# Patient Record
Sex: FEMALE | Race: WHITE | NOT HISPANIC OR LATINO
[De-identification: names, ages, dates, MRNs, and addresses within clinical notes are randomized per-mention and may not be internally consistent; named-entity substitution may affect disease eponyms.]

---

## 2017-01-20 ENCOUNTER — MOBILE ON CALL (OUTPATIENT)
Age: 57
End: 2017-01-20

## 2017-02-27 ENCOUNTER — MEDICATION RENEWAL (OUTPATIENT)
Age: 57
End: 2017-02-27

## 2017-03-01 ENCOUNTER — MEDICATION RENEWAL (OUTPATIENT)
Age: 57
End: 2017-03-01

## 2017-03-01 ENCOUNTER — TRANSCRIPTION ENCOUNTER (OUTPATIENT)
Age: 57
End: 2017-03-01

## 2017-03-20 ENCOUNTER — MEDICATION RENEWAL (OUTPATIENT)
Age: 57
End: 2017-03-20

## 2017-04-18 ENCOUNTER — MEDICATION RENEWAL (OUTPATIENT)
Age: 57
End: 2017-04-18

## 2017-05-03 ENCOUNTER — FORM ENCOUNTER (OUTPATIENT)
Age: 57
End: 2017-05-03

## 2017-05-04 ENCOUNTER — APPOINTMENT (OUTPATIENT)
Dept: FAMILY MEDICINE | Facility: CLINIC | Age: 57
End: 2017-05-04

## 2017-05-04 ENCOUNTER — OUTPATIENT (OUTPATIENT)
Dept: OUTPATIENT SERVICES | Facility: HOSPITAL | Age: 57
LOS: 1 days | End: 2017-05-04
Payer: COMMERCIAL

## 2017-05-04 ENCOUNTER — APPOINTMENT (OUTPATIENT)
Dept: RADIOLOGY | Facility: HOSPITAL | Age: 57
End: 2017-05-04

## 2017-05-04 VITALS
HEART RATE: 67 BPM | DIASTOLIC BLOOD PRESSURE: 90 MMHG | OXYGEN SATURATION: 97 % | RESPIRATION RATE: 14 BRPM | WEIGHT: 275 LBS | HEIGHT: 67 IN | BODY MASS INDEX: 43.16 KG/M2 | TEMPERATURE: 98.4 F | SYSTOLIC BLOOD PRESSURE: 140 MMHG

## 2017-05-04 DIAGNOSIS — M54.31 SCIATICA, RIGHT SIDE: ICD-10-CM

## 2017-05-04 PROCEDURE — 72100 X-RAY EXAM L-S SPINE 2/3 VWS: CPT

## 2017-05-05 ENCOUNTER — CHART COPY (OUTPATIENT)
Age: 57
End: 2017-05-05

## 2017-05-10 ENCOUNTER — MESSAGE (OUTPATIENT)
Age: 57
End: 2017-05-10

## 2017-05-11 ENCOUNTER — MEDICATION RENEWAL (OUTPATIENT)
Age: 57
End: 2017-05-11

## 2017-05-18 ENCOUNTER — MESSAGE (OUTPATIENT)
Age: 57
End: 2017-05-18

## 2017-05-26 ENCOUNTER — MEDICATION RENEWAL (OUTPATIENT)
Age: 57
End: 2017-05-26

## 2017-06-01 ENCOUNTER — MESSAGE (OUTPATIENT)
Age: 57
End: 2017-06-01

## 2017-06-05 ENCOUNTER — APPOINTMENT (OUTPATIENT)
Dept: ORTHOPEDIC SURGERY | Facility: CLINIC | Age: 57
End: 2017-06-05

## 2017-06-05 VITALS
WEIGHT: 270 LBS | HEIGHT: 67 IN | SYSTOLIC BLOOD PRESSURE: 152 MMHG | HEART RATE: 70 BPM | DIASTOLIC BLOOD PRESSURE: 75 MMHG | BODY MASS INDEX: 42.38 KG/M2

## 2017-06-05 VITALS — BODY MASS INDEX: 42.38 KG/M2 | WEIGHT: 270 LBS | HEIGHT: 67 IN

## 2017-06-05 DIAGNOSIS — M43.17 SPONDYLOLISTHESIS, LUMBOSACRAL REGION: ICD-10-CM

## 2017-06-06 ENCOUNTER — MESSAGE (OUTPATIENT)
Age: 57
End: 2017-06-06

## 2017-06-06 ENCOUNTER — MEDICATION RENEWAL (OUTPATIENT)
Age: 57
End: 2017-06-06

## 2017-06-07 ENCOUNTER — MESSAGE (OUTPATIENT)
Age: 57
End: 2017-06-07

## 2017-06-07 RX ORDER — MELOXICAM 15 MG/1
15 TABLET ORAL
Qty: 10 | Refills: 1 | Status: DISCONTINUED | COMMUNITY
Start: 2017-06-01 | End: 2017-06-07

## 2017-06-08 ENCOUNTER — MESSAGE (OUTPATIENT)
Age: 57
End: 2017-06-08

## 2017-06-13 ENCOUNTER — MEDICATION RENEWAL (OUTPATIENT)
Age: 57
End: 2017-06-13

## 2017-06-23 ENCOUNTER — MESSAGE (OUTPATIENT)
Age: 57
End: 2017-06-23

## 2017-07-06 ENCOUNTER — MEDICATION RENEWAL (OUTPATIENT)
Age: 57
End: 2017-07-06

## 2017-07-17 ENCOUNTER — MEDICATION RENEWAL (OUTPATIENT)
Age: 57
End: 2017-07-17

## 2017-07-21 ENCOUNTER — MEDICATION RENEWAL (OUTPATIENT)
Age: 57
End: 2017-07-21

## 2017-08-16 ENCOUNTER — RX RENEWAL (OUTPATIENT)
Age: 57
End: 2017-08-16

## 2017-08-16 ENCOUNTER — MEDICATION RENEWAL (OUTPATIENT)
Age: 57
End: 2017-08-16

## 2017-08-18 ENCOUNTER — MOBILE ON CALL (OUTPATIENT)
Age: 57
End: 2017-08-18

## 2017-09-25 ENCOUNTER — MEDICATION RENEWAL (OUTPATIENT)
Age: 57
End: 2017-09-25

## 2017-11-28 ENCOUNTER — RX RENEWAL (OUTPATIENT)
Age: 57
End: 2017-11-28

## 2017-12-05 ENCOUNTER — APPOINTMENT (OUTPATIENT)
Dept: FAMILY MEDICINE | Facility: CLINIC | Age: 57
End: 2017-12-05

## 2017-12-05 DIAGNOSIS — Z87.09 PERSONAL HISTORY OF OTHER DISEASES OF THE RESPIRATORY SYSTEM: ICD-10-CM

## 2017-12-12 ENCOUNTER — APPOINTMENT (OUTPATIENT)
Dept: FAMILY MEDICINE | Facility: CLINIC | Age: 57
End: 2017-12-12

## 2017-12-12 DIAGNOSIS — M54.6 PAIN IN THORACIC SPINE: ICD-10-CM

## 2017-12-19 ENCOUNTER — APPOINTMENT (OUTPATIENT)
Dept: FAMILY MEDICINE | Facility: CLINIC | Age: 57
End: 2017-12-19
Payer: COMMERCIAL

## 2017-12-19 VITALS
DIASTOLIC BLOOD PRESSURE: 84 MMHG | OXYGEN SATURATION: 98 % | WEIGHT: 288 LBS | HEIGHT: 67 IN | RESPIRATION RATE: 14 BRPM | SYSTOLIC BLOOD PRESSURE: 146 MMHG | BODY MASS INDEX: 45.2 KG/M2 | HEART RATE: 87 BPM | TEMPERATURE: 98.2 F

## 2017-12-19 PROCEDURE — 90686 IIV4 VACC NO PRSV 0.5 ML IM: CPT

## 2017-12-19 PROCEDURE — 99396 PREV VISIT EST AGE 40-64: CPT | Mod: 25

## 2017-12-19 PROCEDURE — G0008: CPT

## 2017-12-19 RX ORDER — PREDNISONE 50 MG/1
50 TABLET ORAL DAILY
Qty: 5 | Refills: 1 | Status: COMPLETED | COMMUNITY
Start: 2017-05-04 | End: 2017-12-19

## 2017-12-20 ENCOUNTER — RESULT REVIEW (OUTPATIENT)
Age: 57
End: 2017-12-20

## 2017-12-20 LAB — HPV HIGH+LOW RISK DNA PNL CVX: NOT DETECTED

## 2017-12-22 ENCOUNTER — RESULT REVIEW (OUTPATIENT)
Age: 57
End: 2017-12-22

## 2017-12-22 LAB — CYTOLOGY CVX/VAG DOC THIN PREP: NORMAL

## 2018-01-04 ENCOUNTER — RESULT REVIEW (OUTPATIENT)
Age: 58
End: 2018-01-04

## 2018-01-04 ENCOUNTER — MOBILE ON CALL (OUTPATIENT)
Age: 58
End: 2018-01-04

## 2018-01-04 LAB — HEMOCCULT STL QL IA: NEGATIVE

## 2018-03-05 ENCOUNTER — MEDICATION RENEWAL (OUTPATIENT)
Age: 58
End: 2018-03-05

## 2018-03-13 ENCOUNTER — RX RENEWAL (OUTPATIENT)
Age: 58
End: 2018-03-13

## 2018-04-11 ENCOUNTER — MOBILE ON CALL (OUTPATIENT)
Age: 58
End: 2018-04-11

## 2018-04-21 ENCOUNTER — RX RENEWAL (OUTPATIENT)
Age: 58
End: 2018-04-21

## 2018-05-21 ENCOUNTER — RX RENEWAL (OUTPATIENT)
Age: 58
End: 2018-05-21

## 2018-06-07 ENCOUNTER — OTHER (OUTPATIENT)
Age: 58
End: 2018-06-07

## 2018-07-15 ENCOUNTER — RX RENEWAL (OUTPATIENT)
Age: 58
End: 2018-07-15

## 2018-07-17 ENCOUNTER — RX RENEWAL (OUTPATIENT)
Age: 58
End: 2018-07-17

## 2018-08-08 ENCOUNTER — APPOINTMENT (OUTPATIENT)
Dept: FAMILY MEDICINE | Facility: CLINIC | Age: 58
End: 2018-08-08
Payer: COMMERCIAL

## 2018-08-08 VITALS
DIASTOLIC BLOOD PRESSURE: 88 MMHG | RESPIRATION RATE: 14 BRPM | SYSTOLIC BLOOD PRESSURE: 150 MMHG | HEART RATE: 116 BPM | BODY MASS INDEX: 45.11 KG/M2 | OXYGEN SATURATION: 98 % | WEIGHT: 288 LBS

## 2018-08-08 VITALS — SYSTOLIC BLOOD PRESSURE: 140 MMHG | DIASTOLIC BLOOD PRESSURE: 80 MMHG

## 2018-08-08 PROCEDURE — 99214 OFFICE O/P EST MOD 30 MIN: CPT

## 2018-08-08 NOTE — REVIEW OF SYSTEMS
[Earache] : earache [Sore Throat] : sore throat [Depression] : depression [Negative] : Heme/Lymph [FreeTextEntry4] : on the right side of the neck, cold sore on the lip -for about 5 days on and off for a little longer than that

## 2018-08-08 NOTE — PHYSICAL EXAM
[No Acute Distress] : no acute distress [Well Nourished] : well nourished [Normal Oropharynx] : the oropharynx was normal [Normal TMs] : both tympanic membranes were normal [No JVD] : no jugular venous distention [No Respiratory Distress] : no respiratory distress  [Clear to Auscultation] : lungs were clear to auscultation bilaterally [No Accessory Muscle Use] : no accessory muscle use [Normal Rate] : normal rate  [Regular Rhythm] : with a regular rhythm [Normal S1, S2] : normal S1 and S2 [No Murmur] : no murmur heard [Pedal Pulses Present] : the pedal pulses are present [No Edema] : there was no peripheral edema [No Extremity Clubbing/Cyanosis] : no extremity clubbing/cyanosis [No Discharge] : no discharge [Peau D'Orange In The Right Breast] : Peau [place] changes [___] : a [unfilled] ~Ucm area of erythema [Enlargement Of The Right Breast] : swelling [Tenderness Of The Right Breast] : tenderness [Breast Nipple Retraction Right] : retracted [Breast Nipple Flattening Right] : flattened [___cm] : a ~M [unfilled] ~Ucm subareolar mass was palpated [5:00] : in the 5:00 position [___cm Radial Distance from the Nipple] : [unfilled] ~Ucm radially from the nipple [The Left Breast Was Examined] : a normal appearance [Urinary Bladder Findings] : the bladder was normal on palpation [Vagina] : normal vaginal exam [Cervix] : normal cervix [Uterus] : uterus was normal size, without masses or tenderness [Uterine Adnexae] : normal adnexa [No CVA Tenderness] : no CVA  tenderness [No Joint Swelling] : no joint swelling [Normal Gait] : normal gait [Normal Affect] : the affect was normal [Alert and Oriented x3] : oriented to person, place, and time [Normal Insight/Judgement] : insight and judgment were intact [Normal Outer Ear/Nose] : the outer ears and nose were normal in appearance [No Axillary Lymphadenopathy] : no axillary lymphadenopathy [Breast Nipple Inversion Right] : not inverted [Breast Nipple Fissures Right] : not fissured [Breast Nipple Inversion Left] : not inverted [Breast Nipple Retraction Left] : not retracted [Breast Nipple Flattening Left] : not flattened [Breast Nipple Fissures Left] : not fissured [Axillary Lymph Nodes Enlarged Bilaterally] : no enlarged nodes [de-identified] : obese [de-identified] : small lesion right side of the lip  [de-identified] : right posterior cervical and anterior cervical nodes slightly enlarged and tender to touch, no other source [de-identified] : low back less tender than last exam and no neurological changes noted [de-identified] : eczema of the hands with significant dryness of skin dffusely [de-identified] :  no foot drop noted, fairly good strength in foot flexion and extension bilaterally

## 2018-08-08 NOTE — COUNSELING
[Weight management counseling provided] : Weight management [Healthy eating counseling provided] : healthy eating [Activity counseling provided] : activity [Behavioral health counseling provided] : behavioral health  [Sleep ___ hours/day] : Sleep [unfilled] hours/day [Engage in a relaxing activity] : Engage in a relaxing activity [None] : None [Good understanding] : Patient has a good understanding of lifestyle changes and the steps needed to achieve self management goals

## 2018-08-08 NOTE — HISTORY OF PRESENT ILLNESS
[FreeTextEntry8] : Patient here with pain in the right breast for the last 2 weeks- began when she would normally have gotten her menses, had initial symptoms in both breasts then just in the right while lying on the acupuncture table. Swelling on the left resolved but not the right. She also feels a lump underneath the nipple area of the right breast. She denies any discharge, fever or chills. The area has been a little bit red as well.

## 2018-08-08 NOTE — PAST MEDICAL HISTORY
[Perimenopausal] : hx of being perimenopausal [Definite ___ (Date)] : the last menstrual period was [unfilled] [Total Preg ___] : G: [unfilled] [Live Births___] : P: [unfilled] [Living ___] : Living: [unfilled]

## 2018-08-13 ENCOUNTER — MESSAGE (OUTPATIENT)
Age: 58
End: 2018-08-13

## 2018-08-14 ENCOUNTER — RESULT REVIEW (OUTPATIENT)
Age: 58
End: 2018-08-14

## 2018-08-15 ENCOUNTER — RESULT REVIEW (OUTPATIENT)
Age: 58
End: 2018-08-15

## 2018-08-18 ENCOUNTER — MOBILE ON CALL (OUTPATIENT)
Age: 58
End: 2018-08-18

## 2018-08-19 ENCOUNTER — RESULT REVIEW (OUTPATIENT)
Age: 58
End: 2018-08-19

## 2018-08-19 LAB — CREAT SERPL-MCNC: 0.63 MG/DL

## 2018-08-24 ENCOUNTER — MESSAGE (OUTPATIENT)
Age: 58
End: 2018-08-24

## 2018-08-24 RX ORDER — MELOXICAM 15 MG/1
15 TABLET ORAL DAILY
Qty: 30 | Refills: 1 | Status: COMPLETED | COMMUNITY
Start: 2017-12-19 | End: 2018-08-24

## 2018-08-24 RX ORDER — TRIAMCINOLONE ACETONIDE 5 MG/G
0.5 CREAM TOPICAL TWICE DAILY
Qty: 1 | Refills: 1 | Status: COMPLETED | COMMUNITY
Start: 2017-12-19 | End: 2018-08-24

## 2018-08-29 ENCOUNTER — TRANSCRIPTION ENCOUNTER (OUTPATIENT)
Age: 58
End: 2018-08-29

## 2018-08-30 ENCOUNTER — OUTPATIENT (OUTPATIENT)
Dept: OUTPATIENT SERVICES | Facility: HOSPITAL | Age: 58
LOS: 1 days | Discharge: HOME | End: 2018-08-30
Payer: COMMERCIAL

## 2018-08-30 ENCOUNTER — RESULT REVIEW (OUTPATIENT)
Age: 58
End: 2018-08-30

## 2018-08-30 DIAGNOSIS — C50.919 MALIGNANT NEOPLASM OF UNSPECIFIED SITE OF UNSPECIFIED FEMALE BREAST: ICD-10-CM

## 2018-09-05 ENCOUNTER — LABORATORY RESULT (OUTPATIENT)
Age: 58
End: 2018-09-05

## 2018-09-05 ENCOUNTER — RESULT REVIEW (OUTPATIENT)
Age: 58
End: 2018-09-05

## 2018-09-05 ENCOUNTER — APPOINTMENT (OUTPATIENT)
Dept: HEMATOLOGY ONCOLOGY | Facility: CLINIC | Age: 58
End: 2018-09-05
Payer: COMMERCIAL

## 2018-09-05 VITALS
TEMPERATURE: 98.4 F | RESPIRATION RATE: 16 BRPM | WEIGHT: 279.98 LBS | OXYGEN SATURATION: 97 % | SYSTOLIC BLOOD PRESSURE: 141 MMHG | HEART RATE: 77 BPM | DIASTOLIC BLOOD PRESSURE: 85 MMHG | BODY MASS INDEX: 44.47 KG/M2 | HEIGHT: 66.54 IN

## 2018-09-05 DIAGNOSIS — Z80.52 FAMILY HISTORY OF MALIGNANT NEOPLASM OF BLADDER: ICD-10-CM

## 2018-09-05 DIAGNOSIS — Z80.3 FAMILY HISTORY OF MALIGNANT NEOPLASM OF BREAST: ICD-10-CM

## 2018-09-05 LAB
HCT VFR BLD CALC: 39.5 % — SIGNIFICANT CHANGE UP (ref 34.5–45)
HGB BLD-MCNC: 12.9 G/DL — SIGNIFICANT CHANGE UP (ref 11.5–15.5)
MCHC RBC-ENTMCNC: 29.5 PG — SIGNIFICANT CHANGE UP (ref 27–34)
MCHC RBC-ENTMCNC: 32.8 G/DL — SIGNIFICANT CHANGE UP (ref 32–36)
MCV RBC AUTO: 90.1 FL — SIGNIFICANT CHANGE UP (ref 80–100)
PLATELET # BLD AUTO: 332 K/UL — SIGNIFICANT CHANGE UP (ref 150–400)
RBC # BLD: 4.38 M/UL — SIGNIFICANT CHANGE UP (ref 3.8–5.2)
RBC # FLD: 12 % — SIGNIFICANT CHANGE UP (ref 10.3–14.5)
WBC # BLD: 10.9 K/UL — HIGH (ref 3.8–10.5)
WBC # FLD AUTO: 10.9 K/UL — HIGH (ref 3.8–10.5)

## 2018-09-05 PROCEDURE — 99205 OFFICE O/P NEW HI 60 MIN: CPT

## 2018-09-05 PROCEDURE — 93010 ELECTROCARDIOGRAM REPORT: CPT

## 2018-09-06 LAB
BASOPHILS # BLD AUTO: 0.1 K/UL — SIGNIFICANT CHANGE UP (ref 0–0.2)
BASOPHILS NFR BLD AUTO: 0.5 % — SIGNIFICANT CHANGE UP (ref 0–2)
EOSINOPHIL # BLD AUTO: 0.2 K/UL — SIGNIFICANT CHANGE UP (ref 0–0.5)
EOSINOPHIL NFR BLD AUTO: 1.8 % — SIGNIFICANT CHANGE UP (ref 0–6)
LYMPHOCYTES # BLD AUTO: 19.6 % — SIGNIFICANT CHANGE UP (ref 13–44)
LYMPHOCYTES # BLD AUTO: 2.1 K/UL — SIGNIFICANT CHANGE UP (ref 1–3.3)
MONOCYTES # BLD AUTO: 0.5 K/UL — SIGNIFICANT CHANGE UP (ref 0–0.9)
MONOCYTES NFR BLD AUTO: 4.8 % — SIGNIFICANT CHANGE UP (ref 2–14)
NEUTROPHILS # BLD AUTO: 8 K/UL — HIGH (ref 1.8–7.4)
NEUTROPHILS NFR BLD AUTO: 73.3 % — SIGNIFICANT CHANGE UP (ref 43–77)

## 2018-09-08 ENCOUNTER — APPOINTMENT (OUTPATIENT)
Dept: NUCLEAR MEDICINE | Facility: IMAGING CENTER | Age: 58
End: 2018-09-08
Payer: COMMERCIAL

## 2018-09-08 ENCOUNTER — OUTPATIENT (OUTPATIENT)
Dept: OUTPATIENT SERVICES | Facility: HOSPITAL | Age: 58
LOS: 1 days | End: 2018-09-08
Payer: COMMERCIAL

## 2018-09-08 DIAGNOSIS — Z00.8 ENCOUNTER FOR OTHER GENERAL EXAMINATION: ICD-10-CM

## 2018-09-08 PROCEDURE — 78815 PET IMAGE W/CT SKULL-THIGH: CPT

## 2018-09-08 PROCEDURE — A9552: CPT

## 2018-09-08 PROCEDURE — 78815 PET IMAGE W/CT SKULL-THIGH: CPT | Mod: 26,PI

## 2018-09-11 ENCOUNTER — APPOINTMENT (OUTPATIENT)
Dept: CV DIAGNOSITCS | Facility: HOSPITAL | Age: 58
End: 2018-09-11
Payer: COMMERCIAL

## 2018-09-11 ENCOUNTER — OUTPATIENT (OUTPATIENT)
Dept: OUTPATIENT SERVICES | Facility: HOSPITAL | Age: 58
LOS: 1 days | End: 2018-09-11

## 2018-09-11 DIAGNOSIS — C50.911 MALIGNANT NEOPLASM OF UNSPECIFIED SITE OF RIGHT FEMALE BREAST: ICD-10-CM

## 2018-09-11 PROCEDURE — 93306 TTE W/DOPPLER COMPLETE: CPT | Mod: 26

## 2018-09-13 ENCOUNTER — NON-APPOINTMENT (OUTPATIENT)
Age: 58
End: 2018-09-13

## 2018-09-13 ENCOUNTER — APPOINTMENT (OUTPATIENT)
Dept: CARDIOLOGY | Facility: CLINIC | Age: 58
End: 2018-09-13
Payer: COMMERCIAL

## 2018-09-13 ENCOUNTER — RESULT REVIEW (OUTPATIENT)
Age: 58
End: 2018-09-13

## 2018-09-13 ENCOUNTER — APPOINTMENT (OUTPATIENT)
Dept: GASTROENTEROLOGY | Facility: HOSPITAL | Age: 58
End: 2018-09-13

## 2018-09-13 ENCOUNTER — OUTPATIENT (OUTPATIENT)
Dept: OUTPATIENT SERVICES | Facility: HOSPITAL | Age: 58
LOS: 1 days | Discharge: HOME | End: 2018-09-13
Payer: COMMERCIAL

## 2018-09-13 VITALS — SYSTOLIC BLOOD PRESSURE: 131 MMHG | DIASTOLIC BLOOD PRESSURE: 84 MMHG | HEART RATE: 90 BPM

## 2018-09-13 DIAGNOSIS — C50.911 MALIGNANT NEOPLASM OF UNSPECIFIED SITE OF RIGHT FEMALE BREAST: ICD-10-CM

## 2018-09-13 PROCEDURE — 88312 SPECIAL STAINS GROUP 1: CPT | Mod: 26

## 2018-09-13 PROCEDURE — 99244 OFF/OP CNSLTJ NEW/EST MOD 40: CPT

## 2018-09-13 PROCEDURE — 88305 TISSUE EXAM BY PATHOLOGIST: CPT | Mod: 26

## 2018-09-13 PROCEDURE — 88341 IMHCHEM/IMCYTCHM EA ADD ANTB: CPT | Mod: 26

## 2018-09-13 PROCEDURE — 43239 EGD BIOPSY SINGLE/MULTIPLE: CPT | Mod: GC,59

## 2018-09-13 PROCEDURE — 88189 FLOWCYTOMETRY/READ 16 & >: CPT

## 2018-09-13 PROCEDURE — 88173 CYTOPATH EVAL FNA REPORT: CPT | Mod: 26

## 2018-09-13 PROCEDURE — 43242 EGD US FINE NEEDLE BX/ASPIR: CPT | Mod: GC

## 2018-09-13 PROCEDURE — 93000 ELECTROCARDIOGRAM COMPLETE: CPT

## 2018-09-13 PROCEDURE — 88342 IMHCHEM/IMCYTCHM 1ST ANTB: CPT | Mod: 26

## 2018-09-13 PROCEDURE — 88305 TISSUE EXAM BY PATHOLOGIST: CPT | Mod: 26,59

## 2018-09-14 ENCOUNTER — APPOINTMENT (OUTPATIENT)
Dept: INFUSION THERAPY | Facility: HOSPITAL | Age: 58
End: 2018-09-14

## 2018-09-14 ENCOUNTER — APPOINTMENT (OUTPATIENT)
Dept: HEMATOLOGY ONCOLOGY | Facility: CLINIC | Age: 58
End: 2018-09-14

## 2018-09-14 LAB — TM INTERPRETATION: SIGNIFICANT CHANGE UP

## 2018-09-19 ENCOUNTER — CHART COPY (OUTPATIENT)
Age: 58
End: 2018-09-19

## 2018-09-24 ENCOUNTER — FORM ENCOUNTER (OUTPATIENT)
Age: 58
End: 2018-09-24

## 2018-09-25 ENCOUNTER — OUTPATIENT (OUTPATIENT)
Dept: OUTPATIENT SERVICES | Facility: HOSPITAL | Age: 58
LOS: 1 days | End: 2018-09-25
Payer: COMMERCIAL

## 2018-09-25 DIAGNOSIS — Z98.890 OTHER SPECIFIED POSTPROCEDURAL STATES: Chronic | ICD-10-CM

## 2018-09-25 DIAGNOSIS — C50.911 MALIGNANT NEOPLASM OF UNSPECIFIED SITE OF RIGHT FEMALE BREAST: ICD-10-CM

## 2018-09-25 PROCEDURE — 77001 FLUOROGUIDE FOR VEIN DEVICE: CPT | Mod: 26

## 2018-09-25 PROCEDURE — 36561 INSERT TUNNELED CV CATH: CPT

## 2018-09-25 PROCEDURE — C1894: CPT

## 2018-09-25 PROCEDURE — 76937 US GUIDE VASCULAR ACCESS: CPT

## 2018-09-25 PROCEDURE — 76937 US GUIDE VASCULAR ACCESS: CPT | Mod: 26

## 2018-09-25 PROCEDURE — 77001 FLUOROGUIDE FOR VEIN DEVICE: CPT

## 2018-09-25 PROCEDURE — C1769: CPT

## 2018-09-25 PROCEDURE — C1788: CPT

## 2018-09-26 ENCOUNTER — NON-APPOINTMENT (OUTPATIENT)
Age: 58
End: 2018-09-26

## 2018-09-26 ENCOUNTER — APPOINTMENT (OUTPATIENT)
Dept: HEMATOLOGY ONCOLOGY | Facility: CLINIC | Age: 58
End: 2018-09-26
Payer: COMMERCIAL

## 2018-09-26 ENCOUNTER — APPOINTMENT (OUTPATIENT)
Dept: INFUSION THERAPY | Facility: HOSPITAL | Age: 58
End: 2018-09-26

## 2018-09-26 ENCOUNTER — RESULT REVIEW (OUTPATIENT)
Age: 58
End: 2018-09-26

## 2018-09-26 ENCOUNTER — LABORATORY RESULT (OUTPATIENT)
Age: 58
End: 2018-09-26

## 2018-09-26 ENCOUNTER — APPOINTMENT (OUTPATIENT)
Dept: DERMATOLOGY | Facility: CLINIC | Age: 58
End: 2018-09-26
Payer: COMMERCIAL

## 2018-09-26 VITALS
BODY MASS INDEX: 43.95 KG/M2 | WEIGHT: 280 LBS | DIASTOLIC BLOOD PRESSURE: 76 MMHG | HEIGHT: 67 IN | SYSTOLIC BLOOD PRESSURE: 128 MMHG

## 2018-09-26 LAB
HCT VFR BLD CALC: 40.9 % — SIGNIFICANT CHANGE UP (ref 34.5–45)
HGB BLD-MCNC: 13 G/DL — SIGNIFICANT CHANGE UP (ref 11.5–15.5)
MCHC RBC-ENTMCNC: 28.3 PG — SIGNIFICANT CHANGE UP (ref 27–34)
MCHC RBC-ENTMCNC: 31.8 G/DL — LOW (ref 32–36)
MCV RBC AUTO: 89.1 FL — SIGNIFICANT CHANGE UP (ref 80–100)
PLATELET # BLD AUTO: 324 K/UL — SIGNIFICANT CHANGE UP (ref 150–400)
RBC # BLD: 4.6 M/UL — SIGNIFICANT CHANGE UP (ref 3.8–5.2)
RBC # FLD: 12.4 % — SIGNIFICANT CHANGE UP (ref 10.3–14.5)
WBC # BLD: 10.7 K/UL — HIGH (ref 3.8–10.5)
WBC # FLD AUTO: 10.7 K/UL — HIGH (ref 3.8–10.5)

## 2018-09-26 PROCEDURE — 99215 OFFICE O/P EST HI 40 MIN: CPT

## 2018-09-26 PROCEDURE — 11100 BX SKIN SUBCUTANEOUS&/MUCOUS MEMBRANE 1 LESION: CPT

## 2018-09-26 RX ORDER — TRIAMCINOLONE ACETONIDE 5 MG/G
0.5 CREAM TOPICAL TWICE DAILY
Qty: 15 | Refills: 1 | Status: DISCONTINUED | COMMUNITY
Start: 2018-04-11 | End: 2018-09-26

## 2018-09-27 ENCOUNTER — APPOINTMENT (OUTPATIENT)
Dept: ENDOVASCULAR SURGERY | Facility: CLINIC | Age: 58
End: 2018-09-27

## 2018-09-27 DIAGNOSIS — Z51.11 ENCOUNTER FOR ANTINEOPLASTIC CHEMOTHERAPY: ICD-10-CM

## 2018-09-27 DIAGNOSIS — E86.0 DEHYDRATION: ICD-10-CM

## 2018-09-27 DIAGNOSIS — R11.2 NAUSEA WITH VOMITING, UNSPECIFIED: ICD-10-CM

## 2018-09-27 DIAGNOSIS — Z45.2 ENCOUNTER FOR ADJUSTMENT AND MANAGEMENT OF VASCULAR ACCESS DEVICE: ICD-10-CM

## 2018-09-27 DIAGNOSIS — Z51.89 ENCOUNTER FOR OTHER SPECIFIED AFTERCARE: ICD-10-CM

## 2018-09-27 DIAGNOSIS — C50.911 MALIGNANT NEOPLASM OF UNSPECIFIED SITE OF RIGHT FEMALE BREAST: ICD-10-CM

## 2018-09-27 PROBLEM — J34.2 DEVIATED NASAL SEPTUM: Chronic | Status: ACTIVE | Noted: 2018-09-25

## 2018-09-28 ENCOUNTER — APPOINTMENT (OUTPATIENT)
Dept: INFUSION THERAPY | Facility: HOSPITAL | Age: 58
End: 2018-09-28

## 2018-10-02 DIAGNOSIS — Z87.891 PERSONAL HISTORY OF NICOTINE DEPENDENCE: ICD-10-CM

## 2018-10-02 DIAGNOSIS — Z87.19 PERSONAL HISTORY OF OTHER DISEASES OF THE DIGESTIVE SYSTEM: ICD-10-CM

## 2018-10-02 RX ORDER — SUMATRIPTAN 50 MG/1
50 TABLET, FILM COATED ORAL
Qty: 9 | Refills: 0 | Status: COMPLETED | COMMUNITY
Start: 2018-07-08 | End: 2018-10-02

## 2018-10-04 ENCOUNTER — MEDICATION RENEWAL (OUTPATIENT)
Age: 58
End: 2018-10-04

## 2018-10-04 ENCOUNTER — APPOINTMENT (OUTPATIENT)
Dept: DERMATOLOGY | Facility: CLINIC | Age: 58
End: 2018-10-04
Payer: COMMERCIAL

## 2018-10-04 VITALS — SYSTOLIC BLOOD PRESSURE: 118 MMHG | DIASTOLIC BLOOD PRESSURE: 72 MMHG

## 2018-10-04 PROCEDURE — 99212 OFFICE O/P EST SF 10 MIN: CPT

## 2018-10-09 ENCOUNTER — OUTPATIENT (OUTPATIENT)
Dept: OUTPATIENT SERVICES | Facility: HOSPITAL | Age: 58
LOS: 1 days | Discharge: HOME | End: 2018-10-09

## 2018-10-09 DIAGNOSIS — C50.919 MALIGNANT NEOPLASM OF UNSPECIFIED SITE OF UNSPECIFIED FEMALE BREAST: ICD-10-CM

## 2018-10-09 DIAGNOSIS — Z98.890 OTHER SPECIFIED POSTPROCEDURAL STATES: Chronic | ICD-10-CM

## 2018-10-12 ENCOUNTER — RESULT REVIEW (OUTPATIENT)
Age: 58
End: 2018-10-12

## 2018-10-12 ENCOUNTER — LABORATORY RESULT (OUTPATIENT)
Age: 58
End: 2018-10-12

## 2018-10-12 ENCOUNTER — APPOINTMENT (OUTPATIENT)
Dept: INFUSION THERAPY | Facility: HOSPITAL | Age: 58
End: 2018-10-12

## 2018-10-12 ENCOUNTER — APPOINTMENT (OUTPATIENT)
Dept: HEMATOLOGY ONCOLOGY | Facility: CLINIC | Age: 58
End: 2018-10-12
Payer: COMMERCIAL

## 2018-10-12 LAB
HCT VFR BLD CALC: 36.9 % — SIGNIFICANT CHANGE UP (ref 34.5–45)
HGB BLD-MCNC: 12.3 G/DL — SIGNIFICANT CHANGE UP (ref 11.5–15.5)
MCHC RBC-ENTMCNC: 29.3 PG — SIGNIFICANT CHANGE UP (ref 27–34)
MCHC RBC-ENTMCNC: 33.3 G/DL — SIGNIFICANT CHANGE UP (ref 32–36)
MCV RBC AUTO: 88 FL — SIGNIFICANT CHANGE UP (ref 80–100)
PLATELET # BLD AUTO: 337 K/UL — SIGNIFICANT CHANGE UP (ref 150–400)
RBC # BLD: 4.19 M/UL — SIGNIFICANT CHANGE UP (ref 3.8–5.2)
RBC # FLD: 12.7 % — SIGNIFICANT CHANGE UP (ref 10.3–14.5)
WBC # BLD: 10.8 K/UL — HIGH (ref 3.8–10.5)
WBC # FLD AUTO: 10.8 K/UL — HIGH (ref 3.8–10.5)

## 2018-10-12 PROCEDURE — 99215 OFFICE O/P EST HI 40 MIN: CPT

## 2018-10-15 DIAGNOSIS — Z51.89 ENCOUNTER FOR OTHER SPECIFIED AFTERCARE: ICD-10-CM

## 2018-10-15 DIAGNOSIS — R11.2 NAUSEA WITH VOMITING, UNSPECIFIED: ICD-10-CM

## 2018-10-15 DIAGNOSIS — E86.0 DEHYDRATION: ICD-10-CM

## 2018-10-15 DIAGNOSIS — Z51.11 ENCOUNTER FOR ANTINEOPLASTIC CHEMOTHERAPY: ICD-10-CM

## 2018-10-25 ENCOUNTER — RX RENEWAL (OUTPATIENT)
Age: 58
End: 2018-10-25

## 2018-10-26 ENCOUNTER — APPOINTMENT (OUTPATIENT)
Dept: HEMATOLOGY ONCOLOGY | Facility: CLINIC | Age: 58
End: 2018-10-26
Payer: COMMERCIAL

## 2018-10-26 ENCOUNTER — APPOINTMENT (OUTPATIENT)
Dept: INFUSION THERAPY | Facility: HOSPITAL | Age: 58
End: 2018-10-26
Payer: COMMERCIAL

## 2018-10-26 ENCOUNTER — RESULT REVIEW (OUTPATIENT)
Age: 58
End: 2018-10-26

## 2018-10-26 ENCOUNTER — LABORATORY RESULT (OUTPATIENT)
Age: 58
End: 2018-10-26

## 2018-10-26 VITALS
WEIGHT: 278.88 LBS | OXYGEN SATURATION: 97 % | BODY MASS INDEX: 43.68 KG/M2 | SYSTOLIC BLOOD PRESSURE: 154 MMHG | TEMPERATURE: 98.3 F | RESPIRATION RATE: 16 BRPM | HEART RATE: 78 BPM | DIASTOLIC BLOOD PRESSURE: 83 MMHG

## 2018-10-26 LAB
HCT VFR BLD CALC: 33.1 % — LOW (ref 34.5–45)
HGB BLD-MCNC: 11 G/DL — LOW (ref 11.5–15.5)
MCHC RBC-ENTMCNC: 29.3 PG — SIGNIFICANT CHANGE UP (ref 27–34)
MCHC RBC-ENTMCNC: 33.2 G/DL — SIGNIFICANT CHANGE UP (ref 32–36)
MCV RBC AUTO: 88.2 FL — SIGNIFICANT CHANGE UP (ref 80–100)
PLATELET # BLD AUTO: 237 K/UL — SIGNIFICANT CHANGE UP (ref 150–400)
RBC # BLD: 3.75 M/UL — LOW (ref 3.8–5.2)
RBC # FLD: 13.4 % — SIGNIFICANT CHANGE UP (ref 10.3–14.5)
WBC # BLD: 9.3 K/UL — SIGNIFICANT CHANGE UP (ref 3.8–10.5)
WBC # FLD AUTO: 9.3 K/UL — SIGNIFICANT CHANGE UP (ref 3.8–10.5)

## 2018-10-26 PROCEDURE — 99215 OFFICE O/P EST HI 40 MIN: CPT | Mod: 25

## 2018-10-26 PROCEDURE — G0008: CPT

## 2018-10-31 ENCOUNTER — OUTPATIENT (OUTPATIENT)
Dept: OUTPATIENT SERVICES | Facility: HOSPITAL | Age: 58
LOS: 1 days | Discharge: HOME | End: 2018-10-31

## 2018-10-31 DIAGNOSIS — C50.919 MALIGNANT NEOPLASM OF UNSPECIFIED SITE OF UNSPECIFIED FEMALE BREAST: ICD-10-CM

## 2018-10-31 DIAGNOSIS — Z98.890 OTHER SPECIFIED POSTPROCEDURAL STATES: Chronic | ICD-10-CM

## 2018-11-05 ENCOUNTER — MOBILE ON CALL (OUTPATIENT)
Age: 58
End: 2018-11-05

## 2018-11-05 ENCOUNTER — APPOINTMENT (OUTPATIENT)
Dept: FAMILY MEDICINE | Facility: CLINIC | Age: 58
End: 2018-11-05
Payer: COMMERCIAL

## 2018-11-05 VITALS
DIASTOLIC BLOOD PRESSURE: 84 MMHG | BODY MASS INDEX: 43.63 KG/M2 | OXYGEN SATURATION: 98 % | TEMPERATURE: 98 F | HEART RATE: 104 BPM | HEIGHT: 67 IN | SYSTOLIC BLOOD PRESSURE: 130 MMHG | WEIGHT: 278 LBS | RESPIRATION RATE: 14 BRPM

## 2018-11-05 PROCEDURE — 99213 OFFICE O/P EST LOW 20 MIN: CPT

## 2018-11-05 RX ORDER — DICLOFENAC SODIUM 10 MG/G
1 GEL TOPICAL
Qty: 500 | Refills: 0 | Status: COMPLETED | COMMUNITY
Start: 2018-05-09

## 2018-11-05 NOTE — COUNSELING
[Healthy eating counseling provided] : healthy eating [Activity counseling provided] : activity [Behavioral health counseling provided] : behavioral health  [Sleep ___ hours/day] : Sleep [unfilled] hours/day [Engage in a relaxing activity] : Engage in a relaxing activity [None] : None [Needs reinforcement, provided] : Patient needs reinforcement on understanding lifestyle changes and  the steps needed to achieve self management goals and reinforcement was provided

## 2018-11-05 NOTE — REVIEW OF SYSTEMS
[Abdominal Pain] : abdominal pain [Diarrhea] : diarrhea [Dysuria] : dysuria [Frequency] : frequency [Negative] : Respiratory [Hematuria] : no hematuria [FreeTextEntry4] : mouth sores after last chemo now resolving, mouth is dry but using biotene with improvement.

## 2018-11-05 NOTE — HISTORY OF PRESENT ILLNESS
[FreeTextEntry8] : Vaginal discharge and itching following last chemotherapy treatment about 2 weeks ago. Also having diarrhea since that treatment, since also having some dysuria and urgency with urination. No hematuria but urine is dark likely from lack of hydration.

## 2018-11-05 NOTE — PHYSICAL EXAM
[No Acute Distress] : no acute distress [Soft] : abdomen soft [Non Tender] : non-tender [Vulvitis] : vulvitis [No Rash] : no rash [Alert and Oriented x3] : oriented to person, place, and time [FreeTextEntry1] : mucosal sloughing of the vulvar region with few ulcerated

## 2018-11-07 ENCOUNTER — RESULT REVIEW (OUTPATIENT)
Age: 58
End: 2018-11-07

## 2018-11-07 LAB
CANDIDA VAG CYTO: NOT DETECTED
G VAGINALIS+PREV SP MTYP VAG QL MICRO: NOT DETECTED
T VAGINALIS VAG QL WET PREP: NOT DETECTED

## 2018-11-09 ENCOUNTER — APPOINTMENT (OUTPATIENT)
Dept: HEMATOLOGY ONCOLOGY | Facility: CLINIC | Age: 58
End: 2018-11-09
Payer: COMMERCIAL

## 2018-11-09 ENCOUNTER — RESULT REVIEW (OUTPATIENT)
Age: 58
End: 2018-11-09

## 2018-11-09 ENCOUNTER — LABORATORY RESULT (OUTPATIENT)
Age: 58
End: 2018-11-09

## 2018-11-09 ENCOUNTER — APPOINTMENT (OUTPATIENT)
Dept: INFUSION THERAPY | Facility: HOSPITAL | Age: 58
End: 2018-11-09

## 2018-11-09 VITALS
RESPIRATION RATE: 14 BRPM | HEART RATE: 71 BPM | BODY MASS INDEX: 43.06 KG/M2 | DIASTOLIC BLOOD PRESSURE: 66 MMHG | WEIGHT: 274.92 LBS | TEMPERATURE: 98 F | SYSTOLIC BLOOD PRESSURE: 142 MMHG | OXYGEN SATURATION: 98 %

## 2018-11-09 LAB
HCT VFR BLD CALC: 31.7 % — LOW (ref 34.5–45)
HGB BLD-MCNC: 10.5 G/DL — LOW (ref 11.5–15.5)
MCHC RBC-ENTMCNC: 29.4 PG — SIGNIFICANT CHANGE UP (ref 27–34)
MCHC RBC-ENTMCNC: 33 G/DL — SIGNIFICANT CHANGE UP (ref 32–36)
MCV RBC AUTO: 88.8 FL — SIGNIFICANT CHANGE UP (ref 80–100)
PLATELET # BLD AUTO: 347 K/UL — SIGNIFICANT CHANGE UP (ref 150–400)
RBC # BLD: 3.57 M/UL — LOW (ref 3.8–5.2)
RBC # FLD: 14.2 % — SIGNIFICANT CHANGE UP (ref 10.3–14.5)
WBC # BLD: 10.3 K/UL — SIGNIFICANT CHANGE UP (ref 3.8–10.5)
WBC # FLD AUTO: 10.3 K/UL — SIGNIFICANT CHANGE UP (ref 3.8–10.5)

## 2018-11-09 PROCEDURE — 99215 OFFICE O/P EST HI 40 MIN: CPT

## 2018-11-09 NOTE — REASON FOR VISIT
[Follow-Up Visit] : a follow-up [Spouse] : spouse [FreeTextEntry2] : locally advanced, inflammatory breast cancer

## 2018-11-09 NOTE — ASSESSMENT
[FreeTextEntry1] : 59yo woman with ER+MN-HER2+ locally advanced, poorly differentiated, inflammatory ductal breast cancer, multifocal with multiple breast masses and multiple enlarged axillary LNs on exam/MRI imaging (biopsy confirmed with clips placed). Staging imaging with SUV-avid peripancreatic LN - biopsy with reactive lymph node (benign). She presents for C4D1 of dose-dense AC (to be followed by THP weekly x 12 weeks). She is doing well overall with some chemotherapy-related side effects in the week after treatment, and exam is consistent with response to therapy.\par \par R Breast Cancer: Inflammatory with axillary involvement\par -labs today, proceed with AC #4 via mediport with neulasta OnPro support\par -re-reviewed side effects, monitoring, to call if any issues or new symptoms develop, fever to go to call and come to [facility] or ER immediately\par -EMLA cream 1hour prior to mediport accessed\par -continue antihypertensive medication (HTN treatment and cardioprotective)\par -follow up with Dr. Luna after AC portion of treatment is complete, she has an appointment 11/15 (plan for mastectomy with ALND)\par -radiation and plastic surgery consultations in near future\par -plan for interval repeat imaging prior to surgery\par -adjuvant plan for AI (?with ovarian suppression), herceptin/perjeta to complete 1 year, consider extended adjuvant therapy with neratinib thereafter\par \par Chemotherapy-induced nausea: \par -eprescribed PRN compazine trial for nausea as trial given incomplete relief with Reglan and Zofran recently\par \par Eye tearing/crusting:\par -possible side effect of treatment v. allergy-related\par -trial of OTC antihistamine eye drops BID and saline/rewetting drops between\par \par Anxiety: patient takes citalopram, situation anxiety surrounding treatment appointments especially\par -recommend trial of Ativan 0.5mg PRN; night before treatment and AM of treatment (trial of half tab - 0.25mg for tolerance)\par -advised on avoiding driving, falls precautions when taking

## 2018-11-09 NOTE — PHYSICAL EXAM
[Fully active, able to carry on all pre-disease performance without restriction] : Status 0 - Fully active, able to carry on all pre-disease performance without restriction [Normal] : affect appropriate [de-identified] : +alopecia, wearing cap [de-identified] : occasional tearing b/l without erythema or irritation evident [de-identified] : no sialedenitis evident, nontender, no submandibular masses or adenopathy, no mucositis or irritation [de-identified] : L chest wall mediport. no scab or erythema, well healed [de-identified] : pendulous breasts. R breast with some thickness behind the nipple without mauricio masses palpable (signficant improvement) - nipple flattened with dimpling superiorly with very slight peu d'orange appearance and 2 small papules at nipple stable/overall improved. L axillary fullness without mauricio masses, L no masses or adenopathy

## 2018-11-09 NOTE — HISTORY OF PRESENT ILLNESS
[Disease: _____________________] : Disease: [unfilled] [T: ___] : T[unfilled] [N: ___] : N[unfilled] [AJCC Stage: ____] : AJCC Stage: [unfilled] [Treatment Protocol] : Treatment Protocol [de-identified] : \par 57yo woman with ER+AR-HER2+ locally advanced, inflammatory poorly differentiated ductal breast cancer.\par \par She noted summer 2018 she felt a mass near her nipple in her R breast. She went to her PMD who ordered a mammogram and sonogram which revealed a R abnormal hypoechoic mass and abnormal appearing axillary LN.\par \par Image guided biopsy of mass and axilla revealed invasive poorly differentiated ductal carcinoma, +LVI, 1.2cm in specimen, [place] 8/9, DCIS, microcalcifications present, ER >95%, AR negative, HER2+ 3+ IHC, Lymph node biopsy with metastatic carcinoma.\par \par She then saw Dr. Camacho breast surgeon who ordered and MRI of the breasts. MRI showed: Biopsy proven mammary carcinoma in the R retroareolar region 5:00 manifested as multiple irregular masses. The tumor is extensive in nature and spanning an area of over 3 cm with non-mass enhancement extending posteriorly suggesting extensive intraductal component as well. Two other highly suspicious masses noted in the RUOQ at 11:00 measuring >3cm and at 10:00 measuring 2.6cm consistent with multicentric disease. Biopsy proven metastatic disease to an axillary LN with additional suspicious LNs (additional with replaced fatty hilum and abnormally enlarged cortices, some have irregular borders, some posterior to pectoralis muscle, also abnormal appearing node in soft tissue lateral to the R chest wall).\par \par Dr. Camacho recommended neoadjuvant chemotherapy and the option for mastectomy with possible axillary lymph node dissection pending response to therapy.\par \par She then saw me in consultation. She underwent PETCT that found a peripancreatic lymph node that was enlarged and SUV avid, b/l laryngeal FDG avidity (likely due to speaking during exam), and SUV avid breast mass/axilla on R with breast skin noted to be thickened. She underwent EGD/EUS and biopsy of this peripancreatic LN (Dr. Giovanni Felton) which was consistent with benign/reactive lymphatic tissue. She had a mediport placed by IR on 9/26. She saw oncocardiology Dr. Khan for risk assessment prior to anthracycline/anti-HER2 monoclonal therapy and was started on ACEi for HTN and for cardioprotection. She saw dermatology for skin biopsy of R breast prior to treatment to evaluate/confirm inflammatory disease; biopsy results with skin involvement by poorly differentiated carcinoma. She started neoadjuvant chemotherapy with dose-dense AC on 9/26.\par \par Patient lives in [place] with her  Gonzales ([employer] company owner) and 26 year old daughter (an artist and ). She works full time as a . She notes a history of smoking in the distant past. Mother passed away in 70s from metastatic melanoma, and a maternal aunt had breast cancer; no other significant close family history of cancers. She notes overall migraines (which she has had for many years, seen neurologist, stable lytic medications help, associated with one sided facial numbness when they occur) have gotten better with menopause – her LMP was 2/2018 and it was infrequent prior to that. She has had some hot flashes. She used OCPs in the past for years particularly for endometriosis treatment. She has had issues with DJD of back and spine with pain, MRI last year noted, epidural injections have helped with this significantly as well as occasional NSAID use.\par  [de-identified] : ER+ND-HER2+ [FreeTextEntry1] : dose-dense AC x 4 cycles, to be followed by weekly taxol/herceptin/perjeta x 12 [de-identified] : She presents today for C4D1 of AC (to be followed by[facility]). She noted fatigue that lingered for ~1 week after treatment, slightly longer duration this cycle and for the full week. She feels "queasy" without emesis for 2-3 days post treatment and poor/metallic taste sensation that lingers, only somewhat improved with PO Reglan PRN or Zofran ODT - tolerating PO but she notes tough to drink enough fluids during that time. Occasional b/l eye tearing with crusting in the morning. She had some mouth sores that improved/resolved with magic mouthwash. She has loose BMs multiple times per day for 5 days last week. She was treated for vaginal infection/itching by her PMD and has noted significant relief over the last few days. She feels a headache/migraine coming on this morning. Denies f/c/s, current n/v, CP, SOB, RESTREPO, cough, abd pain, diarrhea, bleeding, edema, rashes, new neuro symptoms, weight loss or gain.

## 2018-11-09 NOTE — OB HISTORY
[Definite:  ___ (Date)] : the last menstrual period was [unfilled] [Experiencing Menopausal Sxs] : experiencing menopausal symptoms [___] : Full Term: [unfilled] [Normal Amount/Duration] : was abnormal [Spotting Between  Menses] : no spotting between menses [Regular Cycle Intervals] : periods have been irregular

## 2018-11-12 DIAGNOSIS — Z51.89 ENCOUNTER FOR OTHER SPECIFIED AFTERCARE: ICD-10-CM

## 2018-11-12 DIAGNOSIS — R11.2 NAUSEA WITH VOMITING, UNSPECIFIED: ICD-10-CM

## 2018-11-12 DIAGNOSIS — Z51.11 ENCOUNTER FOR ANTINEOPLASTIC CHEMOTHERAPY: ICD-10-CM

## 2018-11-15 ENCOUNTER — MESSAGE (OUTPATIENT)
Age: 58
End: 2018-11-15

## 2018-11-23 ENCOUNTER — RESULT REVIEW (OUTPATIENT)
Age: 58
End: 2018-11-23

## 2018-11-23 ENCOUNTER — LABORATORY RESULT (OUTPATIENT)
Age: 58
End: 2018-11-23

## 2018-11-23 ENCOUNTER — APPOINTMENT (OUTPATIENT)
Dept: HEMATOLOGY ONCOLOGY | Facility: CLINIC | Age: 58
End: 2018-11-23
Payer: COMMERCIAL

## 2018-11-23 ENCOUNTER — APPOINTMENT (OUTPATIENT)
Dept: INFUSION THERAPY | Facility: HOSPITAL | Age: 58
End: 2018-11-23

## 2018-11-23 VITALS
TEMPERATURE: 98.3 F | SYSTOLIC BLOOD PRESSURE: 136 MMHG | RESPIRATION RATE: 16 BRPM | WEIGHT: 272.27 LBS | BODY MASS INDEX: 42.64 KG/M2 | OXYGEN SATURATION: 97 % | HEART RATE: 84 BPM | DIASTOLIC BLOOD PRESSURE: 78 MMHG

## 2018-11-23 LAB
BASOPHILS # BLD AUTO: 0 K/UL — SIGNIFICANT CHANGE UP (ref 0–0.2)
BASOPHILS NFR BLD AUTO: 0.2 % — SIGNIFICANT CHANGE UP (ref 0–2)
EOSINOPHIL # BLD AUTO: 0.1 K/UL — SIGNIFICANT CHANGE UP (ref 0–0.5)
EOSINOPHIL NFR BLD AUTO: 0.6 % — SIGNIFICANT CHANGE UP (ref 0–6)
HCT VFR BLD CALC: 28.4 % — LOW (ref 34.5–45)
HGB BLD-MCNC: 9.6 G/DL — LOW (ref 11.5–15.5)
LYMPHOCYTES # BLD AUTO: 0.8 K/UL — LOW (ref 1–3.3)
LYMPHOCYTES # BLD AUTO: 9.2 % — LOW (ref 13–44)
MCHC RBC-ENTMCNC: 30.3 PG — SIGNIFICANT CHANGE UP (ref 27–34)
MCHC RBC-ENTMCNC: 33.9 G/DL — SIGNIFICANT CHANGE UP (ref 32–36)
MCV RBC AUTO: 89.4 FL — SIGNIFICANT CHANGE UP (ref 80–100)
MONOCYTES # BLD AUTO: 0.8 K/UL — SIGNIFICANT CHANGE UP (ref 0–0.9)
MONOCYTES NFR BLD AUTO: 8.4 % — SIGNIFICANT CHANGE UP (ref 2–14)
NEUTROPHILS # BLD AUTO: 7.4 K/UL — SIGNIFICANT CHANGE UP (ref 1.8–7.4)
NEUTROPHILS NFR BLD AUTO: 81.6 % — HIGH (ref 43–77)
PLATELET # BLD AUTO: 296 K/UL — SIGNIFICANT CHANGE UP (ref 150–400)
RBC # BLD: 3.18 M/UL — LOW (ref 3.8–5.2)
RBC # FLD: 15.6 % — HIGH (ref 10.3–14.5)
WBC # BLD: 9 K/UL — SIGNIFICANT CHANGE UP (ref 3.8–10.5)
WBC # FLD AUTO: 9 K/UL — SIGNIFICANT CHANGE UP (ref 3.8–10.5)

## 2018-11-23 PROCEDURE — 99214 OFFICE O/P EST MOD 30 MIN: CPT

## 2018-11-23 NOTE — HISTORY OF PRESENT ILLNESS
[Disease: _____________________] : Disease: [unfilled] [T: ___] : T[unfilled] [N: ___] : N[unfilled] [AJCC Stage: ____] : AJCC Stage: [unfilled] [Treatment Protocol] : Treatment Protocol [de-identified] : \par 57yo woman with ER+GA-HER2+ locally advanced, inflammatory poorly differentiated ductal breast cancer.\par \par She noted summer 2018 she felt a mass near her nipple in her R breast. She went to her PMD who ordered a mammogram and sonogram which revealed a R abnormal hypoechoic mass and abnormal appearing axillary LN.\par \par Image guided biopsy of mass and axilla revealed invasive poorly differentiated ductal carcinoma, +LVI, 1.2cm in specimen, [place] 8/9, DCIS, microcalcifications present, ER >95%, GA negative, HER2+ 3+ IHC, Lymph node biopsy with metastatic carcinoma.\par \par She then saw Dr. Camacho breast surgeon who ordered and MRI of the breasts. MRI showed: Biopsy proven mammary carcinoma in the R retroareolar region 5:00 manifested as multiple irregular masses. The tumor is extensive in nature and spanning an area of over 3 cm with non-mass enhancement extending posteriorly suggesting extensive intraductal component as well. Two other highly suspicious masses noted in the RUOQ at 11:00 measuring >3cm and at 10:00 measuring 2.6cm consistent with multicentric disease. Biopsy proven metastatic disease to an axillary LN with additional suspicious LNs (additional with replaced fatty hilum and abnormally enlarged cortices, some have irregular borders, some posterior to pectoralis muscle, also abnormal appearing node in soft tissue lateral to the R chest wall).\par \par Dr. Camacho recommended neoadjuvant chemotherapy and the option for mastectomy with possible axillary lymph node dissection pending response to therapy.\par \par She then saw me in consultation. She underwent PETCT that found a peripancreatic lymph node that was enlarged and SUV avid, b/l laryngeal FDG avidity (likely due to speaking during exam), and SUV avid breast mass/axilla on R with breast skin noted to be thickened. She underwent EGD/EUS and biopsy of this peripancreatic LN (Dr. Giovanni Felton) which was consistent with benign/reactive lymphatic tissue. She had a mediport placed by IR on 9/26. She saw oncocardiology Dr. Khan for risk assessment prior to anthracycline/anti-HER2 monoclonal therapy and was started on ACEi for HTN and for cardioprotection. She saw dermatology for skin biopsy of R breast prior to treatment to evaluate/confirm inflammatory disease; biopsy results with skin involvement by poorly differentiated carcinoma. She started neoadjuvant chemotherapy with dose-dense AC on 9/26.\par \par Patient lives in [place] with her  Gonzales ([employer] owner) and 26 year old daughter (an artist and ). She works full time as a . She notes a history of smoking in the distant past. Mother passed away in 70s from metastatic melanoma, and a maternal aunt had breast cancer; no other significant close family history of cancers. She notes overall migraines (which she has had for many years, seen neurologist, stable lytic medications help, associated with one sided facial numbness when they occur) have gotten better with menopause – her LMP was 2/2018 and it was infrequent prior to that. She has had some hot flashes. She used OCPs in the past for years particularly for endometriosis treatment. She has had issues with DJD of back and spine with pain, MRI last year noted, epidural injections have helped with this significantly as well as occasional NSAID use.\par  [de-identified] : ER+NM-HER2+ [FreeTextEntry1] : dose-dense AC (adriamycin 60mg/m2 and cytoxan 600mg/m2) x 4 cycles, followed by weekly taxol 80mg/m2 x 12 with herceptin/perjeta q3 weeks (to complete 1 year) [de-identified] : She presents today for C1 of THP (she completed 4 cycles of AC). She noted fatigue that lingered for >1 week after the last treatment, again slightly longer duration this cycle and for the full week. She feels "queasy" without emesis for 2-3 days post treatment and poor/metallic taste sensation that lingers until the last few days. Occasional b/l eye tearing with crusting in the mornin- she noted antihistamine eye drops helped but subsequently dried her eyes out and she was unable to wear contacts. Some oral discomfort without mucositis noted. She is working full time. She saw Dr. Camacho last week who noted significant improvement in breast exam, recommended MRI 3 weeks prior to treatment completion for surgical planning. Denies f/c/s, current n/v, CP, SOB, RESTREPO, cough, abd pain, diarrhea, bleeding, edema, rashes, new neuro symptoms, weight loss or gain.

## 2018-11-23 NOTE — PHYSICAL EXAM
[Fully active, able to carry on all pre-disease performance without restriction] : Status 0 - Fully active, able to carry on all pre-disease performance without restriction [Normal] : affect appropriate [de-identified] : +alopecia, wearing cap [de-identified] : occasional tearing b/l without erythema or irritation evident [de-identified] : no sialedenitis evident, nontender, no submandibular masses or adenopathy, no mucositis or irritation [de-identified] : L chest wall mediport. no scab or erythema, well healed (tegaderm with EMLA cream in place) [de-identified] : pendulous breasts. R breast with some thickness behind the nipple/of nipple tissue without mauricio masses palpable (signficant improvement) - nipple flattened with dimpling superiorly with very slight peu d'orange appearance and 2 small papules at nipple stable/overall improved. L axillary fullness/fatty tissue without mauricio masses or adenopathy palpated, L no masses or adenopathy [de-identified] : dryness of hands/thumbs, slight/faint rash b/l arms dorsal surface

## 2018-11-23 NOTE — ASSESSMENT
[FreeTextEntry1] : 57yo woman with ER+OK-HER2+ locally advanced, poorly differentiated, inflammatory ductal breast cancer, multifocal with multiple breast masses and multiple enlarged axillary LNs on exam/MRI imaging (biopsy confirmed with clips placed). Staging imaging with SUV-avid peripancreatic LN - biopsy with reactive lymph node (benign). She presents for C1 of weekly taxol with q3week herceptin/perjeta, after completing 4 cycles of AC. She is doing well overall with some chemotherapy-related side effects in the week after treatment, and exam is consistent with response to therapy.\par \par R Breast Cancer: Inflammatory with axillary involvement, exam significantly improved with treatment\par -CMP today, proceed with taxol, herceptin, perjeta today via mediport\par -CBC reviewed, mild anemia, continue to monitor\par -re-reviewed side effects of THP therapy, monitoring, to call if any issues or new symptoms develop, fever to go to call and come to [facility] or ER immediately\par -EMLA cream 1 hour prior to mediport accessed\par -continue antihypertensive medication (HTN treatment and cardioprotective); follow up with Dr. Khna\par -follow up with Dr. Luna for MRI in 8 weeks (plan for mastectomy with ALND)\par -radiation and plastic surgery consultations in near future\par -plan for interval repeat imaging prior to surgery\par -adjuvant plan for AI (?with ovarian suppression), herceptin/perjeta to complete 1 year, to consider extended adjuvant therapy with neratinib thereafter\par \par Chemotherapy-induced nausea: improved/did not require PRN this cycle\par -eprescribed PRN compazine trial for nausea as trial given incomplete relief with Reglan and Zofran recently\par \par Eye tearing/crusting: ?relation to AC, now completed\par -possible side effect of treatment v. allergy-related\par -trial of OTC antihistamine eye drops every other day and saline/rewetting drops between\par \par Anxiety: patient takes citalopram, situation anxiety surrounding treatment appointments especially\par -recommend trial of Ativan 0.5mg PRN; night before treatment and AM of treatment (trial of half tab - 0.25mg for tolerance) - she has not yet tried but will consider\par -advised on avoiding driving, falls precautions when taking\par \par RTC 1 week with cycle 2 of taxol (herceptin/perjeta [id number]) for toxicity check, exam; thereafter to consider q3 week visits if stable/tolerating

## 2018-11-28 ENCOUNTER — CLINICAL ADVICE (OUTPATIENT)
Age: 58
End: 2018-11-28

## 2018-11-30 ENCOUNTER — APPOINTMENT (OUTPATIENT)
Dept: INFUSION THERAPY | Facility: HOSPITAL | Age: 58
End: 2018-11-30

## 2018-11-30 ENCOUNTER — RESULT REVIEW (OUTPATIENT)
Age: 58
End: 2018-11-30

## 2018-11-30 ENCOUNTER — APPOINTMENT (OUTPATIENT)
Dept: HEMATOLOGY ONCOLOGY | Facility: CLINIC | Age: 58
End: 2018-11-30
Payer: COMMERCIAL

## 2018-11-30 ENCOUNTER — LABORATORY RESULT (OUTPATIENT)
Age: 58
End: 2018-11-30

## 2018-11-30 LAB
BASOPHILS # BLD AUTO: 0.1 K/UL — SIGNIFICANT CHANGE UP (ref 0–0.2)
BASOPHILS NFR BLD AUTO: 0.8 % — SIGNIFICANT CHANGE UP (ref 0–2)
BUN SERPL-MCNC: 7 MG/DL — SIGNIFICANT CHANGE UP (ref 7–23)
CA-I BLDA-SCNC: 1.23 MMOL/L — SIGNIFICANT CHANGE UP (ref 1.12–1.3)
CHLORIDE SERPL-SCNC: 103 MMOL/L — SIGNIFICANT CHANGE UP (ref 96–108)
CO2 SERPL-SCNC: 24 MMOL/L — SIGNIFICANT CHANGE UP (ref 22–31)
CREAT SERPL-MCNC: 0.6 MG/DL — SIGNIFICANT CHANGE UP (ref 0.5–1.3)
EOSINOPHIL # BLD AUTO: 0.1 K/UL — SIGNIFICANT CHANGE UP (ref 0–0.5)
EOSINOPHIL NFR BLD AUTO: 2.1 % — SIGNIFICANT CHANGE UP (ref 0–6)
GLUCOSE SERPL-MCNC: 95 MG/DL — SIGNIFICANT CHANGE UP (ref 70–99)
HCT VFR BLD CALC: 26.6 % — LOW (ref 34.5–45)
HGB BLD-MCNC: 9.2 G/DL — LOW (ref 11.5–15.5)
LYMPHOCYTES # BLD AUTO: 0.7 K/UL — LOW (ref 1–3.3)
LYMPHOCYTES # BLD AUTO: 10.8 % — LOW (ref 13–44)
MCHC RBC-ENTMCNC: 31.1 PG — SIGNIFICANT CHANGE UP (ref 27–34)
MCHC RBC-ENTMCNC: 34.7 G/DL — SIGNIFICANT CHANGE UP (ref 32–36)
MCV RBC AUTO: 89.8 FL — SIGNIFICANT CHANGE UP (ref 80–100)
MONOCYTES # BLD AUTO: 0.5 K/UL — SIGNIFICANT CHANGE UP (ref 0–0.9)
MONOCYTES NFR BLD AUTO: 7.7 % — SIGNIFICANT CHANGE UP (ref 2–14)
NEUTROPHILS # BLD AUTO: 5 K/UL — SIGNIFICANT CHANGE UP (ref 1.8–7.4)
NEUTROPHILS NFR BLD AUTO: 78.5 % — HIGH (ref 43–77)
PLATELET # BLD AUTO: 417 K/UL — HIGH (ref 150–400)
POTASSIUM SERPL-MCNC: 4 MMOL/L — SIGNIFICANT CHANGE UP (ref 3.5–5.3)
POTASSIUM SERPL-SCNC: 4 MMOL/L — SIGNIFICANT CHANGE UP (ref 3.5–5.3)
RBC # BLD: 2.96 M/UL — LOW (ref 3.8–5.2)
RBC # FLD: 16.2 % — HIGH (ref 10.3–14.5)
SODIUM SERPL-SCNC: 139 MMOL/L — SIGNIFICANT CHANGE UP (ref 135–145)
WBC # BLD: 6.4 K/UL — SIGNIFICANT CHANGE UP (ref 3.8–10.5)
WBC # FLD AUTO: 6.4 K/UL — SIGNIFICANT CHANGE UP (ref 3.8–10.5)

## 2018-11-30 PROCEDURE — 99214 OFFICE O/P EST MOD 30 MIN: CPT

## 2018-11-30 NOTE — ASSESSMENT
[FreeTextEntry1] : 59yo woman with ER+NV-HER2+ locally advanced, poorly differentiated, inflammatory ductal breast cancer, multifocal with multiple breast masses and multiple enlarged axillary LNs on exam/MRI imaging (biopsy confirmed with clips placed). Staging imaging with SUV-avid peripancreatic LN - biopsy with reactive lymph node (benign). She presents for weekly taxol #2 with q3week herceptin/perjeta, after completing 4 cycles of AC. She is doing well overall with some chemotherapy-related side effects in the week after treatment, and exam is consistent with response to therapy.\par \par R Breast Cancer: Inflammatory with axillary involvement, exam significantly improved with treatment. Mild diarrhea G2 for 2 days, resolved, suspect related to Perjeta, neuropathy grade 2 sudden onset with taxol 80mg/m2\par -CMP today, BMP OK, hold taxol today given neuropathy\par -CBC reviewed, mild anemia Hgb 9.2, continue to monitor\par -re-reviewed side effects of THP therapy, monitoring, to call if any issues or new symptoms develop, fever to go to call and come to [facility] or ER immediately\par -EMLA cream 1 hour prior to mediport accessed\par -continue antihypertensive medication (HTN treatment and cardioprotective); follow up with Dr. Khan\par -follow up with Dr. Luna for MRI in ~3 weeks prior to treatment completion (plan for mastectomy with ALND)\par -radiation and plastic surgery consultations in near future\par -plan for interval repeat imaging prior to surgery with PET/CT\par -adjuvant plan for AI (?with ovarian suppression), herceptin/perjeta to complete 1 year, to consider extended adjuvant therapy with neratinib thereafter\par \par Chemotherapy-induced nausea: improved/did not require PRN this cycle\par -PRN compazine trial for nausea as trial given incomplete relief with Reglan and Zofran recently\par \par Chemotherapy-related diarrhea: limited, resolved, thought related to perjeta\par -PRN imodium, lomotil, hydration, call if recurs\par Eye tearing/crusting: ?relation to AC, now completed\par -possible side effect of treatment v. allergy-related\par -trial of OTC antihistamine eye drops every other day and saline/rewetting drops between\par \par Anxiety: patient takes citalopram, situation anxiety surrounding treatment appointments especially\par -recommend trial of Ativan 0.5mg PRN; night before treatment and AM of treatment (trial of half tab - 0.25mg for tolerance) - she has not yet tried but will consider\par -advised on avoiding driving, falls precautions when taking\par \par She will call Monday to let me know if symptoms persist and to decide whether to continue taxol v. consider docetaxel, H+P alone

## 2018-11-30 NOTE — HISTORY OF PRESENT ILLNESS
[Disease: _____________________] : Disease: [unfilled] [T: ___] : T[unfilled] [N: ___] : N[unfilled] [AJCC Stage: ____] : AJCC Stage: [unfilled] [Treatment Protocol] : Treatment Protocol [de-identified] : \par 57yo woman with ER+LA-HER2+ locally advanced, inflammatory poorly differentiated ductal breast cancer.\par \par She noted summer 2018 she felt a mass near her nipple in her R breast. She went to her PMD who ordered a mammogram and sonogram which revealed a R abnormal hypoechoic mass and abnormal appearing axillary LN.\par \par Image guided biopsy of mass and axilla revealed invasive poorly differentiated ductal carcinoma, +LVI, 1.2cm in specimen, [place] 8/9, DCIS, microcalcifications present, ER >95%, LA negative, HER2+ 3+ IHC, Lymph node biopsy with metastatic carcinoma.\par \par She then saw Dr. Camcaho breast surgeon who ordered and MRI of the breasts. MRI showed: Biopsy proven mammary carcinoma in the R retroareolar region 5:00 manifested as multiple irregular masses. The tumor is extensive in nature and spanning an area of over 3 cm with non-mass enhancement extending posteriorly suggesting extensive intraductal component as well. Two other highly suspicious masses noted in the RUOQ at 11:00 measuring >3cm and at 10:00 measuring 2.6cm consistent with multicentric disease. Biopsy proven metastatic disease to an axillary LN with additional suspicious LNs (additional with replaced fatty hilum and abnormally enlarged cortices, some have irregular borders, some posterior to pectoralis muscle, also abnormal appearing node in soft tissue lateral to the R chest wall).\par \par Dr. Camacho recommended neoadjuvant chemotherapy and the option for mastectomy with possible axillary lymph node dissection pending response to therapy.\par \par She then saw me in consultation. She underwent PETCT that found a peripancreatic lymph node that was enlarged and SUV avid, b/l laryngeal FDG avidity (likely due to speaking during exam), and SUV avid breast mass/axilla on R with breast skin noted to be thickened. She underwent EGD/EUS and biopsy of this peripancreatic LN (Dr. Giovanni Felton) which was consistent with benign/reactive lymphatic tissue. She had a mediport placed by IR on 9/26. She saw oncocardiology Dr. Khan for risk assessment prior to anthracycline/anti-HER2 monoclonal therapy and was started on ACEi for HTN and for cardioprotection. She saw dermatology for skin biopsy of R breast prior to treatment to evaluate/confirm inflammatory disease; biopsy results with skin involvement by poorly differentiated carcinoma. She started neoadjuvant chemotherapy with dose-dense AC on 9/26. dose-dense AC-->THP (weekly taxol 80mg/m2)\par \par Patient lives in [place] with her  Gonzales ([employer] company owner) and 26 year old daughter (an artist and ). She works full time as a . She notes a history of smoking in the distant past. Mother passed away in 70s from metastatic melanoma, and a maternal aunt had breast cancer; no other significant close family history of cancers. She notes overall migraines (which she has had for many years, seen neurologist, stable lytic medications help, associated with one sided facial numbness when they occur) have gotten better with menopause – her LMP was 2/2018 and it was infrequent prior to that. She has had some hot flashes. She used OCPs in the past for years particularly for endometriosis treatment. She has had issues with DJD of back and spine with pain, MRI last year noted, epidural injections have helped with this significantly as well as occasional NSAID use.\par  [de-identified] : ER+FL-HER2+ [FreeTextEntry1] : dose-dense AC (adriamycin 60mg/m2 and cytoxan 600mg/m2) x 4 cycles, followed by weekly taxol 80mg/m2 x 12 with herceptin/perjeta q3 weeks (to complete 1 year) [de-identified] : She presents today for taxol #2, THP last week (she completed 4 cycles of AC). She notes some fatigue. She developed diarrhea Sunday and Monday, ~6 episodes each day, watery, took imodium 4 tabs both days, associated with abdominal cramping - this has resolved. She feels her skin is very dry - scrapes on forearms from loofah use in shower yesterday against skin. For the last few days she has felt "neuropathy" - b/l palms (top and bottom) and entire soles (but most focused at ball of the soles) feeling of burning like "scraped across pavement." The sensation is new and persistent, particularly when using her hands. She is able to button her clothes. Denies f/c/s, current n/v/d, CP, SOB, RESTREPO, cough, abd pain, bleeding, edema, rashes, focal neuro symptoms, weight loss or gain.

## 2018-11-30 NOTE — PHYSICAL EXAM
[Fully active, able to carry on all pre-disease performance without restriction] : Status 0 - Fully active, able to carry on all pre-disease performance without restriction [Normal] : affect appropriate [de-identified] : +alopecia, wearing cap [de-identified] : no submandibular masses or adenopathy, no mucositis or irritation [de-identified] : L chest wall mediport. no scab or erythema, well healed (tegaderm with EMLA cream in place) [de-identified] : deferred [de-identified] : dryness of hands/thumbs/arms and chest, slight/faint rash b/l arms dorsal surface has resolved but some excoriations remain

## 2018-12-03 ENCOUNTER — OUTPATIENT (OUTPATIENT)
Dept: OUTPATIENT SERVICES | Facility: HOSPITAL | Age: 58
LOS: 1 days | Discharge: HOME | End: 2018-12-03

## 2018-12-03 DIAGNOSIS — C50.919 MALIGNANT NEOPLASM OF UNSPECIFIED SITE OF UNSPECIFIED FEMALE BREAST: ICD-10-CM

## 2018-12-03 DIAGNOSIS — Z98.890 OTHER SPECIFIED POSTPROCEDURAL STATES: Chronic | ICD-10-CM

## 2018-12-06 ENCOUNTER — CLINICAL ADVICE (OUTPATIENT)
Age: 58
End: 2018-12-06

## 2018-12-06 ENCOUNTER — RX RENEWAL (OUTPATIENT)
Age: 58
End: 2018-12-06

## 2018-12-07 ENCOUNTER — RESULT REVIEW (OUTPATIENT)
Age: 58
End: 2018-12-07

## 2018-12-07 ENCOUNTER — APPOINTMENT (OUTPATIENT)
Dept: INFUSION THERAPY | Facility: HOSPITAL | Age: 58
End: 2018-12-07

## 2018-12-07 ENCOUNTER — LABORATORY RESULT (OUTPATIENT)
Age: 58
End: 2018-12-07

## 2018-12-07 LAB
BASOPHILS # BLD AUTO: 0 K/UL — SIGNIFICANT CHANGE UP (ref 0–0.2)
BASOPHILS NFR BLD AUTO: 0.5 % — SIGNIFICANT CHANGE UP (ref 0–2)
EOSINOPHIL # BLD AUTO: 0.1 K/UL — SIGNIFICANT CHANGE UP (ref 0–0.5)
EOSINOPHIL NFR BLD AUTO: 1.6 % — SIGNIFICANT CHANGE UP (ref 0–6)
HCT VFR BLD CALC: 28 % — LOW (ref 34.5–45)
HGB BLD-MCNC: 9.5 G/DL — LOW (ref 11.5–15.5)
LYMPHOCYTES # BLD AUTO: 0.8 K/UL — LOW (ref 1–3.3)
LYMPHOCYTES # BLD AUTO: 12.1 % — LOW (ref 13–44)
MCHC RBC-ENTMCNC: 30.3 PG — SIGNIFICANT CHANGE UP (ref 27–34)
MCHC RBC-ENTMCNC: 34 G/DL — SIGNIFICANT CHANGE UP (ref 32–36)
MCV RBC AUTO: 89 FL — SIGNIFICANT CHANGE UP (ref 80–100)
MONOCYTES # BLD AUTO: 0.7 K/UL — SIGNIFICANT CHANGE UP (ref 0–0.9)
MONOCYTES NFR BLD AUTO: 10 % — SIGNIFICANT CHANGE UP (ref 2–14)
NEUTROPHILS # BLD AUTO: 5.2 K/UL — SIGNIFICANT CHANGE UP (ref 1.8–7.4)
NEUTROPHILS NFR BLD AUTO: 75.7 % — SIGNIFICANT CHANGE UP (ref 43–77)
PLATELET # BLD AUTO: 417 K/UL — HIGH (ref 150–400)
RBC # BLD: 3.14 M/UL — LOW (ref 3.8–5.2)
RBC # FLD: 15.8 % — HIGH (ref 10.3–14.5)
WBC # BLD: 6.9 K/UL — SIGNIFICANT CHANGE UP (ref 3.8–10.5)
WBC # FLD AUTO: 6.9 K/UL — SIGNIFICANT CHANGE UP (ref 3.8–10.5)

## 2018-12-11 ENCOUNTER — MEDICATION RENEWAL (OUTPATIENT)
Age: 58
End: 2018-12-11

## 2018-12-11 DIAGNOSIS — Z51.11 ENCOUNTER FOR ANTINEOPLASTIC CHEMOTHERAPY: ICD-10-CM

## 2018-12-11 DIAGNOSIS — R11.2 NAUSEA WITH VOMITING, UNSPECIFIED: ICD-10-CM

## 2018-12-14 ENCOUNTER — LABORATORY RESULT (OUTPATIENT)
Age: 58
End: 2018-12-14

## 2018-12-14 ENCOUNTER — APPOINTMENT (OUTPATIENT)
Dept: HEMATOLOGY ONCOLOGY | Facility: CLINIC | Age: 58
End: 2018-12-14
Payer: COMMERCIAL

## 2018-12-14 ENCOUNTER — APPOINTMENT (OUTPATIENT)
Dept: INFUSION THERAPY | Facility: HOSPITAL | Age: 58
End: 2018-12-14

## 2018-12-14 ENCOUNTER — RESULT REVIEW (OUTPATIENT)
Age: 58
End: 2018-12-14

## 2018-12-14 VITALS
SYSTOLIC BLOOD PRESSURE: 110 MMHG | HEART RATE: 124 BPM | OXYGEN SATURATION: 96 % | DIASTOLIC BLOOD PRESSURE: 74 MMHG | RESPIRATION RATE: 16 BRPM | BODY MASS INDEX: 40.81 KG/M2 | TEMPERATURE: 98.9 F | WEIGHT: 260.58 LBS

## 2018-12-14 LAB
BASOPHILS # BLD AUTO: 0 K/UL — SIGNIFICANT CHANGE UP (ref 0–0.2)
BASOPHILS NFR BLD AUTO: 0.2 % — SIGNIFICANT CHANGE UP (ref 0–2)
EOSINOPHIL # BLD AUTO: 0.4 K/UL — SIGNIFICANT CHANGE UP (ref 0–0.5)
EOSINOPHIL NFR BLD AUTO: 3 % — SIGNIFICANT CHANGE UP (ref 0–6)
HCT VFR BLD CALC: 31.4 % — LOW (ref 34.5–45)
HGB BLD-MCNC: 10.7 G/DL — LOW (ref 11.5–15.5)
LYMPHOCYTES # BLD AUTO: 1 K/UL — SIGNIFICANT CHANGE UP (ref 1–3.3)
LYMPHOCYTES # BLD AUTO: 6.7 % — LOW (ref 13–44)
MCHC RBC-ENTMCNC: 30.8 PG — SIGNIFICANT CHANGE UP (ref 27–34)
MCHC RBC-ENTMCNC: 34.2 G/DL — SIGNIFICANT CHANGE UP (ref 32–36)
MCV RBC AUTO: 90 FL — SIGNIFICANT CHANGE UP (ref 80–100)
MONOCYTES # BLD AUTO: 0.7 K/UL — SIGNIFICANT CHANGE UP (ref 0–0.9)
MONOCYTES NFR BLD AUTO: 5.1 % — SIGNIFICANT CHANGE UP (ref 2–14)
NEUTROPHILS # BLD AUTO: 12.1 K/UL — HIGH (ref 1.8–7.4)
NEUTROPHILS NFR BLD AUTO: 85 % — HIGH (ref 43–77)
PLATELET # BLD AUTO: 473 K/UL — HIGH (ref 150–400)
RBC # BLD: 3.48 M/UL — LOW (ref 3.8–5.2)
RBC # FLD: 16.1 % — HIGH (ref 10.3–14.5)
WBC # BLD: 14.3 K/UL — HIGH (ref 3.8–10.5)
WBC # FLD AUTO: 14.3 K/UL — HIGH (ref 3.8–10.5)

## 2018-12-14 PROCEDURE — 99215 OFFICE O/P EST HI 40 MIN: CPT

## 2018-12-14 RX ORDER — TERCONAZOLE 8 MG/G
0.8 CREAM VAGINAL
Qty: 20 | Refills: 0 | Status: DISCONTINUED | COMMUNITY
Start: 2018-11-05 | End: 2018-12-14

## 2018-12-14 RX ORDER — METOCLOPRAMIDE 10 MG/1
10 TABLET ORAL EVERY 8 HOURS
Qty: 30 | Refills: 0 | Status: DISCONTINUED | COMMUNITY
Start: 2018-09-10 | End: 2018-12-14

## 2018-12-14 RX ORDER — DIPHENHYDRAMINE HYDROCHLORIDE AND LIDOCAINE HYDROCHLORIDE AND ALUMINUM HYDROXIDE AND MAGNESIUM HYDRO
KIT
Qty: 1 | Refills: 0 | Status: DISCONTINUED | COMMUNITY
Start: 2018-11-02 | End: 2018-12-14

## 2018-12-14 RX ORDER — TERCONAZOLE 4 MG/G
0.4 CREAM VAGINAL
Qty: 1 | Refills: 1 | Status: DISCONTINUED | COMMUNITY
Start: 2018-11-05 | End: 2018-12-14

## 2018-12-14 RX ORDER — NYSTATIN 100000 [USP'U]/G
100000 CREAM TOPICAL
Qty: 30 | Refills: 0 | Status: DISCONTINUED | COMMUNITY
Start: 2018-07-10 | End: 2018-12-14

## 2018-12-14 NOTE — HISTORY OF PRESENT ILLNESS
[Disease: _____________________] : Disease: [unfilled] [T: ___] : T[unfilled] [N: ___] : N[unfilled] [AJCC Stage: ____] : AJCC Stage: [unfilled] [de-identified] : \par 57yo woman with ER+LA-HER2+ locally advanced, inflammatory poorly differentiated ductal breast cancer.\par \par She noted summer 2018 she felt a mass near her nipple in her R breast. She went to her PMD who ordered a mammogram and sonogram which revealed a R abnormal hypoechoic mass and abnormal appearing axillary LN.\par \par Image guided biopsy of mass and axilla revealed invasive poorly differentiated ductal carcinoma, +LVI, 1.2cm in specimen, [place] 8/9, DCIS, microcalcifications present, ER >95%, LA negative, HER2+ 3+ IHC, Lymph node biopsy with metastatic carcinoma.\par \par She then saw Dr. Camacho breast surgeon who ordered and MRI of the breasts. MRI showed: Biopsy proven mammary carcinoma in the R retroareolar region 5:00 manifested as multiple irregular masses. The tumor is extensive in nature and spanning an area of over 3 cm with non-mass enhancement extending posteriorly suggesting extensive intraductal component as well. Two other highly suspicious masses noted in the RUOQ at 11:00 measuring >3cm and at 10:00 measuring 2.6cm consistent with multicentric disease. Biopsy proven metastatic disease to an axillary LN with additional suspicious LNs (additional with replaced fatty hilum and abnormally enlarged cortices, some have irregular borders, some posterior to pectoralis muscle, also abnormal appearing node in soft tissue lateral to the R chest wall).\par \par Dr. Camacho recommended neoadjuvant chemotherapy and the option for mastectomy with possible axillary lymph node dissection pending response to therapy.\par \par She then saw me in consultation. She underwent PETCT that found a peripancreatic lymph node that was enlarged and SUV avid, b/l laryngeal FDG avidity (likely due to speaking during exam), and SUV avid breast mass/axilla on R with breast skin noted to be thickened. She underwent EGD/EUS and biopsy of this peripancreatic LN (Dr. Giovanni Felton) which was consistent with benign/reactive lymphatic tissue. She had a mediport placed by IR on 9/26. She saw oncocardiology Dr. Khan for risk assessment prior to anthracycline/anti-HER2 monoclonal therapy and was started on ACEi for HTN and for cardioprotection. She saw dermatology for skin biopsy of R breast prior to treatment to evaluate/confirm inflammatory disease; biopsy results with skin involvement by poorly differentiated carcinoma. She started neoadjuvant chemotherapy with dose-dense AC on 9/26. dose-dense AC-->THP (weekly taxol 80mg/m2)\par \par 11/23 THP #1\par 11/30 Taxol 80mg/m2 held due to neuropathy\par 12/7 resumed Taxol\par \par Patient lives in [place] with her  Gonzales ([employer] company owner) and 26 year old daughter (an artist and ). She works full time as a . She notes a history of smoking in the distant past. Mother passed away in 70s from metastatic melanoma, and a maternal aunt had breast cancer; no other significant close family history of cancers. She notes overall migraines (which she has had for many years, seen neurologist, stable lytic medications help, associated with one sided facial numbness when they occur) have gotten better with menopause – her LMP was 2/2018 and it was infrequent prior to that. She has had some hot flashes. She used OCPs in the past for years particularly for endometriosis treatment. She has had issues with DJD of back and spine with pain, MRI last year noted, epidural injections have helped with this significantly as well as occasional NSAID use.\par  [de-identified] : ER+GA-HER2+ [Treatment Protocol] : Treatment Protocol [FreeTextEntry1] : dose-dense AC (adriamycin 60mg/m2 and cytoxan 600mg/m2) x 4 cycles, followed by weekly taxol 80mg/m2 x 12 with herceptin/perjeta q3 weeks (to complete 1 year) [de-identified] : She presents today for THP #2 (she completed 4 cycles of AC). She notes some fatigue and occasional back pain/aches that vary. She has taken some anti-inflammatory medications. She has not had any further diarrhea, is keeping a simple diet but eating adequately. She notes a rare "sensitivity" of skin on dorsal fingers but no further neuropathy. She had a toothache this week and saw dentist for root canal yesterday that was uneventful - she is to complete 7 days of Clindamycin. Denies f/c/s, current n/v/d, CP, SOB, RESTREPO, cough, abd pain, bleeding, edema, rashes, focal neuro symptoms, weight loss or gain.

## 2018-12-14 NOTE — PHYSICAL EXAM
[Fully active, able to carry on all pre-disease performance without restriction] : Status 0 - Fully active, able to carry on all pre-disease performance without restriction [Normal] : affect appropriate [de-identified] : +alopecia, wearing cap [de-identified] : root canal area evaluated, unremarkable [de-identified] : HR normal on exam [de-identified] : L chest wall mediport. no scab or erythema, well healed (tegaderm with EMLA cream in place) [de-identified] : R nipple with diffuse thickening compared to L nipple, erythema and dimpling resolved, no palpable masses or adenopathy b/l

## 2018-12-14 NOTE — OB HISTORY
[Definite:  ___ (Date)] : the last menstrual period was [unfilled] [Normal Amount/Duration] : was abnormal [Spotting Between  Menses] : no spotting between menses [Regular Cycle Intervals] : periods have been irregular [Experiencing Menopausal Sxs] : experiencing menopausal symptoms [___] : Full Term: [unfilled]

## 2018-12-14 NOTE — ASSESSMENT
[FreeTextEntry1] : 57yo woman with ER+NC-HER2+ locally advanced, poorly differentiated, inflammatory ductal breast cancer, multifocal with multiple breast masses and multiple enlarged axillary LNs on exam/MRI imaging (biopsy confirmed with clips placed). Staging imaging with SUV-avid peripancreatic LN - biopsy with reactive lymph node (benign). She presents for THP #2 (weekly taxol) after completing 4 cycles of AC. She is doing well overall with some chemotherapy-related side effects in the week after treatment, and exam is consistent with response to therapy.\par \par R Breast Cancer: Inflammatory with axillary involvement, exam significantly improved with treatment. Mild diarrhea G2 for 2 days, resolved, suspect related to Perjeta, neuropathy grade 2 sudden onset with taxol 80mg/m2 now resolved\par -CMP, CBC today reviewed, OK\par -re-reviewed side effects of THP therapy, monitoring, to call if any issues or new symptoms develop, fever to go to call and come to [facility] or ER immediately\par -EMLA cream 1 hour prior to mediport accessed\par -continue antihypertensive medication (HTN treatment and cardioprotective); follow up with Dr. Khan\par -follow up with Dr. Luna for MRI in ~3 weeks prior to treatment completion (plan for mastectomy with ALND)\par -radiation and plastic surgery consultations in near future\par -plan for interval repeat imaging prior to surgery with PET/CT\par -adjuvant plan for AI (?with ovarian suppression), herceptin/perjeta to complete 1 year, to consider extended adjuvant therapy with neratinib thereafter\par -TTE January 2019 due\par \par Chemotherapy-induced nausea: improved/did not require PRN this cycle\par -PRN compazine trial for nausea\par \par Chemotherapy-related diarrhea: limited, resolved, thought related to perjeta\par -PRN imodium, lomotil, hydration, call if recurs\par \par Anxiety: patient takes citalopram, situation anxiety surrounding treatment appointments especially\par -recommend trial of Ativan 0.5mg PRN; night before treatment and AM of treatment (trial of half tab - 0.25mg for tolerance) - she has not yet tried but will consider\par -advised on avoiding driving, falls precautions when taking\par \par Dental Pain: s/p root canal, CBC reviewed adequate counts, exam OK\par -monitor for s/s of infection, fevers, call if issues\par -dental follow up in the coming week\par -continue clindamycin as prescribed\par \par Taxol #4 in 1 week

## 2018-12-20 DIAGNOSIS — Z17.0 ESTROGEN RECEPTOR POSITIVE STATUS [ER+]: ICD-10-CM

## 2018-12-21 ENCOUNTER — APPOINTMENT (OUTPATIENT)
Dept: HEMATOLOGY ONCOLOGY | Facility: CLINIC | Age: 58
End: 2018-12-21
Payer: COMMERCIAL

## 2018-12-21 ENCOUNTER — LABORATORY RESULT (OUTPATIENT)
Age: 58
End: 2018-12-21

## 2018-12-21 ENCOUNTER — APPOINTMENT (OUTPATIENT)
Dept: INFUSION THERAPY | Facility: HOSPITAL | Age: 58
End: 2018-12-21

## 2018-12-21 ENCOUNTER — RESULT REVIEW (OUTPATIENT)
Age: 58
End: 2018-12-21

## 2018-12-21 LAB
BASOPHILS # BLD AUTO: 0 K/UL — SIGNIFICANT CHANGE UP (ref 0–0.2)
BASOPHILS NFR BLD AUTO: 0.4 % — SIGNIFICANT CHANGE UP (ref 0–2)
EOSINOPHIL # BLD AUTO: 0.3 K/UL — SIGNIFICANT CHANGE UP (ref 0–0.5)
EOSINOPHIL NFR BLD AUTO: 3.9 % — SIGNIFICANT CHANGE UP (ref 0–6)
HCT VFR BLD CALC: 27.4 % — LOW (ref 34.5–45)
HGB BLD-MCNC: 9.7 G/DL — LOW (ref 11.5–15.5)
LYMPHOCYTES # BLD AUTO: 0.7 K/UL — LOW (ref 1–3.3)
LYMPHOCYTES # BLD AUTO: 11.5 % — LOW (ref 13–44)
MCHC RBC-ENTMCNC: 31.7 PG — SIGNIFICANT CHANGE UP (ref 27–34)
MCHC RBC-ENTMCNC: 35.2 G/DL — SIGNIFICANT CHANGE UP (ref 32–36)
MCV RBC AUTO: 89.9 FL — SIGNIFICANT CHANGE UP (ref 80–100)
MONOCYTES # BLD AUTO: 0.2 K/UL — SIGNIFICANT CHANGE UP (ref 0–0.9)
MONOCYTES NFR BLD AUTO: 3.7 % — SIGNIFICANT CHANGE UP (ref 2–14)
NEUTROPHILS # BLD AUTO: 5.2 K/UL — SIGNIFICANT CHANGE UP (ref 1.8–7.4)
NEUTROPHILS NFR BLD AUTO: 80.4 % — HIGH (ref 43–77)
PLATELET # BLD AUTO: 344 K/UL — SIGNIFICANT CHANGE UP (ref 150–400)
RBC # BLD: 3.05 M/UL — LOW (ref 3.8–5.2)
RBC # FLD: 16.1 % — HIGH (ref 10.3–14.5)
WBC # BLD: 6.4 K/UL — SIGNIFICANT CHANGE UP (ref 3.8–10.5)
WBC # FLD AUTO: 6.4 K/UL — SIGNIFICANT CHANGE UP (ref 3.8–10.5)

## 2018-12-21 PROCEDURE — 99214 OFFICE O/P EST MOD 30 MIN: CPT

## 2018-12-21 NOTE — HISTORY OF PRESENT ILLNESS
[Disease: _____________________] : Disease: [unfilled] [T: ___] : T[unfilled] [N: ___] : N[unfilled] [AJCC Stage: ____] : AJCC Stage: [unfilled] [de-identified] : \par 57yo woman with ER+NM-HER2+ locally advanced, inflammatory poorly differentiated ductal breast cancer.\par \par She noted summer 2018 she felt a mass near her nipple in her R breast. She went to her PMD who ordered a mammogram and sonogram which revealed a R abnormal hypoechoic mass and abnormal appearing axillary LN.\par \par Image guided biopsy of mass and axilla revealed invasive poorly differentiated ductal carcinoma, +LVI, 1.2cm in specimen, [place] 8/9, DCIS, microcalcifications present, ER >95%, NM negative, HER2+ 3+ IHC, Lymph node biopsy with metastatic carcinoma.\par \par She then saw Dr. Camacho breast surgeon who ordered and MRI of the breasts. MRI showed: Biopsy proven mammary carcinoma in the R retroareolar region 5:00 manifested as multiple irregular masses. The tumor is extensive in nature and spanning an area of over 3 cm with non-mass enhancement extending posteriorly suggesting extensive intraductal component as well. Two other highly suspicious masses noted in the RUOQ at 11:00 measuring >3cm and at 10:00 measuring 2.6cm consistent with multicentric disease. Biopsy proven metastatic disease to an axillary LN with additional suspicious LNs (additional with replaced fatty hilum and abnormally enlarged cortices, some have irregular borders, some posterior to pectoralis muscle, also abnormal appearing node in soft tissue lateral to the R chest wall).\par \par Dr. Camacho recommended neoadjuvant chemotherapy and the option for mastectomy with possible axillary lymph node dissection pending response to therapy.\par \par She then saw me in consultation. She underwent PETCT that found a peripancreatic lymph node that was enlarged and SUV avid, b/l laryngeal FDG avidity (likely due to speaking during exam), and SUV avid breast mass/axilla on R with breast skin noted to be thickened. She underwent EGD/EUS and biopsy of this peripancreatic LN (Dr. Giovanni Felton) which was consistent with benign/reactive lymphatic tissue. She had a mediport placed by IR on 9/26. She saw oncocardiology Dr. Khan for risk assessment prior to anthracycline/anti-HER2 monoclonal therapy and was started on ACEi for HTN and for cardioprotection. She saw dermatology for skin biopsy of R breast prior to treatment to evaluate/confirm inflammatory disease; biopsy results with skin involvement by poorly differentiated carcinoma. She started neoadjuvant chemotherapy with dose-dense AC on 9/26. dose-dense AC-->THP (weekly taxol 80mg/m2)\par \par 11/23 THP #1\par 11/30 Taxol 80mg/m2 held due to neuropathy\par 12/7 resumed Taxol\par \par Patient lives in [place] with her  Gonzales ([employer] company owner) and 26 year old daughter (an artist and ). She works full time as a . She notes a history of smoking in the distant past. Mother passed away in 70s from metastatic melanoma, and a maternal aunt had breast cancer; no other significant close family history of cancers. She notes overall migraines (which she has had for many years, seen neurologist, stable lytic medications help, associated with one sided facial numbness when they occur) have gotten better with menopause – her LMP was 2/2018 and it was infrequent prior to that. She has had some hot flashes. She used OCPs in the past for years particularly for endometriosis treatment. She has had issues with DJD of back and spine with pain, MRI last year noted, epidural injections have helped with this significantly as well as occasional NSAID use.\par  [de-identified] : ER+SD-HER2+ [Treatment Protocol] : Treatment Protocol [FreeTextEntry1] : dose-dense AC (adriamycin 60mg/m2 and cytoxan 600mg/m2) x 4 cycles, followed by weekly taxol 80mg/m2 x 12 with herceptin/perjeta q3 weeks (to complete 1 year) [de-identified] : She presents today for weekly Taxol #4, 2nd cycle of THP was last week (she completed 4 cycles of AC). She notes some fatigue, unchanged. She has completed dental antibiotics and her tooth feels okay without pain - plan for extraction in the future. She had some loose stools interittently but manageable, and she has been working. She notes a rare "sensitivity" of skin on dorsal fingers and occasionally at tips of fingers/toes. Denies f/c/s, current n/v/d, CP, SOB, RESTREPO, cough, abd pain, bleeding, edema, rashes, focal neuro symptoms, weight loss or gain.

## 2018-12-21 NOTE — ASSESSMENT
[FreeTextEntry1] : 59yo woman with ER+TN-HER2+ locally advanced, poorly differentiated, inflammatory ductal breast cancer, multifocal with multiple breast masses and multiple enlarged axillary LNs on exam/MRI imaging (biopsy confirmed with clips placed). Staging imaging with SUV-avid peripancreatic LN - biopsy with reactive lymph node (benign). She presents for weekly taxol#4 after completing 4 cycles of AC, THP #2 last week. She is doing well overall with some chemotherapy-related side effects in the week after treatment, and exam is consistent with response to therapy.\par \par R Breast Cancer: Inflammatory with axillary involvement, exam significantly improved with treatment - no palpable masses or erythema. Mild diarrhea G2 for 2 days, resolved, suspect related to Perjeta, neuropathy grade 2 sudden onset with taxol 80mg/m2 #1 now resolved (week 2 held, week 3 resumed)\par -CMP, CBC today \par -re-reviewed side effects of THP therapy, monitoring, to call if any issues or new symptoms develop, fever to go to call and come to [facility] or ER immediately\par -EMLA cream 1 hour prior to mediport accessed\par -continue antihypertensive medication (HTN treatment and cardioprotective); follow up with Dr. Khan - CHINEDUE due now, discussed and ordered\par -follow up with Dr. Luna for MRI in ~3 weeks prior to treatment completion (plan for mastectomy with ALND)\par -radiation and plastic surgery consultations in near future\par -plan for interval repeat imaging prior to surgery with PET/CT\par -adjuvant plan for AI (?with ovarian suppression), herceptin/perjeta v. TDM1 pending surgical findings\par \par Chemotherapy-induced nausea: improved/did not require PRN this cycle\par -PRN compazine trial for nausea\par \par Chemotherapy-related diarrhea: limited, resolved, thought related to perjeta\par -PRN imodium, lomotil, hydration, call if recurs\par \par Anxiety: patient takes citalopram, situation anxiety surrounding treatment appointments especially\par -recommend trial of Ativan 0.5mg PRN; night before treatment and AM of treatment (trial of half tab - 0.25mg for tolerance) - she has not yet tried but will consider if necessary in the future\par -advised on avoiding driving, falls precautions when taking\par \par Dental Pain: s/p root canal, CBC reviewed adequate counts, exam OK, no additional pain and antibiotics completed\par -monitor for s/s of infection, fevers, call if issues\par -dental follow up in the coming week, CBC check before any further dental work\par \par Taxol #5 in 1 week

## 2018-12-21 NOTE — PHYSICAL EXAM
[Fully active, able to carry on all pre-disease performance without restriction] : Status 0 - Fully active, able to carry on all pre-disease performance without restriction [Normal] : affect appropriate [de-identified] : +alopecia, wearing cap [de-identified] : root canal area evaluated, unremarkable [de-identified] : HR normal on exam [de-identified] : L chest wall mediport. no scab or erythema, well healed (tegaderm with EMLA cream in place) [de-identified] : deferred

## 2018-12-28 ENCOUNTER — RESULT REVIEW (OUTPATIENT)
Age: 58
End: 2018-12-28

## 2018-12-28 ENCOUNTER — APPOINTMENT (OUTPATIENT)
Dept: INFUSION THERAPY | Facility: HOSPITAL | Age: 58
End: 2018-12-28

## 2018-12-28 ENCOUNTER — LABORATORY RESULT (OUTPATIENT)
Age: 58
End: 2018-12-28

## 2018-12-28 LAB
BASOPHILS # BLD AUTO: 0 K/UL — SIGNIFICANT CHANGE UP (ref 0–0.2)
BASOPHILS NFR BLD AUTO: 0.3 % — SIGNIFICANT CHANGE UP (ref 0–2)
EOSINOPHIL # BLD AUTO: 0.2 K/UL — SIGNIFICANT CHANGE UP (ref 0–0.5)
EOSINOPHIL NFR BLD AUTO: 3.2 % — SIGNIFICANT CHANGE UP (ref 0–6)
HCT VFR BLD CALC: 28.4 % — LOW (ref 34.5–45)
HGB BLD-MCNC: 10 G/DL — LOW (ref 11.5–15.5)
LYMPHOCYTES # BLD AUTO: 0.8 K/UL — LOW (ref 1–3.3)
LYMPHOCYTES # BLD AUTO: 13.2 % — SIGNIFICANT CHANGE UP (ref 13–44)
MCHC RBC-ENTMCNC: 31.9 PG — SIGNIFICANT CHANGE UP (ref 27–34)
MCHC RBC-ENTMCNC: 35.1 G/DL — SIGNIFICANT CHANGE UP (ref 32–36)
MCV RBC AUTO: 91 FL — SIGNIFICANT CHANGE UP (ref 80–100)
MONOCYTES # BLD AUTO: 0.3 K/UL — SIGNIFICANT CHANGE UP (ref 0–0.9)
MONOCYTES NFR BLD AUTO: 5.3 % — SIGNIFICANT CHANGE UP (ref 2–14)
NEUTROPHILS # BLD AUTO: 4.6 K/UL — SIGNIFICANT CHANGE UP (ref 1.8–7.4)
NEUTROPHILS NFR BLD AUTO: 78 % — HIGH (ref 43–77)
PLATELET # BLD AUTO: 392 K/UL — SIGNIFICANT CHANGE UP (ref 150–400)
RBC # BLD: 3.12 M/UL — LOW (ref 3.8–5.2)
RBC # FLD: 16.5 % — HIGH (ref 10.3–14.5)
WBC # BLD: 5.9 K/UL — SIGNIFICANT CHANGE UP (ref 3.8–10.5)
WBC # FLD AUTO: 5.9 K/UL — SIGNIFICANT CHANGE UP (ref 3.8–10.5)

## 2019-01-02 ENCOUNTER — RESULT REVIEW (OUTPATIENT)
Age: 59
End: 2019-01-02

## 2019-01-02 ENCOUNTER — APPOINTMENT (OUTPATIENT)
Dept: INFUSION THERAPY | Facility: HOSPITAL | Age: 59
End: 2019-01-02

## 2019-01-02 ENCOUNTER — APPOINTMENT (OUTPATIENT)
Dept: HEMATOLOGY ONCOLOGY | Facility: CLINIC | Age: 59
End: 2019-01-02
Payer: COMMERCIAL

## 2019-01-02 ENCOUNTER — LABORATORY RESULT (OUTPATIENT)
Age: 59
End: 2019-01-02

## 2019-01-02 VITALS
OXYGEN SATURATION: 97 % | RESPIRATION RATE: 16 BRPM | SYSTOLIC BLOOD PRESSURE: 129 MMHG | TEMPERATURE: 98.4 F | WEIGHT: 261.67 LBS | BODY MASS INDEX: 40.98 KG/M2 | HEART RATE: 85 BPM | DIASTOLIC BLOOD PRESSURE: 79 MMHG

## 2019-01-02 LAB
BASOPHILS # BLD AUTO: 0 K/UL — SIGNIFICANT CHANGE UP (ref 0–0.2)
BASOPHILS NFR BLD AUTO: 0.6 % — SIGNIFICANT CHANGE UP (ref 0–2)
EOSINOPHIL # BLD AUTO: 0.1 K/UL — SIGNIFICANT CHANGE UP (ref 0–0.5)
EOSINOPHIL NFR BLD AUTO: 1.4 % — SIGNIFICANT CHANGE UP (ref 0–6)
HCT VFR BLD CALC: 27.6 % — LOW (ref 34.5–45)
HGB BLD-MCNC: 9.4 G/DL — LOW (ref 11.5–15.5)
LYMPHOCYTES # BLD AUTO: 0.9 K/UL — LOW (ref 1–3.3)
LYMPHOCYTES # BLD AUTO: 20.6 % — SIGNIFICANT CHANGE UP (ref 13–44)
MCHC RBC-ENTMCNC: 31.2 PG — SIGNIFICANT CHANGE UP (ref 27–34)
MCHC RBC-ENTMCNC: 33.9 G/DL — SIGNIFICANT CHANGE UP (ref 32–36)
MCV RBC AUTO: 91.9 FL — SIGNIFICANT CHANGE UP (ref 80–100)
MONOCYTES # BLD AUTO: 0.2 K/UL — SIGNIFICANT CHANGE UP (ref 0–0.9)
MONOCYTES NFR BLD AUTO: 4.2 % — SIGNIFICANT CHANGE UP (ref 2–14)
NEUTROPHILS # BLD AUTO: 3.1 K/UL — SIGNIFICANT CHANGE UP (ref 1.8–7.4)
NEUTROPHILS NFR BLD AUTO: 73.2 % — SIGNIFICANT CHANGE UP (ref 43–77)
PLATELET # BLD AUTO: 304 K/UL — SIGNIFICANT CHANGE UP (ref 150–400)
RBC # BLD: 3.01 M/UL — LOW (ref 3.8–5.2)
RBC # FLD: 16.4 % — HIGH (ref 10.3–14.5)
WBC # BLD: 4.3 K/UL — SIGNIFICANT CHANGE UP (ref 3.8–10.5)
WBC # FLD AUTO: 4.3 K/UL — SIGNIFICANT CHANGE UP (ref 3.8–10.5)

## 2019-01-02 PROCEDURE — 99214 OFFICE O/P EST MOD 30 MIN: CPT

## 2019-01-03 ENCOUNTER — OUTPATIENT (OUTPATIENT)
Dept: OUTPATIENT SERVICES | Facility: HOSPITAL | Age: 59
LOS: 1 days | Discharge: HOME | End: 2019-01-03

## 2019-01-03 ENCOUNTER — MEDICATION RENEWAL (OUTPATIENT)
Age: 59
End: 2019-01-03

## 2019-01-03 DIAGNOSIS — Z17.0 ESTROGEN RECEPTOR POSITIVE STATUS [ER+]: ICD-10-CM

## 2019-01-03 DIAGNOSIS — C50.919 MALIGNANT NEOPLASM OF UNSPECIFIED SITE OF UNSPECIFIED FEMALE BREAST: ICD-10-CM

## 2019-01-03 DIAGNOSIS — Z98.890 OTHER SPECIFIED POSTPROCEDURAL STATES: Chronic | ICD-10-CM

## 2019-01-03 RX ORDER — PROCHLORPERAZINE MALEATE 10 MG/1
10 TABLET ORAL EVERY 6 HOURS
Qty: 30 | Refills: 0 | Status: DISCONTINUED | COMMUNITY
Start: 2018-11-09 | End: 2019-01-03

## 2019-01-03 RX ORDER — ONDANSETRON 8 MG/1
8 TABLET, ORALLY DISINTEGRATING ORAL EVERY 8 HOURS
Qty: 30 | Refills: 0 | Status: DISCONTINUED | COMMUNITY
Start: 2018-10-26 | End: 2019-01-03

## 2019-01-03 NOTE — ASSESSMENT
[FreeTextEntry1] : 59yo woman with ER+GA-HER2+ locally advanced, poorly differentiated, inflammatory ductal breast cancer, multifocal with multiple breast masses and multiple enlarged axillary LNs on exam/MRI imaging (biopsy confirmed with clips placed). Staging imaging with SUV-avid peripancreatic LN - biopsy with reactive lymph node (benign). She presents prior to THP #3 after completing 4 cycles of AC. Exam is consistent with significant response to therapy and she is tolerating therapy.\par \par R Breast Cancer: Inflammatory with axillary involvement, exam significantly improved with treatment - no palpable masses or erythema. Mild diarrhea G2 for 2 days with first cycle, resolved, suspect related to Perjeta. Neuropathy grade 2 sudden onset with taxol 80mg/m2 #1 now resolved and has not recurred (week 2 held, week 3 resumed)\par -CMP, CBC today \par -EMLA cream 1 hour prior to mediport accessed\par -continue antihypertensive medication (HTN treatment and cardioprotective); follow up with Dr. Khan - TTE due now, discussed and ordered 1/4 AM prior to treatment\par -follow up with Dr. Luna for MRI in ~3 weeks prior to treatment completion (plan for mastectomy with ALND)\par -radiation and plastic surgery consultations in near future\par -plan for interval repeat imaging prior to surgery with PET/CT\par -adjuvant plan for AI (?with ovarian suppression), herceptin/perjeta v. TDM1 pending surgical findings\par \par Chemotherapy-induced nausea: improved/did not require PRN this cycle\par -PRN compazine trial for nausea\par \par Chemotherapy-related diarrhea: limited, resolved, thought related to perjeta with initial loading dose, occasional diet-related loose stools\par -PRN imodium, lomotil, hydration, call if recurs\par \par Anxiety: patient takes citalopram, situation anxiety surrounding treatment appointments especially\par -recommend trial of Ativan 0.5mg PRN; night before treatment and AM of treatment (trial of half tab - 0.25mg for tolerance) - she has not yet tried but will consider if necessary in the future\par -advised on avoiding driving, falls precautions when taking\par \par Dental Pain: s/p root canal, CBC reviewed adequate counts, exam OK, no additional pain and antibiotics completed\par -monitor for s/s of infection, fevers, call if issues\par -dental follow up planned, CBC check before any further dental work\par \par Skin rash on arms: c/w folliculitis, possbly related to taxol, not bothersome\par -continue to monitor, plan for derm consult \par \par Nasal congestion/slight bleeding on tissue: some component chronic, platelets/CBC adequate\par -ENT consultation for nasal endoscopy and evaluation\par -advised nasal saline, flonase, decongestants PRN, hydration, humidifier use

## 2019-01-03 NOTE — HISTORY OF PRESENT ILLNESS
[Disease: _____________________] : Disease: [unfilled] [T: ___] : T[unfilled] [N: ___] : N[unfilled] [AJCC Stage: ____] : AJCC Stage: [unfilled] [Treatment Protocol] : Treatment Protocol [de-identified] : \par 57yo woman with ER+IL-HER2+ locally advanced, inflammatory poorly differentiated ductal breast cancer.\par \par She noted summer 2018 she felt a mass near her nipple in her R breast. She went to her PMD who ordered a mammogram and sonogram which revealed a R abnormal hypoechoic mass and abnormal appearing axillary LN.\par \par Image guided biopsy of mass and axilla revealed invasive poorly differentiated ductal carcinoma, +LVI, 1.2cm in specimen, [place] 8/9, DCIS, microcalcifications present, ER >95%, IL negative, HER2+ 3+ IHC, Lymph node biopsy with metastatic carcinoma.\par \par She then saw Dr. Camacho breast surgeon who ordered and MRI of the breasts. MRI showed: Biopsy proven mammary carcinoma in the R retroareolar region 5:00 manifested as multiple irregular masses. The tumor is extensive in nature and spanning an area of over 3 cm with non-mass enhancement extending posteriorly suggesting extensive intraductal component as well. Two other highly suspicious masses noted in the RUOQ at 11:00 measuring >3cm and at 10:00 measuring 2.6cm consistent with multicentric disease. Biopsy proven metastatic disease to an axillary LN with additional suspicious LNs (additional with replaced fatty hilum and abnormally enlarged cortices, some have irregular borders, some posterior to pectoralis muscle, also abnormal appearing node in soft tissue lateral to the R chest wall).\par \par Dr. Camacho recommended neoadjuvant chemotherapy and the option for mastectomy with possible axillary lymph node dissection pending response to therapy.\par \par She then saw me in consultation. She underwent PETCT that found a peripancreatic lymph node that was enlarged and SUV avid, b/l laryngeal FDG avidity (likely due to speaking during exam), and SUV avid breast mass/axilla on R with breast skin noted to be thickened. She underwent EGD/EUS and biopsy of this peripancreatic LN (Dr. Giovanni Felton) which was consistent with benign/reactive lymphatic tissue. She had a mediport placed by IR on 9/26. She saw oncocardiology Dr. Khan for risk assessment prior to anthracycline/anti-HER2 monoclonal therapy and was started on ACEi for HTN and for cardioprotection. She saw dermatology for skin biopsy of R breast prior to treatment to evaluate/confirm inflammatory disease; biopsy results with skin involvement by poorly differentiated carcinoma. She started neoadjuvant chemotherapy with dose-dense AC on 9/26. dose-dense AC-->THP (weekly taxol 80mg/m2)\par \par 11/23 THP #1\par 11/30 Taxol 80mg/m2 held due to neuropathy\par 12/7 resumed Taxol weekly without issue\par \par Patient lives in [place] with her  Gonzales (pretzel company owner) and 26 year old daughter (an artist and ). She works full time as a . She notes a history of smoking in the distant past. Mother passed away in 70s from metastatic melanoma, and a maternal aunt had breast cancer; no other significant close family history of cancers. She notes overall migraines (which she has had for many years, seen neurologist, stable lytic medications help, associated with one sided facial numbness when they occur) have gotten better with menopause – her LMP was 2/2018 and it was infrequent prior to that. She has had some hot flashes. She used OCPs in the past for years particularly for endometriosis treatment. She has had issues with DJD of back and spine with pain, MRI last year noted, epidural injections have helped with this significantly as well as occasional NSAID use.\par  [de-identified] : ER+NE-HER2+ [FreeTextEntry1] : dose-dense AC (adriamycin 60mg/m2 and cytoxan 600mg/m2) x 4 cycles, followed by weekly taxol 80mg/m2 x 12 with herceptin/perjeta q3 weeks (to complete 1 year) [de-identified] : She presents today prior to next cycle of therapy ([facility] Friday). She notes fatigue, unchanged. She had some loose stools intermittently but manageable and diet/food related. She has some congestion and notes when she blows her nose especially in the morning that there is some blood (limited). She has a slight rash on the dorsal surface of her arms for the last few days that is not bothersome. Denies f/c/s, current n/v/d, CP, SOB, RESTREPO, cough, abd pain, other bleeding, edema, rashes, focal neuro symptoms, weight loss or gain.

## 2019-01-03 NOTE — PHYSICAL EXAM
[Fully active, able to carry on all pre-disease performance without restriction] : Status 0 - Fully active, able to carry on all pre-disease performance without restriction [Normal] : affect appropriate [de-identified] : +alopecia, wearing cap [de-identified] : L chest wall mediport. no scab or erythema, well healed (tegaderm with EMLA cream in place) [de-identified] : R breast with slightly edematous appearing nipple, no palpable masses or adenopathy b/l, pendulous breasts [de-identified] : trace edema at sock line symmetric b/l. faint maculopapular rash of dorsal arms b/l

## 2019-01-04 ENCOUNTER — APPOINTMENT (OUTPATIENT)
Dept: INFUSION THERAPY | Facility: HOSPITAL | Age: 59
End: 2019-01-04

## 2019-01-04 ENCOUNTER — RESULT REVIEW (OUTPATIENT)
Age: 59
End: 2019-01-04

## 2019-01-04 ENCOUNTER — LABORATORY RESULT (OUTPATIENT)
Age: 59
End: 2019-01-04

## 2019-01-04 ENCOUNTER — APPOINTMENT (OUTPATIENT)
Dept: CV DIAGNOSITCS | Facility: HOSPITAL | Age: 59
End: 2019-01-04

## 2019-01-04 ENCOUNTER — OUTPATIENT (OUTPATIENT)
Dept: OUTPATIENT SERVICES | Facility: HOSPITAL | Age: 59
LOS: 1 days | End: 2019-01-04
Payer: COMMERCIAL

## 2019-01-04 DIAGNOSIS — C50.911 MALIGNANT NEOPLASM OF UNSPECIFIED SITE OF RIGHT FEMALE BREAST: ICD-10-CM

## 2019-01-04 DIAGNOSIS — Z98.890 OTHER SPECIFIED POSTPROCEDURAL STATES: Chronic | ICD-10-CM

## 2019-01-04 LAB
BASOPHILS # BLD AUTO: 0 K/UL — SIGNIFICANT CHANGE UP (ref 0–0.2)
BASOPHILS NFR BLD AUTO: 0.5 % — SIGNIFICANT CHANGE UP (ref 0–2)
EOSINOPHIL # BLD AUTO: 0.1 K/UL — SIGNIFICANT CHANGE UP (ref 0–0.5)
EOSINOPHIL NFR BLD AUTO: 1.8 % — SIGNIFICANT CHANGE UP (ref 0–6)
HCT VFR BLD CALC: 28.3 % — LOW (ref 34.5–45)
HGB BLD-MCNC: 9.5 G/DL — LOW (ref 11.5–15.5)
LYMPHOCYTES # BLD AUTO: 0.9 K/UL — LOW (ref 1–3.3)
LYMPHOCYTES # BLD AUTO: 13.8 % — SIGNIFICANT CHANGE UP (ref 13–44)
MCHC RBC-ENTMCNC: 31 PG — SIGNIFICANT CHANGE UP (ref 27–34)
MCHC RBC-ENTMCNC: 33.7 G/DL — SIGNIFICANT CHANGE UP (ref 32–36)
MCV RBC AUTO: 91.9 FL — SIGNIFICANT CHANGE UP (ref 80–100)
MONOCYTES # BLD AUTO: 0.3 K/UL — SIGNIFICANT CHANGE UP (ref 0–0.9)
MONOCYTES NFR BLD AUTO: 4.9 % — SIGNIFICANT CHANGE UP (ref 2–14)
NEUTROPHILS # BLD AUTO: 5.1 K/UL — SIGNIFICANT CHANGE UP (ref 1.8–7.4)
NEUTROPHILS NFR BLD AUTO: 79 % — HIGH (ref 43–77)
PLATELET # BLD AUTO: 344 K/UL — SIGNIFICANT CHANGE UP (ref 150–400)
RBC # BLD: 3.08 M/UL — LOW (ref 3.8–5.2)
RBC # FLD: 16.8 % — HIGH (ref 10.3–14.5)
WBC # BLD: 6.4 K/UL — SIGNIFICANT CHANGE UP (ref 3.8–10.5)
WBC # FLD AUTO: 6.4 K/UL — SIGNIFICANT CHANGE UP (ref 3.8–10.5)

## 2019-01-04 PROCEDURE — 93306 TTE W/DOPPLER COMPLETE: CPT | Mod: 26

## 2019-01-04 PROCEDURE — 93306 TTE W/DOPPLER COMPLETE: CPT

## 2019-01-04 PROCEDURE — 0399T: CPT

## 2019-01-05 ENCOUNTER — MOBILE ON CALL (OUTPATIENT)
Age: 59
End: 2019-01-05

## 2019-01-07 DIAGNOSIS — Z51.11 ENCOUNTER FOR ANTINEOPLASTIC CHEMOTHERAPY: ICD-10-CM

## 2019-01-07 DIAGNOSIS — R11.2 NAUSEA WITH VOMITING, UNSPECIFIED: ICD-10-CM

## 2019-01-11 ENCOUNTER — LABORATORY RESULT (OUTPATIENT)
Age: 59
End: 2019-01-11

## 2019-01-11 ENCOUNTER — RESULT REVIEW (OUTPATIENT)
Age: 59
End: 2019-01-11

## 2019-01-11 ENCOUNTER — APPOINTMENT (OUTPATIENT)
Dept: INFUSION THERAPY | Facility: HOSPITAL | Age: 59
End: 2019-01-11

## 2019-01-11 LAB
BASOPHILS # BLD AUTO: 0 K/UL — SIGNIFICANT CHANGE UP (ref 0–0.2)
BASOPHILS NFR BLD AUTO: 0.2 % — SIGNIFICANT CHANGE UP (ref 0–2)
EOSINOPHIL # BLD AUTO: 0.1 K/UL — SIGNIFICANT CHANGE UP (ref 0–0.5)
EOSINOPHIL NFR BLD AUTO: 2.4 % — SIGNIFICANT CHANGE UP (ref 0–6)
HCT VFR BLD CALC: 31.5 % — LOW (ref 34.5–45)
HGB BLD-MCNC: 10.2 G/DL — LOW (ref 11.5–15.5)
LYMPHOCYTES # BLD AUTO: 0.7 K/UL — LOW (ref 1–3.3)
LYMPHOCYTES # BLD AUTO: 15.5 % — SIGNIFICANT CHANGE UP (ref 13–44)
MCHC RBC-ENTMCNC: 30.1 PG — SIGNIFICANT CHANGE UP (ref 27–34)
MCHC RBC-ENTMCNC: 32.6 G/DL — SIGNIFICANT CHANGE UP (ref 32–36)
MCV RBC AUTO: 92.5 FL — SIGNIFICANT CHANGE UP (ref 80–100)
MONOCYTES # BLD AUTO: 0.4 K/UL — SIGNIFICANT CHANGE UP (ref 0–0.9)
MONOCYTES NFR BLD AUTO: 7.7 % — SIGNIFICANT CHANGE UP (ref 2–14)
NEUTROPHILS # BLD AUTO: 3.6 K/UL — SIGNIFICANT CHANGE UP (ref 1.8–7.4)
NEUTROPHILS NFR BLD AUTO: 74.2 % — SIGNIFICANT CHANGE UP (ref 43–77)
PLATELET # BLD AUTO: 375 K/UL — SIGNIFICANT CHANGE UP (ref 150–400)
RBC # BLD: 3.4 M/UL — LOW (ref 3.8–5.2)
RBC # FLD: 16.6 % — HIGH (ref 10.3–14.5)
WBC # BLD: 4.8 K/UL — SIGNIFICANT CHANGE UP (ref 3.8–10.5)
WBC # FLD AUTO: 4.8 K/UL — SIGNIFICANT CHANGE UP (ref 3.8–10.5)

## 2019-01-17 DIAGNOSIS — C50.911 MALIGNANT NEOPLASM OF UNSPECIFIED SITE OF RIGHT FEMALE BREAST: ICD-10-CM

## 2019-01-17 DIAGNOSIS — C77.3 SECONDARY AND UNSPECIFIED MALIGNANT NEOPLASM OF AXILLA AND UPPER LIMB LYMPH NODES: ICD-10-CM

## 2019-01-18 ENCOUNTER — LABORATORY RESULT (OUTPATIENT)
Age: 59
End: 2019-01-18

## 2019-01-18 ENCOUNTER — APPOINTMENT (OUTPATIENT)
Dept: INFUSION THERAPY | Facility: HOSPITAL | Age: 59
End: 2019-01-18

## 2019-01-18 ENCOUNTER — RESULT REVIEW (OUTPATIENT)
Age: 59
End: 2019-01-18

## 2019-01-18 LAB
BASOPHILS # BLD AUTO: 0 K/UL — SIGNIFICANT CHANGE UP (ref 0–0.2)
BASOPHILS NFR BLD AUTO: 0.6 % — SIGNIFICANT CHANGE UP (ref 0–2)
EOSINOPHIL # BLD AUTO: 0.1 K/UL — SIGNIFICANT CHANGE UP (ref 0–0.5)
EOSINOPHIL NFR BLD AUTO: 2.2 % — SIGNIFICANT CHANGE UP (ref 0–6)
HCT VFR BLD CALC: 31.1 % — LOW (ref 34.5–45)
HGB BLD-MCNC: 10.5 G/DL — LOW (ref 11.5–15.5)
LYMPHOCYTES # BLD AUTO: 0.9 K/UL — LOW (ref 1–3.3)
LYMPHOCYTES # BLD AUTO: 14.3 % — SIGNIFICANT CHANGE UP (ref 13–44)
MCHC RBC-ENTMCNC: 31.3 PG — SIGNIFICANT CHANGE UP (ref 27–34)
MCHC RBC-ENTMCNC: 33.8 G/DL — SIGNIFICANT CHANGE UP (ref 32–36)
MCV RBC AUTO: 92.5 FL — SIGNIFICANT CHANGE UP (ref 80–100)
MONOCYTES # BLD AUTO: 0.4 K/UL — SIGNIFICANT CHANGE UP (ref 0–0.9)
MONOCYTES NFR BLD AUTO: 6.1 % — SIGNIFICANT CHANGE UP (ref 2–14)
NEUTROPHILS # BLD AUTO: 4.8 K/UL — SIGNIFICANT CHANGE UP (ref 1.8–7.4)
NEUTROPHILS NFR BLD AUTO: 76.8 % — SIGNIFICANT CHANGE UP (ref 43–77)
PLATELET # BLD AUTO: 378 K/UL — SIGNIFICANT CHANGE UP (ref 150–400)
RBC # BLD: 3.36 M/UL — LOW (ref 3.8–5.2)
RBC # FLD: 16.2 % — HIGH (ref 10.3–14.5)
WBC # BLD: 6.2 K/UL — SIGNIFICANT CHANGE UP (ref 3.8–10.5)
WBC # FLD AUTO: 6.2 K/UL — SIGNIFICANT CHANGE UP (ref 3.8–10.5)

## 2019-01-23 ENCOUNTER — APPOINTMENT (OUTPATIENT)
Dept: DERMATOLOGY | Facility: CLINIC | Age: 59
End: 2019-01-23
Payer: COMMERCIAL

## 2019-01-23 VITALS
SYSTOLIC BLOOD PRESSURE: 126 MMHG | BODY MASS INDEX: 40.81 KG/M2 | WEIGHT: 260 LBS | HEIGHT: 67 IN | DIASTOLIC BLOOD PRESSURE: 76 MMHG

## 2019-01-23 PROCEDURE — 99243 OFF/OP CNSLTJ NEW/EST LOW 30: CPT

## 2019-01-25 ENCOUNTER — LABORATORY RESULT (OUTPATIENT)
Age: 59
End: 2019-01-25

## 2019-01-25 ENCOUNTER — RESULT REVIEW (OUTPATIENT)
Age: 59
End: 2019-01-25

## 2019-01-25 ENCOUNTER — APPOINTMENT (OUTPATIENT)
Dept: HEMATOLOGY ONCOLOGY | Facility: CLINIC | Age: 59
End: 2019-01-25
Payer: COMMERCIAL

## 2019-01-25 ENCOUNTER — APPOINTMENT (OUTPATIENT)
Dept: INFUSION THERAPY | Facility: HOSPITAL | Age: 59
End: 2019-01-25

## 2019-01-25 VITALS
OXYGEN SATURATION: 97 % | RESPIRATION RATE: 16 BRPM | DIASTOLIC BLOOD PRESSURE: 79 MMHG | HEART RATE: 94 BPM | TEMPERATURE: 98.7 F | SYSTOLIC BLOOD PRESSURE: 119 MMHG | BODY MASS INDEX: 40.4 KG/M2 | WEIGHT: 257.94 LBS

## 2019-01-25 LAB
BASOPHILS # BLD AUTO: 0 K/UL — SIGNIFICANT CHANGE UP (ref 0–0.2)
BASOPHILS NFR BLD AUTO: 0.6 % — SIGNIFICANT CHANGE UP (ref 0–2)
EOSINOPHIL # BLD AUTO: 0.1 K/UL — SIGNIFICANT CHANGE UP (ref 0–0.5)
EOSINOPHIL NFR BLD AUTO: 2.1 % — SIGNIFICANT CHANGE UP (ref 0–6)
HCT VFR BLD CALC: 29.8 % — LOW (ref 34.5–45)
HGB BLD-MCNC: 10 G/DL — LOW (ref 11.5–15.5)
LYMPHOCYTES # BLD AUTO: 0.8 K/UL — LOW (ref 1–3.3)
LYMPHOCYTES # BLD AUTO: 16.6 % — SIGNIFICANT CHANGE UP (ref 13–44)
MCHC RBC-ENTMCNC: 31 PG — SIGNIFICANT CHANGE UP (ref 27–34)
MCHC RBC-ENTMCNC: 33.5 G/DL — SIGNIFICANT CHANGE UP (ref 32–36)
MCV RBC AUTO: 92.5 FL — SIGNIFICANT CHANGE UP (ref 80–100)
MONOCYTES # BLD AUTO: 0.3 K/UL — SIGNIFICANT CHANGE UP (ref 0–0.9)
MONOCYTES NFR BLD AUTO: 6.6 % — SIGNIFICANT CHANGE UP (ref 2–14)
NEUTROPHILS # BLD AUTO: 3.6 K/UL — SIGNIFICANT CHANGE UP (ref 1.8–7.4)
NEUTROPHILS NFR BLD AUTO: 74 % — SIGNIFICANT CHANGE UP (ref 43–77)
PLATELET # BLD AUTO: 388 K/UL — SIGNIFICANT CHANGE UP (ref 150–400)
RBC # BLD: 3.22 M/UL — LOW (ref 3.8–5.2)
RBC # FLD: 16.1 % — HIGH (ref 10.3–14.5)
WBC # BLD: 4.9 K/UL — SIGNIFICANT CHANGE UP (ref 3.8–10.5)
WBC # FLD AUTO: 4.9 K/UL — SIGNIFICANT CHANGE UP (ref 3.8–10.5)

## 2019-01-25 PROCEDURE — 99215 OFFICE O/P EST HI 40 MIN: CPT

## 2019-01-25 NOTE — ASSESSMENT
[FreeTextEntry1] : 57yo woman with ER+NE-HER2+ locally advanced, poorly differentiated, inflammatory ductal breast cancer, multifocal with multiple breast masses and multiple enlarged axillary LNs on exam/MRI imaging (biopsy confirmed with clips placed). Staging imaging with SUV-avid peripancreatic LN - biopsy with reactive lymph node (benign). She presents prior to THP #4 after completing 4 cycles of AC. Exam is consistent with significant response to therapy and she is tolerating therapy with few AEs.\par \par R Breast Cancer: Inflammatory with axillary involvement, exam significantly improved with treatment - no palpable masses or erythema. Mild diarrhea G2 for 2-3 days with each THP cycle, suspect related to Perjeta. Neuropathy grade 2 sudden onset with taxol 80mg/m2 #1 now resolved and has not recurred (week 2 held, week 3 resumed). Skin rash grade 1, per below\par -CMP, CBC today \par -EMLA cream 1 hour prior to mediport accessed\par -continue antihypertensive medication (HTN treatment and cardioprotective); follow up with Dr. Khan (TTE 1/4 ok, EF stable)\par -follow up with Dr. Luna for MRI in ~2 weeks prior to treatment completion (plan for mastectomy with ALND) - scheduled. discussed today deferring reconstruction to completion of full treatment\par -radiation consultation in near future\par -adjuvant plan for AI (?with ovarian suppression), herceptin/perjeta v. TDM1 pending surgical findings\par \par Chemotherapy-related diarrhea: limited, resolved, thought related to perjeta with initial loading dose, occasional diet-related loose stools\par -PRN imodium, lomotil, hydration, call if recurs\par \par Anxiety: patient takes citalopram, situation anxiety surrounding treatment appointments especially\par -recommend trial of Ativan 0.5mg PRN, has not needed\par -advised on avoiding driving, falls precautions when taking\par \par Dental Pain: s/p root canal, CBC reviewed adequate counts, exam OK, no additional pain and antibiotics completed. small residual area of ulceration near tooth on exam today\par -monitor for s/s of infection, fevers, call if issues\par -dental follow up planned, CBC check before any further dental work - next week, reviewed\par \par Skin rash on arms: c/w taxol reaction, also with new acneiform rash on face c/w reaction to her2 antibody therapy, both improved\par -continue topicals, derm follow up planned\par \par Nasal congestion/slight bleeding on tissue: some component chronic, platelets/CBC adequate\par -ENT consultation for nasal endoscopy and evaluation - scheduled per patient\par -advised nasal saline, flonase, decongestants PRN, hydration, humidifier use

## 2019-01-25 NOTE — HISTORY OF PRESENT ILLNESS
[Disease: _____________________] : Disease: [unfilled] [T: ___] : T[unfilled] [N: ___] : N[unfilled] [AJCC Stage: ____] : AJCC Stage: [unfilled] [de-identified] : \par 57yo woman with ER+NE-HER2+ locally advanced, inflammatory poorly differentiated ductal breast cancer.\par \par She noted summer 2018 she felt a mass near her nipple in her R breast. She went to her PMD who ordered a mammogram and sonogram which revealed a R abnormal hypoechoic mass and abnormal appearing axillary LN.\par \par Image guided biopsy of mass and axilla revealed invasive poorly differentiated ductal carcinoma, +LVI, 1.2cm in specimen, [place] 8/9, DCIS, microcalcifications present, ER >95%, NE negative, HER2+ 3+ IHC, Lymph node biopsy with metastatic carcinoma.\par \par She then saw Dr. Camacho breast surgeon who ordered and MRI of the breasts. MRI showed: Biopsy proven mammary carcinoma in the R retroareolar region 5:00 manifested as multiple irregular masses. The tumor is extensive in nature and spanning an area of over 3 cm with non-mass enhancement extending posteriorly suggesting extensive intraductal component as well. Two other highly suspicious masses noted in the RUOQ at 11:00 measuring >3cm and at 10:00 measuring 2.6cm consistent with multicentric disease. Biopsy proven metastatic disease to an axillary LN with additional suspicious LNs (additional with replaced fatty hilum and abnormally enlarged cortices, some have irregular borders, some posterior to pectoralis muscle, also abnormal appearing node in soft tissue lateral to the R chest wall).\par \par Dr. Camacho recommended neoadjuvant chemotherapy and the option for mastectomy with possible axillary lymph node dissection pending response to therapy.\par \par She then saw me in consultation. She underwent PETCT that found a peripancreatic lymph node that was enlarged and SUV avid, b/l laryngeal FDG avidity (likely due to speaking during exam), and SUV avid breast mass/axilla on R with breast skin noted to be thickened. She underwent EGD/EUS and biopsy of this peripancreatic LN (Dr. Giovanni Felton) which was consistent with benign/reactive lymphatic tissue. She had a mediport placed by IR on 9/26. She saw oncocardiology Dr. Khan for risk assessment prior to anthracycline/anti-HER2 monoclonal therapy and was started on ACEi for HTN and for cardioprotection. She saw dermatology for skin biopsy of R breast prior to treatment to evaluate/confirm inflammatory disease; biopsy results with skin involvement by poorly differentiated carcinoma. She started neoadjuvant chemotherapy with dose-dense AC on 9/26. dose-dense AC-->THP (weekly taxol 80mg/m2)\par \par 11/23 THP #1\par 11/30 Taxol 80mg/m2 held due to neuropathy\par 12/7 resumed Taxol weekly without issue\par \par Patient lives in [place] with her  Gonzales (pretzel company owner) and 26 year old daughter (an artist and ). She works full time as a . She notes a history of smoking in the distant past. Mother passed away in 70s from metastatic melanoma, and a maternal aunt had breast cancer; no other significant close family history of cancers. She notes overall migraines (which she has had for many years, seen neurologist, stable lytic medications help, associated with one sided facial numbness when they occur) have gotten better with menopause – her LMP was 2/2018 and it was infrequent prior to that. She has had some hot flashes. She used OCPs in the past for years particularly for endometriosis treatment. She has had issues with DJD of back and spine with pain, MRI last year noted, epidural injections have helped with this significantly as well as occasional NSAID use.\par  [de-identified] : ER+IL-HER2+ [Treatment Protocol] : Treatment Protocol [FreeTextEntry1] : dose-dense AC (adriamycin 60mg/m2 and cytoxan 600mg/m2) x 4 cycles, followed by weekly taxol 80mg/m2 x 12 with herceptin/perjeta q3 weeks (to complete 1 year) [de-identified] : She presents today prior to next cycle of therapy (THP today). She notes fatigue, unchanged. She had some loose stools intermittently but manageable and diet/food related - no more than 3-4 times per day for a few days each cycle, relieved with imodium. She still has some congestion and notes when she blows her nose especially in the morning that there is some blood (limited to when blowing nose) - she will see ENT within the week for evaluation. Since last visit she developed acne like rash on face; she saw Dr. Jim who prescribed topicals for this and for hand erythema c/w taxol reaction, she notes improvement. Denies f/c/s, current n/v/d, CP, SOB, RESTREPO, cough, abd pain, other bleeding, edema, rashes, focal neuro symptoms, weight loss or gain.

## 2019-01-25 NOTE — PHYSICAL EXAM
[Fully active, able to carry on all pre-disease performance without restriction] : Status 0 - Fully active, able to carry on all pre-disease performance without restriction [Normal] : affect appropriate [de-identified] : +alopecia, wearing cap [de-identified] : very small ulceration L lateral mouth near prior tooth infection [de-identified] : L chest wall mediport. no scab or erythema (tegaderm with EMLA cream in place) [de-identified] : R breast with slightly edematous appearing nipple, no palpable masses or adenopathy b/l, pendulous breasts [de-identified] : trace edema at sock line symmetric b/l. faint maculopapular rash of dorsal arms and over cheeks b/l less prominent/raised, slight confluent erythema over dorsal hand surfaces

## 2019-01-28 ENCOUNTER — APPOINTMENT (OUTPATIENT)
Dept: OTOLARYNGOLOGY | Facility: CLINIC | Age: 59
End: 2019-01-28
Payer: COMMERCIAL

## 2019-01-28 VITALS
SYSTOLIC BLOOD PRESSURE: 118 MMHG | HEART RATE: 54 BPM | RESPIRATION RATE: 16 BRPM | DIASTOLIC BLOOD PRESSURE: 62 MMHG | WEIGHT: 257 LBS | HEIGHT: 67 IN | OXYGEN SATURATION: 98 % | BODY MASS INDEX: 40.34 KG/M2

## 2019-01-28 DIAGNOSIS — Z87.39 PERSONAL HISTORY OF OTHER DISEASES OF THE MUSCULOSKELETAL SYSTEM AND CONNECTIVE TISSUE: ICD-10-CM

## 2019-01-28 DIAGNOSIS — Z80.8 FAMILY HISTORY OF MALIGNANT NEOPLASM OF OTHER ORGANS OR SYSTEMS: ICD-10-CM

## 2019-01-28 DIAGNOSIS — F41.8 OTHER SPECIFIED ANXIETY DISORDERS: ICD-10-CM

## 2019-01-28 DIAGNOSIS — Z87.09 PERSONAL HISTORY OF OTHER DISEASES OF THE RESPIRATORY SYSTEM: ICD-10-CM

## 2019-01-28 DIAGNOSIS — Z87.898 PERSONAL HISTORY OF OTHER SPECIFIED CONDITIONS: ICD-10-CM

## 2019-01-28 PROCEDURE — 99243 OFF/OP CNSLTJ NEW/EST LOW 30: CPT | Mod: 25

## 2019-01-28 PROCEDURE — 69210 REMOVE IMPACTED EAR WAX UNI: CPT

## 2019-01-28 NOTE — REVIEW OF SYSTEMS
[Seasonal Allergies] : seasonal allergies [Post Nasal Drip] : post nasal drip [Nasal Congestion] : nasal congestion [Nose Bleeds] : nose bleeds [Recurrent Sinus Infections] : recurrent sinus infections [Sinus Pain] : sinus pain [Sinus Pressure] : sinus pressure [Discolored Nasal Discharge] : discolored nasal discharge [Throat Pain] : throat pain [Throat Dryness] : throat dryness [Throat Itching] : throat itching [Feeling Tired] : feeling tired [Recent Weight Gain (___ Lbs)] : recent [unfilled] ~Ulb weight gain [Recent Weight Loss (___ Lbs)] : recent [unfilled] ~Ulb weight loss [Joint Pain] : joint pain [Negative] : Heme/Lymph [Sneezing] : no sneezing [Problem Snoring] : no snoring problems [Sense Of Smell Problem] : no sense of smell problem [Snoring With Pauses] : no snoring with pauses [Hoarseness] : no hoarseness [Throat Clearing] : no throat clearing [Fever] : no fever [Chills] : no chills [Feeling Poorly] : not feeling poorly [Arthralgias] : no arthralgias [Joint Stiffness] : no joint stiffness [Joint Swelling] : no joint swelling [Limb Pain] : no limb pain [Limb Swelling] : no limb swelling

## 2019-01-28 NOTE — ASSESSMENT
[FreeTextEntry1] : Pt to start on Ponaris and will D/C Nasonex for now.  She will f/u if the bleeding persists.

## 2019-01-28 NOTE — HISTORY OF PRESENT ILLNESS
[de-identified] : Age: 58\par Gender: F\par Main complaint: Recurrent epistaxis.  Pt on chemo for breast CA mets to axilla.  Also uses Nasonex for "sinus issues".  It has not helped.  \par Associated symptoms: Dry nose\par Duration/Timing: a month\par Severity of symptoms: Moderate\par Modifying factors: None\par \par \par

## 2019-01-28 NOTE — PHYSICAL EXAM
[Midline] : trachea located in midline position [Normal] : external appearance is normal [de-identified] : RASHAD BUSTOS  Removed. [FreeTextEntry1] : Dry crusts on turbinates and septum bilaterally [de-identified] : Mildly inflamed

## 2019-01-29 ENCOUNTER — MEDICATION RENEWAL (OUTPATIENT)
Age: 59
End: 2019-01-29

## 2019-02-01 ENCOUNTER — LABORATORY RESULT (OUTPATIENT)
Age: 59
End: 2019-02-01

## 2019-02-01 ENCOUNTER — RESULT REVIEW (OUTPATIENT)
Age: 59
End: 2019-02-01

## 2019-02-01 ENCOUNTER — APPOINTMENT (OUTPATIENT)
Dept: INFUSION THERAPY | Facility: HOSPITAL | Age: 59
End: 2019-02-01

## 2019-02-01 LAB
BASOPHILS # BLD AUTO: 0 K/UL — SIGNIFICANT CHANGE UP (ref 0–0.2)
BASOPHILS NFR BLD AUTO: 0.3 % — SIGNIFICANT CHANGE UP (ref 0–2)
EOSINOPHIL # BLD AUTO: 0.1 K/UL — SIGNIFICANT CHANGE UP (ref 0–0.5)
EOSINOPHIL NFR BLD AUTO: 2 % — SIGNIFICANT CHANGE UP (ref 0–6)
HCT VFR BLD CALC: 29.5 % — LOW (ref 34.5–45)
HGB BLD-MCNC: 10 G/DL — LOW (ref 11.5–15.5)
LYMPHOCYTES # BLD AUTO: 0.8 K/UL — LOW (ref 1–3.3)
LYMPHOCYTES # BLD AUTO: 15.7 % — SIGNIFICANT CHANGE UP (ref 13–44)
MCHC RBC-ENTMCNC: 31.3 PG — SIGNIFICANT CHANGE UP (ref 27–34)
MCHC RBC-ENTMCNC: 33.7 G/DL — SIGNIFICANT CHANGE UP (ref 32–36)
MCV RBC AUTO: 92.9 FL — SIGNIFICANT CHANGE UP (ref 80–100)
MONOCYTES # BLD AUTO: 0.3 K/UL — SIGNIFICANT CHANGE UP (ref 0–0.9)
MONOCYTES NFR BLD AUTO: 6.1 % — SIGNIFICANT CHANGE UP (ref 2–14)
NEUTROPHILS # BLD AUTO: 3.7 K/UL — SIGNIFICANT CHANGE UP (ref 1.8–7.4)
NEUTROPHILS NFR BLD AUTO: 75.9 % — SIGNIFICANT CHANGE UP (ref 43–77)
PLATELET # BLD AUTO: 368 K/UL — SIGNIFICANT CHANGE UP (ref 150–400)
RBC # BLD: 3.17 M/UL — LOW (ref 3.8–5.2)
RBC # FLD: 15.8 % — HIGH (ref 10.3–14.5)
WBC # BLD: 4.9 K/UL — SIGNIFICANT CHANGE UP (ref 3.8–10.5)
WBC # FLD AUTO: 4.9 K/UL — SIGNIFICANT CHANGE UP (ref 3.8–10.5)

## 2019-02-04 ENCOUNTER — OUTPATIENT (OUTPATIENT)
Dept: OUTPATIENT SERVICES | Facility: HOSPITAL | Age: 59
LOS: 1 days | End: 2019-02-04
Payer: COMMERCIAL

## 2019-02-04 ENCOUNTER — APPOINTMENT (OUTPATIENT)
Dept: MRI IMAGING | Facility: IMAGING CENTER | Age: 59
End: 2019-02-04
Payer: COMMERCIAL

## 2019-02-04 ENCOUNTER — OUTPATIENT (OUTPATIENT)
Dept: OUTPATIENT SERVICES | Facility: HOSPITAL | Age: 59
LOS: 1 days | Discharge: HOME | End: 2019-02-04

## 2019-02-04 DIAGNOSIS — Z98.890 OTHER SPECIFIED POSTPROCEDURAL STATES: Chronic | ICD-10-CM

## 2019-02-04 DIAGNOSIS — Z17.0 ESTROGEN RECEPTOR POSITIVE STATUS [ER+]: ICD-10-CM

## 2019-02-04 DIAGNOSIS — C77.3 SECONDARY AND UNSPECIFIED MALIGNANT NEOPLASM OF AXILLA AND UPPER LIMB LYMPH NODES: ICD-10-CM

## 2019-02-04 DIAGNOSIS — Z00.8 ENCOUNTER FOR OTHER GENERAL EXAMINATION: ICD-10-CM

## 2019-02-04 DIAGNOSIS — C50.911 MALIGNANT NEOPLASM OF UNSPECIFIED SITE OF RIGHT FEMALE BREAST: ICD-10-CM

## 2019-02-04 PROCEDURE — 77049 MRI BREAST C-+ W/CAD BI: CPT | Mod: 26

## 2019-02-04 PROCEDURE — C8937: CPT

## 2019-02-04 PROCEDURE — C8908: CPT

## 2019-02-04 PROCEDURE — A9585: CPT

## 2019-02-08 ENCOUNTER — APPOINTMENT (OUTPATIENT)
Dept: HEMATOLOGY ONCOLOGY | Facility: CLINIC | Age: 59
End: 2019-02-08
Payer: COMMERCIAL

## 2019-02-08 ENCOUNTER — LABORATORY RESULT (OUTPATIENT)
Age: 59
End: 2019-02-08

## 2019-02-08 ENCOUNTER — APPOINTMENT (OUTPATIENT)
Dept: INFUSION THERAPY | Facility: HOSPITAL | Age: 59
End: 2019-02-08

## 2019-02-08 ENCOUNTER — RESULT REVIEW (OUTPATIENT)
Age: 59
End: 2019-02-08

## 2019-02-08 LAB
BASOPHILS # BLD AUTO: 0 K/UL — SIGNIFICANT CHANGE UP (ref 0–0.2)
BASOPHILS NFR BLD AUTO: 0.7 % — SIGNIFICANT CHANGE UP (ref 0–2)
EOSINOPHIL # BLD AUTO: 0.1 K/UL — SIGNIFICANT CHANGE UP (ref 0–0.5)
EOSINOPHIL NFR BLD AUTO: 1.8 % — SIGNIFICANT CHANGE UP (ref 0–6)
HCT VFR BLD CALC: 29.7 % — LOW (ref 34.5–45)
HGB BLD-MCNC: 10.1 G/DL — LOW (ref 11.5–15.5)
LYMPHOCYTES # BLD AUTO: 0.7 K/UL — LOW (ref 1–3.3)
LYMPHOCYTES # BLD AUTO: 14.1 % — SIGNIFICANT CHANGE UP (ref 13–44)
MCHC RBC-ENTMCNC: 31.5 PG — SIGNIFICANT CHANGE UP (ref 27–34)
MCHC RBC-ENTMCNC: 34 G/DL — SIGNIFICANT CHANGE UP (ref 32–36)
MCV RBC AUTO: 92.7 FL — SIGNIFICANT CHANGE UP (ref 80–100)
MONOCYTES # BLD AUTO: 0.2 K/UL — SIGNIFICANT CHANGE UP (ref 0–0.9)
MONOCYTES NFR BLD AUTO: 4.8 % — SIGNIFICANT CHANGE UP (ref 2–14)
NEUTROPHILS # BLD AUTO: 4 K/UL — SIGNIFICANT CHANGE UP (ref 1.8–7.4)
NEUTROPHILS NFR BLD AUTO: 78.6 % — HIGH (ref 43–77)
PLATELET # BLD AUTO: 354 K/UL — SIGNIFICANT CHANGE UP (ref 150–400)
RBC # BLD: 3.2 M/UL — LOW (ref 3.8–5.2)
RBC # FLD: 15.9 % — HIGH (ref 10.3–14.5)
WBC # BLD: 5.2 K/UL — SIGNIFICANT CHANGE UP (ref 3.8–10.5)
WBC # FLD AUTO: 5.2 K/UL — SIGNIFICANT CHANGE UP (ref 3.8–10.5)

## 2019-02-08 PROCEDURE — 99215 OFFICE O/P EST HI 40 MIN: CPT

## 2019-02-08 NOTE — ASSESSMENT
[FreeTextEntry1] : 57yo woman with ER+VT-HER2+ locally advanced, poorly differentiated, inflammatory ductal breast cancer, multifocal with multiple breast masses and multiple enlarged axillary LNs on exam/MRI imaging (biopsy confirmed with clips placed), skin biopsy positive confirming inflammatory disease. Staging imaging with SUV-avid peripancreatic LN - biopsy with reactive lymph node (benign). She presents today for weekly taxol (with H/P q3 week after completing 4 cycles of AC. Exam and MRI 2/4 is consistent with significant response to therapy and she is tolerating therapy with some AEs and lasting/cumulative fatigue.\par \par R Breast Cancer: Inflammatory with axillary involvement, exam and imaging significantly improved with treatment - no palpable masses or erythema. Mild diarrhea G2 for 2-3 days with each THP cycle, suspect related to Perjeta. Neuropathy grade 2 sudden onset with taxol 80mg/m2 #1 now resolved and has not recurred (week 2 held, week 3 resumed). Skin rash grade 1, per below, resolving. Some tingling at the balls of b/l feet persist.\par -CMP, CBC today; proceed with treatment with close monitoring of symptoms\par -EMLA cream 1 hour prior to mediport accessed\par -continue antihypertensive medication (HTN treatment and cardioprotective); follow up with Dr. Khan (TTE 1/4 ok, EF stable)\par -follow up with Dr. Luna next week and plastic surgery consult (plan for mastectomy with ALND) for surgical planning ASAP; last taxol chemotherapy scheduled 2/15\par -will continue herceptin and perjeta [id number] perioperatively\par -radiation consultation in near future for post-operative planning\par -adjuvant plan for AI (?with ovarian suppression) and herceptin/perjeta v. TDM1 pending surgical findings\par \par Chemotherapy-related diarrhea: limited, resolved, thought related to perjeta with initial loading dose, occasional diet-related loose stools\par -PRN imodium, lomotil, hydration, call if recurs\par \par Anxiety: patient takes citalopram, situation anxiety surrounding treatment appointments especially\par -recommend trial of Ativan 0.5mg PRN, has used once this week with some relief\par -advised on avoiding driving, falls precautions when taking\par \par Dental Pain: s/p root canal and tooth pulled last week, CBC reviewed adequate counts, exam OK, no additional pain and antibiotics completed.\par -monitor for s/s of infection, fevers, call if issues\par -dental follow up planned\par \par Skin rash on arms: c/w taxol reaction, also with new acneiform rash on face c/w reaction to her2 antibody therapy, both improved significantly\par -continue topicals, derm follow up planned\par \par Nasal congestion/slight bleeding on tissue: some component chronic, platelets/CBC adequate\par -ENT advised nasal sa advised topical lubricants for mild mucosal irritation,continue

## 2019-02-08 NOTE — PHYSICAL EXAM
[Fully active, able to carry on all pre-disease performance without restriction] : Status 0 - Fully active, able to carry on all pre-disease performance without restriction [Normal] : affect appropriate [de-identified] : +alopecia, wearing cap, appears fatigued [de-identified] : L chest wall mediport accessed [de-identified] : trace edema at sock line symmetric b/l. faint maculopapular rash of dorsal arms and over cheeks b/l less prominent/raised, L arm > R arm. hand erythema much improved, L hand slight hyperpigmentation [de-identified] : b/l feet sensation intact

## 2019-02-08 NOTE — HISTORY OF PRESENT ILLNESS
[Disease: _____________________] : Disease: [unfilled] [T: ___] : T[unfilled] [N: ___] : N[unfilled] [AJCC Stage: ____] : AJCC Stage: [unfilled] [de-identified] : \par 57yo woman with ER+FL-HER2+ locally advanced, inflammatory poorly differentiated ductal breast cancer.\par \par She noted summer 2018 she felt a mass near her nipple in her R breast. She went to her PMD who ordered a mammogram and sonogram which revealed a R abnormal hypoechoic mass and abnormal appearing axillary LN.\par \par Image guided biopsy of mass and axilla revealed invasive poorly differentiated ductal carcinoma, +LVI, 1.2cm in specimen, [place] 8/9, DCIS, microcalcifications present, ER >95%, FL negative, HER2+ 3+ IHC, Lymph node biopsy with metastatic carcinoma.\par \par She then saw Dr. Camacho breast surgeon who ordered and MRI of the breasts. MRI showed: Biopsy proven mammary carcinoma in the R retroareolar region 5:00 manifested as multiple irregular masses. The tumor is extensive in nature and spanning an area of over 3 cm with non-mass enhancement extending posteriorly suggesting extensive intraductal component as well. Two other highly suspicious masses noted in the RUOQ at 11:00 measuring >3cm and at 10:00 measuring 2.6cm consistent with multicentric disease. Biopsy proven metastatic disease to an axillary LN with additional suspicious LNs (additional with replaced fatty hilum and abnormally enlarged cortices, some have irregular borders, some posterior to pectoralis muscle, also abnormal appearing node in soft tissue lateral to the R chest wall).\par \par Dr. Camacho recommended neoadjuvant chemotherapy and the option for mastectomy with possible axillary lymph node dissection pending response to therapy.\par \par She then saw me in consultation. She underwent PETCT that found a peripancreatic lymph node that was enlarged and SUV avid, b/l laryngeal FDG avidity (likely due to speaking during exam), and SUV avid breast mass/axilla on R with breast skin noted to be thickened. She underwent EGD/EUS and biopsy of this peripancreatic LN (Dr. Giovanni Felton) which was consistent with benign/reactive lymphatic tissue. She had a mediport placed by IR on 9/26. She saw oncocardiology Dr. Khan for risk assessment prior to anthracycline/anti-HER2 monoclonal therapy and was started on ACEi for HTN and for cardioprotection. She saw dermatology for skin biopsy of R breast prior to treatment to evaluate/confirm inflammatory disease; biopsy results with skin involvement by poorly differentiated carcinoma. She started neoadjuvant chemotherapy with dose-dense AC on 9/26. dose-dense AC-->THP (weekly taxol 80mg/m2)\par \par 11/23 THP #1\par 11/30 Taxol 80mg/m2 held due to neuropathy\par 12/7 resumed Taxol weekly without issue\par \par MRI 2/4/19 BIRADS 2, masses and adenopathy resolved. Some residual R breast skin thickening noted.\par \par Patient lives in [place] with her  Gonzales (pretzel company owner) and 26 year old daughter (an artist and ). She works full time as a . She notes a history of smoking in the distant past. Mother passed away in 70s from metastatic melanoma, and a maternal aunt had breast cancer; no other significant close family history of cancers. She notes overall migraines (which she has had for many years, seen neurologist, stable lytic medications help, associated with one sided facial numbness when they occur) have gotten better with menopause – her LMP was 2/2018 and it was infrequent prior to that. She has had some hot flashes. She used OCPs in the past for years particularly for endometriosis treatment. She has had issues with DJD of back and spine with pain, MRI last year noted, epidural injections have helped with this significantly as well as occasional NSAID use.\par  [de-identified] : ER+ME-HER2+ [Treatment Protocol] : Treatment Protocol [FreeTextEntry1] : dose-dense AC (adriamycin 60mg/m2 and cytoxan 600mg/m2) x 4 cycles, followed by weekly taxol 80mg/m2 x 12 with herceptin/perjeta q3 weeks [de-identified] : She presents today for taxol #11. She had a tooth pulled last week without complication - she completed 3 days of doxycycline, pain has resolved and wound healed. She notes fatigue, unchanged in quality but longer lasting with each cycle. She had an episode of lightheadedness when she presented for MRI earlier this week. She had some loose stools intermittently but manageable and diet/food related - no more than 3-4 times per day for a few days each cycle. Saw ENT - using topical lubricants for nasal mucositis symptoms - persist. Acne like rash on face and arms/hands significantly improved with topicals. She notes tingling at the ball of her foot which feels like "there is stuffing there" persists and lasts most of the week between taxol cycles. She feels the tips of her toes feel cold most of the time, potentially related. She denies pain or loss of sensation, gait instability. No tingling or numbness, pain in hands/fingers. She notes Denies f/c/s, current n/v/d, CP, SOB, RESTREPO, cough, abd pain, other bleeding, edema, rashes, focal neuro symptoms, weight loss or gain.

## 2019-02-11 DIAGNOSIS — Z51.11 ENCOUNTER FOR ANTINEOPLASTIC CHEMOTHERAPY: ICD-10-CM

## 2019-02-11 DIAGNOSIS — R11.2 NAUSEA WITH VOMITING, UNSPECIFIED: ICD-10-CM

## 2019-02-13 ENCOUNTER — APPOINTMENT (OUTPATIENT)
Dept: DERMATOLOGY | Facility: CLINIC | Age: 59
End: 2019-02-13
Payer: COMMERCIAL

## 2019-02-13 VITALS — DIASTOLIC BLOOD PRESSURE: 70 MMHG | SYSTOLIC BLOOD PRESSURE: 110 MMHG

## 2019-02-13 PROCEDURE — 99214 OFFICE O/P EST MOD 30 MIN: CPT

## 2019-02-13 NOTE — PHYSICAL EXAM
[Alert] : alert [Oriented x 3] : ~L oriented x 3 [Well Nourished] : well nourished [Conjunctiva Non-injected] : conjunctiva non-injected [No Visual Lymphadenopathy] : no visual  lymphadenopathy [No Clubbing] : no clubbing [No Edema] : no edema [No Bromhidrosis] : no bromhidrosis [No Chromhidrosis] : no chromhidrosis [FreeTextEntry3] : distal onycholysis of multiple nail plates. No paronychia\par red follicular papules on b/l forearms. Face clear\par hyperpigmented patches on b/l dorsal hands\par hyperpigmented macules on b/l forearms

## 2019-02-13 NOTE — ASSESSMENT
[FreeTextEntry1] : 1.) Acneiform eruption- likely 2/2 herceptin or pertuzumab - improving\par CTCAE Grade I\par start OTC BPO wash alternating with her usual Neutrogena gentle cleanser\par c/w Clindamycin 1% lotion 1-2x daily PRN for flares \par discussed can consider PO doxy in future if still bothersome or flaring\par \par 2.) Dorsal hand/foot syndrome- likely 2/2 taxol - stable. Now with secondary hyperpigmentation\par CTCAE Grade I\par -Diagnosis reviewed. Anticipatory guidance provided\par c/w Triamcinolone 0.1% ointment BID x 2 weeks PRN for flares. SED.\par Again advised her to discuss w/ her oncologist potentially cooling her hands with ice packs during last Taxol infusion\par \par f/u 2-3mo / prn

## 2019-02-13 NOTE — HISTORY OF PRESENT ILLNESS
[FreeTextEntry1] : f/u acneiform eruption and hand/foot syndrome [de-identified] : Patient reports  ongoing discoloration on her dorsal hands and some ongoing dysesthesia. Using TAC ointment BID. Did not attempt to use ice packs during Taxol infusions.\par Acneiform eruption on arms and face - clindamycin lotion helps on face than arms. Not progressing elsewhere.\par Final taxol treatment scheduled for this Friday. Noting some progression of the onycholysis on her nails.\par Will be continuing the herceptin/pertuzumab every 3 weeks x 1 year.\par \par Otherwise, no new, evolving, or symptomatic skin lesions. \par \par Background\par 58 year old F w/ PMH of poorly differentiated inflammatory ductal breast cancer. Previously on cyclophosphamide and doxorubin and more recently taxol (started in Nov).She is also on herceptin and pertuzumab. Since starting the taxol, she has noticed erythema and a burning sensation of her b/l dorsal hands, which then resolves in about 1 week with peeling skin. No involvement of her palms or her feet. Over the past few weeks, she has also noticed asymptomatic pink bumps on her face, upper chest and b/l arms. She has not tried any topical treatments. Using a Neutrogena face wash.

## 2019-02-15 ENCOUNTER — RESULT REVIEW (OUTPATIENT)
Age: 59
End: 2019-02-15

## 2019-02-15 ENCOUNTER — APPOINTMENT (OUTPATIENT)
Dept: INFUSION THERAPY | Facility: HOSPITAL | Age: 59
End: 2019-02-15

## 2019-02-15 ENCOUNTER — APPOINTMENT (OUTPATIENT)
Dept: HEMATOLOGY ONCOLOGY | Facility: CLINIC | Age: 59
End: 2019-02-15
Payer: COMMERCIAL

## 2019-02-15 ENCOUNTER — LABORATORY RESULT (OUTPATIENT)
Age: 59
End: 2019-02-15

## 2019-02-15 VITALS
WEIGHT: 253.53 LBS | SYSTOLIC BLOOD PRESSURE: 122 MMHG | TEMPERATURE: 99.1 F | BODY MASS INDEX: 39.71 KG/M2 | OXYGEN SATURATION: 97 % | RESPIRATION RATE: 17 BRPM | DIASTOLIC BLOOD PRESSURE: 75 MMHG | HEART RATE: 111 BPM

## 2019-02-15 LAB
BASOPHILS # BLD AUTO: 0 K/UL — SIGNIFICANT CHANGE UP (ref 0–0.2)
BASOPHILS NFR BLD AUTO: 0.4 % — SIGNIFICANT CHANGE UP (ref 0–2)
EOSINOPHIL # BLD AUTO: 0.1 K/UL — SIGNIFICANT CHANGE UP (ref 0–0.5)
EOSINOPHIL NFR BLD AUTO: 2.7 % — SIGNIFICANT CHANGE UP (ref 0–6)
HCT VFR BLD CALC: 30.8 % — LOW (ref 34.5–45)
HGB BLD-MCNC: 10.1 G/DL — LOW (ref 11.5–15.5)
LYMPHOCYTES # BLD AUTO: 0.9 K/UL — LOW (ref 1–3.3)
LYMPHOCYTES # BLD AUTO: 19.7 % — SIGNIFICANT CHANGE UP (ref 13–44)
MCHC RBC-ENTMCNC: 30.3 PG — SIGNIFICANT CHANGE UP (ref 27–34)
MCHC RBC-ENTMCNC: 32.9 G/DL — SIGNIFICANT CHANGE UP (ref 32–36)
MCV RBC AUTO: 92.2 FL — SIGNIFICANT CHANGE UP (ref 80–100)
MONOCYTES # BLD AUTO: 0.2 K/UL — SIGNIFICANT CHANGE UP (ref 0–0.9)
MONOCYTES NFR BLD AUTO: 4.3 % — SIGNIFICANT CHANGE UP (ref 2–14)
NEUTROPHILS # BLD AUTO: 3.4 K/UL — SIGNIFICANT CHANGE UP (ref 1.8–7.4)
NEUTROPHILS NFR BLD AUTO: 73 % — SIGNIFICANT CHANGE UP (ref 43–77)
PLATELET # BLD AUTO: 369 K/UL — SIGNIFICANT CHANGE UP (ref 150–400)
RBC # BLD: 3.34 M/UL — LOW (ref 3.8–5.2)
RBC # FLD: 16 % — HIGH (ref 10.3–14.5)
WBC # BLD: 4.7 K/UL — SIGNIFICANT CHANGE UP (ref 3.8–10.5)
WBC # FLD AUTO: 4.7 K/UL — SIGNIFICANT CHANGE UP (ref 3.8–10.5)

## 2019-02-15 PROCEDURE — 99215 OFFICE O/P EST HI 40 MIN: CPT

## 2019-02-15 RX ORDER — CLINDAMYCIN HYDROCHLORIDE 300 MG/1
CAPSULE ORAL
Refills: 0 | Status: DISCONTINUED | COMMUNITY
End: 2019-02-15

## 2019-02-15 NOTE — PHYSICAL EXAM
[Fully active, able to carry on all pre-disease performance without restriction] : Status 0 - Fully active, able to carry on all pre-disease performance without restriction [Normal] : affect appropriate [de-identified] : +alopecia, wearing cap, appears fatigued [de-identified] : L chest wall mediport [de-identified] : trace edema at sock line symmetric b/l. faint maculopapular rash of dorsal arms and over cheeks b/l less prominent/raised, L arm > R arm. hand erythema much improved, L hand slight hyperpigmentation [de-identified] : b/l feet sensation intact

## 2019-02-15 NOTE — HISTORY OF PRESENT ILLNESS
[Disease: _____________________] : Disease: [unfilled] [T: ___] : T[unfilled] [N: ___] : N[unfilled] [AJCC Stage: ____] : AJCC Stage: [unfilled] [Treatment Protocol] : Treatment Protocol [de-identified] : \par 57yo woman with ER+NE-HER2+ locally advanced, inflammatory poorly differentiated ductal breast cancer.\par \par She noted summer 2018 she felt a mass near her nipple in her R breast. She went to her PMD who ordered a mammogram and sonogram which revealed a R abnormal hypoechoic mass and abnormal appearing axillary LN.\par \par Image guided biopsy of mass and axilla revealed invasive poorly differentiated ductal carcinoma, +LVI, 1.2cm in specimen, [place] 8/9, DCIS, microcalcifications present, ER >95%, NE negative, HER2+ 3+ IHC, Lymph node biopsy with metastatic carcinoma.\par \par She then saw Dr. Camacho breast surgeon who ordered and MRI of the breasts. MRI showed: Biopsy proven mammary carcinoma in the R retroareolar region 5:00 manifested as multiple irregular masses. The tumor is extensive in nature and spanning an area of over 3 cm with non-mass enhancement extending posteriorly suggesting extensive intraductal component as well. Two other highly suspicious masses noted in the RUOQ at 11:00 measuring >3cm and at 10:00 measuring 2.6cm consistent with multicentric disease. Biopsy proven metastatic disease to an axillary LN with additional suspicious LNs (additional with replaced fatty hilum and abnormally enlarged cortices, some have irregular borders, some posterior to pectoralis muscle, also abnormal appearing node in soft tissue lateral to the R chest wall).\par \par Dr. Camacho recommended neoadjuvant chemotherapy and the option for mastectomy with possible axillary lymph node dissection pending response to therapy.\par \par She then saw me in consultation. She underwent PETCT that found a peripancreatic lymph node that was enlarged and SUV avid, b/l laryngeal FDG avidity (likely due to speaking during exam), and SUV avid breast mass/axilla on R with breast skin noted to be thickened. She underwent EGD/EUS and biopsy of this peripancreatic LN (Dr. Giovanni Felton) which was consistent with benign/reactive lymphatic tissue. She had a mediport placed by IR on 9/26. She saw oncocardiology Dr. Khan for risk assessment prior to anthracycline/anti-HER2 monoclonal therapy and was started on ACEi for HTN and for cardioprotection. She saw dermatology for skin biopsy of R breast prior to treatment to evaluate/confirm inflammatory disease; biopsy results with skin involvement by poorly differentiated carcinoma. She started neoadjuvant chemotherapy with dose-dense AC on 9/26. dose-dense AC-->THP (weekly taxol 80mg/m2)\par \par 11/23 THP #1\par 11/30 Taxol 80mg/m2 held due to neuropathy\par 12/7 resumed Taxol weekly without issue\par \par MRI 2/4/19 BIRADS 2, masses and adenopathy resolved. Some residual R breast skin thickening noted.\par \par Patient lives in [place] with her  Gonzales (pretzel company owner) and 26 year old daughter (an artist and ). She works full time as a . She notes a history of smoking in the distant past. Mother passed away in 70s from metastatic melanoma, and a maternal aunt had breast cancer; no other significant close family history of cancers. She notes overall migraines (which she has had for many years, seen neurologist, stable lytic medications help, associated with one sided facial numbness when they occur) have gotten better with menopause – her LMP was 2/2018 and it was infrequent prior to that. She has had some hot flashes. She used OCPs in the past for years particularly for endometriosis treatment. She has had issues with DJD of back and spine with pain, MRI last year noted, epidural injections have helped with this significantly as well as occasional NSAID use.\par  [de-identified] : ER+MA-HER2+ [FreeTextEntry1] : dose-dense AC (adriamycin 60mg/m2 and cytoxan 600mg/m2) x 4 cycles, followed by weekly taxol 80mg/m2 x 12 with herceptin/perjeta q3 weeks [de-identified] : She presents today for taxol #12 (with herceptin/perjeta). She notes fatigue, unchanged in quality but longer lasting with each cycle - has not been going into work recently. She had some loose stools intermittently but manageable and diet/food related - no more than 3-4 times per day for a few days each cycle (ex. yesterday after eating oatmeal, with some urgency associated). Acne like rash on face and arms/hands significantly improved with topicals. She notes tingling at the ball of her foot and toes which is worse in the day or two after chemotherapy and lasts most of the week between taxol cycles for the last 1-2 cycles. She feels the tips of her toes feel cold most of the time with an occasional burning sensation. She denies mauricio pain or loss of sensation, gait instability. No tingling or numbness, pain in hands/fingers. She notes Denies f/c/s, current n/v/d, CP, SOB, RESTREPO, cough, abd pain, other bleeding, edema, rashes, focal neuro symptoms, weight loss or gain.

## 2019-02-15 NOTE — ASSESSMENT
[FreeTextEntry1] : 59yo woman with ER+MN-HER2+ locally advanced, poorly differentiated, inflammatory ductal breast cancer, multifocal with multiple breast masses and multiple enlarged axillary LNs on exam/MRI imaging (biopsy confirmed with clips placed), skin biopsy positive confirming inflammatory disease. Staging imaging with SUV-avid peripancreatic LN - biopsy with reactive lymph node (benign). She presents today for weekly taxol (with H/P q3 week after completing 4 cycles of AC. Exam and MRI 2/4 is consistent with significant response to therapy and she is tolerating therapy with some AEs and lasting/cumulative fatigue.\par \par R Breast Cancer: Inflammatory with axillary involvement, exam and imaging significantly improved with treatment - no palpable masses or erythema. Mild diarrhea G2 for 2-3 days with each THP cycle, suspect related to Perjeta. Neuropathy grade 2 sudden onset with taxol 80mg/m2 #1 resolved (week 2 held, week 3 resumed) - now with grade 1 symptoms. Skin rash grade 1, per below, resolving. \par -CMP, CBC today; proceed with treatment with close monitoring of symptoms\par -EMLA cream 1 hour prior to mediport accessed\par -continue antihypertensive medication (HTN treatment and cardioprotective); follow up with Dr. Khan (TTE 1/4 ok, EF stable)\par -follow up with Dr. Luna next week and plastic surgery consult this week (plan for mastectomy with ALND) for surgical planning ASAP; last taxol chemotherapy today\par -will continue herceptin and perjeta [id number] perioperatively\par -radiation consultation in near future for post-operative planning\par -adjuvant plan for AI (?with ovarian suppression, LMP 2/2018) and herceptin/perjeta v. TDM1 pending surgical findings\par \par Chemotherapy-related diarrhea: limited, resolved, thought related to perjeta with initial loading dose, occasional diet-related loose stools\par -PRN imodium, lomotil, hydration, call if recurs\par \par Anxiety: patient takes citalopram, situation anxiety surrounding treatment appointments especially\par -recommend trial of Ativan 0.5mg PRN, has used once with some relief\par -advised on avoiding driving, falls precautions when taking\par \par Dental Pain: s/p root canal and tooth pulled early Feb 2019, CBC reviewed adequate counts, exam OK, no additional pain and antibiotics completed.\par -monitor for s/s of infection, fevers, call if issues\par -dental follow up planned\par \par Skin rash on arms: c/w taxol reaction, also with new acneiform rash on face c/w reaction to her2 antibody therapy, both improved significantly\par -continue topicals, derm follow up planned\par \par Nasal congestion/slight bleeding on tissue: some component chronic, platelets/CBC adequate\par -ENT advised topical lubricants for mild mucosal irritation,continue\par \par Follow up 3 weeks, sooner if issues

## 2019-02-26 ENCOUNTER — OUTPATIENT (OUTPATIENT)
Dept: OUTPATIENT SERVICES | Facility: HOSPITAL | Age: 59
LOS: 1 days | Discharge: HOME | End: 2019-02-26

## 2019-02-26 DIAGNOSIS — C77.3 SECONDARY AND UNSPECIFIED MALIGNANT NEOPLASM OF AXILLA AND UPPER LIMB LYMPH NODES: ICD-10-CM

## 2019-02-26 DIAGNOSIS — Z98.890 OTHER SPECIFIED POSTPROCEDURAL STATES: Chronic | ICD-10-CM

## 2019-02-26 DIAGNOSIS — C50.911 MALIGNANT NEOPLASM OF UNSPECIFIED SITE OF RIGHT FEMALE BREAST: ICD-10-CM

## 2019-02-28 ENCOUNTER — TRANSCRIPTION ENCOUNTER (OUTPATIENT)
Age: 59
End: 2019-02-28

## 2019-03-05 ENCOUNTER — OUTPATIENT (OUTPATIENT)
Dept: OUTPATIENT SERVICES | Facility: HOSPITAL | Age: 59
LOS: 1 days | End: 2019-03-05
Payer: COMMERCIAL

## 2019-03-05 DIAGNOSIS — R92.2 INCONCLUSIVE MAMMOGRAM: ICD-10-CM

## 2019-03-05 DIAGNOSIS — Z80.3 FAMILY HISTORY OF MALIGNANT NEOPLASM OF BREAST: ICD-10-CM

## 2019-03-05 DIAGNOSIS — Z98.890 OTHER SPECIFIED POSTPROCEDURAL STATES: Chronic | ICD-10-CM

## 2019-03-05 DIAGNOSIS — Z85.3 PERSONAL HISTORY OF MALIGNANT NEOPLASM OF BREAST: ICD-10-CM

## 2019-03-05 DIAGNOSIS — C50.111 MALIGNANT NEOPLASM OF CENTRAL PORTION OF RIGHT FEMALE BREAST: ICD-10-CM

## 2019-03-05 DIAGNOSIS — Z90.11 ACQUIRED ABSENCE OF RIGHT BREAST AND NIPPLE: ICD-10-CM

## 2019-03-06 ENCOUNTER — RESULT REVIEW (OUTPATIENT)
Age: 59
End: 2019-03-06

## 2019-03-06 PROCEDURE — 88321 CONSLTJ&REPRT SLD PREP ELSWR: CPT

## 2019-03-07 ENCOUNTER — OUTPATIENT (OUTPATIENT)
Dept: OUTPATIENT SERVICES | Facility: HOSPITAL | Age: 59
LOS: 1 days | End: 2019-03-07
Payer: COMMERCIAL

## 2019-03-07 VITALS
RESPIRATION RATE: 16 BRPM | TEMPERATURE: 98 F | OXYGEN SATURATION: 96 % | SYSTOLIC BLOOD PRESSURE: 146 MMHG | WEIGHT: 251.77 LBS | HEART RATE: 88 BPM | DIASTOLIC BLOOD PRESSURE: 74 MMHG | HEIGHT: 66 IN

## 2019-03-07 VITALS — WEIGHT: 251.77 LBS | HEIGHT: 66 IN

## 2019-03-07 DIAGNOSIS — C50.919 MALIGNANT NEOPLASM OF UNSPECIFIED SITE OF UNSPECIFIED FEMALE BREAST: ICD-10-CM

## 2019-03-07 DIAGNOSIS — Z85.3 PERSONAL HISTORY OF MALIGNANT NEOPLASM OF BREAST: ICD-10-CM

## 2019-03-07 DIAGNOSIS — Z98.890 OTHER SPECIFIED POSTPROCEDURAL STATES: Chronic | ICD-10-CM

## 2019-03-07 DIAGNOSIS — Z90.11 ACQUIRED ABSENCE OF RIGHT BREAST AND NIPPLE: ICD-10-CM

## 2019-03-07 DIAGNOSIS — Z01.818 ENCOUNTER FOR OTHER PREPROCEDURAL EXAMINATION: ICD-10-CM

## 2019-03-07 PROCEDURE — 93005 ELECTROCARDIOGRAM TRACING: CPT

## 2019-03-07 PROCEDURE — G0463: CPT

## 2019-03-07 PROCEDURE — 93010 ELECTROCARDIOGRAM REPORT: CPT | Mod: NC

## 2019-03-07 RX ORDER — SODIUM CHLORIDE 9 MG/ML
1000 INJECTION, SOLUTION INTRAVENOUS
Qty: 0 | Refills: 0 | Status: DISCONTINUED | OUTPATIENT
Start: 2019-03-22 | End: 2019-03-22

## 2019-03-07 NOTE — H&P PST ADULT - RS GEN PE MLT RESP DETAILS PC
airway patent/clear to auscultation bilaterally/breath sounds equal/no rhonchi/respirations non-labored/good air movement/no rales/no wheezes

## 2019-03-07 NOTE — H&P PST ADULT - DOCUMENT STATUS
"NABUMETONE 500MG TABLETS  Last Written Prescription Date:  11/8/2018  Last Fill Quantity: 20,  # refills: 0   Last office visit: No previous visit found with prescribing provider:  Bri House MD   Future Office Visit:  NA      Requested Prescriptions   Pending Prescriptions Disp Refills     nabumetone (RELAFEN) 500 MG tablet [Pharmacy Med Name: NABUMETONE 500MG TABLETS] 360 tablet 0     Sig: TAKE 1 TO 2 TABLETS(500 TO 1000 MG) BY MOUTH TWICE DAILY AS NEEDED FOR MODERATE PAIN    NSAID Medications Passed    11/8/2018  3:21 PM       Passed - Blood pressure under 140/90 in past 12 months    BP Readings from Last 3 Encounters:   11/08/18 130/70   04/10/18 127/89   02/26/18 127/88                Passed - Normal ALT on file in past 12 months    Recent Labs   Lab Test  02/26/18   1649   ALT  35            Passed - Normal AST on file in past 12 months    Recent Labs   Lab Test  02/26/18   1649   AST  27            Passed - Recent (12 mo) or future (30 days) visit within the authorizing provider's specialty    Patient had office visit in the last 12 months or has a visit in the next 30 days with authorizing provider or within the authorizing provider's specialty.  See \"Patient Info\" tab in inbasket, or \"Choose Columns\" in Meds & Orders section of the refill encounter.             Passed - Patient is age 6-64 years       Passed - Normal CBC on file in past 12 months    Recent Labs   Lab Test  02/23/18   0630   WBC  3.6*   RBC  3.85   HGB  13.1   HCT  34.1*   PLT  267                Passed - No active pregnancy on record       Passed - Normal serum creatinine on file in past 12 months    Recent Labs   Lab Test  02/26/18   1649   CR  0.60            Passed - No positive pregnancy test in past 12 months          "
Received request from pharmacy that pt is requesting 90 day.  90 day rx denied to pharmacy.    Genevieve GANDHI RN  EP Triage    
Authored by Resident/PA/NP

## 2019-03-07 NOTE — H&P PST ADULT - PSH
History of laparoscopy  1980s for endometriosis  S/P FESS (functional endoscopic sinus surgery)  turbinectomy 1997

## 2019-03-07 NOTE — H&P PST ADULT - NSANTHOSAYNRD_GEN_A_CORE
No. RICA screening performed.  STOP BANG Legend: 0-2 = LOW Risk; 3-4 = INTERMEDIATE Risk; 5-8 = HIGH Risk/neck 15 inches

## 2019-03-07 NOTE — H&P PST ADULT - PMH
Acquired deviated nasal septum    Anemia    Anxiety    Depression    Dizziness  chemo related  Environmental allergies    GERD (gastroesophageal reflux disease)    Hypertension    Lumbar disc herniation  unsure level  Lumbar stenosis  L5-S1, last epidural injection April 2018  Migraine    Morbid obesity with BMI of 40.0-44.9, adult    Palpitations  anxiety induced  Sinusitis Acquired deviated nasal septum    Anemia    Anxiety    Breast cancer  right HER2 positive and estrogen positive. completed chemo 2 weeks ago  Constipation  chemo related  Depression    Dizziness  chemo related  Environmental allergies    Frozen shoulder  bilateral  GERD (gastroesophageal reflux disease)    Hypertension    Lumbar disc herniation  unsure level  Lumbar stenosis  L5-S1, last epidural injection April 2018  Migraine    Morbid obesity with BMI of 40.0-44.9, adult    Mucositis    Neuropathy  lower extremities  Osteoarthritis    Osteophyte of spine  T 7-9 and T 11-12  Palpitations  anxiety induced  Sinusitis    Varicose vein of leg  right posterior patella

## 2019-03-07 NOTE — H&P PST ADULT - HISTORY OF PRESENT ILLNESS
57 yo female presents with right breast cancer HER2 positive and estrogen positive. She just completed chemotherapy and is now scheduled for a right mastectomy with placement of tissue expander.

## 2019-03-07 NOTE — H&P PST ADULT - PROBLEM SELECTOR PLAN 1
right breast tota; mastectomy with mammo localization, right sentinel node biopsy. right breast tissue expander. medical clearance requested. stop MVI and mobic 1 week prior to surgery. lisinopril and omeprazole AM of surgery with sips of water. surgical wash as directed.

## 2019-03-07 NOTE — H&P PST ADULT - MUSCULOSKELETAL
details… detailed exam no joint warmth/diminished strength/no joint swelling/no joint erythema/no calf tenderness/decreased ROM/decreased ROM due to pain/bilateral shoulders

## 2019-03-08 ENCOUNTER — APPOINTMENT (OUTPATIENT)
Dept: INFUSION THERAPY | Facility: HOSPITAL | Age: 59
End: 2019-03-08

## 2019-03-08 ENCOUNTER — RESULT REVIEW (OUTPATIENT)
Age: 59
End: 2019-03-08

## 2019-03-08 ENCOUNTER — LABORATORY RESULT (OUTPATIENT)
Age: 59
End: 2019-03-08

## 2019-03-08 ENCOUNTER — APPOINTMENT (OUTPATIENT)
Dept: HEMATOLOGY ONCOLOGY | Facility: CLINIC | Age: 59
End: 2019-03-08
Payer: COMMERCIAL

## 2019-03-08 VITALS
TEMPERATURE: 98.9 F | BODY MASS INDEX: 39.36 KG/M2 | RESPIRATION RATE: 16 BRPM | DIASTOLIC BLOOD PRESSURE: 79 MMHG | SYSTOLIC BLOOD PRESSURE: 129 MMHG | OXYGEN SATURATION: 96 % | HEART RATE: 93 BPM | WEIGHT: 251.33 LBS

## 2019-03-08 PROBLEM — M48.061 SPINAL STENOSIS, LUMBAR REGION WITHOUT NEUROGENIC CLAUDICATION: Chronic | Status: ACTIVE | Noted: 2019-03-07

## 2019-03-08 PROBLEM — C50.919 MALIGNANT NEOPLASM OF UNSPECIFIED SITE OF UNSPECIFIED FEMALE BREAST: Chronic | Status: ACTIVE | Noted: 2019-03-07

## 2019-03-08 PROBLEM — M25.78 OSTEOPHYTE, VERTEBRAE: Chronic | Status: ACTIVE | Noted: 2019-03-07

## 2019-03-08 LAB
BASOPHILS # BLD AUTO: 0 K/UL — SIGNIFICANT CHANGE UP (ref 0–0.2)
BASOPHILS NFR BLD AUTO: 0.3 % — SIGNIFICANT CHANGE UP (ref 0–2)
EOSINOPHIL # BLD AUTO: 0.2 K/UL — SIGNIFICANT CHANGE UP (ref 0–0.5)
EOSINOPHIL NFR BLD AUTO: 2 % — SIGNIFICANT CHANGE UP (ref 0–6)
HCT VFR BLD CALC: 30.6 % — LOW (ref 34.5–45)
HGB BLD-MCNC: 10.4 G/DL — LOW (ref 11.5–15.5)
LYMPHOCYTES # BLD AUTO: 0.9 K/UL — LOW (ref 1–3.3)
LYMPHOCYTES # BLD AUTO: 10 % — LOW (ref 13–44)
MCHC RBC-ENTMCNC: 30.4 PG — SIGNIFICANT CHANGE UP (ref 27–34)
MCHC RBC-ENTMCNC: 33.8 G/DL — SIGNIFICANT CHANGE UP (ref 32–36)
MCV RBC AUTO: 89.9 FL — SIGNIFICANT CHANGE UP (ref 80–100)
MONOCYTES # BLD AUTO: 0.5 K/UL — SIGNIFICANT CHANGE UP (ref 0–0.9)
MONOCYTES NFR BLD AUTO: 5.3 % — SIGNIFICANT CHANGE UP (ref 2–14)
NEUTROPHILS # BLD AUTO: 7.3 K/UL — SIGNIFICANT CHANGE UP (ref 1.8–7.4)
NEUTROPHILS NFR BLD AUTO: 82.5 % — HIGH (ref 43–77)
PLATELET # BLD AUTO: 322 K/UL — SIGNIFICANT CHANGE UP (ref 150–400)
RBC # BLD: 3.4 M/UL — LOW (ref 3.8–5.2)
RBC # FLD: 15.9 % — HIGH (ref 10.3–14.5)
SURGICAL PATHOLOGY STUDY: SIGNIFICANT CHANGE UP
WBC # BLD: 8.9 K/UL — SIGNIFICANT CHANGE UP (ref 3.8–10.5)
WBC # FLD AUTO: 8.9 K/UL — SIGNIFICANT CHANGE UP (ref 3.8–10.5)

## 2019-03-08 PROCEDURE — 99215 OFFICE O/P EST HI 40 MIN: CPT

## 2019-03-08 NOTE — PHYSICAL EXAM
[Fully active, able to carry on all pre-disease performance without restriction] : Status 0 - Fully active, able to carry on all pre-disease performance without restriction [Normal] : normal spine exam without palpable tenderness, no kyphosis or scoliosis [de-identified] : +alopecia, wearing cap [de-identified] : L chest wall mediport tegaderm in place, nontender [de-identified] : slight thickness of R nipple remains [de-identified] : trace edema at sock line symmetric b/l. b/l arm erythema improved, L hand slight hyperpigmentation

## 2019-03-08 NOTE — HISTORY OF PRESENT ILLNESS
[Disease: _____________________] : Disease: [unfilled] [T: ___] : T[unfilled] [N: ___] : N[unfilled] [AJCC Stage: ____] : AJCC Stage: [unfilled] [Treatment Protocol] : Treatment Protocol [de-identified] : \par 59yo woman with ER+AK-HER2+ locally advanced, inflammatory poorly differentiated ductal breast cancer.\par \par She noted summer 2018 she felt a mass near her nipple in her R breast. She went to her PMD who ordered a mammogram and sonogram which revealed a R abnormal hypoechoic mass and abnormal appearing axillary LN.\par \par Image guided biopsy of mass and axilla revealed invasive poorly differentiated ductal carcinoma, +LVI, 1.2cm in specimen, [place] 8/9, DCIS, microcalcifications present, ER >95%, AK negative, HER2+ 3+ IHC, Lymph node biopsy with metastatic carcinoma.\par \par She then saw Dr. Camacho breast surgeon who ordered and MRI of the breasts. MRI showed: Biopsy proven mammary carcinoma in the R retroareolar region 5:00 manifested as multiple irregular masses. The tumor is extensive in nature and spanning an area of over 3 cm with non-mass enhancement extending posteriorly suggesting extensive intraductal component as well. Two other highly suspicious masses noted in the RUOQ at 11:00 measuring >3cm and at 10:00 measuring 2.6cm consistent with multicentric disease. Biopsy proven metastatic disease to an axillary LN with additional suspicious LNs (additional with replaced fatty hilum and abnormally enlarged cortices, some have irregular borders, some posterior to pectoralis muscle, also abnormal appearing node in soft tissue lateral to the R chest wall).\par \par Dr. Camacho recommended neoadjuvant chemotherapy and the option for mastectomy with possible axillary lymph node dissection pending response to therapy.\par \par She then saw me in consultation. She underwent PETCT that found a peripancreatic lymph node that was enlarged and SUV avid, b/l laryngeal FDG avidity (likely due to speaking during exam), and SUV avid breast mass/axilla on R with breast skin noted to be thickened. She underwent EGD/EUS and biopsy of this peripancreatic LN (Dr. Giovanni Felton) which was consistent with benign/reactive lymphatic tissue. She had a mediport placed by IR on 9/26. She saw oncocardiology Dr. Khan for risk assessment prior to anthracycline/anti-HER2 monoclonal therapy and was started on ACEi for HTN and for cardioprotection. She saw dermatology for skin biopsy of R breast prior to treatment to evaluate/confirm inflammatory disease; biopsy results with skin involvement by poorly differentiated carcinoma. She started neoadjuvant chemotherapy with dose-dense AC on 9/26. dose-dense AC-->THP (weekly taxol 80mg/m2)\par \par 11/23 THP #1\par 11/30 Taxol 80mg/m2 held due to neuropathy\par 12/7 resumed Taxol weekly without issue\par 12/15 taxol completed\par 3/8 Herceptin and Perjeta administered pre-operatively\par \par MRI 2/4/19 BIRADS 2, masses and adenopathy resolved. Some residual R breast skin thickening noted.\par \par Patient lives in [place] with her  Gonzales (pretzel company owner) and 26 year old daughter (an artist and ). She works full time as a . She notes a history of smoking in the distant past. Mother passed away in 70s from metastatic melanoma, and a maternal aunt had breast cancer; no other significant close family history of cancers. She notes overall migraines (which she has had for many years, seen neurologist, stable lytic medications help, associated with one sided facial numbness when they occur) have gotten better with menopause – her LMP was 2/2018 and it was infrequent prior to that. She has had some hot flashes. She used OCPs in the past for years particularly for endometriosis treatment. She has had issues with DJD of back and spine with pain, MRI last year noted, epidural injections have helped with this significantly as well as occasional NSAID use.\par  [de-identified] : ER+MA-HER2+ [FreeTextEntry1] : dose-dense AC (adriamycin 60mg/m2 and cytoxan 600mg/m2) x 4 cycles, followed by weekly taxol 80mg/m2 x 12 with herceptin/perjeta q3 weeks [de-identified] : She presents today for herceptin/perjeta. She is planned for mastectomy with tissue expander with Dr. Luna and Dr. Antonio on 3/22 at [facility]. She has persistent tingling in the very tips of her fingers most of the time, and a similar sensation in the soles of her feet with occasional cold sensation in her toes. Ongoing stable fatigue - metallic taste in mouth is slightly improved and she is eating but still making alterations to her diet to avoid loose stools. She notes taking ativan twice this week later on at night for anxiety at bedside with relief.

## 2019-03-08 NOTE — ASSESSMENT
[FreeTextEntry1] : 57yo woman with ER+NE-HER2+ locally advanced, poorly differentiated, inflammatory ductal breast cancer, multifocal with multiple breast masses and multiple enlarged axillary LNs on exam/MRI imaging (biopsy confirmed with clips placed), skin biopsy positive confirming inflammatory disease. Staging imaging with SUV-avid peripancreatic LN - biopsy with reactive lymph node (benign). She presents today for Herceptin and Perjeta after completing neoadjuvant AC-->THP. Exam and MRI 2/4 is consistent with significant response to therapy and she tolerated therapy with some AEs and lasting/cumulative fatigue.\par \par R Breast Cancer: Inflammatory with axillary involvement, exam and imaging significantly improved with treatment - no palpable masses or erythema. Mild diarrhea G2 for 2-3 days with each THP cycle, suspect related to Perjeta. Neuropathy grade 2 sudden onset with taxol 80mg/m2 #1 resolved (week 2 held, week 3 resumed) - now with grade 1 symptoms. Skin rash grade 1, per below, resolved.\par -CMP, CBC today; proceed with H+P while awaiting surgery\par -EMLA cream 1 hour prior to mediport accessed\par -continue antihypertensive medication (HTN treatment and cardioprotective); follow up with Dr. Khan (TTE 1/4 ok, EF stable)\par -follow up with Dr. Luna next week and plastic surgery: plan for mastectomy with ALND 3/22\par -will continue herceptin and perjeta [id number] perioperatively\par -radiation consultation in near future for post-operative planning\par -adjuvant plan for AI (?with ovarian suppression, LMP 2/2018) and herceptin/perjeta v. TDM1 pending surgical findings\par \par Chemotherapy-related diarrhea: limited, resolved, thought related to perjeta with initial loading dose, occasional diet-related loose stools\par -PRN imodium, lomotil, hydration, call if reoccurs\par \par Anxiety: patient takes citalopram, situation anxiety surrounding treatment appointments especially\par -recommend trial of Ativan 0.5mg PRN, has used more recently with relief; refilled today per request\par -advised on avoiding driving, falls precautions when taking\par \par Dental Pain: s/p root canal and tooth pulled early Feb 2019, CBC reviewed adequate counts, exam OK, no additional pain and antibiotics completed. asymptomatic\par -monitor for s/s of infection, fevers, call if issues\par -dental follow up planned\par \par Skin rash on arms: c/w taxol reaction, also with new acneiform rash on face c/w reaction to her2 antibody therapy, both improved significantly near resolved\par -continue topicals, derm follow up planned\par \par Nasal congestion/slight bleeding on tissue: some component chronic, platelets/CBC adequate\par -ENT advised topical lubricants for mild mucosal irritation,continue\par \par Follow up post-operatively, sooner if issues, will plan for 4/3 H+P pending surgical pathology results if available

## 2019-03-09 ENCOUNTER — FORM ENCOUNTER (OUTPATIENT)
Age: 59
End: 2019-03-09

## 2019-03-10 ENCOUNTER — APPOINTMENT (OUTPATIENT)
Dept: NUCLEAR MEDICINE | Facility: IMAGING CENTER | Age: 59
End: 2019-03-10
Payer: COMMERCIAL

## 2019-03-10 ENCOUNTER — OUTPATIENT (OUTPATIENT)
Dept: OUTPATIENT SERVICES | Facility: HOSPITAL | Age: 59
LOS: 1 days | End: 2019-03-10
Payer: COMMERCIAL

## 2019-03-10 DIAGNOSIS — Z98.890 OTHER SPECIFIED POSTPROCEDURAL STATES: Chronic | ICD-10-CM

## 2019-03-10 DIAGNOSIS — C50.911 MALIGNANT NEOPLASM OF UNSPECIFIED SITE OF RIGHT FEMALE BREAST: ICD-10-CM

## 2019-03-10 PROBLEM — F32.9 MAJOR DEPRESSIVE DISORDER, SINGLE EPISODE, UNSPECIFIED: Chronic | Status: ACTIVE | Noted: 2019-03-07

## 2019-03-10 PROBLEM — F41.9 ANXIETY DISORDER, UNSPECIFIED: Chronic | Status: ACTIVE | Noted: 2019-03-07

## 2019-03-10 PROBLEM — R00.2 PALPITATIONS: Chronic | Status: ACTIVE | Noted: 2019-03-07

## 2019-03-10 PROBLEM — G62.9 POLYNEUROPATHY, UNSPECIFIED: Chronic | Status: ACTIVE | Noted: 2019-03-07

## 2019-03-10 PROBLEM — E66.01 MORBID (SEVERE) OBESITY DUE TO EXCESS CALORIES: Chronic | Status: ACTIVE | Noted: 2019-03-07

## 2019-03-10 PROBLEM — Z91.09 OTHER ALLERGY STATUS, OTHER THAN TO DRUGS AND BIOLOGICAL SUBSTANCES: Chronic | Status: ACTIVE | Noted: 2019-03-07

## 2019-03-10 PROBLEM — K12.30 ORAL MUCOSITIS (ULCERATIVE), UNSPECIFIED: Chronic | Status: ACTIVE | Noted: 2019-03-07

## 2019-03-10 PROBLEM — I10 ESSENTIAL (PRIMARY) HYPERTENSION: Chronic | Status: ACTIVE | Noted: 2019-03-07

## 2019-03-10 PROBLEM — G43.909 MIGRAINE, UNSPECIFIED, NOT INTRACTABLE, WITHOUT STATUS MIGRAINOSUS: Chronic | Status: ACTIVE | Noted: 2019-03-07

## 2019-03-10 PROBLEM — K21.9 GASTRO-ESOPHAGEAL REFLUX DISEASE WITHOUT ESOPHAGITIS: Chronic | Status: ACTIVE | Noted: 2019-03-07

## 2019-03-10 PROBLEM — M75.00 ADHESIVE CAPSULITIS OF UNSPECIFIED SHOULDER: Chronic | Status: ACTIVE | Noted: 2019-03-07

## 2019-03-10 PROBLEM — J32.9 CHRONIC SINUSITIS, UNSPECIFIED: Chronic | Status: ACTIVE | Noted: 2019-03-07

## 2019-03-10 PROBLEM — I83.90 ASYMPTOMATIC VARICOSE VEINS OF UNSPECIFIED LOWER EXTREMITY: Chronic | Status: ACTIVE | Noted: 2019-03-07

## 2019-03-10 PROCEDURE — A9552: CPT

## 2019-03-10 PROCEDURE — 78815 PET IMAGE W/CT SKULL-THIGH: CPT

## 2019-03-10 PROCEDURE — 78815 PET IMAGE W/CT SKULL-THIGH: CPT | Mod: 26,PS

## 2019-03-11 ENCOUNTER — RESULT REVIEW (OUTPATIENT)
Age: 59
End: 2019-03-11

## 2019-03-11 ENCOUNTER — LABORATORY RESULT (OUTPATIENT)
Age: 59
End: 2019-03-11

## 2019-03-11 ENCOUNTER — APPOINTMENT (OUTPATIENT)
Dept: HEMATOLOGY ONCOLOGY | Facility: CLINIC | Age: 59
End: 2019-03-11

## 2019-03-11 DIAGNOSIS — R11.2 NAUSEA WITH VOMITING, UNSPECIFIED: ICD-10-CM

## 2019-03-11 DIAGNOSIS — Z51.11 ENCOUNTER FOR ANTINEOPLASTIC CHEMOTHERAPY: ICD-10-CM

## 2019-03-11 LAB
BASOPHILS # BLD AUTO: 0 K/UL — SIGNIFICANT CHANGE UP (ref 0–0.2)
BASOPHILS NFR BLD AUTO: 0.4 % — SIGNIFICANT CHANGE UP (ref 0–2)
EOSINOPHIL # BLD AUTO: 0.2 K/UL — SIGNIFICANT CHANGE UP (ref 0–0.5)
EOSINOPHIL NFR BLD AUTO: 2.2 % — SIGNIFICANT CHANGE UP (ref 0–6)
HCT VFR BLD CALC: 30.7 % — LOW (ref 34.5–45)
HGB BLD-MCNC: 10.1 G/DL — LOW (ref 11.5–15.5)
LYMPHOCYTES # BLD AUTO: 1 K/UL — SIGNIFICANT CHANGE UP (ref 1–3.3)
LYMPHOCYTES # BLD AUTO: 13.7 % — SIGNIFICANT CHANGE UP (ref 13–44)
MCHC RBC-ENTMCNC: 29.6 PG — SIGNIFICANT CHANGE UP (ref 27–34)
MCHC RBC-ENTMCNC: 33.1 G/DL — SIGNIFICANT CHANGE UP (ref 32–36)
MCV RBC AUTO: 89.5 FL — SIGNIFICANT CHANGE UP (ref 80–100)
MONOCYTES # BLD AUTO: 0.4 K/UL — SIGNIFICANT CHANGE UP (ref 0–0.9)
MONOCYTES NFR BLD AUTO: 5.7 % — SIGNIFICANT CHANGE UP (ref 2–14)
NEUTROPHILS # BLD AUTO: 5.9 K/UL — SIGNIFICANT CHANGE UP (ref 1.8–7.4)
NEUTROPHILS NFR BLD AUTO: 78 % — HIGH (ref 43–77)
PLATELET # BLD AUTO: 312 K/UL — SIGNIFICANT CHANGE UP (ref 150–400)
RBC # BLD: 3.43 M/UL — LOW (ref 3.8–5.2)
RBC # FLD: 15.8 % — HIGH (ref 10.3–14.5)
WBC # BLD: 7.6 K/UL — SIGNIFICANT CHANGE UP (ref 3.8–10.5)
WBC # FLD AUTO: 7.6 K/UL — SIGNIFICANT CHANGE UP (ref 3.8–10.5)

## 2019-03-14 DIAGNOSIS — Z17.0 ESTROGEN RECEPTOR POSITIVE STATUS [ER+]: ICD-10-CM

## 2019-03-15 ENCOUNTER — APPOINTMENT (OUTPATIENT)
Dept: FAMILY MEDICINE | Facility: CLINIC | Age: 59
End: 2019-03-15
Payer: COMMERCIAL

## 2019-03-15 VITALS
TEMPERATURE: 98.6 F | BODY MASS INDEX: 38.45 KG/M2 | WEIGHT: 245 LBS | HEART RATE: 88 BPM | DIASTOLIC BLOOD PRESSURE: 70 MMHG | SYSTOLIC BLOOD PRESSURE: 128 MMHG | OXYGEN SATURATION: 96 % | HEIGHT: 67 IN | RESPIRATION RATE: 16 BRPM

## 2019-03-15 DIAGNOSIS — R59.9 ENLARGED LYMPH NODES, UNSPECIFIED: ICD-10-CM

## 2019-03-15 DIAGNOSIS — Z87.898 PERSONAL HISTORY OF OTHER SPECIFIED CONDITIONS: ICD-10-CM

## 2019-03-15 DIAGNOSIS — N63.10 UNSPECIFIED LUMP IN THE RIGHT BREAST, UNSPECIFIED QUADRANT: ICD-10-CM

## 2019-03-15 DIAGNOSIS — L27.1 LOCALIZED SKIN ERUPTION DUE TO DRUGS AND MEDICAMENTS TAKEN INTERNALLY: ICD-10-CM

## 2019-03-15 DIAGNOSIS — H61.21 IMPACTED CERUMEN, RIGHT EAR: ICD-10-CM

## 2019-03-15 DIAGNOSIS — Z92.89 PERSONAL HISTORY OF OTHER MEDICAL TREATMENT: ICD-10-CM

## 2019-03-15 DIAGNOSIS — Z92.29 PERSONAL HISTORY OF OTHER DRUG THERAPY: ICD-10-CM

## 2019-03-15 DIAGNOSIS — M51.27 OTHER INTERVERTEBRAL DISC DISPLACEMENT, LUMBOSACRAL REGION: ICD-10-CM

## 2019-03-15 PROCEDURE — 99215 OFFICE O/P EST HI 40 MIN: CPT

## 2019-03-16 PROBLEM — Z87.898 HISTORY OF DIARRHEA: Status: RESOLVED | Noted: 2018-11-28 | Resolved: 2019-03-16

## 2019-03-16 PROBLEM — L27.1 HAND FOOT SYNDROME: Status: RESOLVED | Noted: 2019-01-23 | Resolved: 2019-03-16

## 2019-03-16 PROBLEM — M51.27 HERNIATED NUCLEUS PULPOSUS, L5-S1, RIGHT: Status: RESOLVED | Noted: 2017-05-04 | Resolved: 2019-03-16

## 2019-03-16 PROBLEM — R59.9 ENLARGED LYMPH NODE: Status: RESOLVED | Noted: 2018-09-26 | Resolved: 2019-03-16

## 2019-03-16 PROBLEM — H61.21 IMPACTED CERUMEN OF RIGHT EAR: Status: RESOLVED | Noted: 2019-01-28 | Resolved: 2019-03-16

## 2019-03-16 PROBLEM — N63.10 MASS OF BREAST, RIGHT: Noted: 2018-08-08

## 2019-03-16 PROBLEM — Z92.29 HISTORY OF INFLUENZA VACCINATION: Status: RESOLVED | Noted: 2017-12-03 | Resolved: 2019-03-16

## 2019-03-16 NOTE — COUNSELING
[Healthy eating counseling provided] : healthy eating [Behavioral health counseling provided] : behavioral health  [Fall prevention counseling provided] : fall prevention  [Good understanding] : Patient has a good understanding of disease, goals and obesity follow-up plan [Sleep ___ hours/day] : Sleep [unfilled] hours/day [Engage in a relaxing activity] : Engage in a relaxing activity [Adequate lighting] : Adequate lighting [No throw rugs] : No throw rugs [Use proper foot wear] : Use proper foot wear

## 2019-03-16 NOTE — PHYSICAL EXAM
[No Acute Distress] : no acute distress [Well Nourished] : well nourished [Normal Sclera/Conjunctiva] : normal sclera/conjunctiva [PERRL] : pupils equal round and reactive to light [Fundoscopic Exam Performed] : fundoscopic ~T exam ~C was performed [Normal Outer Ear/Nose] : the outer ears and nose were normal in appearance [Normal Oropharynx] : the oropharynx was normal [Normal TMs] : both tympanic membranes were normal [No JVD] : no jugular venous distention [Supple] : supple [Thyroid Normal, No Nodules] : the thyroid was normal and there were no nodules present [No Respiratory Distress] : no respiratory distress  [Clear to Auscultation] : lungs were clear to auscultation bilaterally [No Accessory Muscle Use] : no accessory muscle use [Normal Rate] : normal rate  [Regular Rhythm] : with a regular rhythm [Normal S1, S2] : normal S1 and S2 [No Murmur] : no murmur heard [Pedal Pulses Present] : the pedal pulses are present [No Edema] : there was no peripheral edema [Soft] : abdomen soft [Non Tender] : non-tender [Non-distended] : non-distended [No Masses] : no abdominal mass palpated [No HSM] : no HSM [Declined Rectal Exam] : declined rectal exam [Normal Supraclavicular Nodes] : no supraclavicular lymphadenopathy [Normal Posterior Cervical Nodes] : no posterior cervical lymphadenopathy [Normal Anterior Cervical Nodes] : no anterior cervical lymphadenopathy [No Spinal Tenderness] : no spinal tenderness [No Rash] : no rash [Normal Gait] : normal gait [No Focal Deficits] : no focal deficits [Alert and Oriented x3] : oriented to person, place, and time [de-identified] : deferred to breast surgeon [de-identified] : defer at this time-  [de-identified] : total hair loss, loss of some nails as well [de-identified] : easily cries during the visit but generally doing well dealing with her diagnosis, using guided imagery for heling to deal with her issues

## 2019-03-16 NOTE — ASSESSMENT
[High Risk Surgery - Intraperitoneal, Intrathoracic or Supringuinal Vascular Procedures] : High Risk Surgery - Intraperitoneal, Intrathoracic or Supringuinal Vascular Procedures - No (0) [Ischemic Heart Disease] : Ischemic Heart Disease - No (0) [Congestive Heart Failure] : Congestive Heart Failure - No (0) [Prior Cerebrovascular Accident or TIA] : Prior Cerebrovascular Accident or TIA - No (0) [Creatinine >= 2mg/dL (1 Point)] : Creatinine >= 2mg/dL - No (0) [Insulin-dependent Diabetic (1 Point)] : Insulin-dependent Diabetic - No (0) [0] : 0 , RCRI Class: I, Risk of Post-Op Cardiac Complications: 0.4%, Procedure Risk: Low-Risk [Patient Optimized for Surgery] : Patient optimized for surgery [As per surgery] : as per surgery [FreeTextEntry4] : 57 yo with right breast cancer, status post chemotherapy for right mastectomy, currently optimized for procedure at low risk.  [FreeTextEntry5] : n/a [FreeTextEntry6] : n/a [FreeTextEntry7] : stop all nsaids 1 week prior to surgery, to take lisinopril and citalopram on the day of surgery with sip of water.

## 2019-03-16 NOTE — REVIEW OF SYSTEMS
[Joint Pain] : joint pain [Back Pain] : back pain [Nail Changes] : nail changes [Hair Changes] : hair changes [Headache] : headache [Anxiety] : anxiety [Depression] : depression [Negative] : Heme/Lymph [Diarrhea] : diarrhea [Hematuria] : no hematuria [Skin Rash] : no skin rash [Dizziness] : no dizziness [Fainting] : no fainting [Confusion] : no confusion [Memory Loss] : no memory loss [Unsteady Walking] : no ataxia [FreeTextEntry4] : mouth sores after last chemo now resolving, mouth is dry but using biotene with resolution at this time [FreeTextEntry9] : sciatica worse as she has transitioned off nsaids preoperatviely [de-identified] : complete hair loss of all areas of the body, loss of finger and toenails- partial [de-identified] : peripheral neuropathy [de-identified] : symptoms related to her disease and anxiety about upcoming surgery

## 2019-03-16 NOTE — HISTORY OF PRESENT ILLNESS
[No Pertinent Pulmonary History] : no history of asthma, COPD, sleep apnea, or smoking [No Adverse Anesthesia Reaction] : no adverse anesthesia reaction in self or family member [(Patient denies any chest pain, claudication, dyspnea on exertion, orthopnea, palpitations or syncope)] : Patient denies any chest pain, claudication, dyspnea on exertion, orthopnea, palpitations or syncope [FreeTextEntry1] : mastectomy- right total with expander for reconstruction to be placed [FreeTextEntry2] : 3/22/19 [FreeTextEntry3] : Dr. Luna [FreeTextEntry4] : Patient is 59 yo with right sided breast cancer who is status post chemotherapy completion now admitted for mastectomy and placement of expander for reconstruction. She has tolerated the treatment fairly well and has had the usual side effects including GI upset, mouth sores which have resolved, hair loss and peripheral neuropathy which persist at this time. She denies any other symptoms at this time. She does have very mild anemia which has been stable for several weeks and is recovering since the completion of treatment.

## 2019-03-21 ENCOUNTER — TRANSCRIPTION ENCOUNTER (OUTPATIENT)
Age: 59
End: 2019-03-21

## 2019-03-22 ENCOUNTER — INPATIENT (INPATIENT)
Facility: HOSPITAL | Age: 59
LOS: 0 days | Discharge: HOME | DRG: 577 | End: 2019-03-23
Attending: SURGERY | Admitting: SURGERY
Payer: COMMERCIAL

## 2019-03-22 ENCOUNTER — RESULT REVIEW (OUTPATIENT)
Age: 59
End: 2019-03-22

## 2019-03-22 VITALS
HEIGHT: 67 IN | WEIGHT: 274.48 LBS | SYSTOLIC BLOOD PRESSURE: 117 MMHG | OXYGEN SATURATION: 98 % | DIASTOLIC BLOOD PRESSURE: 68 MMHG | TEMPERATURE: 98 F | HEART RATE: 89 BPM | RESPIRATION RATE: 15 BRPM

## 2019-03-22 DIAGNOSIS — Z90.11 ACQUIRED ABSENCE OF RIGHT BREAST AND NIPPLE: ICD-10-CM

## 2019-03-22 DIAGNOSIS — Z98.890 OTHER SPECIFIED POSTPROCEDURAL STATES: Chronic | ICD-10-CM

## 2019-03-22 PROCEDURE — 88307 TISSUE EXAM BY PATHOLOGIST: CPT | Mod: 26

## 2019-03-22 PROCEDURE — 38900 IO MAP OF SENT LYMPH NODE: CPT | Mod: AS,RT

## 2019-03-22 PROCEDURE — 19303 MAST SIMPLE COMPLETE: CPT | Mod: AS,RT

## 2019-03-22 PROCEDURE — 76098 X-RAY EXAM SURGICAL SPECIMEN: CPT | Mod: 26

## 2019-03-22 RX ORDER — HYDROMORPHONE HYDROCHLORIDE 2 MG/ML
1 INJECTION INTRAMUSCULAR; INTRAVENOUS; SUBCUTANEOUS
Qty: 0 | Refills: 0 | Status: DISCONTINUED | OUTPATIENT
Start: 2019-03-22 | End: 2019-03-22

## 2019-03-22 RX ORDER — ESCITALOPRAM OXALATE 10 MG/1
20 TABLET, FILM COATED ORAL DAILY
Qty: 0 | Refills: 0 | Status: DISCONTINUED | OUTPATIENT
Start: 2019-03-22 | End: 2019-03-23

## 2019-03-22 RX ORDER — SODIUM CHLORIDE 9 MG/ML
1000 INJECTION, SOLUTION INTRAVENOUS
Qty: 0 | Refills: 0 | Status: DISCONTINUED | OUTPATIENT
Start: 2019-03-22 | End: 2019-03-22

## 2019-03-22 RX ORDER — ESCITALOPRAM OXALATE 10 MG/1
10 TABLET, FILM COATED ORAL DAILY
Qty: 0 | Refills: 0 | Status: DISCONTINUED | OUTPATIENT
Start: 2019-03-22 | End: 2019-03-22

## 2019-03-22 RX ORDER — CITALOPRAM 10 MG/1
20 TABLET, FILM COATED ORAL DAILY
Qty: 0 | Refills: 0 | Status: DISCONTINUED | OUTPATIENT
Start: 2019-03-22 | End: 2019-03-22

## 2019-03-22 RX ORDER — HYDROMORPHONE HYDROCHLORIDE 2 MG/ML
0.5 INJECTION INTRAMUSCULAR; INTRAVENOUS; SUBCUTANEOUS
Qty: 0 | Refills: 0 | Status: DISCONTINUED | OUTPATIENT
Start: 2019-03-22 | End: 2019-03-23

## 2019-03-22 RX ORDER — ONDANSETRON 8 MG/1
4 TABLET, FILM COATED ORAL ONCE
Qty: 0 | Refills: 0 | Status: DISCONTINUED | OUTPATIENT
Start: 2019-03-22 | End: 2019-03-22

## 2019-03-22 RX ORDER — HYDROMORPHONE HYDROCHLORIDE 2 MG/ML
0.5 INJECTION INTRAMUSCULAR; INTRAVENOUS; SUBCUTANEOUS
Qty: 0 | Refills: 0 | Status: DISCONTINUED | OUTPATIENT
Start: 2019-03-22 | End: 2019-03-22

## 2019-03-22 RX ORDER — SODIUM CHLORIDE 9 MG/ML
1000 INJECTION, SOLUTION INTRAVENOUS
Qty: 0 | Refills: 0 | Status: DISCONTINUED | OUTPATIENT
Start: 2019-03-22 | End: 2019-03-23

## 2019-03-22 RX ORDER — OMEPRAZOLE 10 MG/1
1 CAPSULE, DELAYED RELEASE ORAL
Qty: 0 | Refills: 0 | COMMUNITY

## 2019-03-22 RX ORDER — LISINOPRIL 2.5 MG/1
5 TABLET ORAL DAILY
Qty: 0 | Refills: 0 | Status: DISCONTINUED | OUTPATIENT
Start: 2019-03-22 | End: 2019-03-23

## 2019-03-22 RX ORDER — OXYCODONE HYDROCHLORIDE 5 MG/1
10 TABLET ORAL EVERY 4 HOURS
Qty: 0 | Refills: 0 | Status: DISCONTINUED | OUTPATIENT
Start: 2019-03-22 | End: 2019-03-23

## 2019-03-22 RX ORDER — OXYCODONE HYDROCHLORIDE 5 MG/1
5 TABLET ORAL EVERY 4 HOURS
Qty: 0 | Refills: 0 | Status: DISCONTINUED | OUTPATIENT
Start: 2019-03-22 | End: 2019-03-23

## 2019-03-22 RX ORDER — ACETAMINOPHEN 500 MG
975 TABLET ORAL EVERY 8 HOURS
Qty: 0 | Refills: 0 | Status: DISCONTINUED | OUTPATIENT
Start: 2019-03-22 | End: 2019-03-23

## 2019-03-22 RX ADMIN — OXYCODONE HYDROCHLORIDE 10 MILLIGRAM(S): 5 TABLET ORAL at 22:50

## 2019-03-22 RX ADMIN — HYDROMORPHONE HYDROCHLORIDE 1 MILLIGRAM(S): 2 INJECTION INTRAMUSCULAR; INTRAVENOUS; SUBCUTANEOUS at 11:12

## 2019-03-22 RX ADMIN — HYDROMORPHONE HYDROCHLORIDE 1 MILLIGRAM(S): 2 INJECTION INTRAMUSCULAR; INTRAVENOUS; SUBCUTANEOUS at 11:17

## 2019-03-22 RX ADMIN — HYDROMORPHONE HYDROCHLORIDE 0.5 MILLIGRAM(S): 2 INJECTION INTRAMUSCULAR; INTRAVENOUS; SUBCUTANEOUS at 12:45

## 2019-03-22 RX ADMIN — OXYCODONE HYDROCHLORIDE 5 MILLIGRAM(S): 5 TABLET ORAL at 17:11

## 2019-03-22 RX ADMIN — SODIUM CHLORIDE 50 MILLILITER(S): 9 INJECTION, SOLUTION INTRAVENOUS at 06:39

## 2019-03-22 RX ADMIN — ESCITALOPRAM OXALATE 20 MILLIGRAM(S): 10 TABLET, FILM COATED ORAL at 21:52

## 2019-03-22 RX ADMIN — OXYCODONE HYDROCHLORIDE 5 MILLIGRAM(S): 5 TABLET ORAL at 15:50

## 2019-03-22 RX ADMIN — HYDROMORPHONE HYDROCHLORIDE 1 MILLIGRAM(S): 2 INJECTION INTRAMUSCULAR; INTRAVENOUS; SUBCUTANEOUS at 11:01

## 2019-03-22 RX ADMIN — Medication 975 MILLIGRAM(S): at 17:37

## 2019-03-22 RX ADMIN — OXYCODONE HYDROCHLORIDE 10 MILLIGRAM(S): 5 TABLET ORAL at 21:53

## 2019-03-22 RX ADMIN — OXYCODONE HYDROCHLORIDE 5 MILLIGRAM(S): 5 TABLET ORAL at 23:34

## 2019-03-22 RX ADMIN — Medication 975 MILLIGRAM(S): at 19:10

## 2019-03-22 RX ADMIN — OXYCODONE HYDROCHLORIDE 5 MILLIGRAM(S): 5 TABLET ORAL at 17:34

## 2019-03-22 RX ADMIN — OXYCODONE HYDROCHLORIDE 10 MILLIGRAM(S): 5 TABLET ORAL at 18:00

## 2019-03-22 NOTE — BRIEF OPERATIVE NOTE - NSICDXBRIEFPOSTOP_GEN_ALL_CORE_FT
POST-OP DIAGNOSIS:  Breast cancer 22-Mar-2019 09:31:29  Randi Thorne
POST-OP DIAGNOSIS:  Breast cancer 22-Mar-2019 09:31:29  Randi Thorne

## 2019-03-22 NOTE — BRIEF OPERATIVE NOTE - NSICDXBRIEFPREOP_GEN_ALL_CORE_FT
PRE-OP DIAGNOSIS:  Breast cancer 22-Mar-2019 09:31:16  Randi Thorne A
PRE-OP DIAGNOSIS:  Breast cancer 22-Mar-2019 09:31:16  Randi Thorne A

## 2019-03-22 NOTE — BRIEF OPERATIVE NOTE - OPERATION/FINDINGS
see dictation
see operative report pathology pending. axillary clip with axillary sentinel lymph nodes

## 2019-03-22 NOTE — BRIEF OPERATIVE NOTE - NSICDXBRIEFPROCEDURE_GEN_ALL_CORE_FT
PROCEDURES:  Immediate reconstruction of breast 22-Mar-2019 10:39:51 Right with ADM and TE Tyrell Steen
PROCEDURES:  Mastectomy with axillary sentinel node biopsy 22-Mar-2019 09:30:59  Randi Thorne A

## 2019-03-23 ENCOUNTER — TRANSCRIPTION ENCOUNTER (OUTPATIENT)
Age: 59
End: 2019-03-23

## 2019-03-23 VITALS
SYSTOLIC BLOOD PRESSURE: 106 MMHG | RESPIRATION RATE: 16 BRPM | WEIGHT: 278 LBS | TEMPERATURE: 98 F | HEART RATE: 76 BPM | DIASTOLIC BLOOD PRESSURE: 56 MMHG | OXYGEN SATURATION: 99 %

## 2019-03-23 LAB
ANION GAP SERPL CALC-SCNC: 7 MMOL/L — SIGNIFICANT CHANGE UP (ref 5–17)
BASOPHILS # BLD AUTO: 0 K/UL — SIGNIFICANT CHANGE UP (ref 0–0.2)
BASOPHILS NFR BLD AUTO: 0.3 % — SIGNIFICANT CHANGE UP (ref 0–2)
BUN SERPL-MCNC: 13 MG/DL — SIGNIFICANT CHANGE UP (ref 7–23)
CALCIUM SERPL-MCNC: 8.9 MG/DL — SIGNIFICANT CHANGE UP (ref 8.4–10.5)
CHLORIDE SERPL-SCNC: 105 MMOL/L — SIGNIFICANT CHANGE UP (ref 96–108)
CO2 SERPL-SCNC: 28 MMOL/L — SIGNIFICANT CHANGE UP (ref 22–31)
CREAT SERPL-MCNC: 0.52 MG/DL — SIGNIFICANT CHANGE UP (ref 0.5–1.3)
EOSINOPHIL # BLD AUTO: 0 K/UL — SIGNIFICANT CHANGE UP (ref 0–0.5)
EOSINOPHIL NFR BLD AUTO: 0.1 % — SIGNIFICANT CHANGE UP (ref 0–6)
GLUCOSE SERPL-MCNC: 102 MG/DL — HIGH (ref 70–99)
HCT VFR BLD CALC: 28.5 % — LOW (ref 34.5–45)
HGB BLD-MCNC: 8.7 G/DL — LOW (ref 11.5–15.5)
LYMPHOCYTES # BLD AUTO: 1 K/UL — SIGNIFICANT CHANGE UP (ref 1–3.3)
LYMPHOCYTES # BLD AUTO: 9.5 % — LOW (ref 13–44)
MCHC RBC-ENTMCNC: 28.4 PG — SIGNIFICANT CHANGE UP (ref 27–34)
MCHC RBC-ENTMCNC: 30.6 GM/DL — LOW (ref 32–36)
MCV RBC AUTO: 92.6 FL — SIGNIFICANT CHANGE UP (ref 80–100)
MONOCYTES # BLD AUTO: 0.8 K/UL — SIGNIFICANT CHANGE UP (ref 0–0.9)
MONOCYTES NFR BLD AUTO: 6.9 % — SIGNIFICANT CHANGE UP (ref 1–9)
NEUTROPHILS # BLD AUTO: 9.2 K/UL — HIGH (ref 1.8–7.4)
NEUTROPHILS NFR BLD AUTO: 83.3 % — HIGH (ref 43–77)
PLATELET # BLD AUTO: 271 K/UL — SIGNIFICANT CHANGE UP (ref 150–400)
POTASSIUM SERPL-MCNC: 3.9 MMOL/L — SIGNIFICANT CHANGE UP (ref 3.5–5.3)
POTASSIUM SERPL-SCNC: 3.9 MMOL/L — SIGNIFICANT CHANGE UP (ref 3.5–5.3)
RBC # BLD: 3.08 M/UL — LOW (ref 3.8–5.2)
RBC # FLD: 15.4 % — HIGH (ref 10.3–14.5)
SODIUM SERPL-SCNC: 140 MMOL/L — SIGNIFICANT CHANGE UP (ref 135–145)
WBC # BLD: 11 K/UL — HIGH (ref 3.8–10.5)
WBC # FLD AUTO: 11 K/UL — HIGH (ref 3.8–10.5)

## 2019-03-23 PROCEDURE — C1789: CPT

## 2019-03-23 PROCEDURE — 85027 COMPLETE CBC AUTOMATED: CPT

## 2019-03-23 PROCEDURE — 88307 TISSUE EXAM BY PATHOLOGIST: CPT

## 2019-03-23 PROCEDURE — 76098 X-RAY EXAM SURGICAL SPECIMEN: CPT

## 2019-03-23 PROCEDURE — 99261: CPT

## 2019-03-23 PROCEDURE — 80048 BASIC METABOLIC PNL TOTAL CA: CPT

## 2019-03-23 RX ADMIN — OXYCODONE HYDROCHLORIDE 5 MILLIGRAM(S): 5 TABLET ORAL at 00:30

## 2019-03-23 RX ADMIN — LISINOPRIL 5 MILLIGRAM(S): 2.5 TABLET ORAL at 04:55

## 2019-03-23 RX ADMIN — Medication 975 MILLIGRAM(S): at 03:50

## 2019-03-23 RX ADMIN — Medication 975 MILLIGRAM(S): at 09:55

## 2019-03-23 RX ADMIN — Medication 975 MILLIGRAM(S): at 02:51

## 2019-03-23 RX ADMIN — OXYCODONE HYDROCHLORIDE 10 MILLIGRAM(S): 5 TABLET ORAL at 05:50

## 2019-03-23 RX ADMIN — SODIUM CHLORIDE 85 MILLILITER(S): 9 INJECTION, SOLUTION INTRAVENOUS at 04:51

## 2019-03-23 RX ADMIN — OXYCODONE HYDROCHLORIDE 10 MILLIGRAM(S): 5 TABLET ORAL at 04:51

## 2019-03-23 NOTE — PROGRESS NOTE ADULT - SUBJECTIVE AND OBJECTIVE BOX
no events  pain controlled    ICU Vital Signs Last 24 Hrs  T(C): 36.5 (23 Mar 2019 08:18), Max: 37.5 (22 Mar 2019 10:40)  T(F): 97.7 (23 Mar 2019 08:18), Max: 99.5 (22 Mar 2019 10:40)  HR: 76 (23 Mar 2019 08:18) (67 - 88)  BP: 106/56 (23 Mar 2019 08:18) (93/52 - 131/67)  BP(mean): --  ABP: --  ABP(mean): --  RR: 16 (23 Mar 2019 08:18) (14 - 169)  SpO2: 99% (23 Mar 2019 08:18) (92% - 99%)    inc c/d/i  no hematoma  no erythema  dr ovalle    home today

## 2019-03-23 NOTE — DISCHARGE NOTE NURSING/CASE MANAGEMENT/SOCIAL WORK - NSDCDPATPORTLINK_GEN_ALL_CORE
You can access the [facility], offered by [facility], by registering with the following website: [email or web address]

## 2019-03-23 NOTE — DISCHARGE NOTE PROVIDER - NSDCCPCAREPLAN_GEN_ALL_CORE_FT
PRINCIPAL DISCHARGE DIAGNOSIS  Diagnosis: Breast cancer  Assessment and Plan of Treatment: s/p RIGHT mastectomy with tissue expander on 3/22/2019

## 2019-03-23 NOTE — PROGRESS NOTE ADULT - SUBJECTIVE AND OBJECTIVE BOX
Patient awake and alert; no complaints  Afebrile vital signs stable  PE: Right mastectomy flaps are viable, wound clean and dry; ADI drainage serosanguinous  Imp: patient appears stable  Plan: Patient may be discharged today as per Plastic surgery. Patient to follow up with Dr Antonio for post op care.

## 2019-03-23 NOTE — DISCHARGE NOTE PROVIDER - CARE PROVIDER_API CALL
Ehsan Antonio)  Plastic Surgery  [address], [address]  [place] [address]  Phone: [phone]  Fax: [phone]  Follow Up Time: 1 week

## 2019-03-23 NOTE — DISCHARGE NOTE PROVIDER - HOSPITAL COURSE
57 y/o female with RIGHT breast cancer admitted for RIGHT breast mastectomy on 3/22/2019.  Pt tolerated procedure. Pain well-controlled. For discharge with drains on 3/23/2019.

## 2019-03-25 ENCOUNTER — OUTPATIENT (OUTPATIENT)
Dept: OUTPATIENT SERVICES | Facility: HOSPITAL | Age: 59
LOS: 1 days | Discharge: HOME | End: 2019-03-25

## 2019-03-25 DIAGNOSIS — C50.911 MALIGNANT NEOPLASM OF UNSPECIFIED SITE OF RIGHT FEMALE BREAST: ICD-10-CM

## 2019-03-25 DIAGNOSIS — Z98.890 OTHER SPECIFIED POSTPROCEDURAL STATES: Chronic | ICD-10-CM

## 2019-03-28 ENCOUNTER — MEDICATION RENEWAL (OUTPATIENT)
Age: 59
End: 2019-03-28

## 2019-03-28 LAB — SURGICAL PATHOLOGY STUDY: SIGNIFICANT CHANGE UP

## 2019-04-03 ENCOUNTER — RESULT REVIEW (OUTPATIENT)
Age: 59
End: 2019-04-03

## 2019-04-03 ENCOUNTER — APPOINTMENT (OUTPATIENT)
Dept: INFUSION THERAPY | Facility: HOSPITAL | Age: 59
End: 2019-04-03

## 2019-04-03 ENCOUNTER — LABORATORY RESULT (OUTPATIENT)
Age: 59
End: 2019-04-03

## 2019-04-03 ENCOUNTER — APPOINTMENT (OUTPATIENT)
Dept: HEMATOLOGY ONCOLOGY | Facility: CLINIC | Age: 59
End: 2019-04-03
Payer: COMMERCIAL

## 2019-04-03 PROBLEM — M47.9 DEGENERATIVE JOINT DISEASE OF LOW BACK: Status: RESOLVED | Noted: 2019-04-03 | Resolved: 2019-04-03

## 2019-04-03 PROBLEM — G43.909 MIGRAINES: Status: RESOLVED | Noted: 2019-04-03 | Resolved: 2019-04-03

## 2019-04-03 LAB
BASOPHILS # BLD AUTO: 0 K/UL — SIGNIFICANT CHANGE UP (ref 0–0.2)
BASOPHILS NFR BLD AUTO: 0.1 % — SIGNIFICANT CHANGE UP (ref 0–2)
EOSINOPHIL # BLD AUTO: 0.1 K/UL — SIGNIFICANT CHANGE UP (ref 0–0.5)
EOSINOPHIL NFR BLD AUTO: 1.8 % — SIGNIFICANT CHANGE UP (ref 0–6)
HCT VFR BLD CALC: 31.4 % — LOW (ref 34.5–45)
HGB BLD-MCNC: 10.4 G/DL — LOW (ref 11.5–15.5)
LYMPHOCYTES # BLD AUTO: 1.1 K/UL — SIGNIFICANT CHANGE UP (ref 1–3.3)
LYMPHOCYTES # BLD AUTO: 18.3 % — SIGNIFICANT CHANGE UP (ref 13–44)
MCHC RBC-ENTMCNC: 28.8 PG — SIGNIFICANT CHANGE UP (ref 27–34)
MCHC RBC-ENTMCNC: 33.1 G/DL — SIGNIFICANT CHANGE UP (ref 32–36)
MCV RBC AUTO: 86.9 FL — SIGNIFICANT CHANGE UP (ref 80–100)
MONOCYTES # BLD AUTO: 0.4 K/UL — SIGNIFICANT CHANGE UP (ref 0–0.9)
MONOCYTES NFR BLD AUTO: 7 % — SIGNIFICANT CHANGE UP (ref 2–14)
NEUTROPHILS # BLD AUTO: 4.3 K/UL — SIGNIFICANT CHANGE UP (ref 1.8–7.4)
NEUTROPHILS NFR BLD AUTO: 72.8 % — SIGNIFICANT CHANGE UP (ref 43–77)
PLATELET # BLD AUTO: 349 K/UL — SIGNIFICANT CHANGE UP (ref 150–400)
RBC # BLD: 3.61 M/UL — LOW (ref 3.8–5.2)
RBC # FLD: 14.9 % — HIGH (ref 10.3–14.5)
WBC # BLD: 6 K/UL — SIGNIFICANT CHANGE UP (ref 3.8–10.5)
WBC # FLD AUTO: 6 K/UL — SIGNIFICANT CHANGE UP (ref 3.8–10.5)

## 2019-04-03 PROCEDURE — 99215 OFFICE O/P EST HI 40 MIN: CPT

## 2019-04-04 ENCOUNTER — OUTPATIENT (OUTPATIENT)
Dept: OUTPATIENT SERVICES | Facility: HOSPITAL | Age: 59
LOS: 1 days | Discharge: HOME | End: 2019-04-04
Payer: COMMERCIAL

## 2019-04-04 DIAGNOSIS — Z51.11 ENCOUNTER FOR ANTINEOPLASTIC CHEMOTHERAPY: ICD-10-CM

## 2019-04-04 DIAGNOSIS — Z98.890 OTHER SPECIFIED POSTPROCEDURAL STATES: Chronic | ICD-10-CM

## 2019-04-04 NOTE — HISTORY OF PRESENT ILLNESS
[Disease: _____________________] : Disease: [unfilled] [T: ___] : T[unfilled] [N: ___] : N[unfilled] [AJCC Stage: ____] : AJCC Stage: [unfilled] [Treatment Protocol] : Treatment Protocol [de-identified] : \par 59yo woman with ER+IA-HER2+ locally advanced, inflammatory poorly differentiated ductal breast cancer.\par \par She noted summer 2018 she felt a mass near her nipple in her R breast. She went to her PMD who ordered a mammogram and sonogram which revealed a R abnormal hypoechoic mass and abnormal appearing axillary LN.\par \par Image guided biopsy of mass and axilla revealed invasive poorly differentiated ductal carcinoma, +LVI, 1.2cm in specimen, [place] 8/9, DCIS, microcalcifications present, ER >95%, IA negative, HER2+ 3+ IHC, Lymph node biopsy with metastatic carcinoma.\par \par She then saw Dr. Camacho breast surgeon who ordered and MRI of the breasts. MRI showed: Biopsy proven mammary carcinoma in the R retroareolar region 5:00 manifested as multiple irregular masses. The tumor is extensive in nature and spanning an area of over 3 cm with non-mass enhancement extending posteriorly suggesting extensive intraductal component as well. Two other highly suspicious masses noted in the RUOQ at 11:00 measuring >3cm and at 10:00 measuring 2.6cm consistent with multicentric disease. Biopsy proven metastatic disease to an axillary LN with additional suspicious LNs (additional with replaced fatty hilum and abnormally enlarged cortices, some have irregular borders, some posterior to pectoralis muscle, also abnormal appearing node in soft tissue lateral to the R chest wall).\par \par Dr. Camacho recommended neoadjuvant chemotherapy and the option for mastectomy with possible axillary lymph node dissection pending response to therapy.\par \par She then saw me in consultation. She underwent PETCT that found a peripancreatic lymph node that was enlarged and SUV avid, b/l laryngeal FDG avidity (likely due to speaking during exam), and SUV avid breast mass/axilla on R with breast skin noted to be thickened. She underwent EGD/EUS and biopsy of this peripancreatic LN (Dr. Giovanni Felton) which was consistent with benign/reactive lymphatic tissue. She had a mediport placed by IR on 9/26. She saw oncocardiology Dr. Khan for risk assessment prior to anthracycline/anti-HER2 monoclonal therapy and was started on ACEi for HTN and for cardioprotection. She saw dermatology for skin biopsy of R breast prior to treatment to evaluate/confirm inflammatory disease; biopsy results with skin involvement by poorly differentiated carcinoma. She started neoadjuvant chemotherapy with dose-dense AC on 9/26. dose-dense AC-->THP (weekly taxol 80mg/m2)\par \par 11/23 THP #1\par 11/30 Taxol 80mg/m2 held due to neuropathy\par 12/7 resumed Taxol weekly without issue\par 12/15 taxol completed\par 3/8 Herceptin and Perjeta administered pre-operatively\par \par MRI 2/4/19 BIRADS 2, masses and adenopathy resolved. Some residual R breast skin thickening noted.\par \par 3/22 R mastectomy and ALND: 0/6 LN removed involved, no residual invasive carcinoma or DCIS identified. [id number] ([facility])\par 4/3 Herceptin and Perjeta continued post-operatively\par \par Patient lives in [place] with her  Gonzales (pretzel company owner) and 26 year old daughter (an artist and ). She works full time as a . She notes a history of smoking in the distant past. Mother passed away in 70s from metastatic melanoma, and a maternal aunt had breast cancer; no other significant close family history of cancers. She notes overall migraines (which she has had for many years, seen neurologist, stable lytic medications help, associated with one sided facial numbness when they occur) have gotten better with menopause – her LMP was 2/2018 and it was infrequent prior to that. She has had some hot flashes. She used OCPs in the past for years particularly for endometriosis treatment. She has had issues with DJD of back and spine with pain, MRI last year noted, epidural injections have helped with this significantly as well as occasional NSAID use.\par  [de-identified] : ER+TX-HER2+ [FreeTextEntry1] : dose-dense AC (adriamycin 60mg/m2 and cytoxan 600mg/m2) x 4 cycles, followed by weekly taxol 80mg/m2 x 12 with herceptin/perjeta q3 weeks [de-identified] : She presents today for herceptin/perjeta. She s/p mastectomy with tissue expander with Dr. Luna and Dr. Antonio on 3/22 at [facility]. She has persistent tingling in the very tips of her fingers most of the time, and a similar sensation in the soles of her feet with occasional cold sensation in her toes - this is stable/unchanged and does not affect her ADLs/IADLs, balance. She is healing well with drains removed post-operatively. She notes skin rash has improved. Some joint pains as she remains off Celebrex.

## 2019-04-04 NOTE — ASSESSMENT
[FreeTextEntry1] : 57yo woman with ER+NV-HER2+ locally advanced, poorly differentiated, inflammatory ductal breast cancer, multifocal with multiple breast masses and multiple enlarged axillary LNs on exam/MRI imaging (biopsy confirmed with clips placed), skin biopsy positive confirming inflammatory disease. Staging imaging with SUV-avid peripancreatic LN - biopsy with reactive lymph node (benign). She completed neoadjuvant AC-->THP with interval PET JENNY. Mastectomy/ALND 3/22 with pCR. She continues adjuvant herceptin/perjeta.\par \par R Breast Cancer: Mild diarrhea G2 for 2-3 days with each THP cycle, suspect related to Perjeta. Neuropathy grade 2 sudden onset with taxol 80mg/m2 #1 resolved (week 2 held, week 3 resumed) - now with grade 1 symptoms. Skin rash grade 1, per below, resolved.\par -CMP, CBC today; proceed with H+P every 3 weeks for total 1 year as tolerated\par -EMLA cream 1 hour prior to mediport accessed\par -continue antihypertensive medication (HTN treatment and cardioprotective); follow up with Dr. Khan (TTE 1/4 ok, EF stable)\par -follow up with Dr. Luna and Dr. Antonio as recommended\par -radiation consultation now; asked nurse navigator to assist with expediting\par -adjuvant plan for AI with ovarian suppression pending radiation treatment\par \par Chemotherapy-related diarrhea: limited, resolved, thought related to perjeta with initial loading dose, occasional diet-related loose stools\par -PRN imodium, lomotil, hydration, call if reoccurs\par \par Skin rash on arms: c/w taxol reaction, also with new acneiform rash on face c/w reaction to her2 antibody therapy, both improved significantly near resolved\par -continue topicals, derm follow up planned\par \par follow up 3 weeks; consider Lupron initiation

## 2019-04-04 NOTE — PHYSICAL EXAM
[Fully active, able to carry on all pre-disease performance without restriction] : Status 0 - Fully active, able to carry on all pre-disease performance without restriction [Normal] : affect appropriate [de-identified] : +alopecia, wearing cap [de-identified] : L chest wall mediport tegaderm in place, nontender [de-identified] : dressing/post mastectomy bra in place [de-identified] : trace edema b/l. b/l arm erythema improved, L hand slight hyperpigmentation still improved today

## 2019-04-05 ENCOUNTER — APPOINTMENT (OUTPATIENT)
Dept: RADIATION ONCOLOGY | Facility: CLINIC | Age: 59
End: 2019-04-05
Payer: COMMERCIAL

## 2019-04-05 VITALS
SYSTOLIC BLOOD PRESSURE: 124 MMHG | HEIGHT: 67 IN | RESPIRATION RATE: 16 BRPM | HEART RATE: 78 BPM | TEMPERATURE: 98.2 F | BODY MASS INDEX: 37.94 KG/M2 | DIASTOLIC BLOOD PRESSURE: 75 MMHG | WEIGHT: 241.73 LBS | OXYGEN SATURATION: 98 %

## 2019-04-05 DIAGNOSIS — M48.061 SPINAL STENOSIS, LUMBAR REGION WITHOUT NEUROGENIC CLAUDICATION: ICD-10-CM

## 2019-04-05 DIAGNOSIS — Z87.42 PERSONAL HISTORY OF OTHER DISEASES OF THE FEMALE GENITAL TRACT: ICD-10-CM

## 2019-04-05 DIAGNOSIS — M47.9 SPONDYLOSIS, UNSPECIFIED: ICD-10-CM

## 2019-04-05 DIAGNOSIS — G43.909 MIGRAINE, UNSPECIFIED, NOT INTRACTABLE, W/OUT STATUS MIGRAINOSUS: ICD-10-CM

## 2019-04-05 DIAGNOSIS — M19.90 UNSPECIFIED OSTEOARTHRITIS, UNSPECIFIED SITE: ICD-10-CM

## 2019-04-05 DIAGNOSIS — K21.9 GASTRO-ESOPHAGEAL REFLUX DISEASE W/OUT ESOPHAGITIS: ICD-10-CM

## 2019-04-05 DIAGNOSIS — Z82.49 FAMILY HISTORY OF ISCHEMIC HEART DISEASE AND OTHER DISEASES OF THE CIRCULATORY SYSTEM: ICD-10-CM

## 2019-04-05 DIAGNOSIS — Z92.21 PERSONAL HISTORY OF ANTINEOPLASTIC CHEMOTHERAPY: ICD-10-CM

## 2019-04-05 PROCEDURE — 77263 THER RADIOLOGY TX PLNG CPLX: CPT

## 2019-04-05 PROCEDURE — 99244 OFF/OP CNSLTJ NEW/EST MOD 40: CPT | Mod: 25

## 2019-04-05 RX ORDER — DIPHENOXYLATE HYDROCHLORIDE AND ATROPINE SULFATE 2.5; .025 MG/1; MG/1
2.5-0.025 TABLET ORAL 4 TIMES DAILY
Qty: 30 | Refills: 0 | Status: DISCONTINUED | COMMUNITY
Start: 2018-11-28 | End: 2019-04-05

## 2019-04-05 RX ORDER — OXYCODONE 5 MG/1
5 TABLET ORAL EVERY 6 HOURS
Qty: 28 | Refills: 0 | Status: DISCONTINUED | COMMUNITY
Start: 2018-10-02 | End: 2019-04-05

## 2019-04-05 RX ORDER — LIDOCAINE AND PRILOCAINE 25; 25 MG/G; MG/G
2.5-2.5 CREAM TOPICAL
Qty: 1 | Refills: 0 | Status: DISCONTINUED | COMMUNITY
Start: 2018-09-26 | End: 2019-04-05

## 2019-04-05 RX ORDER — VALACYCLOVIR 1 G/1
1 TABLET, FILM COATED ORAL
Qty: 4 | Refills: 0 | Status: DISCONTINUED | COMMUNITY
Start: 2018-06-02 | End: 2019-04-05

## 2019-04-05 RX ORDER — CLINDAMYCIN PHOSPHATE 10 MG/ML
1 LOTION TOPICAL DAILY
Qty: 1 | Refills: 3 | Status: DISCONTINUED | COMMUNITY
Start: 2019-01-23 | End: 2019-04-05

## 2019-04-08 ENCOUNTER — OTHER (OUTPATIENT)
Age: 59
End: 2019-04-08

## 2019-04-10 DIAGNOSIS — Z17.0 ESTROGEN RECEPTOR POSITIVE STATUS [ER+]: ICD-10-CM

## 2019-04-10 DIAGNOSIS — C77.3 SECONDARY AND UNSPECIFIED MALIGNANT NEOPLASM OF AXILLA AND UPPER LIMB LYMPH NODES: ICD-10-CM

## 2019-04-12 ENCOUNTER — OUTPATIENT (OUTPATIENT)
Dept: OUTPATIENT SERVICES | Facility: HOSPITAL | Age: 59
LOS: 1 days | End: 2019-04-12
Payer: COMMERCIAL

## 2019-04-12 DIAGNOSIS — Z98.890 OTHER SPECIFIED POSTPROCEDURAL STATES: Chronic | ICD-10-CM

## 2019-04-12 DIAGNOSIS — C50.911 MALIGNANT NEOPLASM OF UNSPECIFIED SITE OF RIGHT FEMALE BREAST: ICD-10-CM

## 2019-04-12 PROCEDURE — 93306 TTE W/DOPPLER COMPLETE: CPT | Mod: 26

## 2019-04-12 PROCEDURE — 93306 TTE W/DOPPLER COMPLETE: CPT

## 2019-04-16 ENCOUNTER — RESULT REVIEW (OUTPATIENT)
Age: 59
End: 2019-04-16

## 2019-04-19 PROCEDURE — 77290 THER RAD SIMULAJ FIELD CPLX: CPT | Mod: 26

## 2019-04-19 PROCEDURE — 77333 RADIATION TREATMENT AID(S): CPT | Mod: 26

## 2019-04-22 ENCOUNTER — OUTPATIENT (OUTPATIENT)
Dept: OUTPATIENT SERVICES | Facility: HOSPITAL | Age: 59
LOS: 1 days | Discharge: HOME | End: 2019-04-22

## 2019-04-22 DIAGNOSIS — Z98.890 OTHER SPECIFIED POSTPROCEDURAL STATES: Chronic | ICD-10-CM

## 2019-04-22 DIAGNOSIS — C50.911 MALIGNANT NEOPLASM OF UNSPECIFIED SITE OF RIGHT FEMALE BREAST: ICD-10-CM

## 2019-04-22 DIAGNOSIS — C77.3 SECONDARY AND UNSPECIFIED MALIGNANT NEOPLASM OF AXILLA AND UPPER LIMB LYMPH NODES: ICD-10-CM

## 2019-04-22 DIAGNOSIS — Z17.0 ESTROGEN RECEPTOR POSITIVE STATUS [ER+]: ICD-10-CM

## 2019-04-22 NOTE — OB/GYN HISTORY
[Definite:  ___ (Date)] : the last menstrual period was [unfilled] [History of Birth Control Pills] : Patient has a history of taking birth control pills [Currently In Menopause] : currently in menopause [___] : Living: [unfilled] [History of Hormone Replacement Therapy] : no history of hormone replacement therapy

## 2019-04-22 NOTE — REVIEW OF SYSTEMS
[Patient Intake Form Reviewed] : Patient intake form was reviewed [Fatigue] : fatigue [Recent Change In Weight] : ~T recent weight change [Diarrhea] : diarrhea [Muscle Pain] : muscle pain [Negative] : Heme/Lymph [Anxiety] : anxiety [Depression] : depression [Fever] : no fever [Chills] : no chills [Night Sweats] : no night sweats [Abdominal Pain] : no abdominal pain [Vomiting] : no vomiting [Constipation] : no constipation [Gait Disturbance] : no gait disturbance [Proptosis] : no proptosis [Hot Flashes] : no hot flashes [Deepening Of The Voice] : no deepening of the voice [FreeTextEntry2] : as noted in HPI [FreeTextEntry9] : as noted in HPI [de-identified] : healed surgical incisions right chest wall

## 2019-04-22 NOTE — HISTORY OF PRESENT ILLNESS
[FreeTextEntry1] : Ms. Ranjeet Whitlock is a  58 year old post menopausal female, former occasional smoker with [id number], clinical stage IIIB ER positive, SD negative, Her-2 positive poorly differentiated invasive ductal carcinoma of the right breast, s/p neoadjuvant chemotherapy, s/p mastectomy and sentinel node biopsy and reconstruction with finding of ypT0 ypN0 disease.\par \par She felt a mass in her right retrpareolar breast in august of 2018 and was referred for breast imaging by her PMD.\par \par Diagnostic right breast mammography on 8/10/18 demonstrated heterogeneous pleomorphic microcalcifications extending in a linear fashion posteriorly and associated with a linear density and diffuse skin thickening.  There were bilateral nonenlarged axillary lymph nodes with the right lymph nodes appearing dense. \par \par Bilateral ultrasound on the same date confirmed the presence of a 0.8 x 0.7 cm x 1 cm hypoechoic mass at 5:00 1cm FN.  Adjacent to it at 5:00 in the retroareolar region was a 1.1 cm oval lobulated hypoechoic mass. Extending posteriorly and medially from these masses were linear hypoechoic areas related to the mass and the mammographic areas of calcified DCIS. There was a calcified cyst at 6:00 5 cm FN. There were abnormal lymph nodes in the right axillary tail with the most abnormal measuring 1.3 x 1.0 x 1.2 cm which was highly suspicious for metastatic carcinoma. Left breast imaging was negative for any suspicious findings. Impression: Highly suspicious microcalcifications and sonographically identified masses right breast at 5:00 corresponding to the palpable findings. Abnormal appearing lymph nodes right axillary tail.\par \par Ultrasound-guided core biopsy(reviewed by [facility]) on 8/14/19 of the right breast at 5:00 was positive for poorly differentiated Laura score 8/9 invasive ductal carcinoma measuring at 1.2 cm with LVI.  There was  intermediate nuclear grade solid type DCIS with necrosis and lymphovascular permeation.  Per outside pathology report, the cancer was ER positive (95%), SD negative (<1%), HER-2 positive(3+ membrane staining). Right axillary tail lymph node biopsy was positive for metastatic carcinoma, consistent with breast primary.\par \par Bilateral breast MRI on  8/20/19 showed multiple suspicious irregular heterogeneously enhancing masses in the right breast, the largest measuring 2.3 cm  at 5 :00 corresponding with biopsy-proven poorly differentiated invasive ductal carcinoma. The masses were contiguous with one another and with areas of linear no-mass enhancement and extensive stippled enhancement extending at least 3 mm posterior to the nipple. There are suspicious satellite nodules also noted superior and lateral to the nipple. There were 2 other highly suspicious additional lesions in the right upper quadrant at 11:00 over 3 cm area with linear extensions and a 2.6 cm irregular nodular mass at extending in a linear fashion at 10:00. Both were highly suggestive of carcinoma. Findings were consistent with multicentric disease. Biopsy proven metastatic disease to an axillary lymph node with additional suspicious lymph nodes.\par There was no  MRI evidence of suspicious enhancing masses in the Left breast.\par \par \par She was evaluated by Dr. Luna on 8/2008  who recommended neoadjuvant chemotherapy and the option for mastectomy with possible axillary lymph node dissection pending response to therapy.  Patient was then referred to Dr. Cox.\par \par She underwent PET/CT on 9/8/18  which demonstrated a hypermetabolic right retroareolar breast nodule corresponding to patient's \par known malignancy. Overlying diffuse hypermetabolic skin thickening was nonspecific, possibly secondary to neoplasm. There were multiple hypermetabolic right axillary/retropectoral lymph nodes compatible with metastatic disease. There was a hypermetabolic focus in periportal/peripancreatic region is concerning for blas metastasis and Intense and symmetric laryngeal hypermetabolism which could be physiologic if patient was speaking following injection of the radiotracer. \par \par On 9/13/18, she underwent EGD/EUS and biopsy of a 1.6 cm matthew-pancreatic LN on 9/13/18 by Dr. Giovanni Felton. Pathology was negative for malignant cells and favored a reactive lymph node.  Stomach biopsy was negative.\par \par She underwent a right breast skin punch biopsy on 9/26/2018 which showed cutaneous involvement of poorly differentiated carcinoma consistent with breast origin. LVI and tumor necrosis were seen.  Patient was staged as cT4N1.\par \par She started neoadjuvant chemotherapy with dose-dense AC on 9/26/18. Dose-dense AC (adriamycin/cytotaxan) for 4 cycles every other week was completed on 11/23/18. This was followed by THP, which the patient completed on 2/15/19.  Patient then resumed Herceptin/Perjeta on 3/8/19.\par \par Of note, follow up breast MRI 2/4/19 3/4/19 demonstrated that previously identified enhancing foci in the right breast and enlarged right axillary lymph nodes were no longer visualized. Persistent diffuse skin thickening of the right breast. \par \par PET/CT 3/10/19  demonstrated resolution of hypermetabolic right retroareolar breast nodule as compared to PET/CT dated 9/8/2018, compatible with response to interval therapy. Interval decrease in thickness and metabolism of nonspecific right breast \par skin thickening. Resolution of multiple hypermetabolic right axillary/retropectoral lymph nodes.  Resolution of hypermetabolic focus in periportal/peripancreatic region.  No new lesion. \par \par On 3/22/19, she underwent right breast mastectomy with subpectoral tissue expander, excision of previously  biopsied lymph node and right sentinel node (Dr. Luna and Marisela,at [facility])l.  Pathology report indicated that there was no residual invasive carcinoma, DCIS or LCIS identified in the right breast. Therefore, surgical margins were uninvolved by invasive disease and DCIS.  LVI  and dermal lymphatic invasion were not identified.  There was 6 negative non- sentinel lymph nodes (0/6) with focal dystropic calcifications present.  Per pathology report, pathologic staging is y pT0pN0.\par \par Today she states that she notes that she feels fatigued which has improved since completing chemo.  She reports right breast and axillary pain 7/10 for which she takes percocet and mobic which decreases pain to 3/10. She notes decreased ROM of her right arm and neuropathy of extremities, a 40 lb unintentional weight loss since 8/2018, and intermittent diarrhea.  She is receiving Herceptin / Perjeta every 3 weeks.  Next treatment is on 4/26/19.\par \par Other history: She has used oral contraceptives in the past for endometriosis. Her family history of malignancies involves her mother who passed away from metastatic melanoma, a maternal first cousin with breast cancer and a maternal uncle with bladder and blood cancer.\par \par

## 2019-04-22 NOTE — VITALS
[Least Pain Intensity: 3/10] : 3/10 [Pain Location: ___] : Pain Location: [unfilled] [Opioid] : opioid [80: Normal activity with effort; some signs or symptoms of disease.] : 80: Normal activity with effort; some signs or symptoms of disease.  [3 - Distress Level] : Distress Level: 3 [Maximal Pain Intensity: 7/10] : 7/10

## 2019-04-22 NOTE — PHYSICAL EXAM
[Sclera] : the sclera and conjunctiva were normal [Outer Ear] : the ears and nose were normal in appearance [Hearing Threshold Finger Rub Not Baker] : hearing was normal [Normal Oral Cavity] : oral cavity was normal [Heart Rate And Rhythm] : heart rate and rhythm were normal [Heart Sounds] : normal S1 and S2 [Normal] : no JVD, no calf tenderness [No UE Edema] : there is no upper extremity edema [Bowel Sounds] : normal bowel sounds [Abdomen Soft] : soft [Cervical Lymph Nodes Enlarged Posterior Bilaterally] : posterior cervical [Cervical Lymph Nodes Enlarged Anterior Bilaterally] : anterior cervical [Supraclavicular Lymph Nodes Enlarged Bilaterally] : supraclavicular [Axillary Lymph Nodes Enlarged Bilaterally] : axillary [Femoral Lymph Nodes Enlarged Bilaterally] : femoral [Inguinal Lymph Nodes Enlarged Bilaterally] : inguinal [] : no rash [Oriented To Time, Place, And Person] : oriented to person, place, and time [Affect] : the affect was normal [No Focal Deficits] : no focal deficits [de-identified] : Right chest wall incision well approximated with clear adhesive tape covering incision without palpable masses; left breast without palpable masses [de-identified] : Decreased ROM right arm [de-identified] : Right chest wall  incision well approximated with clear adhesive tape covering incision

## 2019-04-23 ENCOUNTER — APPOINTMENT (OUTPATIENT)
Dept: CARDIOLOGY | Facility: CLINIC | Age: 59
End: 2019-04-23
Payer: COMMERCIAL

## 2019-04-23 ENCOUNTER — NON-APPOINTMENT (OUTPATIENT)
Age: 59
End: 2019-04-23

## 2019-04-23 ENCOUNTER — APPOINTMENT (OUTPATIENT)
Dept: CV DIAGNOSITCS | Facility: HOSPITAL | Age: 59
End: 2019-04-23

## 2019-04-23 VITALS
HEART RATE: 82 BPM | HEIGHT: 67 IN | BODY MASS INDEX: 37.83 KG/M2 | SYSTOLIC BLOOD PRESSURE: 118 MMHG | DIASTOLIC BLOOD PRESSURE: 75 MMHG | WEIGHT: 241 LBS | OXYGEN SATURATION: 97 %

## 2019-04-23 DIAGNOSIS — F32.9 MAJOR DEPRESSIVE DISORDER, SINGLE EPISODE, UNSPECIFIED: ICD-10-CM

## 2019-04-23 PROCEDURE — 93000 ELECTROCARDIOGRAM COMPLETE: CPT

## 2019-04-23 PROCEDURE — 99244 OFF/OP CNSLTJ NEW/EST MOD 40: CPT

## 2019-04-26 ENCOUNTER — APPOINTMENT (OUTPATIENT)
Age: 59
End: 2019-04-26

## 2019-04-26 ENCOUNTER — LABORATORY RESULT (OUTPATIENT)
Age: 59
End: 2019-04-26

## 2019-04-26 ENCOUNTER — APPOINTMENT (OUTPATIENT)
Dept: HEMATOLOGY ONCOLOGY | Facility: CLINIC | Age: 59
End: 2019-04-26
Payer: COMMERCIAL

## 2019-04-26 VITALS
BODY MASS INDEX: 37.5 KG/M2 | HEART RATE: 71 BPM | SYSTOLIC BLOOD PRESSURE: 124 MMHG | RESPIRATION RATE: 15 BRPM | OXYGEN SATURATION: 98 % | WEIGHT: 239.42 LBS | DIASTOLIC BLOOD PRESSURE: 82 MMHG | TEMPERATURE: 98.5 F

## 2019-04-26 PROCEDURE — 99215 OFFICE O/P EST HI 40 MIN: CPT

## 2019-04-29 DIAGNOSIS — Z51.11 ENCOUNTER FOR ANTINEOPLASTIC CHEMOTHERAPY: ICD-10-CM

## 2019-05-01 NOTE — REVIEW OF SYSTEMS
[Joint Pain] : joint pain [Feeling Fatigued] : feeling fatigued [Anxiety] : anxiety [Negative] : Neurological

## 2019-05-01 NOTE — HISTORY OF PRESENT ILLNESS
[FreeTextEntry1] : 59 yo woman who is a  She has 1 adult daughter (25) and is  to Gonzales Harrison who own a [employer].\par \par PMH: Migraines, back pain and DLD s/p epidural injections, menopausal, anxiety and depression on medication, Obesity BMI 45. CHOL 218 ,  in 2016 She has no prior History of HTN. \par Labs:P \par \par Cancer Dx: Left Poorly differentiated ductal Breast cancer ER+, SD- HER+  multifocal with multiple masses and LNs involved, Diagnosed in august 2018. \par \par Under Corey and Dr. Cox she is planned for neoadjuvant chemotherapy  ACTHP.  She presents today for evaluation of HTN and prechemotherapy cardiac risk stratification.\par \par Denies: SOB, LE edema, palpitation, chest pain\par \par Echo 9/11/2018. \par EF 63%  Normal left ventricular internal dimensions and wall thicknesses. Normal left ventricular systolic function. No segmental wall motion abnormalities. Normal right ventricular size and function. Global longitudinal strain equals - 21.2%, which is normal.\par \par \par Oncologist: Dr. Noa Cox\par PCP: DR. CHRISTINA NELSON\par \par Interval follow up 4/23/19\par \par Echo at [facility] with mass on aortic valve- reviewed by me. Not a mass- just calcification of the non cusp. EF reported as 55%\par \par \par ROS: Denies Chest pain, dyspnea, palpitations, dizziness or syncope.\par here with

## 2019-05-01 NOTE — ASSESSMENT
[FreeTextEntry1] : 57 yo with  Migraines, back pain and DLD s/p epidural injections, menopausal, anxiety and depression on medication, Obesity BMI 45. CHOL 218 , , with recently diagnosed Left Poorly differentiated ductal Breast cancer ER+, MA- HER+  multifocal with multiple masses and LNs involved.  neoadjuvant chemotherapy  ACTHP.  \par Mrs Harrison has no know "previous history of HTN" however she has had BP>131 and up to 152 over the last year and a half. I have explained to her the cardiac  risk of the chemo agents she will receive and she is at high risk for cardiotoxicity. Her staring EF of 63%  with a GLS of -21.\par \par -recent echo in [facility] with EF 55% with stable calcifcation on Ao valve.\par EF dropped 8%? at OSH. c/w lisinopril for cardioprotection \par c/w current cancer treatment\par repeat echo jus before next planed HP to follow EF.\par \par Advised exercise as tolerated during treatment.\par \par plan d/w patient,  and Dr Nicolle Cox\par \par f/u 2 mths , can discuss echo results via phone\par \par \par \par \par \par

## 2019-05-01 NOTE — PHYSICAL EXAM
[General Appearance - Well Developed] : well developed [Normal Appearance] : normal appearance [Well Groomed] : well groomed [General Appearance - Well Nourished] : well nourished [General Appearance - In No Acute Distress] : no acute distress [No Deformities] : no deformities [Normal Conjunctiva] : the conjunctiva exhibited no abnormalities [Eyelids - No Xanthelasma] : the eyelids demonstrated no xanthelasmas [No Oral Pallor] : no oral pallor [Normal Oral Mucosa] : normal oral mucosa [No Oral Cyanosis] : no oral cyanosis [No Jugular Venous Jones A Waves] : no jugular venous jones A waves [Normal Jugular Venous A Waves Present] : normal jugular venous A waves present [Normal Jugular Venous V Waves Present] : normal jugular venous V waves present [Respiration, Rhythm And Depth] : normal respiratory rhythm and effort [Heart Rate And Rhythm] : heart rate and rhythm were normal [Auscultation Breath Sounds / Voice Sounds] : lungs were clear to auscultation bilaterally [Exaggerated Use Of Accessory Muscles For Inspiration] : no accessory muscle use [Abdomen Tenderness] : non-tender [Heart Sounds] : normal S1 and S2 [Abdomen Soft] : soft [Cyanosis, Localized] : no localized cyanosis [Nail Clubbing] : no clubbing of the fingernails [] : no hepato-splenomegaly [Oriented To Time, Place, And Person] : oriented to person, place, and time

## 2019-05-02 NOTE — HISTORY OF PRESENT ILLNESS
[Disease: _____________________] : Disease: [unfilled] [N: ___] : N[unfilled] [T: ___] : T[unfilled] [AJCC Stage: ____] : AJCC Stage: [unfilled] [Treatment Protocol] : Treatment Protocol [de-identified] : ER+AL-HER2+ [de-identified] : \par 57yo woman with ER+NC-HER2+ locally advanced, inflammatory poorly differentiated ductal breast cancer.\par \par She noted summer 2018 she felt a mass near her nipple in her R breast. She went to her PMD who ordered a mammogram and sonogram which revealed a R abnormal hypoechoic mass and abnormal appearing axillary LN.\par \par Image guided biopsy of mass and axilla revealed invasive poorly differentiated ductal carcinoma, +LVI, 1.2cm in specimen, Roundup 8/9, DCIS, microcalcifications present, ER >95%, NC negative, HER2+ 3+ IHC, Lymph node biopsy with metastatic carcinoma.\par \par She then saw Dr. Camacho breast surgeon who ordered and MRI of the breasts. MRI showed: Biopsy proven mammary carcinoma in the R retroareolar region 5:00 manifested as multiple irregular masses. The tumor is extensive in nature and spanning an area of over 3 cm with non-mass enhancement extending posteriorly suggesting extensive intraductal component as well. Two other highly suspicious masses noted in the RUOQ at 11:00 measuring >3cm and at 10:00 measuring 2.6cm consistent with multicentric disease. Biopsy proven metastatic disease to an axillary LN with additional suspicious LNs (additional with replaced fatty hilum and abnormally enlarged cortices, some have irregular borders, some posterior to pectoralis muscle, also abnormal appearing node in soft tissue lateral to the R chest wall).\par \par Dr. Camacho recommended neoadjuvant chemotherapy and the option for mastectomy with possible axillary lymph node dissection pending response to therapy.\par \par She then saw me in consultation. She underwent PETCT that found a peripancreatic lymph node that was enlarged and SUV avid, b/l laryngeal FDG avidity (likely due to speaking during exam), and SUV avid breast mass/axilla on R with breast skin noted to be thickened. She underwent EGD/EUS and biopsy of this peripancreatic LN (Dr. Giovanni Felton) which was consistent with benign/reactive lymphatic tissue. She had a mediport placed by IR on 9/26. She saw oncocardiology Dr. Khan for risk assessment prior to anthracycline/anti-HER2 monoclonal therapy and was started on ACEi for HTN and for cardioprotection. She saw dermatology for skin biopsy of R breast prior to treatment to evaluate/confirm inflammatory disease; biopsy results with skin involvement by poorly differentiated carcinoma. She started neoadjuvant chemotherapy with dose-dense AC on 9/26. dose-dense AC-->THP (weekly taxol 80mg/m2)\par \par 11/23 THP #1\par 11/30 Taxol 80mg/m2 held due to neuropathy\par 12/7 resumed Taxol weekly without issue\par 12/15 taxol completed\par 3/8 Herceptin and Perjeta administered pre-operatively\par \par MRI 2/4/19 BIRADS 2, masses and adenopathy resolved. Some residual R breast skin thickening noted.\par \par 3/22 R mastectomy and ALND: 0/6 LN removed involved, no residual invasive carcinoma or DCIS identified. [id number] ([facility])\par 4/3 Herceptin and Perjeta continued post-operatively\par \par Patient lives in [place] with her  Gonzales (pretzel company owner) and 26 year old daughter (an artist and ). She works full time as a . She notes a history of smoking in the distant past. Mother passed away in 70s from metastatic melanoma, and a maternal aunt had breast cancer; no other significant close family history of cancers. She notes overall migraines (which she has had for many years, seen neurologist, stable lytic medications help, associated with one sided facial numbness when they occur) have gotten better with menopause – her LMP was 2/2018 and it was infrequent prior to that. She has had some hot flashes. She used OCPs in the past for years particularly for endometriosis treatment. She has had issues with DJD of back and spine with pain, MRI last year noted, epidural injections have helped with this significantly as well as occasional NSAID use.\par  [de-identified] : She presents today for herceptin/perjeta. She had blurry vision in her left eye - saw ophthalmology who referred her for retinal specialist - diagnosed with retina edema related to myopia (not thought to be related to treatment). She had intravitreous injection of Avastin this week with plan for 2 more treatments in the coming weeks. She has no other significant complaints; radiation simulation scheduled. [FreeTextEntry1] : dose-dense AC (adriamycin 60mg/m2 and cytoxan 600mg/m2) x 4 cycles, followed by weekly taxol 80mg/m2 x 12 with herceptin/perjeta q3 weeks

## 2019-05-02 NOTE — PHYSICAL EXAM
[Fully active, able to carry on all pre-disease performance without restriction] : Status 0 - Fully active, able to carry on all pre-disease performance without restriction [Normal] : affect appropriate [de-identified] : +alopecia, wearing cap [de-identified] : L chest wall mediport tegaderm in place, nontender [de-identified] : well healed, tissue expanders palpated,no masses or adenopathy papable [de-identified] : trace edema b/l. b/l arm erythema improved, L hand slight hyperpigmentation still improved today

## 2019-05-02 NOTE — OB HISTORY
[Definite:  ___ (Date)] : the last menstrual period was [unfilled] [Experiencing Menopausal Sxs] : experiencing menopausal symptoms [___] : Total Pregnancies: [unfilled] [Normal Amount/Duration] : was abnormal [Spotting Between  Menses] : no spotting between menses [Regular Cycle Intervals] : periods have been irregular

## 2019-05-02 NOTE — ASSESSMENT
[FreeTextEntry1] : 57yo woman with ER+WI-HER2+ locally advanced, poorly differentiated, inflammatory ductal breast cancer, multifocal with multiple breast masses and multiple enlarged axillary LNs on exam/MRI imaging (biopsy confirmed with clips placed), skin biopsy positive confirming inflammatory disease. Staging imaging with SUV-avid peripancreatic LN - biopsy with reactive lymph node (benign). She completed neoadjuvant AC-->THP with interval PET JENNY. Mastectomy/ALND 3/22 with pCR. She continues adjuvant herceptin/perjeta.\par \par R Breast Cancer: Mild diarrhea G2 for 2-3 days with each THP cycle, suspect related to Perjeta. Neuropathy grade 2 sudden onset with taxol 80mg/m2 #1 resolved (week 2 held, week 3 resumed) - now with grade 1 symptoms. Skin rash grade 1, per below, resolved.\par -CMP, CBC today; proceed with H+P every 3 weeks for total 1 year as tolerated\par -EMLA cream 1 hour prior to mediport accessed\par -continue antihypertensive medication (HTN treatment and cardioprotective); follow up with Dr. Khan (TTE 1/4 ok, EF stable)\par -follow up with Dr. Luna and Dr. Antonio as recommended\par -radiation treatment planned now (I advised she let Dr. Jackson know about recent intravitreous avastin injection)\par -adjuvant plan for AI with ovarian suppression pending radiation treatment - discussed plan to initiate Lupron with next H/P appointment, patient is amenable.\par \par Chemotherapy-related diarrhea: limited, resolved, thought related to perjeta with initial loading dose, occasional diet-related loose stools\par -PRN imodium, lomotil, hydration, call if reoccurs\par \par Skin rash on arms: c/w taxol reaction, also with new acneiform rash on face c/w reaction to her2 antibody therapy, both improved significantly near resolved\par -continue topicals, derm follow up planned\par \par TTE finding: mass on aortic valve non mobile, repeated Dr. Khan thought to be calcification\par -follow up Dr. Khan as planned\par -TTE repeat this coming month ordered/planned for close interval monitoring\par \par follow up 3 weeks with Lupron initiation

## 2019-05-03 ENCOUNTER — OTHER (OUTPATIENT)
Age: 59
End: 2019-05-03

## 2019-05-03 PROCEDURE — 77300 RADIATION THERAPY DOSE PLAN: CPT | Mod: 26

## 2019-05-03 PROCEDURE — 77295 3-D RADIOTHERAPY PLAN: CPT | Mod: 26

## 2019-05-03 PROCEDURE — 77334 RADIATION TREATMENT AID(S): CPT | Mod: 26

## 2019-05-06 ENCOUNTER — MESSAGE (OUTPATIENT)
Age: 59
End: 2019-05-06

## 2019-05-08 PROCEDURE — 77280 THER RAD SIMULAJ FIELD SMPL: CPT | Mod: 26

## 2019-05-09 ENCOUNTER — MEDICATION RENEWAL (OUTPATIENT)
Age: 59
End: 2019-05-09

## 2019-05-13 NOTE — PHYSICAL EXAM
[Normal] : well developed, well nourished, in no acute distress [Skin Color & Pigmentation] : normal skin color and pigmentation [Oriented To Time, Place, And Person] : oriented to person, place, and time

## 2019-05-14 NOTE — REVIEW OF SYSTEMS
[Fatigue: Grade 0] : Fatigue: Grade 0 [Dyspnea: Grade 0] : Dyspnea: Grade 0 [Cough: Grade 0] : Cough: Grade 0 [Dermatitis Radiation: Grade 0] : Dermatitis Radiation: Grade 0 [Skin Hyperpigmentation: Grade 0] : Skin Hyperpigmentation: Grade 0 [Skin Induration: Grade 0] : Skin Induration: Grade 0

## 2019-05-14 NOTE — HISTORY OF PRESENT ILLNESS
[FreeTextEntry1] : Ms. Ranjeet Harrison 58 year old female with cT4 N0 poorly differentiated ER positive, Her-2 positive poorly differentiated invasive ductal carcinoma of the right breast, s/p neoadjuvant chemotherapy, s/p right mastectomy and sentinel node biopsy showing ypT0 ypN0 disease.\par \par 5/13/19- 2/25 fx\par Today she notes intermittent  right chest wall twinges. She takes mobic daily for osteoarthritis. Using aquaphor to treated area.\par

## 2019-05-16 ENCOUNTER — APPOINTMENT (OUTPATIENT)
Dept: CV DIAGNOSITCS | Facility: HOSPITAL | Age: 59
End: 2019-05-16

## 2019-05-16 ENCOUNTER — OUTPATIENT (OUTPATIENT)
Dept: OUTPATIENT SERVICES | Facility: HOSPITAL | Age: 59
LOS: 1 days | End: 2019-05-16
Payer: COMMERCIAL

## 2019-05-16 DIAGNOSIS — Z98.890 OTHER SPECIFIED POSTPROCEDURAL STATES: Chronic | ICD-10-CM

## 2019-05-16 DIAGNOSIS — I25.10 ATHEROSCLEROTIC HEART DISEASE OF NATIVE CORONARY ARTERY WITHOUT ANGINA PECTORIS: ICD-10-CM

## 2019-05-16 PROCEDURE — 77427 RADIATION TX MANAGEMENT X5: CPT

## 2019-05-16 PROCEDURE — 93306 TTE W/DOPPLER COMPLETE: CPT

## 2019-05-16 PROCEDURE — 93356 MYOCRD STRAIN IMG SPCKL TRCK: CPT

## 2019-05-16 PROCEDURE — 93306 TTE W/DOPPLER COMPLETE: CPT | Mod: 26

## 2019-05-17 ENCOUNTER — APPOINTMENT (OUTPATIENT)
Age: 59
End: 2019-05-17

## 2019-05-17 ENCOUNTER — LABORATORY RESULT (OUTPATIENT)
Age: 59
End: 2019-05-17

## 2019-05-21 ENCOUNTER — RESULT REVIEW (OUTPATIENT)
Age: 59
End: 2019-05-21

## 2019-05-21 ENCOUNTER — APPOINTMENT (OUTPATIENT)
Dept: HEMATOLOGY ONCOLOGY | Facility: CLINIC | Age: 59
End: 2019-05-21

## 2019-05-21 LAB
BASOPHILS # BLD AUTO: 0 K/UL — SIGNIFICANT CHANGE UP (ref 0–0.2)
BASOPHILS NFR BLD AUTO: 0.3 % — SIGNIFICANT CHANGE UP (ref 0–2)
EOSINOPHIL # BLD AUTO: 0.2 K/UL — SIGNIFICANT CHANGE UP (ref 0–0.5)
EOSINOPHIL NFR BLD AUTO: 2.8 % — SIGNIFICANT CHANGE UP (ref 0–6)
HCT VFR BLD CALC: 32.3 % — LOW (ref 34.5–45)
HGB BLD-MCNC: 11 G/DL — LOW (ref 11.5–15.5)
LYMPHOCYTES # BLD AUTO: 1 K/UL — SIGNIFICANT CHANGE UP (ref 1–3.3)
LYMPHOCYTES # BLD AUTO: 15.3 % — SIGNIFICANT CHANGE UP (ref 13–44)
MCHC RBC-ENTMCNC: 29.2 PG — SIGNIFICANT CHANGE UP (ref 27–34)
MCHC RBC-ENTMCNC: 34.1 G/DL — SIGNIFICANT CHANGE UP (ref 32–36)
MCV RBC AUTO: 85.9 FL — SIGNIFICANT CHANGE UP (ref 80–100)
MONOCYTES # BLD AUTO: 0.4 K/UL — SIGNIFICANT CHANGE UP (ref 0–0.9)
MONOCYTES NFR BLD AUTO: 5.5 % — SIGNIFICANT CHANGE UP (ref 2–14)
NEUTROPHILS # BLD AUTO: 5 K/UL — SIGNIFICANT CHANGE UP (ref 1.8–7.4)
NEUTROPHILS NFR BLD AUTO: 76.2 % — SIGNIFICANT CHANGE UP (ref 43–77)
PLATELET # BLD AUTO: 264 K/UL — SIGNIFICANT CHANGE UP (ref 150–400)
RBC # BLD: 3.77 M/UL — LOW (ref 3.8–5.2)
RBC # FLD: 14.3 % — SIGNIFICANT CHANGE UP (ref 10.3–14.5)
WBC # BLD: 6.5 K/UL — SIGNIFICANT CHANGE UP (ref 3.8–10.5)
WBC # FLD AUTO: 6.5 K/UL — SIGNIFICANT CHANGE UP (ref 3.8–10.5)

## 2019-05-23 LAB
ESTRADIOL SERPL-MCNC: 16 PG/ML
FSH SERPL-MCNC: 33.7 IU/L
LH SERPL-ACNC: 14 IU/L

## 2019-05-23 PROCEDURE — 77427 RADIATION TX MANAGEMENT X5: CPT

## 2019-05-28 NOTE — REASON FOR VISIT
[Routine On-Treatment] : a routine on-treatment visit for [Spouse] : spouse [Breast Cancer] : breast cancer

## 2019-05-28 NOTE — VITALS
[Least Pain Intensity: 0/10] : 0/10 [Maximal Pain Intensity: 0/10] : 0/10 [90: Able to carry normal activity; minor signs or symptoms of disease.] : 90: Able to carry normal activity; minor signs or symptoms of disease.

## 2019-05-31 PROCEDURE — 77427 RADIATION TX MANAGEMENT X5: CPT

## 2019-06-01 ENCOUNTER — MOBILE ON CALL (OUTPATIENT)
Age: 59
End: 2019-06-01

## 2019-06-02 NOTE — PHYSICAL EXAM
[Normal] : well developed, well nourished, in no acute distress [Oriented To Time, Place, And Person] : oriented to person, place, and time [Breast Palpation Mass] : no palpable masses [de-identified] : mild skin reaction

## 2019-06-02 NOTE — REVIEW OF SYSTEMS
[Fatigue: Grade 0] : Fatigue: Grade 0 [Cough: Grade 0] : Cough: Grade 0 [Dyspnea: Grade 0] : Dyspnea: Grade 0 [Skin Hyperpigmentation: Grade 0] : Skin Hyperpigmentation: Grade 0 [Skin Induration: Grade 0] : Skin Induration: Grade 0 [Dermatitis Radiation: Grade 1 - Faint erythema or dry desquamation] : Dermatitis Radiation: Grade 1 - Faint erythema or dry desquamation

## 2019-06-02 NOTE — HISTORY OF PRESENT ILLNESS
[FreeTextEntry1] : Ms. Ranjeet Harrison 58 year old female with cT4 N0 poorly differentiated ER positive, Her-2 positive poorly differentiated invasive ductal carcinoma of the right breast, s/p neoadjuvant chemotherapy, s/p right mastectomy and sentinel node biopsy showing ypT0 ypN0 disease.\par \par 5/28/19-12/25 fx\par She notes fatigue and right chest wall tightness. She complains of a cough and right sided throat pain and states that she may have a slight respiratory infection. Denies breast pain or dysphagia. Notably, she is scheduled to receive a right eye injection for retinal edema by ophthalmologist.\par \par 5/13/19- 2/25 fx\par Today she notes intermittent  right chest wall twinges. She takes mobic daily for osteoarthritis. Using aquaphor to treated area.\par \par \par 5/20/19- 7/25 treatments. Notes intermittent itching and twinges to the right chest. Feeling well overall. Feels she is settling into treatment well.

## 2019-06-02 NOTE — REVIEW OF SYSTEMS
[Dysphagia: Grade 0] : Dysphagia: Grade 0 [Esophagitis: Grade 1 - Asymptomatic; clinical or diagnostic observations only; intervention not indicated] : Esophagitis: Grade 1 - Asymptomatic; clinical or diagnostic observations only; intervention not indicated [Fatigue: Grade 1 - Fatigue relieved by rest] : Fatigue: Grade 1 - Fatigue relieved by rest [Dyspnea: Grade 0] : Dyspnea: Grade 0 [Cough: Grade 0] : Cough: Grade 0 [Skin Hyperpigmentation: Grade 0] : Skin Hyperpigmentation: Grade 0 [Skin Induration: Grade 0] : Skin Induration: Grade 0 [Dermatitis Radiation: Grade 1 - Faint erythema or dry desquamation] : Dermatitis Radiation: Grade 1 - Faint erythema or dry desquamation

## 2019-06-02 NOTE — PHYSICAL EXAM
[Normal] : well developed, well nourished, in no acute distress [Oriented To Time, Place, And Person] : oriented to person, place, and time [] : no respiratory distress [Breast Palpation Mass] : no palpable masses

## 2019-06-02 NOTE — HISTORY OF PRESENT ILLNESS
[FreeTextEntry1] : Ms. Ranjeet Harrison 58 year old female with cT4 N0 poorly differentiated ER positive, Her-2 positive poorly differentiated invasive ductal carcinoma of the right breast, s/p neoadjuvant chemotherapy, s/p right mastectomy and sentinel node biopsy showing ypT0 ypN0 disease.\par \par 5/20/19 OTV\par No new complaints.  Using aquaphor on the affected area.\par \par 5/13/19- 2/25 fx\par Today she notes intermittent  right chest wall twinges. She takes mobic daily for osteoarthritis. Using aquaphor to treated area.\par \par \par 5/20/19- 7/25 treatments. Notes intermittent itching and twinges to the right chest. Feeling well overall. Feels she is settling into treatment well.

## 2019-06-03 ENCOUNTER — OUTPATIENT (OUTPATIENT)
Dept: OUTPATIENT SERVICES | Facility: HOSPITAL | Age: 59
LOS: 1 days | Discharge: HOME | End: 2019-06-03

## 2019-06-03 DIAGNOSIS — Z98.890 OTHER SPECIFIED POSTPROCEDURAL STATES: Chronic | ICD-10-CM

## 2019-06-03 DIAGNOSIS — C77.3 SECONDARY AND UNSPECIFIED MALIGNANT NEOPLASM OF AXILLA AND UPPER LIMB LYMPH NODES: ICD-10-CM

## 2019-06-03 DIAGNOSIS — Z17.0 ESTROGEN RECEPTOR POSITIVE STATUS [ER+]: ICD-10-CM

## 2019-06-03 DIAGNOSIS — C50.911 MALIGNANT NEOPLASM OF UNSPECIFIED SITE OF RIGHT FEMALE BREAST: ICD-10-CM

## 2019-06-03 NOTE — REVIEW OF SYSTEMS
[Esophagitis: Grade 1 - Asymptomatic; clinical or diagnostic observations only; intervention not indicated] : Esophagitis: Grade 1 - Asymptomatic; clinical or diagnostic observations only; intervention not indicated [Dysphagia: Grade 0] : Dysphagia: Grade 0 [Fatigue: Grade 1 - Fatigue relieved by rest] : Fatigue: Grade 1 - Fatigue relieved by rest [Cough: Grade 0] : Cough: Grade 0 [Dyspnea: Grade 0] : Dyspnea: Grade 0 [Dermatitis Radiation: Grade 1 - Faint erythema or dry desquamation] : Dermatitis Radiation: Grade 1 - Faint erythema or dry desquamation [Skin Hyperpigmentation: Grade 0] : Skin Hyperpigmentation: Grade 0 [Skin Induration: Grade 0] : Skin Induration: Grade 0

## 2019-06-03 NOTE — REASON FOR VISIT
[Breast Cancer] : breast cancer [Routine On-Treatment] : a routine on-treatment visit for [Spouse] : spouse

## 2019-06-07 ENCOUNTER — MEDICATION RENEWAL (OUTPATIENT)
Age: 59
End: 2019-06-07

## 2019-06-07 ENCOUNTER — APPOINTMENT (OUTPATIENT)
Age: 59
End: 2019-06-07

## 2019-06-07 ENCOUNTER — LABORATORY RESULT (OUTPATIENT)
Age: 59
End: 2019-06-07

## 2019-06-07 ENCOUNTER — APPOINTMENT (OUTPATIENT)
Dept: HEMATOLOGY ONCOLOGY | Facility: CLINIC | Age: 59
End: 2019-06-07
Payer: COMMERCIAL

## 2019-06-07 PROCEDURE — 99214 OFFICE O/P EST MOD 30 MIN: CPT

## 2019-06-07 PROCEDURE — 77427 RADIATION TX MANAGEMENT X5: CPT

## 2019-06-07 RX ORDER — BENZONATATE 100 MG/1
100 CAPSULE ORAL 3 TIMES DAILY
Qty: 30 | Refills: 0 | Status: DISCONTINUED | COMMUNITY
Start: 2019-05-06 | End: 2019-06-07

## 2019-06-07 NOTE — HISTORY OF PRESENT ILLNESS
[Disease: _____________________] : Disease: [unfilled] [T: ___] : T[unfilled] [N: ___] : N[unfilled] [AJCC Stage: ____] : AJCC Stage: [unfilled] [Treatment Protocol] : Treatment Protocol [de-identified] : \par 57yo woman with ER+MA-HER2+ locally advanced, inflammatory poorly differentiated ductal breast cancer.\par \par She noted summer 2018 she felt a mass near her nipple in her R breast. She went to her PMD who ordered a mammogram and sonogram which revealed a R abnormal hypoechoic mass and abnormal appearing axillary LN.\par \par Image guided biopsy of mass and axilla revealed invasive poorly differentiated ductal carcinoma, +LVI, 1.2cm in specimen, [place] 8/9, DCIS, microcalcifications present, ER >95%, MA negative, HER2+ 3+ IHC, Lymph node biopsy with metastatic carcinoma.\par \par She then saw Dr. Camacho breast surgeon who ordered and MRI of the breasts. MRI showed: Biopsy proven mammary carcinoma in the R retroareolar region 5:00 manifested as multiple irregular masses. The tumor is extensive in nature and spanning an area of over 3 cm with non-mass enhancement extending posteriorly suggesting extensive intraductal component as well. Two other highly suspicious masses noted in the RUOQ at 11:00 measuring >3cm and at 10:00 measuring 2.6cm consistent with multicentric disease. Biopsy proven metastatic disease to an axillary LN with additional suspicious LNs (additional with replaced fatty hilum and abnormally enlarged cortices, some have irregular borders, some posterior to pectoralis muscle, also abnormal appearing node in soft tissue lateral to the R chest wall).\par \par Dr. Camacho recommended neoadjuvant chemotherapy and the option for mastectomy with possible axillary lymph node dissection pending response to therapy.\par \par She then saw me in consultation. She underwent PETCT that found a peripancreatic lymph node that was enlarged and SUV avid, b/l laryngeal FDG avidity (likely due to speaking during exam), and SUV avid breast mass/axilla on R with breast skin noted to be thickened. She underwent EGD/EUS and biopsy of this peripancreatic LN (Dr. Giovanni Felton) which was consistent with benign/reactive lymphatic tissue. She had a mediport placed by IR on 9/26. She saw oncocardiology Dr. Khan for risk assessment prior to anthracycline/anti-HER2 monoclonal therapy and was started on ACEi for HTN and for cardioprotection. She saw dermatology for skin biopsy of R breast prior to treatment to evaluate/confirm inflammatory disease; biopsy results with skin involvement by poorly differentiated carcinoma. She started neoadjuvant chemotherapy with dose-dense AC on 9/26. dose-dense AC-->THP (weekly taxol 80mg/m2)\par \par 11/23 THP #1\par 11/30 Taxol 80mg/m2 held due to neuropathy\par 12/7 resumed Taxol weekly without issue\par 12/15 taxol completed\par 3/8 Herceptin and Perjeta administered pre-operatively\par \par MRI 2/4/19 BIRADS 2, masses and adenopathy resolved. Some residual R breast skin thickening noted.\par \par 3/22 R mastectomy and ALND: 0/6 LN removed involved, no residual invasive carcinoma or DCIS identified. [id number] ([facility])\par 4/3 Herceptin and Perjeta continued post-operatively, adjuvant radiation started 5/2019.\par \par Patient lives in [place] with her  Gonzales (pretzel company owner) and 26 year old daughter (an artist and ). She works full time as a . She notes a history of smoking in the distant past. Mother passed away in 70s from metastatic melanoma, and a maternal aunt had breast cancer; no other significant close family history of cancers. She notes overall migraines (which she has had for many years, seen neurologist, stable lytic medications help, associated with one sided facial numbness when they occur) have gotten better with menopause – her LMP was 2/2018 and it was infrequent prior to that. She has had some hot flashes. She used OCPs in the past for years particularly for endometriosis treatment. She has had issues with DJD of back and spine with pain, MRI last year noted, epidural injections have helped with this significantly as well as occasional NSAID use.\par  [de-identified] : ER+SC-HER2+ [FreeTextEntry1] : dose-dense AC (adriamycin 60mg/m2 and cytoxan 600mg/m2) x 4 cycles, followed by weekly taxol 80mg/m2 x 12 with herceptin/perjeta q3 weeks [de-identified] : She presents today for herceptin/perjeta. She had her second introccular injection of avastinfor retina edema related to myopia (not thought to be related to treatment) and vision has improved. She notes neuropathy in feet persists (tingling, feeling like "something is there" over entire foot, worse at ball of the foot) and occasionally in fingertips, not interfering with function, possibly improving. Nail splitting is growing out but still present. Some erythema/irritation over R breast related to radiation that she is applying cream to. Her hair is growing back, appetite improving. She has no other significant complaints; radiation simulation scheduled.

## 2019-06-07 NOTE — ASSESSMENT
[FreeTextEntry1] : 57yo woman with ER+ID-HER2+ locally advanced, poorly differentiated, inflammatory ductal breast cancer, multifocal with multiple breast masses and multiple enlarged axillary LNs on exam/MRI imaging (biopsy confirmed with clips placed), skin biopsy positive confirming inflammatory disease. Staging imaging with SUV-avid peripancreatic LN - biopsy with reactive lymph node (benign). She completed neoadjuvant AC-->THP with interval PET JENNY. Mastectomy/ALND with tissue expander placed 3/22 with pCR. She continues adjuvant herceptin/perjeta and adjuvant radiation, doing well currently.\par \par R Breast Cancer: Mild diarrhea G2 for 2-3 days with each THP cycle, suspect related to Perjeta. Neuropathy grade 2 sudden onset with taxol 80mg/m2 #1 resolved (week 2 held, week 3 resumed) - now with grade 1 symptoms slowly improveing and without functional deficits. Skin rash grade 1, per below, resolved.\par -CMP, CBC today; proceed with H+P every 3 weeks for total 1 year as tolerated\par -EMLA cream 1 hour prior to mediport accessed\par -continue antihypertensive medication (HTN treatment and cardioprotective); follow up with Dr. Khan (TTE 5/2019 ok, EF stable)\par -follow up with Dr. Luna and Dr. Antonio as recommended\par -radiation treatment to continue through next week\par -adjuvant plan for AI for now as LH/FSH/estradiol within menopausal range, no menses since 2/2019 - will recheck serologies next week to confirm stable and intermittently on AI therapy once radiation treatment is completed\par -follow up 6/28 for HP and to see me, begin AI tentatively\par \par Chemotherapy-related diarrhea: limited, resolved, thought related to perjeta with initial loading dose, occasional diet-related loose stools, improving overall\par -PRN imodium, lomotil, hydration, call if reoccurs\par \par Skin rash on arms: c/w taxol reaction, also with new acneiform rash on face c/w reaction to her2 antibody therapy, both improved significantly/resolved. Also with onycholysis improving\par -continue topicals, derm follow up planned\par \par TTE finding: mass on aortic valve non mobile, repeated Dr. Khan thought to be calcification\par -follow up Dr. Khan as planned\par -TTE repeat this coming month ordered/planned for close interval monitoring

## 2019-06-07 NOTE — PHYSICAL EXAM
[Fully active, able to carry on all pre-disease performance without restriction] : Status 0 - Fully active, able to carry on all pre-disease performance without restriction [Normal] : affect appropriate [de-identified] : L chest wall mediport accessed, nontender [de-identified] : well healed, tissue expanders palpated, R breast superiorly mild edema to sternum, no masses or adenopathy papable [de-identified] : trace edema b/l at ankles unchanged

## 2019-06-09 NOTE — DISEASE MANAGEMENT
[Clinical] : TNM Stage: c [IIIB] : IIIB [FreeTextEntry4] : [id number] [TTNM] : 4 [NTNM] : 1 [de-identified] : right chest wall/ nodes [MTNM] : 0

## 2019-06-09 NOTE — PHYSICAL EXAM
[Normal] : well developed, well nourished, in no acute distress [] : no respiratory distress [Oriented To Time, Place, And Person] : oriented to person, place, and time [Breast Palpation Mass] : no palpable masses [de-identified] : mild erythema to treated  right breast

## 2019-06-09 NOTE — HISTORY OF PRESENT ILLNESS
[FreeTextEntry1] : Ms. Ranjeet Harrison 58 year old female with cT4 N0 poorly differentiated ER positive, Her-2 positive poorly differentiated invasive ductal carcinoma of the right breast, s/p neoadjuvant chemotherapy, s/p right mastectomy and sentinel node biopsy showing ypT0 ypN0 disease.\par \par 6/3/19-16/25\par Notes right inframammary fold intermittent tenderness  3/10 which is worse with lying on right side and on palpation. Pain relieved with percocet at night which she uses for spinal stenosis. Notes mild throat tenderness but has not tried MMW to date.  She has no trouble swallowing.  Continues to use aquaphor to treated area. Notably, had right eye injection and drove today for the first time.\par \par 5/28/19-12/25 fx\par She notes fatigue and right chest wall tightness. She complains of a cough and right sided throat pain and states that she may have a slight respiratory infection. Denies breast pain or dysphagia. Notably, she is scheduled to receive a right eye injection for retinal edema by ophthalmologist.\par \par 5/13/19- 2/25 fx\par Today she notes intermittent  right chest wall twinges. She takes mobic daily for osteoarthritis. Using aquaphor to treated area.\par \par \par 5/20/19- 7/25 treatments. Notes intermittent itching and twinges to the right chest. Feeling well overall. Feels she is settling into treatment well.

## 2019-06-10 DIAGNOSIS — Z51.11 ENCOUNTER FOR ANTINEOPLASTIC CHEMOTHERAPY: ICD-10-CM

## 2019-06-12 ENCOUNTER — APPOINTMENT (OUTPATIENT)
Dept: HEMATOLOGY ONCOLOGY | Facility: CLINIC | Age: 59
End: 2019-06-12

## 2019-06-12 ENCOUNTER — RESULT REVIEW (OUTPATIENT)
Age: 59
End: 2019-06-12

## 2019-06-12 LAB
BASOPHILS # BLD AUTO: 0 K/UL — SIGNIFICANT CHANGE UP (ref 0–0.2)
BASOPHILS NFR BLD AUTO: 0.1 % — SIGNIFICANT CHANGE UP (ref 0–2)
EOSINOPHIL # BLD AUTO: 0.2 K/UL — SIGNIFICANT CHANGE UP (ref 0–0.5)
EOSINOPHIL NFR BLD AUTO: 2.6 % — SIGNIFICANT CHANGE UP (ref 0–6)
HCT VFR BLD CALC: 33 % — LOW (ref 34.5–45)
HGB BLD-MCNC: 10.7 G/DL — LOW (ref 11.5–15.5)
LYMPHOCYTES # BLD AUTO: 0.7 K/UL — LOW (ref 1–3.3)
LYMPHOCYTES # BLD AUTO: 11.8 % — LOW (ref 13–44)
MCHC RBC-ENTMCNC: 28.3 PG — SIGNIFICANT CHANGE UP (ref 27–34)
MCHC RBC-ENTMCNC: 32.5 G/DL — SIGNIFICANT CHANGE UP (ref 32–36)
MCV RBC AUTO: 87.1 FL — SIGNIFICANT CHANGE UP (ref 80–100)
MONOCYTES # BLD AUTO: 0.4 K/UL — SIGNIFICANT CHANGE UP (ref 0–0.9)
MONOCYTES NFR BLD AUTO: 6.4 % — SIGNIFICANT CHANGE UP (ref 2–14)
NEUTROPHILS # BLD AUTO: 4.9 K/UL — SIGNIFICANT CHANGE UP (ref 1.8–7.4)
NEUTROPHILS NFR BLD AUTO: 79 % — HIGH (ref 43–77)
PLATELET # BLD AUTO: 236 K/UL — SIGNIFICANT CHANGE UP (ref 150–400)
RBC # BLD: 3.79 M/UL — LOW (ref 3.8–5.2)
RBC # FLD: 15.1 % — HIGH (ref 10.3–14.5)
WBC # BLD: 6.2 K/UL — SIGNIFICANT CHANGE UP (ref 3.8–10.5)
WBC # FLD AUTO: 6.2 K/UL — SIGNIFICANT CHANGE UP (ref 3.8–10.5)

## 2019-06-13 LAB
ESTRADIOL SERPL-MCNC: 10 PG/ML
FSH SERPL-MCNC: 34.8 IU/L
LH SERPL-ACNC: 19.8 IU/L

## 2019-06-14 ENCOUNTER — APPOINTMENT (OUTPATIENT)
Dept: INFUSION THERAPY | Facility: HOSPITAL | Age: 59
End: 2019-06-14

## 2019-06-14 PROCEDURE — 77427 RADIATION TX MANAGEMENT X5: CPT

## 2019-06-14 NOTE — VITALS
[Least Pain Intensity: 0/10] : 0/10 [Pain Location: ___] : Pain Location: [unfilled] [90: Able to carry normal activity; minor signs or symptoms of disease.] : 90: Able to carry normal activity; minor signs or symptoms of disease.  [Maximal Pain Intensity: 5/10] : 5/10

## 2019-06-14 NOTE — PHYSICAL EXAM
[Normal] : well developed, well nourished, in no acute distress [] : no respiratory distress [Oriented To Time, Place, And Person] : oriented to person, place, and time [Breast Palpation Mass] : no palpable masses [Breast Abnormal Lactation (Galactorrhea)] : no nipple discharge [de-identified] : mild erythema to treated right breast/sternum

## 2019-06-14 NOTE — DISEASE MANAGEMENT
[Clinical] : TNM Stage: c [IIIB] : IIIB [FreeTextEntry4] : [id number] [TTNM] : 4 [NTNM] : 1 [MTNM] : 0 [de-identified] : right chest wall/ nodes

## 2019-06-14 NOTE — HISTORY OF PRESENT ILLNESS
[FreeTextEntry1] : Ms. Ranjeet Harrison 58 year old female with cT4 N0 poorly differentiated ER positive, Her-2 positive poorly differentiated invasive ductal carcinoma of the right breast, s/p neoadjuvant chemotherapy, s/p right mastectomy and sentinel node biopsy showing ypT0 ypN0 disease. She is receiving herceptin and perjeta every 3 weeks under the guidance of Dr. Grant.\par \par 6/10/19 - 21/25 fx\par Notes pain and itchiness to treated right breast/chest 6/10. Continues to note fatigue and right throat tenderness, but has not needed to use miracle mouth wash.  Using aquaphor and kenalog ointment. \par \par 6/3/19-16/25\par Notes right inframammary fold intermittent tenderness  3/10 which is worse with lying on right side and on palpation. Pain relieved with percocet at night which she uses for spinal stenosis. Notes mild throat tenderness but has not tried MMW to date.  She has no trouble swallowing.  Continues to use aquaphor to treated area. Notably, had right eye injection and drove today for the first time.\par \par 5/28/19-12/25 fx\par She notes fatigue and right chest wall tightness. She complains of a cough and right sided throat pain and states that she may have a slight respiratory infection. Denies breast pain or dysphagia. Notably, she is scheduled to receive a right eye injection for retinal edema by ophthalmologist.\par \par 5/13/19- 2/25 fx\par Today she notes intermittent  right chest wall twinges. She takes mobic daily for osteoarthritis. Using aquaphor to treated area.\par \par \par 5/20/19- 7/25 treatments. Notes intermittent itching and twinges to the right chest. Feeling well overall. Feels she is settling into treatment well.

## 2019-06-14 NOTE — REVIEW OF SYSTEMS
[Esophagitis: Grade 1 - Asymptomatic; clinical or diagnostic observations only; intervention not indicated] : Esophagitis: Grade 1 - Asymptomatic; clinical or diagnostic observations only; intervention not indicated [Dysphagia: Grade 0] : Dysphagia: Grade 0 [Fatigue: Grade 1 - Fatigue relieved by rest] : Fatigue: Grade 1 - Fatigue relieved by rest [Cough: Grade 0] : Cough: Grade 0 [Dyspnea: Grade 0] : Dyspnea: Grade 0 [Skin Induration: Grade 0] : Skin Induration: Grade 0 [Skin Hyperpigmentation: Grade 0] : Skin Hyperpigmentation: Grade 0 [Dermatitis Radiation: Grade 1 - Faint erythema or dry desquamation] : Dermatitis Radiation: Grade 1 - Faint erythema or dry desquamation

## 2019-06-20 VITALS
HEART RATE: 81 BPM | SYSTOLIC BLOOD PRESSURE: 122 MMHG | DIASTOLIC BLOOD PRESSURE: 80 MMHG | TEMPERATURE: 98.1 F | RESPIRATION RATE: 18 BRPM

## 2019-06-27 ENCOUNTER — MEDICATION RENEWAL (OUTPATIENT)
Age: 59
End: 2019-06-27

## 2019-06-28 ENCOUNTER — APPOINTMENT (OUTPATIENT)
Dept: HEMATOLOGY ONCOLOGY | Facility: CLINIC | Age: 59
End: 2019-06-28
Payer: COMMERCIAL

## 2019-06-28 ENCOUNTER — LABORATORY RESULT (OUTPATIENT)
Age: 59
End: 2019-06-28

## 2019-06-28 ENCOUNTER — RESULT REVIEW (OUTPATIENT)
Age: 59
End: 2019-06-28

## 2019-06-28 ENCOUNTER — APPOINTMENT (OUTPATIENT)
Age: 59
End: 2019-06-28

## 2019-06-28 VITALS
TEMPERATURE: 97.8 F | BODY MASS INDEX: 37.64 KG/M2 | WEIGHT: 240.3 LBS | SYSTOLIC BLOOD PRESSURE: 116 MMHG | RESPIRATION RATE: 18 BRPM | HEART RATE: 72 BPM | DIASTOLIC BLOOD PRESSURE: 77 MMHG | OXYGEN SATURATION: 96 %

## 2019-06-28 LAB
BASOPHILS # BLD AUTO: 0 K/UL — SIGNIFICANT CHANGE UP (ref 0–0.2)
BASOPHILS NFR BLD AUTO: 0.4 % — SIGNIFICANT CHANGE UP (ref 0–2)
EOSINOPHIL # BLD AUTO: 0.1 K/UL — SIGNIFICANT CHANGE UP (ref 0–0.5)
EOSINOPHIL NFR BLD AUTO: 2.3 % — SIGNIFICANT CHANGE UP (ref 0–6)
HCT VFR BLD CALC: 33 % — LOW (ref 34.5–45)
HGB BLD-MCNC: 10.6 G/DL — LOW (ref 11.5–15.5)
LYMPHOCYTES # BLD AUTO: 0.8 K/UL — LOW (ref 1–3.3)
LYMPHOCYTES # BLD AUTO: 13.6 % — SIGNIFICANT CHANGE UP (ref 13–44)
MCHC RBC-ENTMCNC: 28.1 PG — SIGNIFICANT CHANGE UP (ref 27–34)
MCHC RBC-ENTMCNC: 32.2 G/DL — SIGNIFICANT CHANGE UP (ref 32–36)
MCV RBC AUTO: 87.5 FL — SIGNIFICANT CHANGE UP (ref 80–100)
MONOCYTES # BLD AUTO: 0.4 K/UL — SIGNIFICANT CHANGE UP (ref 0–0.9)
MONOCYTES NFR BLD AUTO: 7.1 % — SIGNIFICANT CHANGE UP (ref 2–14)
NEUTROPHILS # BLD AUTO: 4.4 K/UL — SIGNIFICANT CHANGE UP (ref 1.8–7.4)
NEUTROPHILS NFR BLD AUTO: 76.6 % — SIGNIFICANT CHANGE UP (ref 43–77)
PLATELET # BLD AUTO: 247 K/UL — SIGNIFICANT CHANGE UP (ref 150–400)
RBC # BLD: 3.77 M/UL — LOW (ref 3.8–5.2)
RBC # FLD: 15.1 % — HIGH (ref 10.3–14.5)
WBC # BLD: 5.7 K/UL — SIGNIFICANT CHANGE UP (ref 3.8–10.5)
WBC # FLD AUTO: 5.7 K/UL — SIGNIFICANT CHANGE UP (ref 3.8–10.5)

## 2019-06-28 PROCEDURE — 99215 OFFICE O/P EST HI 40 MIN: CPT

## 2019-07-01 NOTE — HISTORY OF PRESENT ILLNESS
[Disease: _____________________] : Disease: [unfilled] [T: ___] : T[unfilled] [N: ___] : N[unfilled] [AJCC Stage: ____] : AJCC Stage: [unfilled] [Treatment Protocol] : Treatment Protocol [de-identified] : \par 58yo woman with ER+SD-HER2+ locally advanced, inflammatory poorly differentiated ductal breast cancer.\par \par She noted summer 2018 she felt a mass near her nipple in her R breast. She went to her PMD who ordered a mammogram and sonogram which revealed a R abnormal hypoechoic mass and abnormal appearing axillary LN.\par \par Image guided biopsy of mass and axilla revealed invasive poorly differentiated ductal carcinoma, +LVI, 1.2cm in specimen, [place] 8/9, DCIS, microcalcifications present, ER >95%, SD negative, HER2+ 3+ IHC, Lymph node biopsy with metastatic carcinoma.\par \par She then saw Dr. Camacho breast surgeon who ordered and MRI of the breasts. MRI showed: Biopsy proven mammary carcinoma in the R retroareolar region 5:00 manifested as multiple irregular masses. The tumor is extensive in nature and spanning an area of over 3 cm with non-mass enhancement extending posteriorly suggesting extensive intraductal component as well. Two other highly suspicious masses noted in the RUOQ at 11:00 measuring >3cm and at 10:00 measuring 2.6cm consistent with multicentric disease. Biopsy proven metastatic disease to an axillary LN with additional suspicious LNs (additional with replaced fatty hilum and abnormally enlarged cortices, some have irregular borders, some posterior to pectoralis muscle, also abnormal appearing node in soft tissue lateral to the R chest wall).\par \par Dr. Camacho recommended neoadjuvant chemotherapy and the option for mastectomy with possible axillary lymph node dissection pending response to therapy.\par \par She then saw me in consultation. She underwent PETCT that found a peripancreatic lymph node that was enlarged and SUV avid, b/l laryngeal FDG avidity (likely due to speaking during exam), and SUV avid breast mass/axilla on R with breast skin noted to be thickened. She underwent EGD/EUS and biopsy of this peripancreatic LN (Dr. Giovanni Fetlon) which was consistent with benign/reactive lymphatic tissue. She had a mediport placed by IR on 9/26. She saw oncocardiology Dr. Khan for risk assessment prior to anthracycline/anti-HER2 monoclonal therapy and was started on ACEi for HTN and for cardioprotection. She saw dermatology for skin biopsy of R breast prior to treatment to evaluate/confirm inflammatory disease; biopsy results with skin involvement by poorly differentiated carcinoma. She started neoadjuvant chemotherapy with dose-dense AC on 9/26. dose-dense AC-->THP (weekly taxol 80mg/m2)\par \par 11/23 THP #1\par 11/30 Taxol 80mg/m2 held due to neuropathy\par 12/7 resumed Taxol weekly without issue\par 12/15 taxol completed x 12 weeks\par 3/8 Herceptin and Perjeta administered pre-operatively\par \par MRI 2/4/19 BIRADS 2, masses and adenopathy resolved. Some residual R breast skin thickening noted.\par \par 3/22 R mastectomy and ALND: 0/6 LN removed involved, no residual invasive carcinoma or DCIS identified. [id number] ([facility])\par 4/3 Herceptin and Perjeta continued post-operatively every 3 weeks, adjuvant radiation started 5/2019 and completed 6/2019 c/b grade 3 dermatiti.\par LH/FSH/estradiol levels May/June 2019 consistent with menopause.\par \par Patient lives in [place] with her  Gonzales (pretzel company owner) and 26 year old daughter (an artist and ). She works full time as a . She notes a history of smoking in the distant past. Mother passed away in 70s from metastatic melanoma, and a maternal aunt had breast cancer; no other significant close family history of cancers. She notes overall migraines (which she has had for many years, seen neurologist, stable lytic medications help, associated with one sided facial numbness when they occur) have gotten better with menopause – her LMP was 2/2018 and it was infrequent prior to that. She has had some hot flashes. She used OCPs in the past for years particularly for endometriosis treatment. She has had issues with DJD of back and spine with pain, MRI last year noted, epidural injections have helped with this significantly as well as occasional NSAID use.\par  [de-identified] : ER+KS-HER2+ [FreeTextEntry1] : dose-dense AC (adriamycin 60mg/m2 and cytoxan 600mg/m2) x 4 cycles, followed by weekly taxol 80mg/m2 x 12 with herceptin/perjeta q3 weeks [de-identified] : She presents today for herceptin/perjeta. She notes ongoing skin irritation with open wound that is slowly improving at R breast - using ointments as prescribed, percocet PRN 1-2x per day for pain. She notes neuropathy in feet persists (tingling, feeling like "something is there" over entire foot, worse at ball of the foot) and occasionally in fingertips, not interfering with function, improving. Nail splitting is growing out but still present - she has lost most of her toenails. Her hair is growing back, appetite improving. She has no other significant complaints.

## 2019-07-01 NOTE — ASSESSMENT
[Curative] : Goals of care discussed with patient: Curative [FreeTextEntry1] : 58yo woman with ER+DE-HER2+ locally advanced, poorly differentiated, inflammatory ductal breast cancer, multifocal with multiple breast masses and multiple enlarged axillary LNs on exam/MRI imaging (biopsy confirmed with clips placed), skin biopsy positive confirming inflammatory disease. Staging imaging with SUV-avid peripancreatic LN - biopsy with reactive lymph node (benign). She completed neoadjuvant AC-->THP with interval PET JENNY. Mastectomy/ALND with tissue expander placed 3/22 with pCR. She continues adjuvant herceptin/perjeta and adjuvant radiation completed, doing well currently.\par \par R Breast Cancer: Mild diarrhea G2 for 2-3 days with each THP cycle, suspect related to Perjeta - improved with taxol completion. Neuropathy grade 2 sudden onset with taxol 80mg/m2 #1 resolved (week 2 held, week 3 resumed) - now with grade 1 symptoms slowly improving and without functional deficits. Skin rash grade 1, per below, resolved.\par -CMP, CBC today; proceed with H+P every 3 weeks for total 1 year as tolerated\par -EMLA cream 1 hour prior to mediport accessed\par -continue antihypertensive medication (HTN treatment and cardioprotective); follow up with Dr. Khan (TTE 5/2019 ok, EF stable)\par -follow up with Dr. Luna and Dr. Antonio as recommended\par -radiation treatment completed, grade 3 dermatitis, follow up rad onc recommended\par -adjuvant plan for AI for now as LH/FSH/estradiol within menopausal range, no menses since 2/2019 - will recheck serologies intermittently on AI therapy \par -start arimidex 1mg PO daily when radiation treatment completed; reviewed potential side effects of therapy including hot flashes, joint pains and aches, vaginal dryness, osteoporosis/increased risk of fractures, hair thinning, potential cardiovascular risks, amongst others - she expressed understanding of risks/benefits and will move forward with therapy at this time (to begin in 1-2 weeks as skin heals)\par -follow up 6 weeks with HP, HP every 3 weeks\par \par Chemotherapy-related diarrhea: limited, resolved, thought related to perjeta with initial loading dose, occasional diet-related loose stools, improved/resolved off taxol, some sensitivity to certain foods\par -PRN imodium, lomotil, hydration, call if reoccurs\par \par Skin rash on arms: c/w taxol reaction, also with new acneiform rash on face c/w reaction to her2 antibody therapy, both improved significantly/resolved. Also with onycholysis improving\par -continue topicals, derm follow up planned\par \par TTE finding: mass on aortic valve non mobile, repeated Dr. Khan thought to be calcification. EF stable 5/2019\par -follow up Dr. Khan as planned\par -TTE Aug 2019 due

## 2019-07-01 NOTE — PHYSICAL EXAM
[Fully active, able to carry on all pre-disease performance without restriction] : Status 0 - Fully active, able to carry on all pre-disease performance without restriction [Normal] : affect appropriate [de-identified] : L chest wall mediport with tegaderm/EMLA, nontender [de-identified] : well healed, tissue expanders palpated, R breast superiorly mild edema to sternum, inferiorly with open skin wound healing gauze overlying, no masses or adenopathy papable [de-identified] : trace edema b/l at ankles unchanged

## 2019-07-12 ENCOUNTER — APPOINTMENT (OUTPATIENT)
Dept: INFUSION THERAPY | Facility: HOSPITAL | Age: 59
End: 2019-07-12

## 2019-07-16 ENCOUNTER — OUTPATIENT (OUTPATIENT)
Dept: OUTPATIENT SERVICES | Facility: HOSPITAL | Age: 59
LOS: 1 days | Discharge: HOME | End: 2019-07-16

## 2019-07-16 DIAGNOSIS — Z17.0 ESTROGEN RECEPTOR POSITIVE STATUS [ER+]: ICD-10-CM

## 2019-07-16 DIAGNOSIS — Z98.890 OTHER SPECIFIED POSTPROCEDURAL STATES: Chronic | ICD-10-CM

## 2019-07-16 DIAGNOSIS — C50.911 MALIGNANT NEOPLASM OF UNSPECIFIED SITE OF RIGHT FEMALE BREAST: ICD-10-CM

## 2019-07-16 DIAGNOSIS — C77.3 SECONDARY AND UNSPECIFIED MALIGNANT NEOPLASM OF AXILLA AND UPPER LIMB LYMPH NODES: ICD-10-CM

## 2019-07-19 ENCOUNTER — LABORATORY RESULT (OUTPATIENT)
Age: 59
End: 2019-07-19

## 2019-07-19 ENCOUNTER — APPOINTMENT (OUTPATIENT)
Age: 59
End: 2019-07-19

## 2019-07-22 DIAGNOSIS — Z51.11 ENCOUNTER FOR ANTINEOPLASTIC CHEMOTHERAPY: ICD-10-CM

## 2019-07-26 RX ORDER — MELOXICAM 15 MG/1
15 TABLET ORAL
Qty: 90 | Refills: 1 | Status: COMPLETED | COMMUNITY
Start: 2018-02-09 | End: 2019-07-26

## 2019-08-05 ENCOUNTER — MEDICATION RENEWAL (OUTPATIENT)
Age: 59
End: 2019-08-05

## 2019-08-06 ENCOUNTER — APPOINTMENT (OUTPATIENT)
Dept: RADIATION ONCOLOGY | Facility: CLINIC | Age: 59
End: 2019-08-06
Payer: COMMERCIAL

## 2019-08-06 VITALS
DIASTOLIC BLOOD PRESSURE: 75 MMHG | TEMPERATURE: 98.24 F | HEART RATE: 78 BPM | RESPIRATION RATE: 16 BRPM | OXYGEN SATURATION: 96 % | SYSTOLIC BLOOD PRESSURE: 133 MMHG

## 2019-08-06 PROCEDURE — 99024 POSTOP FOLLOW-UP VISIT: CPT

## 2019-08-06 RX ORDER — LIDOCAINE HYDROCHLORIDE 20 MG/ML
2 SOLUTION ORAL; TOPICAL
Qty: 100 | Refills: 2 | Status: DISCONTINUED | COMMUNITY
Start: 2019-05-28 | End: 2019-08-06

## 2019-08-06 RX ORDER — HYDROCORTISONE 25 MG/G
2.5 CREAM TOPICAL TWICE DAILY
Qty: 1 | Refills: 2 | Status: DISCONTINUED | COMMUNITY
Start: 2019-06-10 | End: 2019-08-06

## 2019-08-06 NOTE — REVIEW OF SYSTEMS
[Dyspepsia: Grade 0] : Dyspepsia: Grade 0 [Dysphagia: Grade 0] : Dysphagia: Grade 0 [Esophagitis: Grade 0] : Esophagitis: Grade 0 [Fatigue: Grade 1 - Fatigue relieved by rest] : Fatigue: Grade 1 - Fatigue relieved by rest [Breast Pain: Grade 1 - Mild pain] : Breast Pain: Grade 1 - Mild pain [Dyspnea: Grade 0] : Dyspnea: Grade 0 [Cough: Grade 0] : Cough: Grade 0 [Dermatitis Radiation: Grade 0] : Dermatitis Radiation: Grade 0

## 2019-08-12 NOTE — PHYSICAL EXAM
[Normal] : no respiratory distress, lungs were clear to auscultation bilaterally [Heart Sounds] : normal S1 and S2 [Heart Rate And Rhythm] : heart rate and rhythm were normal [Breast Palpation Mass] : no palpable masses [Breast Abnormal Lactation (Galactorrhea)] : no nipple discharge [No UE Edema] : there is no upper extremity edema [Cervical Lymph Nodes Enlarged Posterior Bilaterally] : posterior cervical [Cervical Lymph Nodes Enlarged Anterior Bilaterally] : anterior cervical [Supraclavicular Lymph Nodes Enlarged Bilaterally] : supraclavicular [Axillary Lymph Nodes Enlarged Bilaterally] : axillary [Oriented To Time, Place, And Person] : oriented to person, place, and time [de-identified] :  right breast expander [de-identified] : right chest wall healed skin

## 2019-08-12 NOTE — HISTORY OF PRESENT ILLNESS
[FreeTextEntry1] : 59 year old female with cT4 N0 poorly differentiated ER positive, Her-2 positive poorly differentiated invasive ductal carcinoma of the right breast, s/p neoadjuvant chemotherapy, s/p right mastectomy and sentinel node biopsy showing ypT0 ypN0 disease.\par  She was treated to a dose of 5,000 cGy to the right chest wall/regional nodes from 5/10/2019 - 6/14/2019 (35 days). She tolerated her radiation well and was treated with silvadene for desquamation. She was instructed to follow up with (s)Nicolle Cox and Cheryl, PMD.\par \par Today she notes joint pain in her hands, dizziness and right chest wall pain to palpation or when lying on side. She saw Dr. Antonio and is planned for reconstruction of right breast and left mastectomy October 8.  She will see Dr. Camacho on 8/14 to discuss scheduling left mastectomy and will see Dr. Cox on 8/13. She is on anastrazole daily and her next dose of herceptin is due on 8/13/19.

## 2019-08-13 ENCOUNTER — RESULT REVIEW (OUTPATIENT)
Age: 59
End: 2019-08-13

## 2019-08-13 ENCOUNTER — APPOINTMENT (OUTPATIENT)
Dept: HEMATOLOGY ONCOLOGY | Facility: CLINIC | Age: 59
End: 2019-08-13
Payer: COMMERCIAL

## 2019-08-13 ENCOUNTER — LABORATORY RESULT (OUTPATIENT)
Age: 59
End: 2019-08-13

## 2019-08-13 ENCOUNTER — APPOINTMENT (OUTPATIENT)
Dept: INFUSION THERAPY | Facility: HOSPITAL | Age: 59
End: 2019-08-13

## 2019-08-13 LAB
BASOPHILS # BLD AUTO: 0 K/UL — SIGNIFICANT CHANGE UP (ref 0–0.2)
BASOPHILS NFR BLD AUTO: 0.3 % — SIGNIFICANT CHANGE UP (ref 0–2)
EOSINOPHIL # BLD AUTO: 0.1 K/UL — SIGNIFICANT CHANGE UP (ref 0–0.5)
EOSINOPHIL NFR BLD AUTO: 1.9 % — SIGNIFICANT CHANGE UP (ref 0–6)
HCT VFR BLD CALC: 33.9 % — LOW (ref 34.5–45)
HGB BLD-MCNC: 11 G/DL — LOW (ref 11.5–15.5)
LYMPHOCYTES # BLD AUTO: 0.9 K/UL — LOW (ref 1–3.3)
LYMPHOCYTES # BLD AUTO: 13.9 % — SIGNIFICANT CHANGE UP (ref 13–44)
MCHC RBC-ENTMCNC: 29.3 PG — SIGNIFICANT CHANGE UP (ref 27–34)
MCHC RBC-ENTMCNC: 32.3 G/DL — SIGNIFICANT CHANGE UP (ref 32–36)
MCV RBC AUTO: 90.5 FL — SIGNIFICANT CHANGE UP (ref 80–100)
MONOCYTES # BLD AUTO: 0.3 K/UL — SIGNIFICANT CHANGE UP (ref 0–0.9)
MONOCYTES NFR BLD AUTO: 5.4 % — SIGNIFICANT CHANGE UP (ref 2–14)
NEUTROPHILS # BLD AUTO: 5 K/UL — SIGNIFICANT CHANGE UP (ref 1.8–7.4)
NEUTROPHILS NFR BLD AUTO: 78.5 % — HIGH (ref 43–77)
PLATELET # BLD AUTO: 272 K/UL — SIGNIFICANT CHANGE UP (ref 150–400)
RBC # BLD: 3.74 M/UL — LOW (ref 3.8–5.2)
RBC # FLD: 14.2 % — SIGNIFICANT CHANGE UP (ref 10.3–14.5)
WBC # BLD: 6.4 K/UL — SIGNIFICANT CHANGE UP (ref 3.8–10.5)
WBC # FLD AUTO: 6.4 K/UL — SIGNIFICANT CHANGE UP (ref 3.8–10.5)

## 2019-08-13 PROCEDURE — 99215 OFFICE O/P EST HI 40 MIN: CPT

## 2019-08-13 NOTE — HISTORY OF PRESENT ILLNESS
[Disease: _____________________] : Disease: [unfilled] [T: ___] : T[unfilled] [N: ___] : N[unfilled] [AJCC Stage: ____] : AJCC Stage: [unfilled] [de-identified] : ER+WA-HER2+ [de-identified] : \par 60yo woman with ER+NH-HER2+ locally advanced, inflammatory poorly differentiated ductal breast cancer.\par \par She noted summer 2018 she felt a mass near her nipple in her R breast. She went to her PMD who ordered a mammogram and sonogram which revealed a R abnormal hypoechoic mass and abnormal appearing axillary LN.\par \par Image guided biopsy of mass and axilla revealed invasive poorly differentiated ductal carcinoma, +LVI, 1.2cm in specimen, [place] 8/9, DCIS, microcalcifications present, ER >95%, NH negative, HER2+ 3+ IHC, Lymph node biopsy with metastatic carcinoma.\par \par She then saw Dr. Camacho breast surgeon who ordered and MRI of the breasts. MRI showed: Biopsy proven mammary carcinoma in the R retroareolar region 5:00 manifested as multiple irregular masses. The tumor is extensive in nature and spanning an area of over 3 cm with non-mass enhancement extending posteriorly suggesting extensive intraductal component as well. Two other highly suspicious masses noted in the RUOQ at 11:00 measuring >3cm and at 10:00 measuring 2.6cm consistent with multicentric disease. Biopsy proven metastatic disease to an axillary LN with additional suspicious LNs (additional with replaced fatty hilum and abnormally enlarged cortices, some have irregular borders, some posterior to pectoralis muscle, also abnormal appearing node in soft tissue lateral to the R chest wall).\par \par Dr. Camacho recommended neoadjuvant chemotherapy and the option for mastectomy with possible axillary lymph node dissection pending response to therapy.\par \par She then saw me in consultation. She underwent PETCT that found a peripancreatic lymph node that was enlarged and SUV avid, b/l laryngeal FDG avidity (likely due to speaking during exam), and SUV avid breast mass/axilla on R with breast skin noted to be thickened. She underwent EGD/EUS and biopsy of this peripancreatic LN (Dr. Giovanni Felton) which was consistent with benign/reactive lymphatic tissue. She had a mediport placed by IR on 9/26. She saw oncocardiology Dr. Khan for risk assessment prior to anthracycline/anti-HER2 monoclonal therapy and was started on ACEi for HTN and for cardioprotection. She saw dermatology for skin biopsy of R breast prior to treatment to evaluate/confirm inflammatory disease; biopsy results with skin involvement by poorly differentiated carcinoma. She started neoadjuvant chemotherapy with dose-dense AC on 9/26. dose-dense AC-->THP (weekly taxol 80mg/m2)\par \par 11/23 THP #1\par 11/30 Taxol 80mg/m2 held due to neuropathy\par 12/7 resumed Taxol weekly without issue\par 12/15 taxol completed x 12 weeks\par 3/8 Herceptin and Perjeta administered pre-operatively\par \par MRI 2/4/19 BIRADS 2, masses and adenopathy resolved. Some residual R breast skin thickening noted.\par \par 3/22 R mastectomy and ALND: 0/6 LN removed involved, no residual invasive carcinoma or DCIS identified. [id number] ([facility])\par 4/3 Herceptin and Perjeta continued post-operatively every 3 weeks, adjuvant radiation started 5/2019 and completed 6/2019 c/b grade 3 dermatitis.\par LH/FSH/estradiol levels May/June 2019 consistent with menopause.\par Arimidex started July 2019.\par \par Patient lives in [place] with her  Gonzales (pretzel company owner) and 26 year old daughter (an artist and ). She works full time as a . She notes a history of smoking in the distant past. Mother passed away in 70s from metastatic melanoma, and a maternal aunt had breast cancer; no other significant close family history of cancers. She notes overall migraines (which she has had for many years, seen neurologist, stable lytic medications help, associated with one sided facial numbness when they occur) have gotten better with menopause – her LMP was 2/2018 and it was infrequent prior to that. She has had some hot flashes. She used OCPs in the past for years particularly for endometriosis treatment. She has had issues with DJD of back and spine with pain, MRI last year noted, epidural injections have helped with this significantly as well as occasional NSAID use.\par  [Treatment Protocol] : Treatment Protocol [FreeTextEntry1] : dose-dense AC (adriamycin 60mg/m2 and cytoxan 600mg/m2) x 4 cycles, followed by weekly taxol 80mg/m2 x 12 with herceptin/perjeta q3 weeks, arimidex [de-identified] : She presents today for herceptin/perjeta. She notes neuropathy in feet persists (tingling, feeling like "something is there" over entire foot, worse at ball of the foot) and occasionally in fingertips, not interfering with function, improving. She does note an occasional dizzy feeling including while not moving, not predictable or provokable. She also notes band like headaches over the front of her head that are new and occasional this month since starting arimidex (migraines are different and usually on 1 side of face). She has had some hot flashes as well since starting arimidex as well as aches in hands and feet occasionally. Nail splitting is growing out. She has no other significant complaints.

## 2019-08-13 NOTE — OB HISTORY
[Normal Amount/Duration] : was abnormal [Definite:  ___ (Date)] : the last menstrual period was [unfilled] [Spotting Between  Menses] : no spotting between menses [Regular Cycle Intervals] : periods have been irregular [Experiencing Menopausal Sxs] : experiencing menopausal symptoms [___] : Full Term: [unfilled]

## 2019-08-13 NOTE — REASON FOR VISIT
[Follow-Up Visit] : a follow-up [FreeTextEntry2] : locally advanced, inflammatory breast cancer [Spouse] : spouse

## 2019-08-13 NOTE — ASSESSMENT
[Curative] : Goals of care discussed with patient: Curative [FreeTextEntry1] : 58yo woman with ER+TN-HER2+ locally advanced, poorly differentiated, inflammatory ductal breast cancer, multifocal with multiple breast masses and multiple enlarged axillary LNs on exam/MRI imaging (biopsy confirmed with clips placed), skin biopsy positive confirming inflammatory disease. Staging imaging with SUV-avid peripancreatic LN - biopsy with reactive lymph node (benign). She completed neoadjuvant AC-->THP with interval PET JENNY. Mastectomy/ALND with tissue expander placed 3/22 with pCR. She continues adjuvant herceptin/perjeta and adjuvant radiation completed, on arimidex, doing well currently.\par \par R Breast Cancer: Mild diarrhea G2 for 2-3 days with each THP cycle, suspect related to Perjeta - improved with taxol completion. Neuropathy grade 2 sudden onset with taxol 80mg/m2 #1 resolved (week 2 held, week 3 resumed) - now with grade 1 symptoms slowly improving/stable and without functional deficits. Skin rash grade 1, per below, resolved.\par -CMP, CBC today; proceed with H+P every 3 weeks for total 1 year as tolerated\par -EMLA cream 1 hour prior to mediport accessed\par -continue antihypertensive medication (HTN treatment and cardioprotective); follow up with Dr. Khan (TTE 5/2019 ok, EF stable) - TTE ordered now and discussed\par -follow up with Dr. Luna and Dr. Antonio as recommended - CT abdomen this month for planned reconstruction in the near future\par -radiation treatment completed, follow up rad onc recommended\par -continue AI - arimidex 1mg PO daily (baseline DEXA in near future)\par -follow up 6 weeks with HP, HP every 3 weeks\par \par Headaches: new, atypical, different from migraines, possibly related to arimidex given timing and other symptoms\par -check MRI Brain with contrast now\par -PRN tylenol use discussed, proper use\par \par Joint Pains: arimidex related, also with some hot flashes, somewhat manageable though worsening\par -cymbalta as below\par -continue to monitor - check LH/FSH/estradiol, consider alternative AI if persist/bothersome with next follow up\par \par Neuropathy: taxane induced, stable and somewhat bothersome. unclear if occasional dizziness related as well\par -will taper patient's citalopram cautiously and start cymbalta 20mg PO ER daily, with dose uptitration as tolerating for antidepressant and nerve pain treatment, discussed with patient and she is amenable\par \par TTE finding: mass on aortic valve non mobile, repeated Dr. Khan thought to be calcification. EF stable 5/2019\par -follow up Dr. Khan as planned\par -TTE Aug 2019 due - ordered, discussed scheduling today

## 2019-08-13 NOTE — PHYSICAL EXAM
[Fully active, able to carry on all pre-disease performance without restriction] : Status 0 - Fully active, able to carry on all pre-disease performance without restriction [Normal] : affect appropriate [de-identified] : L chest wall mediport accessed, nontender [de-identified] : trace edema b/l at ankles unchanged, some nail splitting of toes, dry skin

## 2019-08-19 ENCOUNTER — OUTPATIENT (OUTPATIENT)
Dept: OUTPATIENT SERVICES | Facility: HOSPITAL | Age: 59
LOS: 1 days | End: 2019-08-19
Payer: COMMERCIAL

## 2019-08-19 ENCOUNTER — APPOINTMENT (OUTPATIENT)
Dept: CT IMAGING | Facility: IMAGING CENTER | Age: 59
End: 2019-08-19
Payer: COMMERCIAL

## 2019-08-19 DIAGNOSIS — Z98.890 OTHER SPECIFIED POSTPROCEDURAL STATES: Chronic | ICD-10-CM

## 2019-08-19 DIAGNOSIS — Z00.8 ENCOUNTER FOR OTHER GENERAL EXAMINATION: ICD-10-CM

## 2019-08-19 PROCEDURE — 74174 CTA ABD&PLVS W/CONTRAST: CPT | Mod: 26

## 2019-08-19 PROCEDURE — 74174 CTA ABD&PLVS W/CONTRAST: CPT

## 2019-08-27 ENCOUNTER — MEDICATION RENEWAL (OUTPATIENT)
Age: 59
End: 2019-08-27

## 2019-08-28 ENCOUNTER — OUTPATIENT (OUTPATIENT)
Dept: OUTPATIENT SERVICES | Facility: HOSPITAL | Age: 59
LOS: 1 days | Discharge: HOME | End: 2019-08-28

## 2019-08-28 DIAGNOSIS — Z98.890 OTHER SPECIFIED POSTPROCEDURAL STATES: Chronic | ICD-10-CM

## 2019-08-28 DIAGNOSIS — C77.3 SECONDARY AND UNSPECIFIED MALIGNANT NEOPLASM OF AXILLA AND UPPER LIMB LYMPH NODES: ICD-10-CM

## 2019-08-28 DIAGNOSIS — C50.911 MALIGNANT NEOPLASM OF UNSPECIFIED SITE OF RIGHT FEMALE BREAST: ICD-10-CM

## 2019-08-28 DIAGNOSIS — Z17.0 ESTROGEN RECEPTOR POSITIVE STATUS [ER+]: ICD-10-CM

## 2019-08-29 ENCOUNTER — APPOINTMENT (OUTPATIENT)
Dept: CV DIAGNOSITCS | Facility: HOSPITAL | Age: 59
End: 2019-08-29

## 2019-08-29 ENCOUNTER — OUTPATIENT (OUTPATIENT)
Dept: OUTPATIENT SERVICES | Facility: HOSPITAL | Age: 59
LOS: 1 days | End: 2019-08-29
Payer: COMMERCIAL

## 2019-08-29 DIAGNOSIS — C50.911 MALIGNANT NEOPLASM OF UNSPECIFIED SITE OF RIGHT FEMALE BREAST: ICD-10-CM

## 2019-08-29 DIAGNOSIS — Z98.890 OTHER SPECIFIED POSTPROCEDURAL STATES: Chronic | ICD-10-CM

## 2019-08-29 PROCEDURE — 93306 TTE W/DOPPLER COMPLETE: CPT | Mod: 26

## 2019-08-29 PROCEDURE — 93356 MYOCRD STRAIN IMG SPCKL TRCK: CPT

## 2019-08-29 PROCEDURE — 93306 TTE W/DOPPLER COMPLETE: CPT

## 2019-09-03 ENCOUNTER — LABORATORY RESULT (OUTPATIENT)
Age: 59
End: 2019-09-03

## 2019-09-03 ENCOUNTER — APPOINTMENT (OUTPATIENT)
Dept: INFUSION THERAPY | Facility: HOSPITAL | Age: 59
End: 2019-09-03

## 2019-09-03 ENCOUNTER — RESULT REVIEW (OUTPATIENT)
Age: 59
End: 2019-09-03

## 2019-09-03 LAB
BASOPHILS # BLD AUTO: 0 K/UL — SIGNIFICANT CHANGE UP (ref 0–0.2)
BASOPHILS NFR BLD AUTO: 0.3 % — SIGNIFICANT CHANGE UP (ref 0–2)
EOSINOPHIL # BLD AUTO: 0.2 K/UL — SIGNIFICANT CHANGE UP (ref 0–0.5)
EOSINOPHIL NFR BLD AUTO: 2 % — SIGNIFICANT CHANGE UP (ref 0–6)
HCT VFR BLD CALC: 33.8 % — LOW (ref 34.5–45)
HGB BLD-MCNC: 11 G/DL — LOW (ref 11.5–15.5)
LYMPHOCYTES # BLD AUTO: 1.1 K/UL — SIGNIFICANT CHANGE UP (ref 1–3.3)
LYMPHOCYTES # BLD AUTO: 13.9 % — SIGNIFICANT CHANGE UP (ref 13–44)
MCHC RBC-ENTMCNC: 29.7 PG — SIGNIFICANT CHANGE UP (ref 27–34)
MCHC RBC-ENTMCNC: 32.4 G/DL — SIGNIFICANT CHANGE UP (ref 32–36)
MCV RBC AUTO: 91.7 FL — SIGNIFICANT CHANGE UP (ref 80–100)
MONOCYTES # BLD AUTO: 0.5 K/UL — SIGNIFICANT CHANGE UP (ref 0–0.9)
MONOCYTES NFR BLD AUTO: 6 % — SIGNIFICANT CHANGE UP (ref 2–14)
NEUTROPHILS # BLD AUTO: 6 K/UL — SIGNIFICANT CHANGE UP (ref 1.8–7.4)
NEUTROPHILS NFR BLD AUTO: 77.7 % — HIGH (ref 43–77)
PLATELET # BLD AUTO: 279 K/UL — SIGNIFICANT CHANGE UP (ref 150–400)
RBC # BLD: 3.69 M/UL — LOW (ref 3.8–5.2)
RBC # FLD: 14.5 % — SIGNIFICANT CHANGE UP (ref 10.3–14.5)
WBC # BLD: 7.7 K/UL — SIGNIFICANT CHANGE UP (ref 3.8–10.5)
WBC # FLD AUTO: 7.7 K/UL — SIGNIFICANT CHANGE UP (ref 3.8–10.5)

## 2019-09-04 DIAGNOSIS — Z51.11 ENCOUNTER FOR ANTINEOPLASTIC CHEMOTHERAPY: ICD-10-CM

## 2019-09-14 ENCOUNTER — APPOINTMENT (OUTPATIENT)
Dept: MRI IMAGING | Facility: IMAGING CENTER | Age: 59
End: 2019-09-14

## 2019-09-14 ENCOUNTER — OUTPATIENT (OUTPATIENT)
Dept: OUTPATIENT SERVICES | Facility: HOSPITAL | Age: 59
LOS: 1 days | End: 2019-09-14

## 2019-09-14 DIAGNOSIS — Z98.890 OTHER SPECIFIED POSTPROCEDURAL STATES: Chronic | ICD-10-CM

## 2019-09-14 DIAGNOSIS — C50.911 MALIGNANT NEOPLASM OF UNSPECIFIED SITE OF RIGHT FEMALE BREAST: ICD-10-CM

## 2019-09-14 NOTE — PATIENT PROFILE ADULT - HAVE YOU EXPERIENCED A TRAUMATIC EVENT?
Providers


Date of admission: 


09/11/19 19:12





Expected date of discharge: 09/13/19


Attending physician: 


Autumn Saucedo





Consults: 





                                        





09/12/19 10:06


Consult Physician Routine 


   Consulting Provider: Timmy Tubbs


   Consult Reason/Comments: possible pancreatic mass


   Do you want consulting provider notified?: Yes











Primary care physician: 


Garima Lacy





[facility] 


   Omega-


Omega-


Garima Lacy 
no

## 2019-09-18 ENCOUNTER — FORM ENCOUNTER (OUTPATIENT)
Age: 59
End: 2019-09-18

## 2019-09-19 ENCOUNTER — OUTPATIENT (OUTPATIENT)
Dept: OUTPATIENT SERVICES | Facility: HOSPITAL | Age: 59
LOS: 1 days | End: 2019-09-19
Payer: COMMERCIAL

## 2019-09-19 ENCOUNTER — APPOINTMENT (OUTPATIENT)
Dept: CT IMAGING | Facility: IMAGING CENTER | Age: 59
End: 2019-09-19
Payer: COMMERCIAL

## 2019-09-19 DIAGNOSIS — C50.911 MALIGNANT NEOPLASM OF UNSPECIFIED SITE OF RIGHT FEMALE BREAST: ICD-10-CM

## 2019-09-19 DIAGNOSIS — Z98.890 OTHER SPECIFIED POSTPROCEDURAL STATES: Chronic | ICD-10-CM

## 2019-09-19 PROCEDURE — 70470 CT HEAD/BRAIN W/O & W/DYE: CPT

## 2019-09-19 PROCEDURE — 70470 CT HEAD/BRAIN W/O & W/DYE: CPT | Mod: 26

## 2019-09-24 ENCOUNTER — RESULT REVIEW (OUTPATIENT)
Age: 59
End: 2019-09-24

## 2019-09-24 ENCOUNTER — LABORATORY RESULT (OUTPATIENT)
Age: 59
End: 2019-09-24

## 2019-09-24 ENCOUNTER — APPOINTMENT (OUTPATIENT)
Dept: HEMATOLOGY ONCOLOGY | Facility: CLINIC | Age: 59
End: 2019-09-24
Payer: COMMERCIAL

## 2019-09-24 ENCOUNTER — APPOINTMENT (OUTPATIENT)
Dept: INFUSION THERAPY | Facility: HOSPITAL | Age: 59
End: 2019-09-24

## 2019-09-24 LAB
BASOPHILS # BLD AUTO: 0 K/UL — SIGNIFICANT CHANGE UP (ref 0–0.2)
BASOPHILS NFR BLD AUTO: 0.2 % — SIGNIFICANT CHANGE UP (ref 0–2)
EOSINOPHIL # BLD AUTO: 0.2 K/UL — SIGNIFICANT CHANGE UP (ref 0–0.5)
EOSINOPHIL NFR BLD AUTO: 2.2 % — SIGNIFICANT CHANGE UP (ref 0–6)
HCT VFR BLD CALC: 33.7 % — LOW (ref 34.5–45)
HGB BLD-MCNC: 11.1 G/DL — LOW (ref 11.5–15.5)
LYMPHOCYTES # BLD AUTO: 1.1 K/UL — SIGNIFICANT CHANGE UP (ref 1–3.3)
LYMPHOCYTES # BLD AUTO: 14.7 % — SIGNIFICANT CHANGE UP (ref 13–44)
MCHC RBC-ENTMCNC: 30.6 PG — SIGNIFICANT CHANGE UP (ref 27–34)
MCHC RBC-ENTMCNC: 33 G/DL — SIGNIFICANT CHANGE UP (ref 32–36)
MCV RBC AUTO: 92.6 FL — SIGNIFICANT CHANGE UP (ref 80–100)
MONOCYTES # BLD AUTO: 0.4 K/UL — SIGNIFICANT CHANGE UP (ref 0–0.9)
MONOCYTES NFR BLD AUTO: 6 % — SIGNIFICANT CHANGE UP (ref 2–14)
NEUTROPHILS # BLD AUTO: 5.6 K/UL — SIGNIFICANT CHANGE UP (ref 1.8–7.4)
NEUTROPHILS NFR BLD AUTO: 76.8 % — SIGNIFICANT CHANGE UP (ref 43–77)
PLATELET # BLD AUTO: 292 K/UL — SIGNIFICANT CHANGE UP (ref 150–400)
RBC # BLD: 3.64 M/UL — LOW (ref 3.8–5.2)
RBC # FLD: 14.1 % — SIGNIFICANT CHANGE UP (ref 10.3–14.5)
WBC # BLD: 7.3 K/UL — SIGNIFICANT CHANGE UP (ref 3.8–10.5)
WBC # FLD AUTO: 7.3 K/UL — SIGNIFICANT CHANGE UP (ref 3.8–10.5)

## 2019-09-24 PROCEDURE — 99215 OFFICE O/P EST HI 40 MIN: CPT

## 2019-09-28 RX ORDER — OXYCODONE AND ACETAMINOPHEN 5; 325 MG/1; MG/1
5-325 TABLET ORAL
Qty: 30 | Refills: 0 | Status: DISCONTINUED | COMMUNITY
Start: 2019-04-09 | End: 2019-09-28

## 2019-09-28 NOTE — HISTORY OF PRESENT ILLNESS
[Disease: _____________________] : Disease: [unfilled] [T: ___] : T[unfilled] [N: ___] : N[unfilled] [AJCC Stage: ____] : AJCC Stage: [unfilled] [Treatment Protocol] : Treatment Protocol [de-identified] : \par 60yo woman with ER+MA-HER2+ locally advanced, inflammatory poorly differentiated ductal breast cancer.\par \par She noted summer 2018 she felt a mass near her nipple in her R breast. She went to her PMD who ordered a mammogram and sonogram which revealed a R abnormal hypoechoic mass and abnormal appearing axillary LN.\par \par Image guided biopsy of mass and axilla revealed invasive poorly differentiated ductal carcinoma, +LVI, 1.2cm in specimen, [place] 8/9, DCIS, microcalcifications present, ER >95%, MA negative, HER2+ 3+ IHC, Lymph node biopsy with metastatic carcinoma.\par \par She then saw Dr. Camacho breast surgeon who ordered and MRI of the breasts. MRI showed: Biopsy proven mammary carcinoma in the R retroareolar region 5:00 manifested as multiple irregular masses. The tumor is extensive in nature and spanning an area of over 3 cm with non-mass enhancement extending posteriorly suggesting extensive intraductal component as well. Two other highly suspicious masses noted in the RUOQ at 11:00 measuring >3cm and at 10:00 measuring 2.6cm consistent with multicentric disease. Biopsy proven metastatic disease to an axillary LN with additional suspicious LNs (additional with replaced fatty hilum and abnormally enlarged cortices, some have irregular borders, some posterior to pectoralis muscle, also abnormal appearing node in soft tissue lateral to the R chest wall).\par \par Dr. Camacho recommended neoadjuvant chemotherapy and the option for mastectomy with possible axillary lymph node dissection pending response to therapy.\par \par She then saw me in consultation. She underwent PETCT that found a peripancreatic lymph node that was enlarged and SUV avid, b/l laryngeal FDG avidity (likely due to speaking during exam), and SUV avid breast mass/axilla on R with breast skin noted to be thickened. She underwent EGD/EUS and biopsy of this peripancreatic LN (Dr. Giovanni Felton) which was consistent with benign/reactive lymphatic tissue. She had a mediport placed by IR on 9/26. She saw oncocardiology Dr. Khan for risk assessment prior to anthracycline/anti-HER2 monoclonal therapy and was started on ACEi for HTN and for cardioprotection. She saw dermatology for skin biopsy of R breast prior to treatment to evaluate/confirm inflammatory disease; biopsy results with skin involvement by poorly differentiated carcinoma. She started neoadjuvant chemotherapy with dose-dense AC on 9/26. dose-dense AC-->THP (weekly taxol 80mg/m2)\par \par 11/23 THP #1\par 11/30 Taxol 80mg/m2 held due to neuropathy\par 12/7 resumed Taxol weekly without issue\par 12/15 taxol completed x 12 weeks\par 3/8 Herceptin and Perjeta administered pre-operatively\par \par MRI 2/4/19 BIRADS 2, masses and adenopathy resolved. Some residual R breast skin thickening noted.\par \par 3/22 R mastectomy and ALND: 0/6 LN removed involved, no residual invasive carcinoma or DCIS identified. [id number] ([facility])\par \par 4/3 Herceptin and Perjeta continued post-operatively every 3 weeks, adjuvant radiation started 5/2019 and completed 6/2019 c/b grade 3 dermatitis now resolved.\par \par LH/FSH/estradiol levels May/June 2019 consistent with menopause.\par Arimidex started July 2019.\par \par Patient lives in [place] with her  Gonzales (pretzel company owner). She has a 26 year old daughter (an artist and ). She works full time as a . She notes a history of smoking in the distant past. Mother passed away in 70s from metastatic melanoma, and a maternal aunt had breast cancer; no other significant close family history of cancers. She notes overall migraines (which she has had for many years, seen neurologist, stable lytic medications help, associated with one sided facial numbness when they occur) have gotten better with menopause – her LMP was 2/2018 and it was infrequent prior to that. She has had some hot flashes. She used OCPs in the past for years particularly for endometriosis treatment. She has had issues with DJD of back and spine with pain, MRI last year noted, epidural injections have helped with this significantly as well as occasional NSAID use.\par  [FreeTextEntry1] : dose-dense AC (adriamycin 60mg/m2 and cytoxan 600mg/m2) x 4 cycles, followed by weekly taxol 80mg/m2 x 12 with herceptin/perjeta q3 weeks, arimidex [de-identified] : ER+UT-HER2+ [de-identified] : She presents today for herceptin/perjeta. She notes neuropathy in feet persists (tingling, feeling like "something is there" over entire foot, worse at ball of the foot) and occasionally in fingertips, not interfering with function, stable. She is tolerating cymbalta. She notes some headaches persist - band like over the front of her head since starting arimidex (migraines are different and usually on 1 side of face). CTH with contrast negative. She has had some hot flashes as well since starting arimidex as well as aches in hands and feet occasionally. Nail splitting is growing out. She has no other significant complaints.

## 2019-09-28 NOTE — ASSESSMENT
[Curative] : Goals of care discussed with patient: Curative [FreeTextEntry1] : 58yo woman with ER+WV-HER2+ locally advanced, poorly differentiated, inflammatory ductal breast cancer, multifocal with multiple breast masses and multiple enlarged axillary LNs on exam/MRI imaging (biopsy confirmed with clips placed), skin biopsy positive confirming inflammatory disease. Staging imaging with SUV-avid peripancreatic LN - biopsy with reactive lymph node (benign). She completed neoadjuvant AC-->THP with interval PET JENNY. Mastectomy/ALND with tissue expander placed 3/22 with pCR. She continues adjuvant herceptin/perjeta and adjuvant radiation completed, on arimidex, doing well currently.\par \par R Breast Cancer: Mild diarrhea G2 for 2-3 days with each THP cycle, suspect related to Perjeta - improved/resolved with taxol completion. Neuropathy grade 2 sudden onset with taxol 80mg/m2 #1 resolved (week 2 held, week 3 resumed) - now with grade 1 symptoms stable and somewhat persistent now and without functional deficits. Skin rash grade 1, per below, resolved.\par -extensive discussion today regarding extended adjuvant therapy for high risk, HER2+ breast cancer with oral neratinib for 1 year after antibody therapy complete. Reviewed Extenet study data with patient and her  (seems more benefit for ER+ patients like herself, unclear if additional benefit in the setting of dual HER2 blockade), potential for IDFS benefit, potential AEs including significant potential for diarrhea - she will consider, would begin after 1 year of H+P therapy completed, readress next visit\par -CMP, CBC today; proceed with H+P every 3 weeks for total 1 year as tolerated\par -EMLA cream 1 hour prior to mediport accessed\par -continue antihypertensive medication (HTN treatment and cardioprotective); follow up with Dr. Khan (TTE 8/2019 ok, EF stable)\par -follow up with Dr. Luna and Dr. Antonio as recommended - further reconstruction planned 10/2019\par -radiation treatment completed, follow up rad onc recommended\par -continue AI - arimidex 1mg PO daily (baseline DEXA in near future)\par -follow up 6 weeks with HP, HP every 3 weeks\par \par Headaches: new, atypical, different from migraines, possibly related to arimidex given timing and other symptoms, CTH with contrast reassuring (MRI Brain not possible given tissue expanders remain in place)\par -PRN tylenol use discussed, proper use, continue to monitor, consider neurology/pain management follow up\par \par Joint Pains: arimidex related, also with some hot flashes, somewhat manageable\par -cymbalta as below\par -continue to monitor - check LH/FSH/estradiol again\par \par Neuropathy: taxane induced, stable and somewhat bothersome. unclear if occasional dizziness related as well\par -citalopram discontinued, continue Cymbalta 20mg PO ER daily, tolerating\par -dose uptitration as tolerating for antidepressant and nerve pain treatment, discussed with patient, to consider in near future\par \par TTE finding: mass on aortic valve non mobile, repeated Dr. Khan thought to be calcification. EF stable 5/2019, again 8/2019\par -follow up Dr. Khan as planned, continue antihypertensives\par -TTE Nov 2019 due

## 2019-09-28 NOTE — OB HISTORY
[Definite:  ___ (Date)] : the last menstrual period was [unfilled] [Experiencing Menopausal Sxs] : experiencing menopausal symptoms [___] : Full Term: [unfilled] [Normal Amount/Duration] : was abnormal [Spotting Between  Menses] : no spotting between menses [Regular Cycle Intervals] : periods have been irregular [Currently In Menopause] : currently in menopause

## 2019-09-28 NOTE — PHYSICAL EXAM
[Fully active, able to carry on all pre-disease performance without restriction] : Status 0 - Fully active, able to carry on all pre-disease performance without restriction [Normal] : affect appropriate [de-identified] : L chest wall mediport accessed, nontender

## 2019-09-29 ENCOUNTER — RX RENEWAL (OUTPATIENT)
Age: 59
End: 2019-09-29

## 2019-09-30 ENCOUNTER — MEDICATION RENEWAL (OUTPATIENT)
Age: 59
End: 2019-09-30

## 2019-09-30 RX ORDER — CITALOPRAM HYDROBROMIDE 20 MG/1
20 TABLET, FILM COATED ORAL
Qty: 90 | Refills: 1 | Status: DISCONTINUED | COMMUNITY
Start: 2019-04-08 | End: 2019-09-30

## 2019-10-14 ENCOUNTER — RX RENEWAL (OUTPATIENT)
Age: 59
End: 2019-10-14

## 2019-10-14 ENCOUNTER — OUTPATIENT (OUTPATIENT)
Dept: OUTPATIENT SERVICES | Facility: HOSPITAL | Age: 59
LOS: 1 days | Discharge: HOME | End: 2019-10-14

## 2019-10-14 DIAGNOSIS — C50.911 MALIGNANT NEOPLASM OF UNSPECIFIED SITE OF RIGHT FEMALE BREAST: ICD-10-CM

## 2019-10-14 DIAGNOSIS — C77.3 SECONDARY AND UNSPECIFIED MALIGNANT NEOPLASM OF AXILLA AND UPPER LIMB LYMPH NODES: ICD-10-CM

## 2019-10-14 DIAGNOSIS — Z98.890 OTHER SPECIFIED POSTPROCEDURAL STATES: Chronic | ICD-10-CM

## 2019-10-14 DIAGNOSIS — Z17.0 ESTROGEN RECEPTOR POSITIVE STATUS [ER+]: ICD-10-CM

## 2019-10-15 ENCOUNTER — RX RENEWAL (OUTPATIENT)
Age: 59
End: 2019-10-15

## 2019-10-17 ENCOUNTER — LABORATORY RESULT (OUTPATIENT)
Age: 59
End: 2019-10-17

## 2019-10-17 ENCOUNTER — OUTPATIENT (OUTPATIENT)
Dept: OUTPATIENT SERVICES | Facility: HOSPITAL | Age: 59
LOS: 1 days | End: 2019-10-17
Payer: COMMERCIAL

## 2019-10-17 ENCOUNTER — APPOINTMENT (OUTPATIENT)
Dept: INFUSION THERAPY | Facility: HOSPITAL | Age: 59
End: 2019-10-17

## 2019-10-17 VITALS
SYSTOLIC BLOOD PRESSURE: 123 MMHG | RESPIRATION RATE: 16 BRPM | HEART RATE: 78 BPM | TEMPERATURE: 98 F | DIASTOLIC BLOOD PRESSURE: 78 MMHG | OXYGEN SATURATION: 97 % | HEIGHT: 65 IN | WEIGHT: 240.3 LBS

## 2019-10-17 DIAGNOSIS — Z98.890 OTHER SPECIFIED POSTPROCEDURAL STATES: Chronic | ICD-10-CM

## 2019-10-17 DIAGNOSIS — Z90.11 ACQUIRED ABSENCE OF RIGHT BREAST AND NIPPLE: Chronic | ICD-10-CM

## 2019-10-17 DIAGNOSIS — Z01.818 ENCOUNTER FOR OTHER PREPROCEDURAL EXAMINATION: ICD-10-CM

## 2019-10-17 DIAGNOSIS — Z85.3 PERSONAL HISTORY OF MALIGNANT NEOPLASM OF BREAST: ICD-10-CM

## 2019-10-17 DIAGNOSIS — R92.8 OTHER ABNORMAL AND INCONCLUSIVE FINDINGS ON DIAGNOSTIC IMAGING OF BREAST: ICD-10-CM

## 2019-10-17 DIAGNOSIS — C50.311 MALIGNANT NEOPLASM OF LOWER-INNER QUADRANT OF RIGHT FEMALE BREAST: ICD-10-CM

## 2019-10-17 DIAGNOSIS — Z80.3 FAMILY HISTORY OF MALIGNANT NEOPLASM OF BREAST: ICD-10-CM

## 2019-10-17 DIAGNOSIS — Z17.0 ESTROGEN RECEPTOR POSITIVE STATUS [ER+]: ICD-10-CM

## 2019-10-17 DIAGNOSIS — Z90.13 ACQUIRED ABSENCE OF BILATERAL BREASTS AND NIPPLES: ICD-10-CM

## 2019-10-17 LAB
ANION GAP SERPL CALC-SCNC: 7 MMOL/L — SIGNIFICANT CHANGE UP (ref 5–17)
BLD GP AB SCN SERPL QL: SIGNIFICANT CHANGE UP
BUN SERPL-MCNC: 14 MG/DL — SIGNIFICANT CHANGE UP (ref 7–23)
CALCIUM SERPL-MCNC: 8.8 MG/DL — SIGNIFICANT CHANGE UP (ref 8.4–10.5)
CHLORIDE SERPL-SCNC: 106 MMOL/L — SIGNIFICANT CHANGE UP (ref 96–108)
CO2 SERPL-SCNC: 28 MMOL/L — SIGNIFICANT CHANGE UP (ref 22–31)
CREAT SERPL-MCNC: 0.68 MG/DL — SIGNIFICANT CHANGE UP (ref 0.5–1.3)
GLUCOSE SERPL-MCNC: 89 MG/DL — SIGNIFICANT CHANGE UP (ref 70–99)
HCT VFR BLD CALC: 35.6 % — SIGNIFICANT CHANGE UP (ref 34.5–45)
HGB BLD-MCNC: 11 G/DL — LOW (ref 11.5–15.5)
MCHC RBC-ENTMCNC: 28.8 PG — SIGNIFICANT CHANGE UP (ref 27–34)
MCHC RBC-ENTMCNC: 30.9 GM/DL — LOW (ref 32–36)
MCV RBC AUTO: 93.2 FL — SIGNIFICANT CHANGE UP (ref 80–100)
NRBC # BLD: 0 /100 WBCS — SIGNIFICANT CHANGE UP (ref 0–0)
PLATELET # BLD AUTO: 269 K/UL — SIGNIFICANT CHANGE UP (ref 150–400)
POTASSIUM SERPL-MCNC: 4.2 MMOL/L — SIGNIFICANT CHANGE UP (ref 3.5–5.3)
POTASSIUM SERPL-SCNC: 4.2 MMOL/L — SIGNIFICANT CHANGE UP (ref 3.5–5.3)
RBC # BLD: 3.82 M/UL — SIGNIFICANT CHANGE UP (ref 3.8–5.2)
RBC # FLD: 13.8 % — SIGNIFICANT CHANGE UP (ref 10.3–14.5)
SODIUM SERPL-SCNC: 141 MMOL/L — SIGNIFICANT CHANGE UP (ref 135–145)
WBC # BLD: 6.04 K/UL — SIGNIFICANT CHANGE UP (ref 3.8–10.5)
WBC # FLD AUTO: 6.04 K/UL — SIGNIFICANT CHANGE UP (ref 3.8–10.5)

## 2019-10-17 PROCEDURE — 93010 ELECTROCARDIOGRAM REPORT: CPT | Mod: NC

## 2019-10-17 RX ORDER — CITALOPRAM 10 MG/1
1 TABLET, FILM COATED ORAL
Qty: 0 | Refills: 0 | DISCHARGE

## 2019-10-17 RX ORDER — BUTALBITAL
1 POWDER (GRAM) MISCELLANEOUS
Qty: 0 | Refills: 0 | DISCHARGE

## 2019-10-17 NOTE — H&P PST ADULT - RS GEN PE MLT RESP DETAILS PC
no intercostal retractions/normal/airway patent/no chest wall tenderness/respirations non-labored/breath sounds equal/good air movement/clear to auscultation bilaterally

## 2019-10-17 NOTE — H&P PST ADULT - NSICDXPROBLEM_GEN_ALL_CORE_FT
PROBLEM DIAGNOSES  Problem: Personal history of malignant neoplasm of breast  Assessment and Plan: Patient schedule for for left breast mastectomy, removal of right breast expander and bilateral breast reconstruction. Labs, EKG and medical clearance pending.

## 2019-10-17 NOTE — H&P PST ADULT - NSICDXFAMILYHX_GEN_ALL_CORE_FT
FAMILY HISTORY:  FH: diabetes mellitus, Father-     Father  Still living? No  Family history of arrhythmia, Age at diagnosis: Age Unknown  Family history of hypertension, Age at diagnosis: Age Unknown  Family history of pacemaker, Age at diagnosis: Age Unknown  Family history of stroke, Age at diagnosis: Age Unknown    Mother  Still living? No  Family history of melanoma, Age at diagnosis: Age Unknown    Sibling  Still living? Yes, Estimated age: 51-60  Family history of arrhythmia, Age at diagnosis: Age Unknown  Family history of hypertension, Age at diagnosis: Age Unknown  Family history of TIAs, Age at diagnosis: Age Unknown  Family history of varicose veins, Age at diagnosis: Age Unknown

## 2019-10-17 NOTE — H&P PST ADULT - NSANTHOSAYNRD_GEN_A_CORE
No. RICA screening performed.  STOP BANG Legend: 0-2 = LOW Risk; 3-4 = INTERMEDIATE Risk; 5-8 = HIGH Risk

## 2019-10-17 NOTE — H&P PST ADULT - NSICDXPASTSURGICALHX_GEN_ALL_CORE_FT
PAST SURGICAL HISTORY:  H/O mastectomy, right with expander placement 3/2019    History of laparoscopy 1980s for endometriosis    S/P FESS (functional endoscopic sinus surgery) turbinectomy 1997

## 2019-10-17 NOTE — H&P PST ADULT - HISTORY OF PRESENT ILLNESS
60 y/o female presents for PST. As per patient she was diagnosed 08/2018 with right breast cancer and had right breast mastectomy with expander placement. Patient is now returning for left breast mastectomy prophylactic and Bilateral breast reconstruction with ANTONIO flap.

## 2019-10-17 NOTE — H&P PST ADULT - NSICDXPASTMEDICALHX_GEN_ALL_CORE_FT
PAST MEDICAL HISTORY:  Acquired deviated nasal septum     Anemia related to chemo    Anxiety     Breast cancer right HER2 positive and estrogen positive. 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

## 2019-10-17 NOTE — H&P PST ADULT - CARDIOVASCULAR
[facility]    [address] [address]    Phone:  [phone]    Fax:  [phone]       Thank You for choosing us for your health care visit. We are glad to serve you and happy to provide you with this summary of your visit. Please help us to ensure we have accurate records. If you find anything that needs to be changed, please let our staff know as soon as possible.          Your Demographic Information     Patient Name Sex Dandre Marcial Male 1934       Ethnic Group Patient Race    Not of  or  Origin White      Your Visit Details     Date & Time Provider Department    2017 10:00 AM Sabrina Morales MD [facility]      Your Upcoming Appointment*(Max 10)     2017  9:45 AM CDT   ICD Check Office with Tanner MONTERO Sra, MD   [facility] ([facility])    [address] [address] [phone]              Your Upcoming Home Remote Device Check     2017  9:15 AM CDT   ICD Home/Remote Device Check with [facility] REMOTE DEVICE CHECK   [facility] [address] ([facility])    [address]  [address]  [facility] [address]   [phone]              Conditions Discussed Today or Order-Related Diagnoses        Comments    Cough due to bronchospasm    -  Primary     Acute URI           Your Vitals Were     BP Pulse Temp Height Weight SpO2    131/67 (BP Location: [facility], Patient Position: Sitting, Cuff Size: Regular) 81 96.5 °F (35.8 °C) (Oral) 6' (1.829 m) 210 lb (95.3 kg) 99%    BMI Smoking Status                28.48 kg/m2 Former Smoker          Medications Prescribed or Re-Ordered Today     predniSONE (DELTASONE) 10 MG tablet    Sig: Prednisone 5 day Taper 5 tabs PO Day 1-- 4 tabs PO Day 2-- 3 tabs PO Day 3-- 2 tabs PO Day 4-- 1 tab PO Day 5    Class: Eprescribe    Pharmacy: [id number] - [place], [place] - [address] Ph #: [phone]    albuterol 108 (90 BASE) MCG/ACT inhaler    Sig - Route: Inhale 2 puffs into the lungs every 8 hours as needed for Shortness of Breath or Wheezing. - Inhalation    Class: Eprescribe    Pharmacy: [id number] [place] - [address] Ph #: [phone]    amoxicillin (AMOXIL) 875 MG tablet    Sig - Route: Take 1 tablet by mouth 2 times daily. - Oral    Class: Eprescribe    Pharmacy: [id number] - [place], [place] - [address] Ph #: [phone]      Your Current Medications Are        Disp Refills Start End    furosemide (LASIX) 40 MG tablet 90 tablet 5 9/14/2016     Sig - Route: Take 1 tablet by mouth daily. - Oral    Class: Eprescribe    VITAMIN D, CHOLECALCIFEROL, PO        Sig - Route: Take 2,000 mg by mouth.  - Oral    Class: Historical Med    atorvastatin (LIPITOR) 20 MG tablet        Sig - Route: Take 40 mg by mouth daily.  - Oral    Class: Historical Med    metoPROLOL (LOPRESSOR) 50 MG tablet        Sig - Route: Take 50 mg by mouth 2 times daily. - Oral    Class: Historical Med    vitamin B-12 (CYANOCOBALAMIN) 1000 MCG tablet        Sig - Route: Take 1,000 mcg by mouth daily. - Oral    Class: Historical Med    Coenzyme Q10 (CO Q 10 PO)        Sig - Route: Take 400 mg by mouth daily.  - Oral    Class: Historical Med    spironolactone (ALDACTONE) 25 MG tablet        Sig - Route: Take 1 tablet by mouth daily. - Oral    Class: Historical Med    aspirin 325 MG tablet        Sig - Route: Take 1 tablet by mouth daily. - Oral    Class: Historical Med    losartan (COZAAR) 25 MG tablet        Sig - Route: Take 12.5 mg by mouth daily. - Oral    Class: Historical Med    Folic Acid 200 MCG TABS        Sig - Route: Take 400 mcg by mouth. 4 times a day - Oral    Class: Historical Med    Ascorbic Acid (VITAMIN C) 100 MG CHEW        Sig - Route: Chew 500 mg by mouth daily. - Oral    Class: Historical Med    predniSONE (DELTASONE) 10 MG tablet 15 tablet 0  1/19/2017     Sig: Prednisone 5 day Taper 5 tabs PO Day 1-- 4 tabs PO Day 2-- 3 tabs PO Day 3-- 2 tabs PO Day 4-- 1 tab PO Day 5    Class: Eprescribe    albuterol 108 (90 BASE) MCG/ACT inhaler 1 Inhaler 0 1/19/2017     Sig - Route: Inhale 2 puffs into the lungs every 8 hours as needed for Shortness of Breath or Wheezing. - Inhalation    Class: Eprescribe    amoxicillin (AMOXIL) 875 MG tablet 20 tablet 0 1/19/2017     Sig - Route: Take 1 tablet by mouth 2 times daily. - Oral    Class: Eprescribe      Allergies     No Known Allergies      Immunizations History as of 1/19/2017     Name Date    Pneumococcal Polysaccharide Adult 10/1/2015      Problem List as of 1/19/2017     Medtronic Single Chamber ICD implantd 2002 - upgrade to AS biv ICD 9/2016    CHB (complete heart block)    Atrial fibrillation - chronic    VT (ventricular tachycardia)    Ischemic cardiomyopathy    Syncope              Patient Instructions    For Your Well Being: Upper respiratory Infections - Adult    Colds and upper respiratory infections often last 7 to 10 days. They are caused by viruses. Antibiotics are not an effective treatment for viruses.    Symptoms of upper respiratory infections or colds include:  · Sneezing  · Cough  · Runny nose  · Sore throat  · Loss of appetite  · General body aches  · Fatigue  · Fever    Your symptoms may be managed at home in this way:  · Rest at home if there is a fever.  · Drink plenty of liquids.  · For fever, headache, sore throat and body aches, take acetaminophen (Tylenol) or ibuprofen (Advil/Nuprin) as directed.  · For sore throat, try warm water gargles, throat lozenges and cold fluids.  · For stuffy nose, a room humidifier or an over-the -counter nasal decongestant (pseudoephedrine 30mg, 1 tab every 6 hours as needed) may be helpful. Follow label directions.  · Do not smoke or be exposed to cigarette/ pipe smoke.  · If cough becomes bothersome, begin Mucinex DM, 1-2 tabs every 12 hours as needed.  · Cover  coughs and wash hands frequently for infection control.    Fever  · Take your temperature every 4 hours during the fever.  · Normal temperatures are:   -oral 98.6   -rectal 99.6   -underarm 97.6 degrees F (least accurate)  · You can treat your fever with Tylenol or ibuprofen (Advil/Nuprin/Motrin) when your temperature is:   -oral 101 degrees F or higher   - rectal 102 degrees F or higher   - underarm 100 degrees or higher  · Follow the instructions on the label.  · Acetametophen (Tylenol) rectal suppositories can be used for people who are vomitting.  · Dress lightly to allow body heat to escape. If shivering, cover with a light blanket until shivering stops. Maintain normal room temp.  · Take frequent amounts of cool liquids.          Bronchospasm (Adult)    Bronchospasm occurs when the airways (bronchial tubes) go into spasm and contract. This makes it hard to breathe and causes wheezing (a high-pitched whistling sound). Bronchospasm can also cause frequent coughing without wheezing.  Bronchospasm is due to irritation, inflammation, or allergic reaction of the airways. People with asthma get bronchospasm. However, not everyone with bronchospasm has asthma.  Being exposed to harmful fumes, a recent case of bronchitis, exercise, or a flare-up of chronic obstructive pulmonary disease (COPD) may cause the airways to spasm. An episode of bronchospasm may last 7 to 14 days. Medicine may be prescribed to relax the airways and prevent wheezing. Antibiotics will be prescribed only if your healthcare provider thinks there is a bacterial infection. Antibiotics do not help a viral infection.  Home care   · Drink lots of water or other fluids (at least 10 glasses a day) during an attack. This will loosen lung secretions and make it easier to breathe. If you have heart or kidney disease, check with your doctor before you drink extra fluids.  · Take prescribed medicine exactly at the times advised. If you take an inhaled  medicine to help with breathing, do not use it more than once every 4 hours, unless told to do so. If prescribed an antibiotic or prednisone, take all of the medicine, even if you are feeling better after a few days.  · Do not smoke. Also avoid being exposed to secondhand smoke.  · If you were given an inhaler, use it exactly as directed. If you need to use it more often than prescribed, your condition may be getting worse. Contact your healthcare provider.  Follow-up care  Follow up with your healthcare provider, or as advised.   (Note: If you are age 65 or older, have a chronic lung disease or condition that affects your immune system, or you smoke, we recommend getting pneumococcal vaccinations, as well as an influenza vaccination (flu shot) every autumn. Ask your healthcare provider about this.)  When to seek medical advice  Call your healthcare provider right away if any of these occur:  · You need to use your inhalers more often than usual.  · You develop a fever of 100.4°F (38°C) or higher.  · You are coughing up lots of dark-colored sputum (mucus).  · You do not start to improve within 24 hours.  Call 911, or get immediate medical care  Contact emergency services if any of these occur:  · Coughing up bloody sputum (mucus)  · Chest pain with each breath  · Increased wheezing or shortness of breath  © 1738-1694 [employer]. [address] [place] [address], [place] [address]. All rights reserved. This information is not intended as a substitute for professional medical care. Always follow your healthcare professional's instructions.              negative detailed exam

## 2019-10-18 DIAGNOSIS — Z51.11 ENCOUNTER FOR ANTINEOPLASTIC CHEMOTHERAPY: ICD-10-CM

## 2019-10-22 ENCOUNTER — APPOINTMENT (OUTPATIENT)
Dept: FAMILY MEDICINE | Facility: CLINIC | Age: 59
End: 2019-10-22
Payer: COMMERCIAL

## 2019-10-22 VITALS
SYSTOLIC BLOOD PRESSURE: 140 MMHG | BODY MASS INDEX: 38.3 KG/M2 | WEIGHT: 244 LBS | TEMPERATURE: 209.84 F | DIASTOLIC BLOOD PRESSURE: 80 MMHG | HEIGHT: 67 IN | HEART RATE: 77 BPM | RESPIRATION RATE: 16 BRPM | OXYGEN SATURATION: 98 %

## 2019-10-22 DIAGNOSIS — Z48.02 ENCOUNTER FOR REMOVAL OF SUTURES: ICD-10-CM

## 2019-10-22 DIAGNOSIS — N76.0 ACUTE VAGINITIS: ICD-10-CM

## 2019-10-22 DIAGNOSIS — L70.8 OTHER ACNE: ICD-10-CM

## 2019-10-22 DIAGNOSIS — R04.0 EPISTAXIS: ICD-10-CM

## 2019-10-22 DIAGNOSIS — D48.9 NEOPLASM OF UNCERTAIN BEHAVIOR, UNSPECIFIED: ICD-10-CM

## 2019-10-22 DIAGNOSIS — Z01.419 ENCOUNTER FOR GYNECOLOGICAL EXAMINATION (GENERAL) (ROUTINE) W/OUT ABNORMAL FINDINGS: ICD-10-CM

## 2019-10-22 DIAGNOSIS — K12.31 ORAL MUCOSITIS (ULCERATIVE) DUE TO ANTINEOPLASTIC THERAPY: ICD-10-CM

## 2019-10-22 DIAGNOSIS — Z87.09 PERSONAL HISTORY OF OTHER DISEASES OF THE RESPIRATORY SYSTEM: ICD-10-CM

## 2019-10-22 DIAGNOSIS — I83.891 VARICOSE VEINS OF RIGHT LOWER EXTREMITY WITH OTHER COMPLICATIONS: ICD-10-CM

## 2019-10-22 DIAGNOSIS — Z12.11 ENCOUNTER FOR SCREENING FOR MALIGNANT NEOPLASM OF COLON: ICD-10-CM

## 2019-10-22 PROCEDURE — 99214 OFFICE O/P EST MOD 30 MIN: CPT

## 2019-10-22 RX ORDER — TRIAMCINOLONE ACETONIDE 55 UL/1
55 SPRAY, METERED NASAL
Refills: 0 | Status: DISCONTINUED | COMMUNITY
End: 2019-10-22

## 2019-10-23 PROBLEM — R04.0 EPISTAXIS, RECURRENT: Status: RESOLVED | Noted: 2019-01-28 | Resolved: 2019-10-23

## 2019-10-23 PROBLEM — L70.8 ACNEIFORM ERUPTION: Status: RESOLVED | Noted: 2019-01-23 | Resolved: 2019-10-23

## 2019-10-23 PROBLEM — Z48.02 VISIT FOR SUTURE REMOVAL: Status: RESOLVED | Noted: 2018-10-04 | Resolved: 2019-10-23

## 2019-10-23 PROBLEM — K12.31 MUCOSITIS DUE TO CHEMOTHERAPY: Status: RESOLVED | Noted: 2019-01-28 | Resolved: 2019-10-23

## 2019-10-23 PROBLEM — N76.0 VAGINITIS AND VULVOVAGINITIS: Status: RESOLVED | Noted: 2018-11-05 | Resolved: 2019-10-23

## 2019-10-23 PROBLEM — Z87.09 HISTORY OF UPPER RESPIRATORY INFECTION: Status: RESOLVED | Noted: 2019-05-06 | Resolved: 2019-10-23

## 2019-10-23 PROBLEM — D48.9 NEOPLASM OF UNCERTAIN BEHAVIOR: Noted: 2018-09-26

## 2019-10-23 PROBLEM — Z12.11 COLON CANCER SCREENING: Status: RESOLVED | Noted: 2017-12-05 | Resolved: 2019-10-23

## 2019-10-23 NOTE — REVIEW OF SYSTEMS
[Joint Pain] : joint pain [Back Pain] : back pain [Hair Changes] : hair changes [Nail Changes] : nail changes [Headache] : headache [Anxiety] : anxiety [Depression] : depression [Negative] : Heme/Lymph [Diarrhea] : diarrhea [Hematuria] : no hematuria [Skin Rash] : no skin rash [Dizziness] : no dizziness [Fainting] : no fainting [Confusion] : no confusion [Memory Loss] : no memory loss [Unsteady Walking] : no ataxia [FreeTextEntry4] : small sore inside nose otherwise no other sores noted [de-identified] : hair regrown and regrowth of nails as well [FreeTextEntry9] : sciatica worse as she has transitioned off nsaids preoperatviely [de-identified] : peripheral neuropathy- no change [de-identified] : symptoms related to her disease and anxiety about upcoming surgery

## 2019-10-23 NOTE — HISTORY OF PRESENT ILLNESS
[No Pertinent Cardiac History] : no history of aortic stenosis, atrial fibrillation, coronary artery disease, recent myocardial infarction, or implantable device/pacemaker [No Adverse Anesthesia Reaction] : no adverse anesthesia reaction in self or family member [No Pertinent Pulmonary History] : no history of asthma, COPD, sleep apnea, or smoking [Chronic Anticoagulation] : no chronic anticoagulation [Chronic Kidney Disease] : no chronic kidney disease [Diabetes] : no diabetes [FreeTextEntry1] : Breast reconstruction [FreeTextEntry2] : 10/29/19 [FreeTextEntry3] : Miguel Ángel Strongngold [FreeTextEntry4] : Here for medical evaluation prior to breast reconstruction surgery with breast surgeon and plastic surgeon. She has one more treatment to have this week and has completed her chemotherapy and radiation treatments. She has some complaints but no acute medical issues at this time.

## 2019-10-23 NOTE — ASSESSMENT
[High Risk Surgery - Intraperitoneal, Intrathoracic or Supringuinal Vascular Procedures] : High Risk Surgery - Intraperitoneal, Intrathoracic or Supringuinal Vascular Procedures - No (0) [Ischemic Heart Disease] : Ischemic Heart Disease - No (0) [Congestive Heart Failure] : Congestive Heart Failure - No (0) [Insulin-dependent Diabetic (1 Point)] : Insulin-dependent Diabetic - No (0) [Creatinine >= 2mg/dL (1 Point)] : Creatinine >= 2mg/dL - No (0) [Prior Cerebrovascular Accident or TIA] : Prior Cerebrovascular Accident or TIA - No (0) [Patient Optimized for Surgery] : Patient optimized for surgery [0] : 0 , RCRI Class: I, Risk of Post-Op Cardiac Complications: 0.4%, Procedure Risk: Low-Risk [No Further Testing Recommended] : no further testing recommended [As per surgery] : as per surgery [FreeTextEntry7] : to take anastrozole, cymbalta and lisinopril as usual prior to procedure, stop meloxicam 1 week prior to surgery, ok to take tylenol prn pain,

## 2019-10-23 NOTE — COUNSELING
[Weight management counseling provided] : Weight management [Healthy eating counseling provided] : healthy eating [Fall prevention counseling provided] : fall prevention  [Good understanding] : Patient has a good understanding of disease, goals and obesity follow-up plan [Adequate lighting] : Adequate lighting [No throw rugs] : No throw rugs [Use proper foot wear] : Use proper foot wear [None] : None [de-identified] : foot care with neuropathy

## 2019-10-23 NOTE — PHYSICAL EXAM
[Well Nourished] : well nourished [No Acute Distress] : no acute distress [Normal Sclera/Conjunctiva] : normal sclera/conjunctiva [Normal Outer Ear/Nose] : the outer ears and nose were normal in appearance [PERRL] : pupils equal round and reactive to light [Normal Oropharynx] : the oropharynx was normal [Normal TMs] : both tympanic membranes were normal [No JVD] : no jugular venous distention [No Lymphadenopathy] : no lymphadenopathy [Thyroid Normal, No Nodules] : the thyroid was normal and there were no nodules present [No Respiratory Distress] : no respiratory distress  [No Accessory Muscle Use] : no accessory muscle use [Clear to Auscultation] : lungs were clear to auscultation bilaterally [Normal Rate] : normal rate  [Regular Rhythm] : with a regular rhythm [Pedal Pulses Present] : the pedal pulses are present [Normal S1, S2] : normal S1 and S2 [No Murmur] : no murmur heard [No Edema] : there was no peripheral edema [No Nipple Discharge] : no nipple discharge [No Axillary Lymphadenopathy] : no axillary lymphadenopathy [Non Tender] : non-tender [Non-distended] : non-distended [Soft] : abdomen soft [No Masses] : no abdominal mass palpated [No HSM] : no HSM [Declined Rectal Exam] : declined rectal exam [Normal Supraclavicular Nodes] : no supraclavicular lymphadenopathy [Normal Posterior Cervical Nodes] : no posterior cervical lymphadenopathy [No CVA Tenderness] : no CVA  tenderness [Normal Anterior Cervical Nodes] : no anterior cervical lymphadenopathy [Normal Gait] : normal gait [Alert and Oriented x3] : oriented to person, place, and time [Normal Affect] : the affect was normal [Normal Insight/Judgement] : insight and judgment were intact [de-identified] : m [de-identified] : right nostril with small healing mucosal ulceration inside the nares [de-identified] : varicose veins right lower extremity [de-identified] : post expander placement prior to reconstructive surgery [de-identified] : defer at this time [de-identified] : mildly tender over the lumbar region of the back with mild tenderness of the thoracic spine as well with some thoracic scoliosis noted [de-identified] : left sciatic notch minimally tender [de-identified] : hair has grown in again- minmal regrowth of eyebrows and lashes, nails regrowing as well almost normal in appearance [de-identified] : reduced sensation of the fingers and toes

## 2019-10-28 ENCOUNTER — TRANSCRIPTION ENCOUNTER (OUTPATIENT)
Age: 59
End: 2019-10-28

## 2019-10-29 ENCOUNTER — RESULT REVIEW (OUTPATIENT)
Age: 59
End: 2019-10-29

## 2019-10-29 ENCOUNTER — MOBILE ON CALL (OUTPATIENT)
Age: 59
End: 2019-10-29

## 2019-10-29 ENCOUNTER — INPATIENT (INPATIENT)
Facility: HOSPITAL | Age: 59
LOS: 2 days | Discharge: HOME | DRG: 581 | End: 2019-11-01
Attending: SURGERY | Admitting: SURGERY
Payer: COMMERCIAL

## 2019-10-29 VITALS
SYSTOLIC BLOOD PRESSURE: 120 MMHG | OXYGEN SATURATION: 96 % | TEMPERATURE: 98 F | HEART RATE: 90 BPM | WEIGHT: 239.2 LBS | RESPIRATION RATE: 15 BRPM | HEIGHT: 67 IN | DIASTOLIC BLOOD PRESSURE: 81 MMHG

## 2019-10-29 DIAGNOSIS — Z98.890 OTHER SPECIFIED POSTPROCEDURAL STATES: Chronic | ICD-10-CM

## 2019-10-29 DIAGNOSIS — Z17.0 ESTROGEN RECEPTOR POSITIVE STATUS [ER+]: ICD-10-CM

## 2019-10-29 DIAGNOSIS — Z90.11 ACQUIRED ABSENCE OF RIGHT BREAST AND NIPPLE: Chronic | ICD-10-CM

## 2019-10-29 LAB
ABO RH CONFIRMATION: SIGNIFICANT CHANGE UP
ANION GAP SERPL CALC-SCNC: 9 MMOL/L — SIGNIFICANT CHANGE UP (ref 5–17)
BUN SERPL-MCNC: 15 MG/DL — SIGNIFICANT CHANGE UP (ref 7–23)
CALCIUM SERPL-MCNC: 8.5 MG/DL — SIGNIFICANT CHANGE UP (ref 8.4–10.5)
CHLORIDE SERPL-SCNC: 106 MMOL/L — SIGNIFICANT CHANGE UP (ref 96–108)
CO2 SERPL-SCNC: 26 MMOL/L — SIGNIFICANT CHANGE UP (ref 22–31)
CREAT SERPL-MCNC: 0.94 MG/DL — SIGNIFICANT CHANGE UP (ref 0.5–1.3)
GLUCOSE SERPL-MCNC: 182 MG/DL — HIGH (ref 70–99)
HCT VFR BLD CALC: 30.4 % — LOW (ref 34.5–45)
HCT VFR BLD CALC: 31.1 % — LOW (ref 34.5–45)
HGB BLD-MCNC: 9.8 G/DL — LOW (ref 11.5–15.5)
HGB BLD-MCNC: 9.9 G/DL — LOW (ref 11.5–15.5)
MCHC RBC-ENTMCNC: 29.3 PG — SIGNIFICANT CHANGE UP (ref 27–34)
MCHC RBC-ENTMCNC: 29.3 PG — SIGNIFICANT CHANGE UP (ref 27–34)
MCHC RBC-ENTMCNC: 31.8 GM/DL — LOW (ref 32–36)
MCHC RBC-ENTMCNC: 32.2 GM/DL — SIGNIFICANT CHANGE UP (ref 32–36)
MCV RBC AUTO: 90.7 FL — SIGNIFICANT CHANGE UP (ref 80–100)
MCV RBC AUTO: 92 FL — SIGNIFICANT CHANGE UP (ref 80–100)
NRBC # BLD: 0 /100 WBCS — SIGNIFICANT CHANGE UP (ref 0–0)
NRBC # BLD: 0 /100 WBCS — SIGNIFICANT CHANGE UP (ref 0–0)
PLATELET # BLD AUTO: 219 K/UL — SIGNIFICANT CHANGE UP (ref 150–400)
PLATELET # BLD AUTO: 227 K/UL — SIGNIFICANT CHANGE UP (ref 150–400)
POTASSIUM SERPL-MCNC: 4.3 MMOL/L — SIGNIFICANT CHANGE UP (ref 3.5–5.3)
POTASSIUM SERPL-SCNC: 4.3 MMOL/L — SIGNIFICANT CHANGE UP (ref 3.5–5.3)
RBC # BLD: 3.35 M/UL — LOW (ref 3.8–5.2)
RBC # BLD: 3.38 M/UL — LOW (ref 3.8–5.2)
RBC # FLD: 14 % — SIGNIFICANT CHANGE UP (ref 10.3–14.5)
RBC # FLD: 14.1 % — SIGNIFICANT CHANGE UP (ref 10.3–14.5)
SODIUM SERPL-SCNC: 141 MMOL/L — SIGNIFICANT CHANGE UP (ref 135–145)
WBC # BLD: 14.16 K/UL — HIGH (ref 3.8–10.5)
WBC # BLD: 15.81 K/UL — HIGH (ref 3.8–10.5)
WBC # FLD AUTO: 14.16 K/UL — HIGH (ref 3.8–10.5)
WBC # FLD AUTO: 15.81 K/UL — HIGH (ref 3.8–10.5)

## 2019-10-29 PROCEDURE — 88302 TISSUE EXAM BY PATHOLOGIST: CPT | Mod: 26

## 2019-10-29 PROCEDURE — 93005 ELECTROCARDIOGRAM TRACING: CPT

## 2019-10-29 PROCEDURE — 88305 TISSUE EXAM BY PATHOLOGIST: CPT | Mod: 26

## 2019-10-29 PROCEDURE — 80048 BASIC METABOLIC PNL TOTAL CA: CPT

## 2019-10-29 PROCEDURE — 88300 SURGICAL PATH GROSS: CPT | Mod: 26,59

## 2019-10-29 PROCEDURE — 19303 MAST SIMPLE COMPLETE: CPT | Mod: AS,LT

## 2019-10-29 PROCEDURE — 36415 COLL VENOUS BLD VENIPUNCTURE: CPT

## 2019-10-29 PROCEDURE — G0463: CPT

## 2019-10-29 PROCEDURE — 88304 TISSUE EXAM BY PATHOLOGIST: CPT | Mod: 26

## 2019-10-29 PROCEDURE — 85027 COMPLETE CBC AUTOMATED: CPT

## 2019-10-29 PROCEDURE — 86900 BLOOD TYPING SEROLOGIC ABO: CPT

## 2019-10-29 PROCEDURE — 88307 TISSUE EXAM BY PATHOLOGIST: CPT | Mod: 26

## 2019-10-29 PROCEDURE — 86901 BLOOD TYPING SEROLOGIC RH(D): CPT

## 2019-10-29 PROCEDURE — 86850 RBC ANTIBODY SCREEN: CPT

## 2019-10-29 RX ORDER — ACETAMINOPHEN 500 MG
975 TABLET ORAL EVERY 8 HOURS
Refills: 0 | Status: DISCONTINUED | OUTPATIENT
Start: 2019-10-30 | End: 2019-11-01

## 2019-10-29 RX ORDER — HEPARIN SODIUM 5000 [USP'U]/ML
5000 INJECTION INTRAVENOUS; SUBCUTANEOUS EVERY 12 HOURS
Refills: 0 | Status: DISCONTINUED | OUTPATIENT
Start: 2019-10-29 | End: 2019-11-01

## 2019-10-29 RX ORDER — OXYCODONE HYDROCHLORIDE 5 MG/1
5 TABLET ORAL EVERY 4 HOURS
Refills: 0 | Status: DISCONTINUED | OUTPATIENT
Start: 2019-10-29 | End: 2019-11-01

## 2019-10-29 RX ORDER — LISINOPRIL 2.5 MG/1
5 TABLET ORAL DAILY
Refills: 0 | Status: DISCONTINUED | OUTPATIENT
Start: 2019-10-29 | End: 2019-11-01

## 2019-10-29 RX ORDER — INFLUENZA VIRUS VACCINE 15; 15; 15; 15 UG/.5ML; UG/.5ML; UG/.5ML; UG/.5ML
0.5 SUSPENSION INTRAMUSCULAR ONCE
Refills: 0 | Status: DISCONTINUED | OUTPATIENT
Start: 2019-10-29 | End: 2019-11-01

## 2019-10-29 RX ORDER — ACETAMINOPHEN 500 MG
1000 TABLET ORAL ONCE
Refills: 0 | Status: COMPLETED | OUTPATIENT
Start: 2019-10-30 | End: 2019-10-30

## 2019-10-29 RX ORDER — HYDROMORPHONE HYDROCHLORIDE 2 MG/ML
1 INJECTION INTRAMUSCULAR; INTRAVENOUS; SUBCUTANEOUS EVERY 4 HOURS
Refills: 0 | Status: DISCONTINUED | OUTPATIENT
Start: 2019-10-29 | End: 2019-11-01

## 2019-10-29 RX ORDER — ONDANSETRON 8 MG/1
4 TABLET, FILM COATED ORAL ONCE
Refills: 0 | Status: DISCONTINUED | OUTPATIENT
Start: 2019-10-29 | End: 2019-10-29

## 2019-10-29 RX ORDER — OXYCODONE HYDROCHLORIDE 5 MG/1
10 TABLET ORAL EVERY 4 HOURS
Refills: 0 | Status: DISCONTINUED | OUTPATIENT
Start: 2019-10-29 | End: 2019-11-01

## 2019-10-29 RX ORDER — ACETAMINOPHEN 500 MG
1000 TABLET ORAL EVERY 8 HOURS
Refills: 0 | Status: COMPLETED | OUTPATIENT
Start: 2019-10-29 | End: 2019-10-29

## 2019-10-29 RX ORDER — SODIUM CHLORIDE 9 MG/ML
1000 INJECTION, SOLUTION INTRAVENOUS
Refills: 0 | Status: DISCONTINUED | OUTPATIENT
Start: 2019-10-29 | End: 2019-10-29

## 2019-10-29 RX ORDER — SODIUM CHLORIDE 9 MG/ML
1000 INJECTION, SOLUTION INTRAVENOUS
Refills: 0 | Status: DISCONTINUED | OUTPATIENT
Start: 2019-10-29 | End: 2019-10-31

## 2019-10-29 RX ORDER — KETOROLAC TROMETHAMINE 30 MG/ML
15 SYRINGE (ML) INJECTION EVERY 6 HOURS
Refills: 0 | Status: DISCONTINUED | OUTPATIENT
Start: 2019-10-29 | End: 2019-11-01

## 2019-10-29 RX ORDER — HYDROMORPHONE HYDROCHLORIDE 2 MG/ML
0.5 INJECTION INTRAMUSCULAR; INTRAVENOUS; SUBCUTANEOUS
Refills: 0 | Status: DISCONTINUED | OUTPATIENT
Start: 2019-10-29 | End: 2019-10-29

## 2019-10-29 RX ADMIN — Medication 100 MILLIGRAM(S): at 21:29

## 2019-10-29 RX ADMIN — HYDROMORPHONE HYDROCHLORIDE 1 MILLIGRAM(S): 2 INJECTION INTRAMUSCULAR; INTRAVENOUS; SUBCUTANEOUS at 21:02

## 2019-10-29 RX ADMIN — HEPARIN SODIUM 5000 UNIT(S): 5000 INJECTION INTRAVENOUS; SUBCUTANEOUS at 22:05

## 2019-10-29 RX ADMIN — HYDROMORPHONE HYDROCHLORIDE 0.5 MILLIGRAM(S): 2 INJECTION INTRAMUSCULAR; INTRAVENOUS; SUBCUTANEOUS at 18:05

## 2019-10-29 RX ADMIN — HYDROMORPHONE HYDROCHLORIDE 0.5 MILLIGRAM(S): 2 INJECTION INTRAMUSCULAR; INTRAVENOUS; SUBCUTANEOUS at 17:55

## 2019-10-29 RX ADMIN — SODIUM CHLORIDE 50 MILLILITER(S): 9 INJECTION, SOLUTION INTRAVENOUS at 08:15

## 2019-10-29 RX ADMIN — HYDROMORPHONE HYDROCHLORIDE 1 MILLIGRAM(S): 2 INJECTION INTRAMUSCULAR; INTRAVENOUS; SUBCUTANEOUS at 21:15

## 2019-10-29 NOTE — BRIEF OPERATIVE NOTE - NSICDXBRIEFPROCEDURE_GEN_ALL_CORE_FT
PROCEDURES:  Breast reconstruction with ANTONIO free flap 29-Oct-2019 16:50:02 Bilateral Tyrell Steen

## 2019-10-29 NOTE — PRE-ANESTHESIA EVALUATION ADULT - NSANTHADDINFOFT_GEN_ALL_CORE
Discussed GA in detail including arterial line with patient and all questions sought and answered. Pt. agrees to all plans and wishes to proceed with surgical care.    Medical evaluation/optimization and clearance noted and appreciated.

## 2019-10-30 LAB
ANION GAP SERPL CALC-SCNC: 6 MMOL/L — SIGNIFICANT CHANGE UP (ref 5–17)
BUN SERPL-MCNC: 13 MG/DL — SIGNIFICANT CHANGE UP (ref 7–23)
CALCIUM SERPL-MCNC: 8.4 MG/DL — SIGNIFICANT CHANGE UP (ref 8.4–10.5)
CHLORIDE SERPL-SCNC: 105 MMOL/L — SIGNIFICANT CHANGE UP (ref 96–108)
CO2 SERPL-SCNC: 28 MMOL/L — SIGNIFICANT CHANGE UP (ref 22–31)
CREAT SERPL-MCNC: 0.56 MG/DL — SIGNIFICANT CHANGE UP (ref 0.5–1.3)
GLUCOSE SERPL-MCNC: 109 MG/DL — HIGH (ref 70–99)
HCT VFR BLD CALC: 26.9 % — LOW (ref 34.5–45)
HGB BLD-MCNC: 8.7 G/DL — LOW (ref 11.5–15.5)
MCHC RBC-ENTMCNC: 29.5 PG — SIGNIFICANT CHANGE UP (ref 27–34)
MCHC RBC-ENTMCNC: 32.3 GM/DL — SIGNIFICANT CHANGE UP (ref 32–36)
MCV RBC AUTO: 91.2 FL — SIGNIFICANT CHANGE UP (ref 80–100)
NRBC # BLD: 0 /100 WBCS — SIGNIFICANT CHANGE UP (ref 0–0)
PLATELET # BLD AUTO: 204 K/UL — SIGNIFICANT CHANGE UP (ref 150–400)
POTASSIUM SERPL-MCNC: 3.7 MMOL/L — SIGNIFICANT CHANGE UP (ref 3.5–5.3)
POTASSIUM SERPL-SCNC: 3.7 MMOL/L — SIGNIFICANT CHANGE UP (ref 3.5–5.3)
RBC # BLD: 2.95 M/UL — LOW (ref 3.8–5.2)
RBC # FLD: 14.5 % — SIGNIFICANT CHANGE UP (ref 10.3–14.5)
SODIUM SERPL-SCNC: 139 MMOL/L — SIGNIFICANT CHANGE UP (ref 135–145)
WBC # BLD: 12.01 K/UL — HIGH (ref 3.8–10.5)
WBC # FLD AUTO: 12.01 K/UL — HIGH (ref 3.8–10.5)

## 2019-10-30 RX ADMIN — Medication 100 MILLIGRAM(S): at 21:47

## 2019-10-30 RX ADMIN — HYDROMORPHONE HYDROCHLORIDE 1 MILLIGRAM(S): 2 INJECTION INTRAMUSCULAR; INTRAVENOUS; SUBCUTANEOUS at 14:15

## 2019-10-30 RX ADMIN — SODIUM CHLORIDE 125 MILLILITER(S): 9 INJECTION, SOLUTION INTRAVENOUS at 08:07

## 2019-10-30 RX ADMIN — HYDROMORPHONE HYDROCHLORIDE 1 MILLIGRAM(S): 2 INJECTION INTRAMUSCULAR; INTRAVENOUS; SUBCUTANEOUS at 02:07

## 2019-10-30 RX ADMIN — Medication 100 MILLIGRAM(S): at 05:28

## 2019-10-30 RX ADMIN — Medication 400 MILLIGRAM(S): at 00:39

## 2019-10-30 RX ADMIN — HEPARIN SODIUM 5000 UNIT(S): 5000 INJECTION INTRAVENOUS; SUBCUTANEOUS at 05:28

## 2019-10-30 RX ADMIN — HYDROMORPHONE HYDROCHLORIDE 1 MILLIGRAM(S): 2 INJECTION INTRAMUSCULAR; INTRAVENOUS; SUBCUTANEOUS at 22:10

## 2019-10-30 RX ADMIN — HYDROMORPHONE HYDROCHLORIDE 1 MILLIGRAM(S): 2 INJECTION INTRAMUSCULAR; INTRAVENOUS; SUBCUTANEOUS at 13:58

## 2019-10-30 RX ADMIN — HYDROMORPHONE HYDROCHLORIDE 1 MILLIGRAM(S): 2 INJECTION INTRAMUSCULAR; INTRAVENOUS; SUBCUTANEOUS at 02:53

## 2019-10-30 RX ADMIN — HYDROMORPHONE HYDROCHLORIDE 1 MILLIGRAM(S): 2 INJECTION INTRAMUSCULAR; INTRAVENOUS; SUBCUTANEOUS at 21:51

## 2019-10-30 RX ADMIN — HYDROMORPHONE HYDROCHLORIDE 1 MILLIGRAM(S): 2 INJECTION INTRAMUSCULAR; INTRAVENOUS; SUBCUTANEOUS at 18:50

## 2019-10-30 RX ADMIN — HYDROMORPHONE HYDROCHLORIDE 1 MILLIGRAM(S): 2 INJECTION INTRAMUSCULAR; INTRAVENOUS; SUBCUTANEOUS at 06:10

## 2019-10-30 RX ADMIN — Medication 975 MILLIGRAM(S): at 16:39

## 2019-10-30 RX ADMIN — SODIUM CHLORIDE 125 MILLILITER(S): 9 INJECTION, SOLUTION INTRAVENOUS at 00:40

## 2019-10-30 RX ADMIN — Medication 1000 MILLIGRAM(S): at 00:52

## 2019-10-30 RX ADMIN — Medication 975 MILLIGRAM(S): at 23:34

## 2019-10-30 RX ADMIN — HYDROMORPHONE HYDROCHLORIDE 1 MILLIGRAM(S): 2 INJECTION INTRAMUSCULAR; INTRAVENOUS; SUBCUTANEOUS at 06:35

## 2019-10-30 RX ADMIN — Medication 100 MILLIGRAM(S): at 13:53

## 2019-10-30 RX ADMIN — HYDROMORPHONE HYDROCHLORIDE 1 MILLIGRAM(S): 2 INJECTION INTRAMUSCULAR; INTRAVENOUS; SUBCUTANEOUS at 18:28

## 2019-10-30 RX ADMIN — LISINOPRIL 5 MILLIGRAM(S): 2.5 TABLET ORAL at 05:28

## 2019-10-30 RX ADMIN — HEPARIN SODIUM 5000 UNIT(S): 5000 INJECTION INTRAVENOUS; SUBCUTANEOUS at 17:52

## 2019-10-30 RX ADMIN — SODIUM CHLORIDE 75 MILLILITER(S): 9 INJECTION, SOLUTION INTRAVENOUS at 18:28

## 2019-10-30 RX ADMIN — HYDROMORPHONE HYDROCHLORIDE 1 MILLIGRAM(S): 2 INJECTION INTRAMUSCULAR; INTRAVENOUS; SUBCUTANEOUS at 10:30

## 2019-10-30 RX ADMIN — Medication 1000 MILLIGRAM(S): at 00:00

## 2019-10-30 RX ADMIN — Medication 975 MILLIGRAM(S): at 16:05

## 2019-10-30 RX ADMIN — HYDROMORPHONE HYDROCHLORIDE 1 MILLIGRAM(S): 2 INJECTION INTRAMUSCULAR; INTRAVENOUS; SUBCUTANEOUS at 10:14

## 2019-10-30 RX ADMIN — Medication 400 MILLIGRAM(S): at 08:06

## 2019-10-30 NOTE — CHART NOTE - NSCHARTNOTEFT_GEN_A_CORE
Dr Antonio ordered Arterial line to be d'cd this am. Was called by RN to remove line. As per Dr Jeong anesthesiologist and Dr Bonner line may be removed by surgical PA.  Without difficulty A line in left radial artery was removed. Direct digital pressure was applied for 20 minutes. then compression dressing wrapped with ace wrap. Rechecked in 5 minutes no signs of hematoma.  ace wrap pressure dressing reapplied. Aga WILSON will recheck in 10 minutes. If no further bleeding or hematoma formation may remove outer ace wrap.

## 2019-10-30 NOTE — PROGRESS NOTE ADULT - SUBJECTIVE AND OBJECTIVE BOX
Patient awake and alert  Afebrile, vital signs stable; vioptics 72% and 61%  PE: bilateral ANTONIO flaps viable, wounds clean and dry; ADI drainage serosanguinous  Imp; patient appears stable  Plan: continue treatment as per Plastic surgery

## 2019-10-30 NOTE — PROGRESS NOTE ADULT - SUBJECTIVE AND OBJECTIVE BOX
looks well  Flaps pink and warm  Vioptix 72% rt, 61% left  abd intact  JPs serosang  Hct 26.9  doing well  dc david and telemetry  up in chair reg diet  IV 75  continue vioptix

## 2019-10-31 RX ORDER — SODIUM CHLORIDE 9 MG/ML
3 INJECTION INTRAMUSCULAR; INTRAVENOUS; SUBCUTANEOUS EVERY 8 HOURS
Refills: 0 | Status: DISCONTINUED | OUTPATIENT
Start: 2019-10-31 | End: 2019-11-01

## 2019-10-31 RX ADMIN — HYDROMORPHONE HYDROCHLORIDE 1 MILLIGRAM(S): 2 INJECTION INTRAMUSCULAR; INTRAVENOUS; SUBCUTANEOUS at 13:30

## 2019-10-31 RX ADMIN — OXYCODONE HYDROCHLORIDE 10 MILLIGRAM(S): 5 TABLET ORAL at 16:30

## 2019-10-31 RX ADMIN — SODIUM CHLORIDE 3 MILLILITER(S): 9 INJECTION INTRAMUSCULAR; INTRAVENOUS; SUBCUTANEOUS at 22:00

## 2019-10-31 RX ADMIN — OXYCODONE HYDROCHLORIDE 10 MILLIGRAM(S): 5 TABLET ORAL at 15:53

## 2019-10-31 RX ADMIN — Medication 975 MILLIGRAM(S): at 09:30

## 2019-10-31 RX ADMIN — HYDROMORPHONE HYDROCHLORIDE 1 MILLIGRAM(S): 2 INJECTION INTRAMUSCULAR; INTRAVENOUS; SUBCUTANEOUS at 04:16

## 2019-10-31 RX ADMIN — HYDROMORPHONE HYDROCHLORIDE 1 MILLIGRAM(S): 2 INJECTION INTRAMUSCULAR; INTRAVENOUS; SUBCUTANEOUS at 23:42

## 2019-10-31 RX ADMIN — Medication 975 MILLIGRAM(S): at 18:30

## 2019-10-31 RX ADMIN — Medication 975 MILLIGRAM(S): at 00:15

## 2019-10-31 RX ADMIN — Medication 975 MILLIGRAM(S): at 08:31

## 2019-10-31 RX ADMIN — Medication 975 MILLIGRAM(S): at 23:42

## 2019-10-31 RX ADMIN — HYDROMORPHONE HYDROCHLORIDE 1 MILLIGRAM(S): 2 INJECTION INTRAMUSCULAR; INTRAVENOUS; SUBCUTANEOUS at 23:57

## 2019-10-31 RX ADMIN — HEPARIN SODIUM 5000 UNIT(S): 5000 INJECTION INTRAVENOUS; SUBCUTANEOUS at 05:48

## 2019-10-31 RX ADMIN — Medication 975 MILLIGRAM(S): at 17:48

## 2019-10-31 RX ADMIN — HYDROMORPHONE HYDROCHLORIDE 1 MILLIGRAM(S): 2 INJECTION INTRAMUSCULAR; INTRAVENOUS; SUBCUTANEOUS at 13:16

## 2019-10-31 RX ADMIN — HYDROMORPHONE HYDROCHLORIDE 1 MILLIGRAM(S): 2 INJECTION INTRAMUSCULAR; INTRAVENOUS; SUBCUTANEOUS at 08:45

## 2019-10-31 RX ADMIN — HYDROMORPHONE HYDROCHLORIDE 1 MILLIGRAM(S): 2 INJECTION INTRAMUSCULAR; INTRAVENOUS; SUBCUTANEOUS at 04:30

## 2019-10-31 RX ADMIN — HYDROMORPHONE HYDROCHLORIDE 1 MILLIGRAM(S): 2 INJECTION INTRAMUSCULAR; INTRAVENOUS; SUBCUTANEOUS at 08:30

## 2019-10-31 RX ADMIN — SODIUM CHLORIDE 3 MILLILITER(S): 9 INJECTION INTRAMUSCULAR; INTRAVENOUS; SUBCUTANEOUS at 14:48

## 2019-10-31 RX ADMIN — LISINOPRIL 5 MILLIGRAM(S): 2.5 TABLET ORAL at 05:48

## 2019-10-31 RX ADMIN — HEPARIN SODIUM 5000 UNIT(S): 5000 INJECTION INTRAVENOUS; SUBCUTANEOUS at 17:48

## 2019-10-31 NOTE — PROGRESS NOTE ADULT - SUBJECTIVE AND OBJECTIVE BOX
Pt Awake alert  Comfortable without complaint  Pain manageable with pain meds  Sat in chair for most of day yesterday    Vss	Afebrile  Kris breast flap with vioptix in 60-70's kris, good cap refill noted kris, closures intact, some mastectomy skin ecchymosis with no collection appreciated  Abd-closure intact no collection  Drains--serosang    H/H 8/26  A/P doing well POD#2 s/p ANTONIO flap breast reconstruction  -Cont drains  -D/C vioptix--done--flap checks q 2  -ambulate  -saline lock--advance diet as tolerated

## 2019-11-01 ENCOUNTER — TRANSCRIPTION ENCOUNTER (OUTPATIENT)
Age: 59
End: 2019-11-01

## 2019-11-01 VITALS
RESPIRATION RATE: 18 BRPM | SYSTOLIC BLOOD PRESSURE: 123 MMHG | HEART RATE: 100 BPM | OXYGEN SATURATION: 98 % | DIASTOLIC BLOOD PRESSURE: 69 MMHG | TEMPERATURE: 98 F

## 2019-11-01 LAB — SURGICAL PATHOLOGY STUDY: SIGNIFICANT CHANGE UP

## 2019-11-01 PROCEDURE — 97161 PT EVAL LOW COMPLEX 20 MIN: CPT

## 2019-11-01 PROCEDURE — C1781: CPT

## 2019-11-01 PROCEDURE — 88305 TISSUE EXAM BY PATHOLOGIST: CPT

## 2019-11-01 PROCEDURE — 80048 BASIC METABOLIC PNL TOTAL CA: CPT

## 2019-11-01 PROCEDURE — C1889: CPT

## 2019-11-01 PROCEDURE — 88307 TISSUE EXAM BY PATHOLOGIST: CPT

## 2019-11-01 PROCEDURE — 88302 TISSUE EXAM BY PATHOLOGIST: CPT

## 2019-11-01 PROCEDURE — 36415 COLL VENOUS BLD VENIPUNCTURE: CPT

## 2019-11-01 PROCEDURE — 85027 COMPLETE CBC AUTOMATED: CPT

## 2019-11-01 PROCEDURE — 88300 SURGICAL PATH GROSS: CPT

## 2019-11-01 PROCEDURE — 88304 TISSUE EXAM BY PATHOLOGIST: CPT

## 2019-11-01 PROCEDURE — C1769: CPT

## 2019-11-01 RX ADMIN — OXYCODONE HYDROCHLORIDE 5 MILLIGRAM(S): 5 TABLET ORAL at 12:57

## 2019-11-01 RX ADMIN — Medication 975 MILLIGRAM(S): at 07:38

## 2019-11-01 RX ADMIN — HEPARIN SODIUM 5000 UNIT(S): 5000 INJECTION INTRAVENOUS; SUBCUTANEOUS at 05:55

## 2019-11-01 RX ADMIN — OXYCODONE HYDROCHLORIDE 5 MILLIGRAM(S): 5 TABLET ORAL at 10:02

## 2019-11-01 RX ADMIN — Medication 975 MILLIGRAM(S): at 08:30

## 2019-11-01 RX ADMIN — OXYCODONE HYDROCHLORIDE 5 MILLIGRAM(S): 5 TABLET ORAL at 09:02

## 2019-11-01 RX ADMIN — SODIUM CHLORIDE 3 MILLILITER(S): 9 INJECTION INTRAMUSCULAR; INTRAVENOUS; SUBCUTANEOUS at 05:59

## 2019-11-01 RX ADMIN — LISINOPRIL 5 MILLIGRAM(S): 2.5 TABLET ORAL at 05:55

## 2019-11-01 RX ADMIN — Medication 975 MILLIGRAM(S): at 00:42

## 2019-11-01 NOTE — DISCHARGE NOTE PROVIDER - NSDCFUSCHEDAPPT_GEN_ALL_CORE_FT
FIDEL THOMAS ; 11/07/2019 ; [facility]  FIDEL THOMAS ; 11/12/2019 ; [facility]  FIDEL THOMAS ; 11/20/2019 ; [facility] [address] FIDEL THOMAS ; 11/07/2019 ; [facility]  FIDEL THOMAS ; 11/12/2019 ; [facility]  FIDEL THOMAS ; 11/20/2019 ; [facility] [address]

## 2019-11-01 NOTE — PHYSICAL THERAPY INITIAL EVALUATION ADULT - CRITERIA FOR SKILLED THERAPEUTIC INTERVENTIONS
anticipated discharge recommendation/Pt is independent with all functional mobility.  No need for PT services

## 2019-11-01 NOTE — DISCHARGE NOTE NURSING/CASE MANAGEMENT/SOCIAL WORK - NSDCPEWEB_GEN_ALL_CORE
[facility] for Tobacco Control website --- [email or web address] website --- [email or web address]

## 2019-11-01 NOTE — PHYSICAL THERAPY INITIAL EVALUATION ADULT - ADDITIONAL COMMENTS
Pt lives in house with , 2x12 steps inside with 1 rail.  Prior to hospitalization pt was independent with all mobility and self care

## 2019-11-01 NOTE — DISCHARGE NOTE NURSING/CASE MANAGEMENT/SOCIAL WORK - NSDCPEEMAIL_GEN_ALL_CORE
[facility] for Tobacco Control email [email or web address]

## 2019-11-01 NOTE — DISCHARGE NOTE PROVIDER - NSDCCPCAREPLAN_GEN_ALL_CORE_FT
PRINCIPAL DISCHARGE DIAGNOSIS  Diagnosis: History of breast cancer  Assessment and Plan of Treatment:

## 2019-11-01 NOTE — DISCHARGE NOTE NURSING/CASE MANAGEMENT/SOCIAL WORK - PATIENT PORTAL LINK FT
You can access the FollowMyHealth Patient Portal offered by [facility] by registering at the following website: [email or web address]. By joining [employer]’s FollowMyHealth portal, you will also be able to view your health information using other applications (apps) compatible with our system.

## 2019-11-01 NOTE — PROGRESS NOTE ADULT - SUBJECTIVE AND OBJECTIVE BOX
Patient doing well this morning, no complaints. AVSS    On exam, bilateral breasts are soft. Flaps warm and pink. Incisions cdi.  Abdomen stable. Incision intact.  Drains ss.    Plan  Dc home today   Drain care  Ambulate  Shower  Rx sent preop  To speak to  re: home care  Follow up with Dr. Antonio in the office next week

## 2019-11-01 NOTE — DISCHARGE NOTE PROVIDER - CARE PROVIDER_API CALL
Ehsan Antonio (MD)  Plastic Surgery  [address], [address]  [place] [address]  Phone: [phone]  Fax: [phone]  Follow Up Time:

## 2019-11-01 NOTE — DISCHARGE NOTE NURSING/CASE MANAGEMENT/SOCIAL WORK - NSDCFUADDAPPT_GEN_ALL_CORE_FT
Dr. Antonio   Thursday 11/7 at 3:15pm Ehsan Antonio (MD)  Plastic Surgery  [address], [address]  [place] [address]  Phone: [phone]    Follow up appt: Thursday 11/7 at 3:15pm

## 2019-11-07 ENCOUNTER — RESULT REVIEW (OUTPATIENT)
Age: 59
End: 2019-11-07

## 2019-11-07 ENCOUNTER — LABORATORY RESULT (OUTPATIENT)
Age: 59
End: 2019-11-07

## 2019-11-07 ENCOUNTER — APPOINTMENT (OUTPATIENT)
Dept: INFUSION THERAPY | Facility: HOSPITAL | Age: 59
End: 2019-11-07

## 2019-11-07 LAB
BASOPHILS # BLD AUTO: 0 K/UL — SIGNIFICANT CHANGE UP (ref 0–0.2)
BASOPHILS NFR BLD AUTO: 0.3 % — SIGNIFICANT CHANGE UP (ref 0–2)
EOSINOPHIL # BLD AUTO: 0.2 K/UL — SIGNIFICANT CHANGE UP (ref 0–0.5)
EOSINOPHIL NFR BLD AUTO: 2.4 % — SIGNIFICANT CHANGE UP (ref 0–6)
HCT VFR BLD CALC: 28.8 % — LOW (ref 34.5–45)
HGB BLD-MCNC: 9.6 G/DL — LOW (ref 11.5–15.5)
LYMPHOCYTES # BLD AUTO: 0.8 K/UL — LOW (ref 1–3.3)
LYMPHOCYTES # BLD AUTO: 8.8 % — LOW (ref 13–44)
MCHC RBC-ENTMCNC: 31 PG — SIGNIFICANT CHANGE UP (ref 27–34)
MCHC RBC-ENTMCNC: 33.3 G/DL — SIGNIFICANT CHANGE UP (ref 32–36)
MCV RBC AUTO: 93.1 FL — SIGNIFICANT CHANGE UP (ref 80–100)
MONOCYTES # BLD AUTO: 0.4 K/UL — SIGNIFICANT CHANGE UP (ref 0–0.9)
MONOCYTES NFR BLD AUTO: 4.9 % — SIGNIFICANT CHANGE UP (ref 2–14)
NEUTROPHILS # BLD AUTO: 7.6 K/UL — HIGH (ref 1.8–7.4)
NEUTROPHILS NFR BLD AUTO: 83.6 % — HIGH (ref 43–77)
PLATELET # BLD AUTO: 460 K/UL — HIGH (ref 150–400)
RBC # BLD: 3.09 M/UL — LOW (ref 3.8–5.2)
RBC # FLD: 12.7 % — SIGNIFICANT CHANGE UP (ref 10.3–14.5)
WBC # BLD: 9 K/UL — SIGNIFICANT CHANGE UP (ref 3.8–10.5)
WBC # FLD AUTO: 9 K/UL — SIGNIFICANT CHANGE UP (ref 3.8–10.5)

## 2019-11-12 ENCOUNTER — APPOINTMENT (OUTPATIENT)
Dept: HEMATOLOGY ONCOLOGY | Facility: CLINIC | Age: 59
End: 2019-11-12
Payer: COMMERCIAL

## 2019-11-12 VITALS
WEIGHT: 239.86 LBS | BODY MASS INDEX: 37.57 KG/M2 | HEART RATE: 77 BPM | SYSTOLIC BLOOD PRESSURE: 120 MMHG | TEMPERATURE: 97.6 F | OXYGEN SATURATION: 97 % | RESPIRATION RATE: 17 BRPM | DIASTOLIC BLOOD PRESSURE: 72 MMHG

## 2019-11-12 PROCEDURE — 99215 OFFICE O/P EST HI 40 MIN: CPT

## 2019-11-12 NOTE — PHYSICAL EXAM
[Fully active, able to carry on all pre-disease performance without restriction] : Status 0 - Fully active, able to carry on all pre-disease performance without restriction [Normal] : affect appropriate [de-identified] : L chest wall mediport nontender [de-identified] : large donor site scar with steri strips in place, some bruising, no oozing, closed [de-identified] : b/l ANTONIO reconstruction noted, L nipple noted. some crusting/scab R breast medial scar, L drain in place serosanguinous fluid, overall well healing no palpable masses or adenopathy b/l

## 2019-11-12 NOTE — OB HISTORY
[Definite:  ___ (Date)] : the last menstrual period was [unfilled] [Currently In Menopause] : currently in menopause [___] : Full Term: [unfilled] [Experiencing Menopausal Sxs] : experiencing menopausal symptoms [Normal Amount/Duration] : was abnormal [Spotting Between  Menses] : no spotting between menses [Regular Cycle Intervals] : periods have been irregular

## 2019-11-12 NOTE — ASSESSMENT
[Curative] : Goals of care discussed with patient: Curative [FreeTextEntry1] : 58yo woman with ER+FL-HER2+ locally advanced, poorly differentiated, inflammatory ductal breast cancer, multifocal with multiple breast masses and multiple enlarged axillary LNs on exam/MRI imaging (biopsy confirmed with clips placed), skin biopsy positive confirming inflammatory disease. Staging imaging with SUV-avid peripancreatic LN - biopsy with reactive lymph node (benign). She completed neoadjuvant AC-->THP with interval PET JENNY. Mastectomy/ALND with tissue expander placed 3/22 with pCR. She completed 1 year of adjuvant herceptin/perjeta 11/7/19 and adjuvant radiation completed, on arimidex, doing well currently.\par \par We discussed the management of early-stage Her2+ breast cancer and the rationale for extended adjuvant therapy with neratinib based on the results of the ExteNET trial. This was a multicenter, randomized, double-blind, placebo-controlled trial of neratinib following adjuvant trastuzumab treatment. After two years, iDFS was 94.2% in patients treated with neratinib compared with 91.9% in those receiving placebo (HR 0.66; 95% CI: 0.49, 0.90, p=0.008). The recommended dose is 240 mg (6 tablets) given orally once daily with food, continuously for one year. We will employ a dose-escalation strategy over the first 2 weeks to minimize diarrhea. Antidiarrheal prophylaxis should be initiated with the first neratinib dose and continued during the first 2 cycles (56 days) of treatment and as needed thereafter (schedule and therapy discussed, written information and plan given to patient). Food/Drug interactions discussed: Avoid concomitant use with proton pump inhibitors and H2-receptor antagonists; if antacids are necessary, administer neratinib 3 hours after antacids. Discussed safe handling instructions of neratinib. Discussed that if a dose is missed, do not double the next dose, stay on schedule. We discussed adverse effects of neratinib including but not limited to Hand-Foot-Syndrome,increased risk of cytopenias and thus increased risk for infection, diarrhea (which can be severe), LFT abnormalities, and fatigue among others. Management of some adverse reactions may require temporary dose interruption/delay or permanent discontinuation. Current treatment will be continued until progression of disease/lack of efficacy or if untreatable or overwhelming toxicity develops. Patient expressed understanding of the risks and benefits of the treatment plan, had opportunity to ask questions, and oral consent was obtained. She was given written information regarding the treatment plan and potential side effects for review. She would begin in 2-3 weeks after further healing from surgery, HP last dose 11/7. She will call me when medication and antidiarrheals arrive for further instruction, will need baseline labs then as well.\par \par R Breast Cancer: Mild diarrhea G2 for 2-3 days with each THP cycle, suspect related to Perjeta - improved/resolved with taxol completion. Neuropathy grade 2 sudden onset with taxol 80mg/m2 #1 resolved (week 2 held, week 3 resumed) - now with grade 1 symptoms stable and somewhat persistent now and without functional deficits. Skin rash grade 1, per below, resolved.\par -begin neratinib as above; follow up visit in 2 weeks after for lab and toxicity check\par -CBC with differential, CMP and LFT check today at least monthly for at least the first 3 months\par -continue antihypertensive medication (HTN treatment and cardioprotective); follow up with Dr. Khan (TTE 8/2019 ok, EF stable)\par -follow up with Dr. Luna and Dr. Antonio as recommended\par -radiation treatment completed, follow up rad onc recommended\par -continue AI - arimidex 1mg PO daily (baseline DEXA in near future)\par -follow up 6 weeks with HP, HP every 3 weeks\par \par Headaches: new, atypical, different from migraines, possibly related to arimidex given timing and other symptoms, CTH with contrast reassuring (MRI Brain not possible given tissue expanders remain in place), now improving and at baseline\par -PRN tylenol use discussed, proper use, continue to monitor, consider neurology/pain management follow up\par \par Joint Pains: arimidex related, also with some hot flashes, somewhat manageable\par -cymbalta as below\par -continue to monitor - check LH/FSH/estradiol again\par \par Neuropathy: taxane induced, stable and somewhat bothersome. unclear if occasional dizziness related as well, improved somewhat\par -citalopram discontinued, continue Cymbalta 20mg PO ER daily, tolerating\par -dose uptitration as tolerating for antidepressant and nerve pain treatment, discussed with patient prior, to consider in near future\par \par TTE finding: mass on aortic valve non mobile, repeated Dr. Khan thought to be calcification. EF stable 5/2019, again 8/2019\par -follow up Dr. Khan as planned, continue antihypertensives\par -TTE Nov 2019 due, ordered now\par \par Anemia: Hgb 9.6 post-operatively, likely related to loses surgically/drains\par -continue to monitor CBC closely

## 2019-11-12 NOTE — HISTORY OF PRESENT ILLNESS
[T: ___] : T[unfilled] [Disease: _____________________] : Disease: [unfilled] [N: ___] : N[unfilled] [AJCC Stage: ____] : AJCC Stage: [unfilled] [Treatment Protocol] : Treatment Protocol [de-identified] : ER+KS-HER2+ [de-identified] : \par 60yo woman with ER+IA-HER2+ locally advanced, inflammatory poorly differentiated ductal breast cancer.\par \par She noted summer 2018 she felt a mass near her nipple in her R breast. She went to her PMD who ordered a mammogram and sonogram which revealed a R abnormal hypoechoic mass and abnormal appearing axillary LN.\par \par Image guided biopsy of mass and axilla revealed invasive poorly differentiated ductal carcinoma, +LVI, 1.2cm in specimen, [place] 8/9, DCIS, microcalcifications present, ER >95%, IA negative, HER2+ 3+ IHC, Lymph node biopsy with metastatic carcinoma.\par \par She then saw Dr. Camacho breast surgeon who ordered and MRI of the breasts. MRI showed: Biopsy proven mammary carcinoma in the R retroareolar region 5:00 manifested as multiple irregular masses. The tumor is extensive in nature and spanning an area of over 3 cm with non-mass enhancement extending posteriorly suggesting extensive intraductal component as well. Two other highly suspicious masses noted in the RUOQ at 11:00 measuring >3cm and at 10:00 measuring 2.6cm consistent with multicentric disease. Biopsy proven metastatic disease to an axillary LN with additional suspicious LNs (additional with replaced fatty hilum and abnormally enlarged cortices, some have irregular borders, some posterior to pectoralis muscle, also abnormal appearing node in soft tissue lateral to the R chest wall).\par \par Dr. Camacho recommended neoadjuvant chemotherapy and the option for mastectomy with possible axillary lymph node dissection pending response to therapy.\par \par She then saw me in consultation. She underwent PETCT that found a peripancreatic lymph node that was enlarged and SUV avid, b/l laryngeal FDG avidity (likely due to speaking during exam), and SUV avid breast mass/axilla on R with breast skin noted to be thickened. She underwent EGD/EUS and biopsy of this peripancreatic LN (Dr. Giovanni Felton) which was consistent with benign/reactive lymphatic tissue. She had a mediport placed by IR on 9/26. She saw oncocardiology Dr. Khan for risk assessment prior to anthracycline/anti-HER2 monoclonal therapy and was started on ACEi for HTN and for cardioprotection. She saw dermatology for skin biopsy of R breast prior to treatment to evaluate/confirm inflammatory disease; biopsy results with skin involvement by poorly differentiated carcinoma. She started neoadjuvant chemotherapy with dose-dense AC on 9/26. dose-dense AC-->THP (weekly taxol 80mg/m2)\par \par 11/23 THP #1\par 11/30 Taxol 80mg/m2 held due to neuropathy\par 12/7 resumed Taxol weekly without issue\par 12/15 taxol completed x 12 weeks\par 3/8 Herceptin and Perjeta administered pre-operatively\par \par MRI 2/4/19 BIRADS 2, masses and adenopathy resolved. Some residual R breast skin thickening noted.\par \par 3/22 R mastectomy and ALND: 0/6 LN removed involved, no residual invasive carcinoma or DCIS identified. [id number] ([facility])\par \par 4/3 Herceptin and Perjeta continued post-operatively every 3 weeks, adjuvant radiation started 5/2019 and completed 6/2019 c/b grade 3 dermatitis now resolved.\par \par LH/FSH/estradiol levels May/June 2019 consistent with menopause.\par Arimidex started July 2019.\par \par She underwent ANTONIO reconstruction 10/2019 without event. Pathology negative.\par \par Herceptin/Perjeta C17 completed 11/7/19. \par \par Patient lives in [place] with her  Gonzales (pretzel company owner). She has a 26 year old daughter (an artist and ). She works full time as a . She notes a history of smoking in the distant past. Mother passed away in 70s from metastatic melanoma, and a maternal aunt had breast cancer; no other significant close family history of cancers. She notes overall migraines (which she has had for many years, seen neurologist, stable lytic medications help, associated with one sided facial numbness when they occur) have gotten better with menopause – her LMP was 2/2018 and it was infrequent prior to that. She has had some hot flashes. She used OCPs in the past for years particularly for endometriosis treatment. She has had issues with DJD of back and spine with pain, MRI last year noted, epidural injections have helped with this significantly as well as occasional NSAID use.\par  [de-identified] : She presents today after completing herceptin/perjeta. She notes neuropathy in feet persists (tingling, feeling like "something is there" over entire foot, worse at ball of the foot) and occasionally in fingertips, not interfering with function, stable to maybe slightly improved. She is tolerating cymbalta well. Headaches somewhat better to stable. She notes some fatigue after recent ANTONIO reconstruction - 1 drain remains, using only tylenol PRN for pain. She has no other significant complaints.  [FreeTextEntry1] : dose-dense AC (adriamycin 60mg/m2 and cytoxan 600mg/m2) x 4 cycles, followed by weekly taxol 80mg/m2 x 12 with herceptin/perjeta q3 weeks, arimidex

## 2019-11-13 PROBLEM — R19.7 DIARRHEA, UNSPECIFIED: Chronic | Status: ACTIVE | Noted: 2019-10-17

## 2019-11-20 ENCOUNTER — APPOINTMENT (OUTPATIENT)
Dept: RADIATION ONCOLOGY | Facility: CLINIC | Age: 59
End: 2019-11-20
Payer: COMMERCIAL

## 2019-11-20 VITALS
TEMPERATURE: 98.3 F | RESPIRATION RATE: 18 BRPM | WEIGHT: 239.2 LBS | DIASTOLIC BLOOD PRESSURE: 76 MMHG | BODY MASS INDEX: 37.54 KG/M2 | HEART RATE: 77 BPM | OXYGEN SATURATION: 97 % | HEIGHT: 67 IN | SYSTOLIC BLOOD PRESSURE: 122 MMHG

## 2019-11-20 PROCEDURE — 99213 OFFICE O/P EST LOW 20 MIN: CPT

## 2019-11-20 NOTE — REVIEW OF SYSTEMS
[Diarrhea: Grade 0] : Diarrhea: Grade 0 [Constipation: Grade 0] : Constipation: Grade 0 [Fatigue: Grade 1 - Fatigue relieved by rest] : Fatigue: Grade 1 - Fatigue relieved by rest [Breast Pain: Grade 1 - Mild pain] : Breast Pain: Grade 1 - Mild pain [Dyspnea: Grade 0] : Dyspnea: Grade 0

## 2019-11-20 NOTE — VITALS
[Maximal Pain Intensity: 2/10] : 2/10 [Pain Location: ___] : Pain Location: [unfilled] [OTC] : OTC [Opioid] : opioid [80: Normal activity with effort; some signs or symptoms of disease.] : 80: Normal activity with effort; some signs or symptoms of disease.

## 2019-11-21 ENCOUNTER — RESULT REVIEW (OUTPATIENT)
Age: 59
End: 2019-11-21

## 2019-11-24 ENCOUNTER — MOBILE ON CALL (OUTPATIENT)
Age: 59
End: 2019-11-24

## 2019-12-06 ENCOUNTER — MESSAGE (OUTPATIENT)
Age: 59
End: 2019-12-06

## 2019-12-09 ENCOUNTER — RX RENEWAL (OUTPATIENT)
Age: 59
End: 2019-12-09

## 2019-12-11 ENCOUNTER — OUTPATIENT (OUTPATIENT)
Dept: OUTPATIENT SERVICES | Facility: HOSPITAL | Age: 59
LOS: 1 days | Discharge: HOME | End: 2019-12-11

## 2019-12-11 DIAGNOSIS — Z17.0 ESTROGEN RECEPTOR POSITIVE STATUS [ER+]: ICD-10-CM

## 2019-12-11 DIAGNOSIS — Z98.890 OTHER SPECIFIED POSTPROCEDURAL STATES: Chronic | ICD-10-CM

## 2019-12-11 DIAGNOSIS — Z90.11 ACQUIRED ABSENCE OF RIGHT BREAST AND NIPPLE: Chronic | ICD-10-CM

## 2019-12-11 DIAGNOSIS — C50.911 MALIGNANT NEOPLASM OF UNSPECIFIED SITE OF RIGHT FEMALE BREAST: ICD-10-CM

## 2019-12-11 DIAGNOSIS — C77.3 SECONDARY AND UNSPECIFIED MALIGNANT NEOPLASM OF AXILLA AND UPPER LIMB LYMPH NODES: ICD-10-CM

## 2019-12-15 NOTE — PHYSICAL EXAM
[General Appearance - Alert] : alert [General Appearance - In No Acute Distress] : in no acute distress [Abdomen Soft] : soft [] : no respiratory distress [Oriented To Time, Place, And Person] : oriented to person, place, and time [de-identified] : s/p ANTONIO flap, right ADI drain insitu with serosanguinous drainage [de-identified] : s/p bilateral breast reconstruction [de-identified] : Healing surgical incisions

## 2019-12-15 NOTE — HISTORY OF PRESENT ILLNESS
[FreeTextEntry1] : patient is a 59 year old female with cT4 N0 poorly differentiated ER positive, Her-2 positive poorly differentiated invasive ductal carcinoma of the right breast, s/p neoadjuvant chemotherapy, s/p right mastectomy and sentinel node biopsy showing ypT0 ypN0 disease. She was treated to a dose of 5,000 cGy to the right chest wall/regional nodes from 5/10/2019 - 6/14/2019 (35 days).  She was instructed to follow up with (s)Nicolle Cox and Cheryl, PMD.\par \par SHe last saw us on 8/6/19 and was planned  for reconstruction of right breast and left mastectomy in October by Dr. Antonio. \par \par She underwent ANTONIO flap right breast reconstruction on 10/29/2019 by Dr. Antonio and left breast mastectomy by Dr. Luna. \par \par She saw Dr. Cox on 11/12/19. She noted fatigue and neuropathy and was taking anastrazole. \par \par Today she notes fatigue and abdominal and breast pain 2/10. She takes tylenol and percocet as needed with relief. She completed Herceptin 11/7/2019 and will start Nerylx after Thanksgiving.\par

## 2019-12-17 ENCOUNTER — APPOINTMENT (OUTPATIENT)
Dept: HEMATOLOGY ONCOLOGY | Facility: CLINIC | Age: 59
End: 2019-12-17

## 2019-12-17 ENCOUNTER — RESULT REVIEW (OUTPATIENT)
Age: 59
End: 2019-12-17

## 2019-12-17 LAB
BASOPHILS # BLD AUTO: 0 K/UL — SIGNIFICANT CHANGE UP (ref 0–0.2)
BASOPHILS NFR BLD AUTO: 0.1 % — SIGNIFICANT CHANGE UP (ref 0–2)
EOSINOPHIL # BLD AUTO: 0.2 K/UL — SIGNIFICANT CHANGE UP (ref 0–0.5)
EOSINOPHIL NFR BLD AUTO: 1.9 % — SIGNIFICANT CHANGE UP (ref 0–6)
HCT VFR BLD CALC: 33.5 % — LOW (ref 34.5–45)
HGB BLD-MCNC: 10.6 G/DL — LOW (ref 11.5–15.5)
LYMPHOCYTES # BLD AUTO: 1.8 K/UL — SIGNIFICANT CHANGE UP (ref 1–3.3)
LYMPHOCYTES # BLD AUTO: 21.3 % — SIGNIFICANT CHANGE UP (ref 13–44)
MCHC RBC-ENTMCNC: 29.2 PG — SIGNIFICANT CHANGE UP (ref 27–34)
MCHC RBC-ENTMCNC: 31.8 G/DL — LOW (ref 32–36)
MCV RBC AUTO: 91.8 FL — SIGNIFICANT CHANGE UP (ref 80–100)
MONOCYTES # BLD AUTO: 0.5 K/UL — SIGNIFICANT CHANGE UP (ref 0–0.9)
MONOCYTES NFR BLD AUTO: 6 % — SIGNIFICANT CHANGE UP (ref 2–14)
NEUTROPHILS # BLD AUTO: 6 K/UL — SIGNIFICANT CHANGE UP (ref 1.8–7.4)
NEUTROPHILS NFR BLD AUTO: 70.7 % — SIGNIFICANT CHANGE UP (ref 43–77)
PLATELET # BLD AUTO: 303 K/UL — SIGNIFICANT CHANGE UP (ref 150–400)
RBC # BLD: 3.65 M/UL — LOW (ref 3.8–5.2)
RBC # FLD: 13.4 % — SIGNIFICANT CHANGE UP (ref 10.3–14.5)
WBC # BLD: 8.5 K/UL — SIGNIFICANT CHANGE UP (ref 3.8–10.5)
WBC # FLD AUTO: 8.5 K/UL — SIGNIFICANT CHANGE UP (ref 3.8–10.5)

## 2019-12-18 LAB
ALBUMIN SERPL ELPH-MCNC: 4 G/DL
ALP BLD-CCNC: 90 U/L
ALT SERPL-CCNC: 15 U/L
ANION GAP SERPL CALC-SCNC: 10 MMOL/L
AST SERPL-CCNC: 17 U/L
BILIRUB SERPL-MCNC: 0.2 MG/DL
BUN SERPL-MCNC: 13 MG/DL
CALCIUM SERPL-MCNC: 9.3 MG/DL
CHLORIDE SERPL-SCNC: 102 MMOL/L
CO2 SERPL-SCNC: 28 MMOL/L
CREAT SERPL-MCNC: 0.67 MG/DL
GLUCOSE SERPL-MCNC: 76 MG/DL
POTASSIUM SERPL-SCNC: 4 MMOL/L
PROT SERPL-MCNC: 6 G/DL
SODIUM SERPL-SCNC: 140 MMOL/L

## 2019-12-19 ENCOUNTER — RESULT REVIEW (OUTPATIENT)
Age: 59
End: 2019-12-19

## 2019-12-23 ENCOUNTER — APPOINTMENT (OUTPATIENT)
Dept: CV DIAGNOSITCS | Facility: HOSPITAL | Age: 59
End: 2019-12-23

## 2019-12-23 ENCOUNTER — OUTPATIENT (OUTPATIENT)
Dept: OUTPATIENT SERVICES | Facility: HOSPITAL | Age: 59
LOS: 1 days | End: 2019-12-23
Payer: COMMERCIAL

## 2019-12-23 DIAGNOSIS — C50.911 MALIGNANT NEOPLASM OF UNSPECIFIED SITE OF RIGHT FEMALE BREAST: ICD-10-CM

## 2019-12-23 DIAGNOSIS — Z90.11 ACQUIRED ABSENCE OF RIGHT BREAST AND NIPPLE: Chronic | ICD-10-CM

## 2019-12-23 DIAGNOSIS — Z98.890 OTHER SPECIFIED POSTPROCEDURAL STATES: Chronic | ICD-10-CM

## 2019-12-23 PROCEDURE — 93306 TTE W/DOPPLER COMPLETE: CPT | Mod: 26

## 2019-12-23 PROCEDURE — 93356 MYOCRD STRAIN IMG SPCKL TRCK: CPT

## 2019-12-23 PROCEDURE — 93306 TTE W/DOPPLER COMPLETE: CPT

## 2019-12-31 ENCOUNTER — APPOINTMENT (OUTPATIENT)
Dept: HEMATOLOGY ONCOLOGY | Facility: CLINIC | Age: 59
End: 2019-12-31
Payer: COMMERCIAL

## 2019-12-31 ENCOUNTER — RESULT REVIEW (OUTPATIENT)
Age: 59
End: 2019-12-31

## 2019-12-31 VITALS
HEART RATE: 81 BPM | RESPIRATION RATE: 16 BRPM | SYSTOLIC BLOOD PRESSURE: 139 MMHG | WEIGHT: 239.62 LBS | OXYGEN SATURATION: 98 % | TEMPERATURE: 98.7 F | BODY MASS INDEX: 37.53 KG/M2 | DIASTOLIC BLOOD PRESSURE: 81 MMHG

## 2019-12-31 LAB
ALBUMIN SERPL ELPH-MCNC: 3.6 G/DL
ALP BLD-CCNC: 82 U/L
ALT SERPL-CCNC: 16 U/L
ANION GAP SERPL CALC-SCNC: 8 MMOL/L
AST SERPL-CCNC: 14 U/L
BASOPHILS # BLD AUTO: 0 K/UL — SIGNIFICANT CHANGE UP (ref 0–0.2)
BASOPHILS NFR BLD AUTO: 0.4 % — SIGNIFICANT CHANGE UP (ref 0–2)
BILIRUB SERPL-MCNC: 0.2 MG/DL
BUN SERPL-MCNC: 12 MG/DL
CALCIUM SERPL-MCNC: 8.8 MG/DL
CHLORIDE SERPL-SCNC: 105 MMOL/L
CO2 SERPL-SCNC: 28 MMOL/L
CREAT SERPL-MCNC: 0.67 MG/DL
EOSINOPHIL # BLD AUTO: 0.1 K/UL — SIGNIFICANT CHANGE UP (ref 0–0.5)
EOSINOPHIL NFR BLD AUTO: 1.9 % — SIGNIFICANT CHANGE UP (ref 0–6)
GLUCOSE SERPL-MCNC: 72 MG/DL
HCT VFR BLD CALC: 32.1 % — LOW (ref 34.5–45)
HGB BLD-MCNC: 10.4 G/DL — LOW (ref 11.5–15.5)
LYMPHOCYTES # BLD AUTO: 1.3 K/UL — SIGNIFICANT CHANGE UP (ref 1–3.3)
LYMPHOCYTES # BLD AUTO: 16.8 % — SIGNIFICANT CHANGE UP (ref 13–44)
MCHC RBC-ENTMCNC: 29.5 PG — SIGNIFICANT CHANGE UP (ref 27–34)
MCHC RBC-ENTMCNC: 32.4 G/DL — SIGNIFICANT CHANGE UP (ref 32–36)
MCV RBC AUTO: 91.1 FL — SIGNIFICANT CHANGE UP (ref 80–100)
MONOCYTES # BLD AUTO: 0.4 K/UL — SIGNIFICANT CHANGE UP (ref 0–0.9)
MONOCYTES NFR BLD AUTO: 5.8 % — SIGNIFICANT CHANGE UP (ref 2–14)
NEUTROPHILS # BLD AUTO: 5.6 K/UL — SIGNIFICANT CHANGE UP (ref 1.8–7.4)
NEUTROPHILS NFR BLD AUTO: 75 % — SIGNIFICANT CHANGE UP (ref 43–77)
PLATELET # BLD AUTO: 299 K/UL — SIGNIFICANT CHANGE UP (ref 150–400)
POTASSIUM SERPL-SCNC: 3.6 MMOL/L
PROT SERPL-MCNC: 5.6 G/DL
RBC # BLD: 3.53 M/UL — LOW (ref 3.8–5.2)
RBC # FLD: 13.5 % — SIGNIFICANT CHANGE UP (ref 10.3–14.5)
SODIUM SERPL-SCNC: 141 MMOL/L
WBC # BLD: 7.5 K/UL — SIGNIFICANT CHANGE UP (ref 3.8–10.5)
WBC # FLD AUTO: 7.5 K/UL — SIGNIFICANT CHANGE UP (ref 3.8–10.5)

## 2019-12-31 PROCEDURE — 99214 OFFICE O/P EST MOD 30 MIN: CPT

## 2020-01-13 NOTE — HISTORY OF PRESENT ILLNESS
[Disease: _____________________] : Disease: [unfilled] [T: ___] : T[unfilled] [N: ___] : N[unfilled] [AJCC Stage: ____] : AJCC Stage: [unfilled] [Treatment Protocol] : Treatment Protocol [de-identified] : \par 60yo woman with ER+NC-HER2+ locally advanced, inflammatory poorly differentiated ductal breast cancer.\par \par She noted summer 2018 she felt a mass near her nipple in her R breast. She went to her PMD who ordered a mammogram and sonogram which revealed a R abnormal hypoechoic mass and abnormal appearing axillary LN.\par \par Image guided biopsy of mass and axilla revealed invasive poorly differentiated ductal carcinoma, +LVI, 1.2cm in specimen, Calvin 8/9, DCIS, microcalcifications present, ER >95%, NC negative, HER2+ 3+ IHC, Lymph node biopsy with metastatic carcinoma.\par \par She then saw Dr. Camacho breast surgeon who ordered and MRI of the breasts. MRI showed: Biopsy proven mammary carcinoma in the R retroareolar region 5:00 manifested as multiple irregular masses. The tumor is extensive in nature and spanning an area of over 3 cm with non-mass enhancement extending posteriorly suggesting extensive intraductal component as well. Two other highly suspicious masses noted in the RUOQ at 11:00 measuring >3cm and at 10:00 measuring 2.6cm consistent with multicentric disease. Biopsy proven metastatic disease to an axillary LN with additional suspicious LNs (additional with replaced fatty hilum and abnormally enlarged cortices, some have irregular borders, some posterior to pectoralis muscle, also abnormal appearing node in soft tissue lateral to the R chest wall).\par \par Dr. Camacho recommended neoadjuvant chemotherapy and the option for mastectomy with possible axillary lymph node dissection pending response to therapy.\par \par She then saw me in consultation. She underwent PETCT that found a peripancreatic lymph node that was enlarged and SUV avid, b/l laryngeal FDG avidity (likely due to speaking during exam), and SUV avid breast mass/axilla on R with breast skin noted to be thickened. She underwent EGD/EUS and biopsy of this peripancreatic LN (Dr. Giovanni Felton) which was consistent with benign/reactive lymphatic tissue. She had a mediport placed by IR on 9/26. She saw oncocardiology Dr. Khan for risk assessment prior to anthracycline/anti-HER2 monoclonal therapy and was started on ACEi for HTN and for cardioprotection. She saw dermatology for skin biopsy of R breast prior to treatment to evaluate/confirm inflammatory disease; biopsy results with skin involvement by poorly differentiated carcinoma. She started neoadjuvant chemotherapy with dose-dense AC on 9/26. dose-dense AC-->THP (weekly taxol 80mg/m2)\par \par 11/23 THP #1\par 11/30 Taxol 80mg/m2 held due to neuropathy\par 12/7 resumed Taxol weekly without issue\par 12/15 taxol completed x 12 weeks\par 3/8 Herceptin and Perjeta administered pre-operatively\par \par MRI 2/4/19 BIRADS 2, masses and adenopathy resolved. Some residual R breast skin thickening noted.\par \par 3/22 R mastectomy and ALND: 0/6 LN removed involved, no residual invasive carcinoma or DCIS identified. [id number] ([facility])\par \par 4/3 Herceptin and Perjeta continued post-operatively every 3 weeks, adjuvant radiation started 5/2019 and completed 6/2019 c/b grade 3 dermatitis now resolved.\par \par LH/FSH/estradiol levels May/June 2019 consistent with menopause.\par Arimidex started July 2019.\par \par She underwent ANTONIO reconstruction 10/2019 without event. Pathology negative.\par \par Herceptin/Perjeta C17 completed 11/7/19. \par \par Patient lives in [place] with her  Gonzales (pretzel company owner). She has a 26 year old daughter (an artist and ). She works full time as a . She notes a history of smoking in the distant past. Mother passed away in 70s from metastatic melanoma, and a maternal aunt had breast cancer; no other significant close family history of cancers. She notes overall migraines (which she has had for many years, seen neurologist, stable lytic medications help, associated with one sided facial numbness when they occur) have gotten better with menopause – her LMP was 2/2018 and it was infrequent prior to that. She has had some hot flashes. She used OCPs in the past for years particularly for endometriosis treatment. She has had issues with DJD of back and spine with pain, MRI last year noted, epidural injections have helped with this significantly as well as occasional NSAID use.\par  [FreeTextEntry1] : dose-dense AC (adriamycin 60mg/m2 and cytoxan 600mg/m2) x 4 cycles, followed by weekly taxol 80mg/m2 x 12 with herceptin/perjeta q3 weeks, arimidex [de-identified] : ER+RI-HER2+ [de-identified] : Patient presents for follow up after 4 weeks on extended adjuvant therapy with neratinib. She noted once full dose reached 2 weeks ago she started having 4-6 loose or watery BMs daily, difficult to predict and fluctuate based on imodium and diet - trying to avoid high fiber items helps. She notes some fatigue with this, drinking fluids and staying active. Some increase in heartburn symptoms since PPI stopped - she is using pepcid and tums PRN with some relief. She notes neuropathy in feet persists (tingling, feeling like "something is there" over entire foot, worse at ball of the foot) and occasionally in fingertips,overall improving/less prominent. She is tolerating cymbalta well. Headaches improved/less frequent. She notes final surgery planned soon. She has no other significant complaints.

## 2020-01-13 NOTE — PHYSICAL EXAM
[Fully active, able to carry on all pre-disease performance without restriction] : Status 0 - Fully active, able to carry on all pre-disease performance without restriction [Normal] : affect appropriate [de-identified] : large donor site scar with steri strips in place, some bruising, no oozing, closed [de-identified] : L chest wall mediport nontender [de-identified] : b/l ANTONIO reconstruction noted, L nipple noted. some crusting/scab R breast medial scar, L drain in place serosanguinous fluid, overall well healing no palpable masses or adenopathy b/l

## 2020-01-13 NOTE — ASSESSMENT
[Curative] : Goals of care discussed with patient: Curative [FreeTextEntry1] : 60yo woman with ER+RI-HER2+ locally advanced, poorly differentiated, inflammatory ductal breast cancer, multifocal with multiple breast masses and multiple enlarged axillary LNs on exam/MRI imaging (biopsy confirmed with clips placed), skin biopsy positive confirming inflammatory disease. Staging imaging with SUV-avid peripancreatic LN - biopsy with reactive lymph node (benign). She completed neoadjuvant AC-->THP with interval PET JENNY. Mastectomy/ALND with tissue expander placed 3/22 with pCR. She completed 1 year of adjuvant herceptin/perjeta 11/7/19 and adjuvant radiation completed, on arimidex and now s/p C1 neratinib, doing well with some diarrhea.\par \par R Breast Cancer: Diarrhea G2 on neratinib. Neuropathy grade 2 sudden onset with taxol 80mg/m2 #1 resolved (week 2 held, week 3 resumed) - now with grade 1 symptoms stable and somewhat persistent now and without functional deficits, improving\par -continue neratinib - discussed, dose reduction, holding therapy - patient prefers to trial additional measures for diarrhea management for 2-4 weeks - discussed increase imodium (max 6-8 tabs daily), continue budesonide, will add trial of cholestipol (with instructions given regarding timing of use around neratinib dosing)\par -begin neratinib as above; follow up visit in 2 weeks after for lab and toxicity check\par -CBC with differential, CMP and LFT check today\par -continue antihypertensive medication (HTN treatment and cardioprotective); follow up with Dr. Khan (TTE 11/2019 ok, EF stable)\par -follow up with Dr. Luna and Dr. Antonio as recommended\par -radiation treatment completed, follow up rad onc recommended\par -continue AI - arimidex 1mg PO daily (baseline DEXA in near future - ordered)\par -follow up 2-4 weeks with me, sooner if issues arise\par \par Headaches: new, atypical, different from migraines, possibly related to arimidex given timing and other symptoms, CTH with contrast reassuring (MRI Brain not possible given tissue expanders remain in place), now improving and at baseline/resolved\par -PRN tylenol use discussed, proper use, continue to monitor, consider neurology/pain management follow up if reoccur\par \par Joint Pains: arimidex related, also with some hot flashes, somewhat manageable, stable\par -cymbalta as below\par -continue to monitor\par \par Neuropathy: taxane induced, stable and somewhat bothersome. unclear if occasional dizziness related as well, improved somewhat\par -citalopram discontinued, continue Cymbalta 20mg PO ER daily, tolerating\par -dose uptitration as tolerating for antidepressant and nerve pain treatment, discussed with patient prior, to consider in near future\par \par TTE finding: mass on aortic valve non mobile, repeated Dr. Khan thought to be calcification. EF stable 5/2019, again 8/2019, 11/2019\par -follow up Dr. Khan as planned, continue antihypertensives\par \par Anemia: Hgb 9.6 post-operatively, likely related to loses surgically/drains, improving\par -continue to monitor CBC closely

## 2020-01-27 ENCOUNTER — OTHER (OUTPATIENT)
Age: 60
End: 2020-01-27

## 2020-01-27 ENCOUNTER — NON-APPOINTMENT (OUTPATIENT)
Age: 60
End: 2020-01-27

## 2020-01-28 ENCOUNTER — RESULT REVIEW (OUTPATIENT)
Age: 60
End: 2020-01-28

## 2020-01-28 ENCOUNTER — OUTPATIENT (OUTPATIENT)
Dept: OUTPATIENT SERVICES | Facility: HOSPITAL | Age: 60
LOS: 1 days | Discharge: HOME | End: 2020-01-28

## 2020-01-28 ENCOUNTER — APPOINTMENT (OUTPATIENT)
Dept: HEMATOLOGY ONCOLOGY | Facility: CLINIC | Age: 60
End: 2020-01-28

## 2020-01-28 DIAGNOSIS — C50.911 MALIGNANT NEOPLASM OF UNSPECIFIED SITE OF RIGHT FEMALE BREAST: ICD-10-CM

## 2020-01-28 DIAGNOSIS — Z90.11 ACQUIRED ABSENCE OF RIGHT BREAST AND NIPPLE: Chronic | ICD-10-CM

## 2020-01-28 DIAGNOSIS — Z17.0 ESTROGEN RECEPTOR POSITIVE STATUS [ER+]: ICD-10-CM

## 2020-01-28 DIAGNOSIS — Z98.890 OTHER SPECIFIED POSTPROCEDURAL STATES: Chronic | ICD-10-CM

## 2020-01-28 DIAGNOSIS — C77.3 SECONDARY AND UNSPECIFIED MALIGNANT NEOPLASM OF AXILLA AND UPPER LIMB LYMPH NODES: ICD-10-CM

## 2020-01-28 LAB
ALBUMIN SERPL ELPH-MCNC: 3.9 G/DL
ALP BLD-CCNC: 89 U/L
ALT SERPL-CCNC: 32 U/L
ANION GAP SERPL CALC-SCNC: 11 MMOL/L
AST SERPL-CCNC: 20 U/L
BASOPHILS # BLD AUTO: 0 K/UL — SIGNIFICANT CHANGE UP (ref 0–0.2)
BASOPHILS NFR BLD AUTO: 0.2 % — SIGNIFICANT CHANGE UP (ref 0–2)
BILIRUB SERPL-MCNC: <0.2 MG/DL
BUN SERPL-MCNC: 15 MG/DL
CALCIUM SERPL-MCNC: 9 MG/DL
CHLORIDE SERPL-SCNC: 103 MMOL/L
CO2 SERPL-SCNC: 28 MMOL/L
CREAT SERPL-MCNC: 0.67 MG/DL
EOSINOPHIL # BLD AUTO: 0.1 K/UL — SIGNIFICANT CHANGE UP (ref 0–0.5)
EOSINOPHIL NFR BLD AUTO: 1.7 % — SIGNIFICANT CHANGE UP (ref 0–6)
GLUCOSE SERPL-MCNC: 82 MG/DL
HCT VFR BLD CALC: 33.1 % — LOW (ref 34.5–45)
HGB BLD-MCNC: 10.7 G/DL — LOW (ref 11.5–15.5)
LYMPHOCYTES # BLD AUTO: 1.2 K/UL — SIGNIFICANT CHANGE UP (ref 1–3.3)
LYMPHOCYTES # BLD AUTO: 18.7 % — SIGNIFICANT CHANGE UP (ref 13–44)
MAGNESIUM SERPL-MCNC: 1.9 MG/DL
MCHC RBC-ENTMCNC: 29.3 PG — SIGNIFICANT CHANGE UP (ref 27–34)
MCHC RBC-ENTMCNC: 32.4 G/DL — SIGNIFICANT CHANGE UP (ref 32–36)
MCV RBC AUTO: 90.4 FL — SIGNIFICANT CHANGE UP (ref 80–100)
MONOCYTES # BLD AUTO: 0.5 K/UL — SIGNIFICANT CHANGE UP (ref 0–0.9)
MONOCYTES NFR BLD AUTO: 7.7 % — SIGNIFICANT CHANGE UP (ref 2–14)
NEUTROPHILS # BLD AUTO: 4.8 K/UL — SIGNIFICANT CHANGE UP (ref 1.8–7.4)
NEUTROPHILS NFR BLD AUTO: 71.8 % — SIGNIFICANT CHANGE UP (ref 43–77)
PLATELET # BLD AUTO: 278 K/UL — SIGNIFICANT CHANGE UP (ref 150–400)
POTASSIUM SERPL-SCNC: 4 MMOL/L
PROT SERPL-MCNC: 5.9 G/DL
RBC # BLD: 3.66 M/UL — LOW (ref 3.8–5.2)
RBC # FLD: 13.8 % — SIGNIFICANT CHANGE UP (ref 10.3–14.5)
SODIUM SERPL-SCNC: 142 MMOL/L
WBC # BLD: 6.7 K/UL — SIGNIFICANT CHANGE UP (ref 3.8–10.5)
WBC # FLD AUTO: 6.7 K/UL — SIGNIFICANT CHANGE UP (ref 3.8–10.5)

## 2020-01-30 ENCOUNTER — RX RENEWAL (OUTPATIENT)
Age: 60
End: 2020-01-30

## 2020-02-11 ENCOUNTER — APPOINTMENT (OUTPATIENT)
Dept: HEMATOLOGY ONCOLOGY | Facility: CLINIC | Age: 60
End: 2020-02-11
Payer: COMMERCIAL

## 2020-02-11 VITALS
SYSTOLIC BLOOD PRESSURE: 143 MMHG | TEMPERATURE: 98.5 F | WEIGHT: 244.71 LBS | HEART RATE: 88 BPM | RESPIRATION RATE: 18 BRPM | OXYGEN SATURATION: 97 % | DIASTOLIC BLOOD PRESSURE: 87 MMHG | BODY MASS INDEX: 38.33 KG/M2

## 2020-02-11 PROCEDURE — 99214 OFFICE O/P EST MOD 30 MIN: CPT

## 2020-02-12 RX ORDER — COLESTIPOL HYDROCHLORIDE 1 G/1
1 TABLET, FILM COATED ORAL
Qty: 60 | Refills: 1 | Status: DISCONTINUED | COMMUNITY
Start: 2019-12-31 | End: 2020-02-12

## 2020-02-12 RX ORDER — NERATINIB 40 MG/1
40 TABLET ORAL DAILY
Qty: 168 | Refills: 2 | Status: DISCONTINUED | COMMUNITY
Start: 2019-11-12 | End: 2020-02-12

## 2020-02-12 RX ORDER — BUDESONIDE 9 MG/1
9 TABLET, EXTENDED RELEASE ORAL DAILY
Qty: 30 | Refills: 1 | Status: DISCONTINUED | COMMUNITY
Start: 2019-11-12 | End: 2020-02-12

## 2020-02-12 RX ORDER — LOPERAMIDE HYDROCHLORIDE 2 MG/1
2 CAPSULE ORAL 3 TIMES DAILY
Qty: 168 | Refills: 1 | Status: DISCONTINUED | COMMUNITY
Start: 2019-11-12 | End: 2020-02-12

## 2020-02-12 NOTE — OB HISTORY
[Definite:  ___ (Date)] : the last menstrual period was [unfilled] [Experiencing Menopausal Sxs] : experiencing menopausal symptoms [Currently In Menopause] : currently in menopause [___] : Total Pregnancies: [unfilled] [Normal Amount/Duration] : was abnormal [Spotting Between  Menses] : no spotting between menses [Regular Cycle Intervals] : periods have been irregular

## 2020-02-12 NOTE — PHYSICAL EXAM
[Normal] : affect appropriate [Restricted in physically strenuous activity but ambulatory and able to carry out work of a light or sedentary nature] : Status 1- Restricted in physically strenuous activity but ambulatory and able to carry out work of a light or sedentary nature, e.g., light house work, office work [de-identified] : b/l ANTONIO reconstruction noted, L nipple noted. no palpable masses or adenopathy b/l [de-identified] : Well healed large donor site scar with extra tissue on right breast,  [de-identified] : L chest wall mediport nontender

## 2020-02-12 NOTE — ASSESSMENT
[Curative] : Goals of care discussed with patient: Curative [FreeTextEntry1] : 58yo woman with ER+NM-HER2+ locally advanced, poorly differentiated, inflammatory ductal breast cancer, multifocal with multiple breast masses and multiple enlarged axillary LNs on exam/MRI imaging (biopsy confirmed with clips placed), skin biopsy positive confirming inflammatory disease. Staging imaging with SUV-avid peripancreatic LN - biopsy with reactive lymph node (benign). She completed neoadjuvant AC-->THP with interval PET JENNY, G1 neuropathy with taxol. Mastectomy/ALND with tissue expander placed 3/22 with pCR. She completed 1 year of adjuvant herceptin/perjeta 11/7/19 and adjuvant radiation completed, on arimidex. Completed 2 cycles of extended adjuvant therapy with neratinib, discontinued 2/7 due to constant diarrhea G2 despite medical management, now feeling well.\par \par \par - Discontinued neratinib, continue to monitor\par -continue antihypertensive medication (HTN treatment and cardioprotective); follow up with Dr. Khan (TTE 12/2019 ok, EF stable)\par -Echo in March ordered today\par -follow up with Dr. Luna and Dr. Antonio as recommended\par -radiation treatment completed, follow up rad onc recommended\par -continue AI - arimidex 1mg PO daily\par -DEXA ordered today\par -follow up in 3 months, sooner if issues arise\par \par Headaches: improved after discontinuation of neratinib. Reports different from migraines. CTH with contrast reassuring\par -PRN tylenol use discussed, proper use, continue to monitor, consider neurology/pain management follow up if reoccur\par \par Joint Pains: arimidex related, also with some hot flashes, somewhat manageable, stable\par -cymbalta as below\par -continue to monitor\par \par Neuropathy: taxane induced, stable\par -citalopram discontinued, continue Cymbalta 20mg PO ER daily, tolerating\par \par TTE finding: mass on aortic valve non mobile, repeated Dr. Khan thought to be calcification. EF stable 5/2019, again 8/2019, 12/2019\par -follow up Dr. Khan as planned, continue antihypertensives\par \par Anemia: Hgb 9.6 post-operatively, likely related to loses surgically/drains, improving 10.7 on 1/28/2020. No overt bleeding.  \par -continue to monitor CBC closely

## 2020-02-12 NOTE — HISTORY OF PRESENT ILLNESS
[Disease: _____________________] : Disease: [unfilled] [N: ___] : N[unfilled] [T: ___] : T[unfilled] [AJCC Stage: ____] : AJCC Stage: [unfilled] [Treatment Protocol] : Treatment Protocol [de-identified] : \par 60yo woman with ER+AZ-HER2+ locally advanced, inflammatory poorly differentiated ductal breast cancer.\par \par She noted summer 2018 she felt a mass near her nipple in her R breast. She went to her PMD who ordered a mammogram and sonogram which revealed a R abnormal hypoechoic mass and abnormal appearing axillary LN.\par \par Image guided biopsy of mass and axilla revealed invasive poorly differentiated ductal carcinoma, +LVI, 1.2cm in specimen, [place] 8/9, DCIS, microcalcifications present, ER >95%, AZ negative, HER2+ 3+ IHC, Lymph node biopsy with metastatic carcinoma.\par \par She then saw Dr. Camacho breast surgeon who ordered and MRI of the breasts. MRI showed: Biopsy proven mammary carcinoma in the R retroareolar region 5:00 manifested as multiple irregular masses. The tumor is extensive in nature and spanning an area of over 3 cm with non-mass enhancement extending posteriorly suggesting extensive intraductal component as well. Two other highly suspicious masses noted in the RUOQ at 11:00 measuring >3cm and at 10:00 measuring 2.6cm consistent with multicentric disease. Biopsy proven metastatic disease to an axillary LN with additional suspicious LNs (additional with replaced fatty hilum and abnormally enlarged cortices, some have irregular borders, some posterior to pectoralis muscle, also abnormal appearing node in soft tissue lateral to the R chest wall).\par \par Dr. Camacho recommended neoadjuvant chemotherapy and the option for mastectomy with possible axillary lymph node dissection pending response to therapy.\par \par She then saw me in consultation. She underwent PETCT that found a peripancreatic lymph node that was enlarged and SUV avid, b/l laryngeal FDG avidity (likely due to speaking during exam), and SUV avid breast mass/axilla on R with breast skin noted to be thickened. She underwent EGD/EUS and biopsy of this peripancreatic LN (Dr. Giovanni Felton) which was consistent with benign/reactive lymphatic tissue. She had a mediport placed by IR on 9/26. She saw oncocardiology Dr. Khan for risk assessment prior to anthracycline/anti-HER2 monoclonal therapy and was started on ACEi for HTN and for cardioprotection. She saw dermatology for skin biopsy of R breast prior to treatment to evaluate/confirm inflammatory disease; biopsy results with skin involvement by poorly differentiated carcinoma. She started neoadjuvant chemotherapy with dose-dense AC on 9/26. dose-dense AC-->THP (weekly taxol 80mg/m2)\par \par 11/23 THP #1\par 11/30 Taxol 80mg/m2 held due to neuropathy\par 12/7 resumed Taxol weekly without issue\par 12/15 taxol completed x 12 weeks\par 3/8 Herceptin and Perjeta administered pre-operatively\par \par MRI 2/4/19 BIRADS 2, masses and adenopathy resolved. Some residual R breast skin thickening noted.\par \par 3/22 R mastectomy and ALND: 0/6 LN removed involved, no residual invasive carcinoma or DCIS identified. [id number] ([facility])\par \par 4/3 Herceptin and Perjeta continued post-operatively every 3 weeks, adjuvant radiation started 5/2019 and completed 6/2019 c/b grade 3 dermatitis now resolved.\par \par LH/FSH/estradiol levels May/June 2019 consistent with menopause.\par Arimidex started July 2019.\par \par She underwent ANTONIO reconstruction 10/2019 without event. Pathology negative.\par \par Herceptin/Perjeta C17 completed 11/7/19. She started extended adjuvant therapy with neratinib 12/2019.\par \par Patient lives in [place] with her  Gonzales (pretzel company owner). She has a 26 year old daughter (an artist and ). She works full time as a . She notes a history of smoking in the distant past. Mother passed away in 70s from metastatic melanoma, and a maternal aunt had breast cancer; no other significant close family history of cancers. She notes overall migraines (which she has had for many years, seen neurologist, stable lytic medications help, associated with one sided facial numbness when they occur) have gotten better with menopause – her LMP was 2/2018 and it was infrequent prior to that. She has had some hot flashes. She used OCPs in the past for years particularly for endometriosis treatment. She has had issues with DJD of back and spine with pain, MRI last year noted, epidural injections have helped with this significantly as well as occasional NSAID use.\par  [de-identified] : ER+MS-HER2+ [FreeTextEntry1] : dose-dense AC (adriamycin 60mg/m2 and cytoxan 600mg/m2) x 4 cycles, followed by weekly taxol 80mg/m2 x 12 with herceptin/perjeta q3 weeks, arimidex [de-identified] : Patient presents for follow up. She stopped neratinib last Friday (2/7/2020) after many attempts at management of diarrhea with increasing loperamide, budesonide, cholestipol. Since then, her diarrhea improved a lot. She had two formed bowel movements yesterday. She does not need imodium anymore. Headache, fatigue, abdominal pain/cramping all improved. Her last dose of steroid for those symptoms were on Sunday. Having ambulatory plastic surgery scheduled on 2/18/2020. She has no other issues or complaints currently. Neuropathy symptoms stable on cymbalta.

## 2020-02-13 ENCOUNTER — LABORATORY RESULT (OUTPATIENT)
Age: 60
End: 2020-02-13

## 2020-02-13 ENCOUNTER — APPOINTMENT (OUTPATIENT)
Dept: INFUSION THERAPY | Facility: HOSPITAL | Age: 60
End: 2020-02-13

## 2020-02-13 ENCOUNTER — RESULT REVIEW (OUTPATIENT)
Age: 60
End: 2020-02-13

## 2020-02-13 LAB
BASOPHILS # BLD AUTO: 0 K/UL — SIGNIFICANT CHANGE UP (ref 0–0.2)
BASOPHILS NFR BLD AUTO: 0.6 % — SIGNIFICANT CHANGE UP (ref 0–2)
EOSINOPHIL # BLD AUTO: 0.2 K/UL — SIGNIFICANT CHANGE UP (ref 0–0.5)
EOSINOPHIL NFR BLD AUTO: 2.2 % — SIGNIFICANT CHANGE UP (ref 0–6)
HCT VFR BLD CALC: 34 % — LOW (ref 34.5–45)
HGB BLD-MCNC: 10.9 G/DL — LOW (ref 11.5–15.5)
LYMPHOCYTES # BLD AUTO: 1 K/UL — SIGNIFICANT CHANGE UP (ref 1–3.3)
LYMPHOCYTES # BLD AUTO: 14.1 % — SIGNIFICANT CHANGE UP (ref 13–44)
MCHC RBC-ENTMCNC: 29 PG — SIGNIFICANT CHANGE UP (ref 27–34)
MCHC RBC-ENTMCNC: 32 G/DL — SIGNIFICANT CHANGE UP (ref 32–36)
MCV RBC AUTO: 90.6 FL — SIGNIFICANT CHANGE UP (ref 80–100)
MONOCYTES # BLD AUTO: 0.4 K/UL — SIGNIFICANT CHANGE UP (ref 0–0.9)
MONOCYTES NFR BLD AUTO: 5.5 % — SIGNIFICANT CHANGE UP (ref 2–14)
NEUTROPHILS # BLD AUTO: 5.5 K/UL — SIGNIFICANT CHANGE UP (ref 1.8–7.4)
NEUTROPHILS NFR BLD AUTO: 77.6 % — HIGH (ref 43–77)
PLATELET # BLD AUTO: 334 K/UL — SIGNIFICANT CHANGE UP (ref 150–400)
RBC # BLD: 3.75 M/UL — LOW (ref 3.8–5.2)
RBC # FLD: 14.6 % — HIGH (ref 10.3–14.5)
WBC # BLD: 7.1 K/UL — SIGNIFICANT CHANGE UP (ref 3.8–10.5)
WBC # FLD AUTO: 7.1 K/UL — SIGNIFICANT CHANGE UP (ref 3.8–10.5)

## 2020-02-14 ENCOUNTER — APPOINTMENT (OUTPATIENT)
Dept: FAMILY MEDICINE | Facility: CLINIC | Age: 60
End: 2020-02-14
Payer: COMMERCIAL

## 2020-02-14 ENCOUNTER — APPOINTMENT (OUTPATIENT)
Dept: CARDIOLOGY | Facility: CLINIC | Age: 60
End: 2020-02-14
Payer: COMMERCIAL

## 2020-02-14 VITALS
HEART RATE: 86 BPM | SYSTOLIC BLOOD PRESSURE: 120 MMHG | HEIGHT: 67 IN | DIASTOLIC BLOOD PRESSURE: 70 MMHG | TEMPERATURE: 97.8 F | BODY MASS INDEX: 38.77 KG/M2 | RESPIRATION RATE: 15 BRPM | OXYGEN SATURATION: 97 % | WEIGHT: 247 LBS

## 2020-02-14 PROCEDURE — 93306 TTE W/DOPPLER COMPLETE: CPT

## 2020-02-14 PROCEDURE — 93356 MYOCRD STRAIN IMG SPCKL TRCK: CPT

## 2020-02-14 PROCEDURE — 99214 OFFICE O/P EST MOD 30 MIN: CPT

## 2020-02-14 RX ORDER — TRIAMCINOLONE ACETONIDE 1 MG/G
0.1 OINTMENT TOPICAL
Qty: 1 | Refills: 2 | Status: COMPLETED | COMMUNITY
Start: 2019-01-23 | End: 2020-02-14

## 2020-02-14 NOTE — PHYSICAL EXAM
[No Acute Distress] : no acute distress [Well Nourished] : well nourished [Normal Sclera/Conjunctiva] : normal sclera/conjunctiva [PERRL] : pupils equal round and reactive to light [Normal Outer Ear/Nose] : the outer ears and nose were normal in appearance [Normal Oropharynx] : the oropharynx was normal [Normal TMs] : both tympanic membranes were normal [No Lymphadenopathy] : no lymphadenopathy [Thyroid Normal, No Nodules] : the thyroid was normal and there were no nodules present [No JVD] : no jugular venous distention [No Respiratory Distress] : no respiratory distress  [No Accessory Muscle Use] : no accessory muscle use [Normal Rate] : normal rate  [Clear to Auscultation] : lungs were clear to auscultation bilaterally [Regular Rhythm] : with a regular rhythm [No Murmur] : no murmur heard [Pedal Pulses Present] : the pedal pulses are present [Normal S1, S2] : normal S1 and S2 [No Edema] : there was no peripheral edema [Non Tender] : non-tender [Soft] : abdomen soft [Non-distended] : non-distended [No Masses] : no abdominal mass palpated [Declined Rectal Exam] : declined rectal exam [No HSM] : no HSM [Normal Supraclavicular Nodes] : no supraclavicular lymphadenopathy [No CVA Tenderness] : no CVA  tenderness [Normal Posterior Cervical Nodes] : no posterior cervical lymphadenopathy [Normal Anterior Cervical Nodes] : no anterior cervical lymphadenopathy [No Rash] : no rash [Normal Gait] : normal gait [Normal Affect] : the affect was normal [Alert and Oriented x3] : oriented to person, place, and time [Normal Insight/Judgement] : insight and judgment were intact [de-identified] : varicose veins right lower extremity no swelling currently and nontender [de-identified] : post expander placement prior to reconstructive surgery- some scar tissue noted right breast underneath and area leading to the sternum, minimally tender to palpation [de-identified] : defer at this time [de-identified] : mildly tender over the lumbar region of the back with mild tenderness of the thoracic spine as well with some thoracic scoliosis noted [de-identified] : hair has grown in again- minimal regrowth of eyebrows and lashes, nails regrowing as well almost normal in appearance [de-identified] : reduced sensation of the fingers and toes

## 2020-02-14 NOTE — HISTORY OF PRESENT ILLNESS
[No Pertinent Cardiac History] : no history of aortic stenosis, atrial fibrillation, coronary artery disease, recent myocardial infarction, or implantable device/pacemaker [No Pertinent Pulmonary History] : no history of asthma, COPD, sleep apnea, or smoking [No Adverse Anesthesia Reaction] : no adverse anesthesia reaction in self or family member [(Patient denies any chest pain, claudication, dyspnea on exertion, orthopnea, palpitations or syncope)] : Patient denies any chest pain, claudication, dyspnea on exertion, orthopnea, palpitations or syncope [Chronic Anticoagulation] : no chronic anticoagulation [Chronic Kidney Disease] : no chronic kidney disease [Diabetes] : no diabetes [FreeTextEntry1] : Plastic breast surgery for nipplesreconstruction- right [FreeTextEntry2] : 2/18/2020 [FreeTextEntry4] : Here for medical evaluation prior to ongoing reconstruction of the right breast. Patient denies any active medical complaints at this time. Had recently been prescribed nelix for cancer treatment Her2 + breast cancer but did not tolerate this due to severe diarrhea. This was stopped several weeks ago now and she is feeling much better. Feel ready for the nipple surgery this coming week without any recent infections.  [FreeTextEntry3] : Dr. Antonio

## 2020-02-14 NOTE — REVIEW OF SYSTEMS
[Joint Pain] : joint pain [Back Pain] : back pain [Nail Changes] : nail changes [Hair Changes] : hair changes [Headache] : headache [Anxiety] : anxiety [Depression] : depression [Negative] : Heme/Lymph [Diarrhea] : diarrhea [Hematuria] : no hematuria [Skin Rash] : no skin rash [Dizziness] : no dizziness [Confusion] : no confusion [Fainting] : no fainting [Memory Loss] : no memory loss [Unsteady Walking] : no ataxia [de-identified] : hair regrown and regrowth of nails as well [FreeTextEntry9] : sciatica not too bad at this time [de-identified] : symptoms related to her disease and anxiety about upcoming surgery [de-identified] : peripheral neuropathy- no change, migraines were much worse during the use of nelix

## 2020-02-14 NOTE — RESULTS/DATA
[] : results reviewed [de-identified] : normal [de-identified] : normal [de-identified] : normal [de-identified] : normal

## 2020-02-14 NOTE — ASSESSMENT
[Patient Optimized for Surgery] : Patient optimized for surgery [As per surgery] : as per surgery [FreeTextEntry5] : n/a [FreeTextEntry6] : n/a [FreeTextEntry7] : anastazole and lisinopril as per usual routine with sip of water on day of procdure.

## 2020-02-21 ENCOUNTER — RX RENEWAL (OUTPATIENT)
Age: 60
End: 2020-02-21

## 2020-03-19 ENCOUNTER — OUTPATIENT (OUTPATIENT)
Dept: OUTPATIENT SERVICES | Facility: HOSPITAL | Age: 60
LOS: 1 days | Discharge: HOME | End: 2020-03-19

## 2020-03-19 DIAGNOSIS — Z98.890 OTHER SPECIFIED POSTPROCEDURAL STATES: Chronic | ICD-10-CM

## 2020-03-19 DIAGNOSIS — C50.911 MALIGNANT NEOPLASM OF UNSPECIFIED SITE OF RIGHT FEMALE BREAST: ICD-10-CM

## 2020-03-19 DIAGNOSIS — Z90.11 ACQUIRED ABSENCE OF RIGHT BREAST AND NIPPLE: Chronic | ICD-10-CM

## 2020-04-09 ENCOUNTER — APPOINTMENT (OUTPATIENT)
Dept: INFUSION THERAPY | Facility: HOSPITAL | Age: 60
End: 2020-04-09

## 2020-04-29 ENCOUNTER — OUTPATIENT (OUTPATIENT)
Dept: OUTPATIENT SERVICES | Facility: HOSPITAL | Age: 60
LOS: 1 days | Discharge: HOME | End: 2020-04-29

## 2020-04-29 DIAGNOSIS — Z98.890 OTHER SPECIFIED POSTPROCEDURAL STATES: Chronic | ICD-10-CM

## 2020-04-29 DIAGNOSIS — C50.911 MALIGNANT NEOPLASM OF UNSPECIFIED SITE OF RIGHT FEMALE BREAST: ICD-10-CM

## 2020-04-29 DIAGNOSIS — Z17.0 ESTROGEN RECEPTOR POSITIVE STATUS [ER+]: ICD-10-CM

## 2020-04-29 DIAGNOSIS — Z90.11 ACQUIRED ABSENCE OF RIGHT BREAST AND NIPPLE: Chronic | ICD-10-CM

## 2020-04-29 DIAGNOSIS — C77.3 SECONDARY AND UNSPECIFIED MALIGNANT NEOPLASM OF AXILLA AND UPPER LIMB LYMPH NODES: ICD-10-CM

## 2020-05-05 ENCOUNTER — APPOINTMENT (OUTPATIENT)
Dept: HEMATOLOGY ONCOLOGY | Facility: CLINIC | Age: 60
End: 2020-05-05
Payer: COMMERCIAL

## 2020-05-05 PROCEDURE — 99213 OFFICE O/P EST LOW 20 MIN: CPT | Mod: 95

## 2020-05-06 NOTE — PHYSICAL EXAM
[Restricted in physically strenuous activity but ambulatory and able to carry out work of a light or sedentary nature] : Status 1- Restricted in physically strenuous activity but ambulatory and able to carry out work of a light or sedentary nature, e.g., light house work, office work [Normal] : affect appropriate [de-identified] : hair growing back [de-identified] : anicteric no injection

## 2020-05-06 NOTE — REASON FOR VISIT
[Spouse] : spouse [Follow-Up Visit] : a follow-up [FreeTextEntry2] : locally advanced, inflammatory breast cancer

## 2020-05-06 NOTE — HISTORY OF PRESENT ILLNESS
[Disease: _____________________] : Disease: [unfilled] [N: ___] : N[unfilled] [T: ___] : T[unfilled] [AJCC Stage: ____] : AJCC Stage: [unfilled] [Treatment Protocol] : Treatment Protocol [de-identified] : 60yo woman with history of ER+NV-HER2+ locally advanced, inflammatory poorly differentiated ductal breast cancer.\par \par She noted summer 2018 she felt a mass near her nipple in her R breast. She went to her PMD who ordered a mammogram and sonogram which revealed a R abnormal hypoechoic mass and abnormal appearing axillary LN.\par \par Image guided biopsy of mass and axilla revealed invasive poorly differentiated ductal carcinoma, +LVI, 1.2cm in specimen, Laura 8/9, DCIS, microcalcifications present, ER >95%, NV negative, HER2+ 3+ IHC, Lymph node biopsy with metastatic carcinoma.\par \par She then saw Dr. Camacho breast surgeon who ordered and MRI of the breasts. MRI showed: Biopsy proven mammary carcinoma in the R retroareolar region 5:00 manifested as multiple irregular masses. The tumor is extensive in nature and spanning an area of over 3 cm with non-mass enhancement extending posteriorly suggesting extensive intraductal component as well. Two other highly suspicious masses noted in the RUOQ at 11:00 measuring >3cm and at 10:00 measuring 2.6cm consistent with multicentric disease. Biopsy proven metastatic disease to an axillary LN with additional suspicious LNs (additional with replaced fatty hilum and abnormally enlarged cortices, some have irregular borders, some posterior to pectoralis muscle, also abnormal appearing node in soft tissue lateral to the R chest wall).\par \par Dr. Camacho recommended neoadjuvant chemotherapy and the option for mastectomy with possible axillary lymph node dissection pending response to therapy.\par \par She then saw me in consultation. She underwent PETCT that found a peripancreatic lymph node that was enlarged and SUV avid, b/l laryngeal FDG avidity (likely due to speaking during exam), and SUV avid breast mass/axilla on R with breast skin noted to be thickened. She underwent EGD/EUS and biopsy of this peripancreatic LN (Dr. Giovanni Felton) which was consistent with benign/reactive lymphatic tissue. She had a mediport placed by IR on 9/26. She saw oncocardiology Dr. Khan for risk assessment prior to anthracycline/anti-HER2 monoclonal therapy and was started on ACEi for HTN and for cardioprotection. She saw dermatology for skin biopsy of R breast prior to treatment to evaluate/confirm inflammatory disease; biopsy results with skin involvement by poorly differentiated carcinoma. She started neoadjuvant chemotherapy with dose-dense AC on 9/26. dose-dense AC-->THP (weekly taxol 80mg/m2)\par \par 11/23 THP #1\par 11/30 Taxol 80mg/m2 held due to neuropathy\par 12/7 resumed Taxol weekly without issue\par 12/15 taxol completed x 12 weeks\par 3/8 Herceptin and Perjeta administered pre-operatively\par \par MRI 2/4/19 BIRADS 2, masses and adenopathy resolved. Some residual R breast skin thickening noted.\par \par 3/22 R mastectomy and ALND: 0/6 LN removed involved, no residual invasive carcinoma or DCIS identified. [id number] ([facility])\par \par 4/3 Herceptin and Perjeta continued post-operatively every 3 weeks, adjuvant radiation started 5/2019 and completed 6/2019 c/b grade 3 dermatitis now resolved.\par \par LH/FSH/estradiol levels May/June 2019 consistent with menopause.\par Arimidex started July 2019.\par \par She underwent ANTONIO reconstruction 10/2019 without event. Pathology negative.\par \par Herceptin/Perjeta C17 completed 11/7/19. She started extended adjuvant therapy with neratinib 12/2019 -2/2020 - self discontinued due to toxicity of diarrhea. She continues on arimidex.\par \par Patient lives in [place] with her  Gonzales (pretzel company owner). She has a 26 year old daughter (an artist and ). She works full time as a . She notes a history of smoking in the distant past. Mother passed away in 70s from metastatic melanoma, and a maternal aunt had breast cancer; no other significant close family history of cancers. She notes overall migraines (which she has had for many years, seen neurologist, stable lytic medications help, associated with one sided facial numbness when they occur) have gotten better with menopause – her LMP was 2/2018 and it was infrequent prior to that. She has had some hot flashes. She used OCPs in the past for years particularly for endometriosis treatment. She has had issues with DJD of back and spine with pain, MRI last year noted, epidural injections have helped with this significantly as well as occasional NSAID use.\par  [de-identified] : ER+AZ-HER2+ [FreeTextEntry1] : dose-dense AC (adriamycin 60mg/m2 and cytoxan 600mg/m2) x 4 cycles, followed by weekly taxol 80mg/m2 x 12 with herceptin/perjeta q3 weeks, arimidex [Home] : at home, [unfilled] , at the time of the visit. [Medical Office: ([facility])___] : at the medical office located in  [Patient] : the patient [Self] : self [FreeTextEntry2] : Ranjeet Harrison [de-identified] : Patient presents for follow up by telemedicine in light of COVID19 pandemic. She had ambulatory plastic surgery on 2/18/2020; saw PMD for physical prior. She saw Dr. Luna and Dr. Antonio both in 3/2020 and told to follow up in 6 months to a year, all wounds closed and healed. She is tolerating AI, some joint pains at time but exercising/walking, occasional headaches/migraines stable from prior. She has no other issues or complaints currently. Neuropathy symptoms stable to slightly improved on cymbalta - she notes sheets laying over her feet to sleep no longer uncomfortable. She notes difficulty sleeping lately which has lead to an irregular sleep schedule, diffiult to return to normal.

## 2020-05-06 NOTE — OB HISTORY
[Definite:  ___ (Date)] : the last menstrual period was [unfilled] [Currently In Menopause] : currently in menopause [Experiencing Menopausal Sxs] : experiencing menopausal symptoms [___] : Full Term: [unfilled] [Normal Amount/Duration] : was abnormal [Spotting Between  Menses] : no spotting between menses [Regular Cycle Intervals] : periods have been irregular

## 2020-05-06 NOTE — ASSESSMENT
[Curative] : Goals of care discussed with patient: Curative [FreeTextEntry1] : 58yo woman with ER+TX-HER2+ locally advanced, poorly differentiated, inflammatory ductal breast cancer, multifocal with multiple breast masses and multiple enlarged axillary LNs on exam/MRI imaging (biopsy confirmed with clips placed), skin biopsy positive confirming inflammatory disease. Staging imaging with SUV-avid peripancreatic LN - biopsy with reactive lymph node (benign). She completed neoadjuvant AC-->THP with interval PET JENNY, G1 neuropathy with taxol. Mastectomy/ALND with tissue expander placed 3/22 with pCR. She completed 1 year of adjuvant herceptin/perjeta 11/7/19 and adjuvant radiation completed, on arimidex. Completed 2 cycles of extended adjuvant therapy with neratinib, discontinued 2/7 due to constant diarrhea G2 despite medical management, now feeling well. She continues on arimidex.\par \par -continue antihypertensive medication (HTN treatment and cardioprotective); follow up with Dr. Khan (TTE 12/2019, 2/2020 OK, EF stable)\par -follow up with Dr. Luna and Dr. Antonio as recommended\par -radiation treatment completed, follow up rad onc recommended\par -continue AI - arimidex 1mg PO daily\par -DEXA ordered prior, to be performed in near future\par -follow up in 3 months, sooner if issues arise; plan for POF and labs next month if safe/patient comfortable, discussed risks benefits, she notes desire to remove port when safe as well\par \par Joint Pains: arimidex related, also with some hot flashes, somewhat manageable, stable to improved\par -cymbalta as below\par -continue to monitor\par \par Neuropathy: taxane induced, stable\par -citalopram discontinued, continue Cymbalta 20mg PO ER daily, tolerating\par \par TTE finding: mass on aortic valve non mobile, repeated Dr. Khan thought to be calcification. EF stable 5/2019, again 8/2019, 12/2019, 2/2020\par -follow up Dr. Khan as planned, continue antihypertensives\par \par Insomnia: situational, has history and improved with brief trial lorazepam, sleep schedule altered, has tried melatonin and all sleep hygiene measures recommended\par -discussed brief 1 week trial QHS lorazepam as prior to reset sleep schedule now; reviewed plan for only this trial and not long term use/refills, to follow up with PMD further if persists despite this

## 2020-05-19 ENCOUNTER — RESULT REVIEW (OUTPATIENT)
Age: 60
End: 2020-05-19

## 2020-05-19 ENCOUNTER — TRANSCRIPTION ENCOUNTER (OUTPATIENT)
Age: 60
End: 2020-05-19

## 2020-05-19 LAB
SARS-COV-2 IGG SERPL IA-ACNC: 0.1 INDEX
SARS-COV-2 IGG SERPL QL IA: NEGATIVE

## 2020-05-20 ENCOUNTER — RX RENEWAL (OUTPATIENT)
Age: 60
End: 2020-05-20

## 2020-05-26 ENCOUNTER — RX RENEWAL (OUTPATIENT)
Age: 60
End: 2020-05-26

## 2020-05-27 RX ORDER — MELOXICAM 15 MG/1
15 TABLET ORAL
Qty: 90 | Refills: 1 | Status: COMPLETED | COMMUNITY
Start: 2019-06-01 | End: 2020-05-27

## 2020-06-02 ENCOUNTER — APPOINTMENT (OUTPATIENT)
Dept: RADIATION ONCOLOGY | Facility: CLINIC | Age: 60
End: 2020-06-02

## 2020-06-08 ENCOUNTER — APPOINTMENT (OUTPATIENT)
Dept: RADIATION ONCOLOGY | Facility: CLINIC | Age: 60
End: 2020-06-08
Payer: COMMERCIAL

## 2020-06-08 PROCEDURE — 99213 OFFICE O/P EST LOW 20 MIN: CPT | Mod: 95

## 2020-06-09 NOTE — VITALS
[Maximal Pain Intensity: 2/10] : 2/10 [Pain Location: ___] : Pain Location: [unfilled] [OTC] : OTC [Least Pain Intensity: 0/10] : 0/10 [Pain Description/Quality: ___] : Pain description/quality: [unfilled] [Pain Duration: ___] : Pain duration: [unfilled] [Opioid] : opioid [90: Able to carry normal activity; minor signs or symptoms of disease.] : 90: Able to carry normal activity; minor signs or symptoms of disease.  [ECOG Performance Status: 0 - Fully active, able to carry on all pre-disease performance without restriction] : Performance Status: 0 - Fully active, able to carry on all pre-disease performance without restriction [Pain Interferes with ADLs] : Pain does not interfere with activities of daily living

## 2020-06-09 NOTE — HISTORY OF PRESENT ILLNESS
[Home] : at home, [unfilled] , at the time of the visit. [Medical Office: ([facility])___] : at the medical office located in  [Verbal consent obtained from patient] : the patient, [unfilled] [FreeTextEntry4] : Josee Zhu NP [FreeTextEntry1] : \par Ms. Harrison is a 59 year old female with cT4 N0 ER positive, Her-2 positive poorly differentiated invasive ductal carcinoma of the right breast, s/p neoadjuvant chemotherapy, s/p right mastectomy and sentinel node biopsy showing ypT0 ypN0 disease. She was treated to a dose of 5,000 cGy to the right chest wall/regional nodes from 5/10/2019 - 6/14/2019 (35 days).  \par \par She then underwent ANTONIO flap right breast reconstruction on 10/29/2019 by Dr. Antonio and left breast mastectomy by Dr. Luna. Herceptin/Perjeta C17 completed 11/7/19.  She then had a second minor surgery in 2/2020 to tweak the right breast size, to even the shapes/sizes of her breasts. She continues on anastrazole, under the care of Dr. Cox. \par \par The patient reports feeling generally well. She is involved in her usual activities. She has a good appetite and no weight loss. She denies cough or shortness of breath.  She continues to work full time as a .  She was previously following up under the care of Dr. Christine Jackson, but will be transferring care to my team, as Dr. Jackson is relocating. She feels her skin s/p radiation has healed nicely.  She did have a grade 2 radiation dermatitis, but this resolved after the completion of RT with topical creams. She continues regular skin care. She is taking anastrozole and is tolerating it well. \par

## 2020-06-17 ENCOUNTER — TRANSCRIPTION ENCOUNTER (OUTPATIENT)
Age: 60
End: 2020-06-17

## 2020-06-18 ENCOUNTER — RX RENEWAL (OUTPATIENT)
Age: 60
End: 2020-06-18

## 2020-06-19 ENCOUNTER — APPOINTMENT (OUTPATIENT)
Dept: CARDIOLOGY | Facility: CLINIC | Age: 60
End: 2020-06-19

## 2020-06-22 ENCOUNTER — NON-APPOINTMENT (OUTPATIENT)
Age: 60
End: 2020-06-22

## 2020-06-24 ENCOUNTER — TRANSCRIPTION ENCOUNTER (OUTPATIENT)
Age: 60
End: 2020-06-24

## 2020-06-30 ENCOUNTER — RX RENEWAL (OUTPATIENT)
Age: 60
End: 2020-06-30

## 2020-07-29 ENCOUNTER — OUTPATIENT (OUTPATIENT)
Dept: OUTPATIENT SERVICES | Facility: HOSPITAL | Age: 60
LOS: 1 days | Discharge: HOME | End: 2020-07-29

## 2020-07-29 DIAGNOSIS — Z98.890 OTHER SPECIFIED POSTPROCEDURAL STATES: Chronic | ICD-10-CM

## 2020-07-29 DIAGNOSIS — Z90.11 ACQUIRED ABSENCE OF RIGHT BREAST AND NIPPLE: Chronic | ICD-10-CM

## 2020-07-29 DIAGNOSIS — C50.911 MALIGNANT NEOPLASM OF UNSPECIFIED SITE OF RIGHT FEMALE BREAST: ICD-10-CM

## 2020-07-29 DIAGNOSIS — Z17.0 ESTROGEN RECEPTOR POSITIVE STATUS [ER+]: ICD-10-CM

## 2020-07-29 DIAGNOSIS — C77.3 SECONDARY AND UNSPECIFIED MALIGNANT NEOPLASM OF AXILLA AND UPPER LIMB LYMPH NODES: ICD-10-CM

## 2020-07-31 ENCOUNTER — RESULT REVIEW (OUTPATIENT)
Age: 60
End: 2020-07-31

## 2020-07-31 ENCOUNTER — LABORATORY RESULT (OUTPATIENT)
Age: 60
End: 2020-07-31

## 2020-07-31 ENCOUNTER — APPOINTMENT (OUTPATIENT)
Dept: HEMATOLOGY ONCOLOGY | Facility: CLINIC | Age: 60
End: 2020-07-31
Payer: COMMERCIAL

## 2020-07-31 ENCOUNTER — APPOINTMENT (OUTPATIENT)
Dept: INFUSION THERAPY | Facility: HOSPITAL | Age: 60
End: 2020-07-31

## 2020-07-31 VITALS
TEMPERATURE: 98.3 F | HEART RATE: 76 BPM | WEIGHT: 257.94 LBS | SYSTOLIC BLOOD PRESSURE: 135 MMHG | BODY MASS INDEX: 40.4 KG/M2 | RESPIRATION RATE: 14 BRPM | DIASTOLIC BLOOD PRESSURE: 83 MMHG | OXYGEN SATURATION: 98 %

## 2020-07-31 LAB
BASOPHILS # BLD AUTO: 0.04 K/UL — SIGNIFICANT CHANGE UP (ref 0–0.2)
BASOPHILS NFR BLD AUTO: 0.4 % — SIGNIFICANT CHANGE UP (ref 0–2)
EOSINOPHIL # BLD AUTO: 0.11 K/UL — SIGNIFICANT CHANGE UP (ref 0–0.5)
EOSINOPHIL NFR BLD AUTO: 1.1 % — SIGNIFICANT CHANGE UP (ref 0–6)
HCT VFR BLD CALC: 36.8 % — SIGNIFICANT CHANGE UP (ref 34.5–45)
HGB BLD-MCNC: 11.6 G/DL — SIGNIFICANT CHANGE UP (ref 11.5–15.5)
IMM GRANULOCYTES NFR BLD AUTO: 0.4 % — SIGNIFICANT CHANGE UP (ref 0–1.5)
LYMPHOCYTES # BLD AUTO: 1.35 K/UL — SIGNIFICANT CHANGE UP (ref 1–3.3)
LYMPHOCYTES # BLD AUTO: 13.9 % — SIGNIFICANT CHANGE UP (ref 13–44)
MCHC RBC-ENTMCNC: 29.6 PG — SIGNIFICANT CHANGE UP (ref 27–34)
MCHC RBC-ENTMCNC: 31.5 GM/DL — LOW (ref 32–36)
MCV RBC AUTO: 93.9 FL — SIGNIFICANT CHANGE UP (ref 80–100)
MONOCYTES # BLD AUTO: 0.55 K/UL — SIGNIFICANT CHANGE UP (ref 0–0.9)
MONOCYTES NFR BLD AUTO: 5.7 % — SIGNIFICANT CHANGE UP (ref 2–14)
NEUTROPHILS # BLD AUTO: 7.61 K/UL — HIGH (ref 1.8–7.4)
NEUTROPHILS NFR BLD AUTO: 78.5 % — HIGH (ref 43–77)
NRBC # BLD: 0 /100 WBCS — SIGNIFICANT CHANGE UP (ref 0–0)
PLATELET # BLD AUTO: 329 K/UL — SIGNIFICANT CHANGE UP (ref 150–400)
RBC # BLD: 3.92 M/UL — SIGNIFICANT CHANGE UP (ref 3.8–5.2)
RBC # FLD: 14.1 % — SIGNIFICANT CHANGE UP (ref 10.3–14.5)
WBC # BLD: 9.7 K/UL — SIGNIFICANT CHANGE UP (ref 3.8–10.5)
WBC # FLD AUTO: 9.7 K/UL — SIGNIFICANT CHANGE UP (ref 3.8–10.5)

## 2020-07-31 PROCEDURE — 99215 OFFICE O/P EST HI 40 MIN: CPT

## 2020-08-12 ENCOUNTER — APPOINTMENT (OUTPATIENT)
Dept: RADIOLOGY | Facility: IMAGING CENTER | Age: 60
End: 2020-08-12
Payer: COMMERCIAL

## 2020-08-12 ENCOUNTER — RESULT REVIEW (OUTPATIENT)
Age: 60
End: 2020-08-12

## 2020-08-12 ENCOUNTER — OUTPATIENT (OUTPATIENT)
Dept: OUTPATIENT SERVICES | Facility: HOSPITAL | Age: 60
LOS: 1 days | End: 2020-08-12
Payer: COMMERCIAL

## 2020-08-12 DIAGNOSIS — C77.3 SECONDARY AND UNSPECIFIED MALIGNANT NEOPLASM OF AXILLA AND UPPER LIMB LYMPH NODES: ICD-10-CM

## 2020-08-12 DIAGNOSIS — Z90.11 ACQUIRED ABSENCE OF RIGHT BREAST AND NIPPLE: Chronic | ICD-10-CM

## 2020-08-12 DIAGNOSIS — Z98.890 OTHER SPECIFIED POSTPROCEDURAL STATES: Chronic | ICD-10-CM

## 2020-08-12 DIAGNOSIS — C50.911 MALIGNANT NEOPLASM OF UNSPECIFIED SITE OF RIGHT FEMALE BREAST: ICD-10-CM

## 2020-08-12 PROCEDURE — 77080 DXA BONE DENSITY AXIAL: CPT

## 2020-08-12 PROCEDURE — 77080 DXA BONE DENSITY AXIAL: CPT | Mod: 26

## 2020-09-22 RX ORDER — LORAZEPAM 0.5 MG/1
0.5 TABLET ORAL
Qty: 7 | Refills: 0 | Status: COMPLETED | COMMUNITY
Start: 2018-11-09 | End: 2020-09-22

## 2020-10-17 ENCOUNTER — OUTPATIENT (OUTPATIENT)
Dept: OUTPATIENT SERVICES | Facility: HOSPITAL | Age: 60
LOS: 1 days | Discharge: HOME | End: 2020-10-17

## 2020-10-17 DIAGNOSIS — Z90.11 ACQUIRED ABSENCE OF RIGHT BREAST AND NIPPLE: Chronic | ICD-10-CM

## 2020-10-17 DIAGNOSIS — Z17.0 ESTROGEN RECEPTOR POSITIVE STATUS [ER+]: ICD-10-CM

## 2020-10-17 DIAGNOSIS — Z98.890 OTHER SPECIFIED POSTPROCEDURAL STATES: Chronic | ICD-10-CM

## 2020-10-17 DIAGNOSIS — C50.911 MALIGNANT NEOPLASM OF UNSPECIFIED SITE OF RIGHT FEMALE BREAST: ICD-10-CM

## 2020-10-17 DIAGNOSIS — C77.3 SECONDARY AND UNSPECIFIED MALIGNANT NEOPLASM OF AXILLA AND UPPER LIMB LYMPH NODES: ICD-10-CM

## 2020-10-19 ENCOUNTER — NON-APPOINTMENT (OUTPATIENT)
Age: 60
End: 2020-10-19

## 2020-10-20 ENCOUNTER — LABORATORY RESULT (OUTPATIENT)
Age: 60
End: 2020-10-20

## 2020-10-20 ENCOUNTER — RESULT REVIEW (OUTPATIENT)
Age: 60
End: 2020-10-20

## 2020-10-20 ENCOUNTER — RX RENEWAL (OUTPATIENT)
Age: 60
End: 2020-10-20

## 2020-10-20 ENCOUNTER — APPOINTMENT (OUTPATIENT)
Dept: INFUSION THERAPY | Facility: HOSPITAL | Age: 60
End: 2020-10-20

## 2020-10-20 LAB
BASOPHILS # BLD AUTO: 0.02 K/UL — SIGNIFICANT CHANGE UP (ref 0–0.2)
BASOPHILS NFR BLD AUTO: 0.3 % — SIGNIFICANT CHANGE UP (ref 0–2)
EOSINOPHIL # BLD AUTO: 0.09 K/UL — SIGNIFICANT CHANGE UP (ref 0–0.5)
EOSINOPHIL NFR BLD AUTO: 1.4 % — SIGNIFICANT CHANGE UP (ref 0–6)
HCT VFR BLD CALC: 33.2 % — LOW (ref 34.5–45)
HGB BLD-MCNC: 10.7 G/DL — LOW (ref 11.5–15.5)
IMM GRANULOCYTES NFR BLD AUTO: 0.3 % — SIGNIFICANT CHANGE UP (ref 0–1.5)
LYMPHOCYTES # BLD AUTO: 1.4 K/UL — SIGNIFICANT CHANGE UP (ref 1–3.3)
LYMPHOCYTES # BLD AUTO: 22.4 % — SIGNIFICANT CHANGE UP (ref 13–44)
MCHC RBC-ENTMCNC: 29.3 PG — SIGNIFICANT CHANGE UP (ref 27–34)
MCHC RBC-ENTMCNC: 32.2 G/DL — SIGNIFICANT CHANGE UP (ref 32–36)
MCV RBC AUTO: 91 FL — SIGNIFICANT CHANGE UP (ref 80–100)
MONOCYTES # BLD AUTO: 0.41 K/UL — SIGNIFICANT CHANGE UP (ref 0–0.9)
MONOCYTES NFR BLD AUTO: 6.5 % — SIGNIFICANT CHANGE UP (ref 2–14)
NEUTROPHILS # BLD AUTO: 4.32 K/UL — SIGNIFICANT CHANGE UP (ref 1.8–7.4)
NEUTROPHILS NFR BLD AUTO: 69.1 % — SIGNIFICANT CHANGE UP (ref 43–77)
NRBC # BLD: 0 /100 WBCS — SIGNIFICANT CHANGE UP (ref 0–0)
PLATELET # BLD AUTO: 280 K/UL — SIGNIFICANT CHANGE UP (ref 150–400)
RBC # BLD: 3.65 M/UL — LOW (ref 3.8–5.2)
RBC # FLD: 13.8 % — SIGNIFICANT CHANGE UP (ref 10.3–14.5)
WBC # BLD: 6.26 K/UL — SIGNIFICANT CHANGE UP (ref 3.8–10.5)
WBC # FLD AUTO: 6.26 K/UL — SIGNIFICANT CHANGE UP (ref 3.8–10.5)

## 2020-10-27 ENCOUNTER — OUTPATIENT (OUTPATIENT)
Dept: OUTPATIENT SERVICES | Facility: HOSPITAL | Age: 60
LOS: 1 days | End: 2020-10-27
Payer: COMMERCIAL

## 2020-10-27 VITALS
WEIGHT: 255.96 LBS | SYSTOLIC BLOOD PRESSURE: 124 MMHG | RESPIRATION RATE: 16 BRPM | TEMPERATURE: 98 F | OXYGEN SATURATION: 97 % | DIASTOLIC BLOOD PRESSURE: 80 MMHG | HEART RATE: 78 BPM | HEIGHT: 67 IN

## 2020-10-27 DIAGNOSIS — I10 ESSENTIAL (PRIMARY) HYPERTENSION: ICD-10-CM

## 2020-10-27 DIAGNOSIS — Z98.890 OTHER SPECIFIED POSTPROCEDURAL STATES: Chronic | ICD-10-CM

## 2020-10-27 DIAGNOSIS — Z85.3 PERSONAL HISTORY OF MALIGNANT NEOPLASM OF BREAST: ICD-10-CM

## 2020-10-27 DIAGNOSIS — Z01.818 ENCOUNTER FOR OTHER PREPROCEDURAL EXAMINATION: ICD-10-CM

## 2020-10-27 DIAGNOSIS — Z90.13 ACQUIRED ABSENCE OF BILATERAL BREASTS AND NIPPLES: Chronic | ICD-10-CM

## 2020-10-27 DIAGNOSIS — C50.919 MALIGNANT NEOPLASM OF UNSPECIFIED SITE OF UNSPECIFIED FEMALE BREAST: ICD-10-CM

## 2020-10-27 DIAGNOSIS — Z90.11 ACQUIRED ABSENCE OF RIGHT BREAST AND NIPPLE: Chronic | ICD-10-CM

## 2020-10-27 DIAGNOSIS — Z90.13 ACQUIRED ABSENCE OF BILATERAL BREASTS AND NIPPLES: ICD-10-CM

## 2020-10-27 LAB
ANION GAP SERPL CALC-SCNC: 9 MMOL/L — SIGNIFICANT CHANGE UP (ref 5–17)
BUN SERPL-MCNC: 16 MG/DL — SIGNIFICANT CHANGE UP (ref 7–23)
CALCIUM SERPL-MCNC: 9.5 MG/DL — SIGNIFICANT CHANGE UP (ref 8.4–10.5)
CHLORIDE SERPL-SCNC: 105 MMOL/L — SIGNIFICANT CHANGE UP (ref 96–108)
CO2 SERPL-SCNC: 26 MMOL/L — SIGNIFICANT CHANGE UP (ref 22–31)
CREAT SERPL-MCNC: 0.64 MG/DL — SIGNIFICANT CHANGE UP (ref 0.5–1.3)
GLUCOSE SERPL-MCNC: 96 MG/DL — SIGNIFICANT CHANGE UP (ref 70–99)
HCT VFR BLD CALC: 36.7 % — SIGNIFICANT CHANGE UP (ref 34.5–45)
HGB BLD-MCNC: 11.6 G/DL — SIGNIFICANT CHANGE UP (ref 11.5–15.5)
MCHC RBC-ENTMCNC: 29.1 PG — SIGNIFICANT CHANGE UP (ref 27–34)
MCHC RBC-ENTMCNC: 31.6 GM/DL — LOW (ref 32–36)
MCV RBC AUTO: 92.2 FL — SIGNIFICANT CHANGE UP (ref 80–100)
NRBC # BLD: 0 /100 WBCS — SIGNIFICANT CHANGE UP (ref 0–0)
PLATELET # BLD AUTO: 294 K/UL — SIGNIFICANT CHANGE UP (ref 150–400)
POTASSIUM SERPL-MCNC: 4.5 MMOL/L — SIGNIFICANT CHANGE UP (ref 3.5–5.3)
POTASSIUM SERPL-SCNC: 4.5 MMOL/L — SIGNIFICANT CHANGE UP (ref 3.5–5.3)
RBC # BLD: 3.98 M/UL — SIGNIFICANT CHANGE UP (ref 3.8–5.2)
RBC # FLD: 13.9 % — SIGNIFICANT CHANGE UP (ref 10.3–14.5)
SODIUM SERPL-SCNC: 140 MMOL/L — SIGNIFICANT CHANGE UP (ref 135–145)
WBC # BLD: 7.49 K/UL — SIGNIFICANT CHANGE UP (ref 3.8–10.5)
WBC # FLD AUTO: 7.49 K/UL — SIGNIFICANT CHANGE UP (ref 3.8–10.5)

## 2020-10-27 PROCEDURE — 80048 BASIC METABOLIC PNL TOTAL CA: CPT

## 2020-10-27 PROCEDURE — 85027 COMPLETE CBC AUTOMATED: CPT

## 2020-10-27 PROCEDURE — G0463: CPT

## 2020-10-27 RX ORDER — SODIUM CHLORIDE 9 MG/ML
3 INJECTION INTRAMUSCULAR; INTRAVENOUS; SUBCUTANEOUS EVERY 8 HOURS
Refills: 0 | Status: DISCONTINUED | OUTPATIENT
Start: 2020-11-03 | End: 2020-11-17

## 2020-10-27 RX ORDER — OXYCODONE HYDROCHLORIDE 5 MG/1
1 TABLET ORAL
Qty: 0 | Refills: 0 | DISCHARGE

## 2020-10-27 RX ORDER — CYCLOBENZAPRINE HYDROCHLORIDE 10 MG/1
1 TABLET, FILM COATED ORAL
Qty: 0 | Refills: 0 | DISCHARGE

## 2020-10-27 RX ORDER — LOPERAMIDE HCL 2 MG
1 TABLET ORAL
Qty: 0 | Refills: 0 | DISCHARGE

## 2020-10-27 RX ORDER — DULOXETINE HYDROCHLORIDE 30 MG/1
1 CAPSULE, DELAYED RELEASE ORAL
Qty: 0 | Refills: 0 | DISCHARGE

## 2020-10-27 RX ORDER — LIDOCAINE HCL 20 MG/ML
0.2 VIAL (ML) INJECTION ONCE
Refills: 0 | Status: DISCONTINUED | OUTPATIENT
Start: 2020-11-03 | End: 2020-11-17

## 2020-10-27 RX ORDER — ACETAMINOPHEN 500 MG
2 TABLET ORAL
Qty: 0 | Refills: 0 | DISCHARGE

## 2020-10-27 NOTE — H&P PST ADULT - HISTORY OF PRESENT ILLNESS
60 year old female with h/o malignant neoplasm of right breast 08/2018 , s/p preop chemo, Double mastectomy( Right in 03/2019, left in 11/2019) Reconstruction with ANTONIO flap(11/2019), now presents for bilateral nipple reconstruction & bilateral breast revision reconstruction on 11/03/2020.  ***"Preop covid testing ON 11/01/20". 60 year old female with h/o malignant neoplasm of right breast 08/2018 , s/p preop chemo, Double mastectomy(03/2019 right, left in 11/2019 with Reconstruction with ANTONIO flap), now presents for bilateral nipple reconstruction & bilateral breast revision reconstruction on 11/03/2020.  ***"Preop covid testing ON 11/01/20".

## 2020-10-27 NOTE — H&P PST ADULT - PAIN CHRONIC, PROFILE
[facility]FF HEMODIALYSIS NOTE     Patient currently on hemodialysis for removal of uremic toxins and volume.     Patient seen and evaluated on hemodialysis, tolerating treatment, see HD flowsheet for vitals and assessments.      Ultrafiltration goal is 3L     Labs have been reviewed and the dialysate bath has been adjusted.     Assessment/Plan:  Seen on dialysis this afternoon, tolerating well.  I had a long discussion with patient and family at bedside earlier in the day.  They had concerns about increased anasarca on patient.  I explained to them that this fluid is 2/2 her poor nutritional state as her albumin is 1.2.  Yesterday during dialysis, she had very labile blood pressures, requiring us to only able to remove ~ 1L/24h.    Explained to them that we will need to focus on good protein supplementation, and limit narcotic use prior to dialysis to aid in ultrafiltration.    I have switched her protein supplementation to Nepro, as she likes this better than Boost  Current electrolytes are stable (K/Phos) so can keep on a regular diet and we will monitor for any changes.  Will place on low sodium diet.  Will provide HD again today with an increased treatment time to 3.5 hours.  Low dialysate temp, 1st hour of treatment will be UF only.    Will provide Albumin 25% PRN as needed during the therapy to aid in ultrafiltration.  I have also discussed with Endocrine, as there is limited supply of D50 in house, and being that she has been hypoglycemic, they may need to start her on a dextrose gtt if there it comes to that.  At that point, will need to assess daily and need ultrafiltration/hemo daily in order to keep up with her fluid balance.  If she will be unable to tolerate ultrafiltration at that time, will need to look into transferrin to ICU for SCUF/SLED to maintain/achieve euvolemia.      BERNICE Damon, FNP-BC  Nephrology  Pager:  [phone]            no

## 2020-10-27 NOTE — H&P PST ADULT - NSICDXPASTSURGICALHX_GEN_ALL_CORE_FT
PAST SURGICAL HISTORY:  H/O bilateral mastectomy preop chemo 08/2018right in 03/2019, left 11/2019 with reconstruction deisy flap,    H/O mastectomy, right with expander placement 3/2019    History of laparoscopy 1980s for endometriosis    S/P FESS (functional endoscopic sinus surgery) turbinectomy 1997

## 2020-10-27 NOTE — H&P PST ADULT - I HAVE PERSONALLY SEEN AND EXAMINED THE PATIENT. THERE HAVE NOT BEEN ANY CHANGES IN THE PATIENT'S HISTORY OR EXAM UNLESS COMMENTED BELOW
TRIAGE TELEPHONE ENCOUNTER  Date contacted: April 14, 2020 4:39 PM    Type of contact: Spoke with patient     Current appointment date: Tomorrow (4/15/20) at 10:00am  Attending provider(s) or other suggested: Cooper    Current eye symptoms: None at time of call except for concern about instructions to keep or remove eye patch for drop instillation tonight and tomorrow before next day postop appt.     Recommendation(s) given: Pt was given verbal instruction after surgery to keep eye patch on until time of appt tmrw. However, Pt is now confused upon reading the postop handout which indicate the removal of patch to instill gtts tonight.     Triage recommended to keep patch on throughout the night. In the morning, Pt could use the assessment of her comfort level as a main indicator for deciding between leaving vs taking the patch off to instill gtts before her appt at 10am.    COVID-19 Visitor restrictions were relayed to patient at end of call.      Shannan Woods COA 4:39 PM April 14, 2020        Statement Selected

## 2020-10-27 NOTE — H&P PST ADULT - RS GEN PE MLT RESP DETAILS PC
respirations non-labored/no rales/no rhonchi/no wheezes/breath sounds equal/clear to auscultation bilaterally

## 2020-10-27 NOTE — H&P PST ADULT - NSICDXPROBLEM_GEN_ALL_CORE_FT
PROBLEM DIAGNOSES  Problem: Cancer of breast  Assessment and Plan:  Bilateral nipple reconstruction & bilateral breast revision reconstruction on 11/03/2020.  ***"Preop covid testing ON 11/01/20".    Problem: HTN (hypertension)  Assessment and Plan: Instructed to continue meds &  take with sips of water in AM the day of surgery .

## 2020-10-27 NOTE — H&P PST ADULT - NSICDXPASTMEDICALHX_GEN_ALL_CORE_FT
PAST MEDICAL HISTORY:  Acquired deviated nasal septum     Anemia related to post chemo    Anxiety     Breast cancer right HER2 positive and estrogen positive. completed chemo 2 weeks ago    Depression     Diarrhea     Environmental allergies     Frozen shoulder bilateral    GERD (gastroesophageal reflux disease)     Hypertension     Lumbar disc herniation unsure level    Lumbar stenosis L5-S1, last epidural injection April 2018    Migraine     Morbid obesity with BMI of 40.0-44.9, adult     Mucositis     Neuropathy lower extremities    Osteoarthritis back pain    Osteophyte of spine T 7-9 and T 11-12    Palpitations anxiety induced    Sinusitis     Varicose vein of leg right posterior patella

## 2020-10-29 ENCOUNTER — OUTPATIENT (OUTPATIENT)
Dept: OUTPATIENT SERVICES | Facility: HOSPITAL | Age: 60
LOS: 1 days | End: 2020-10-29
Payer: COMMERCIAL

## 2020-10-29 DIAGNOSIS — Z90.11 ACQUIRED ABSENCE OF RIGHT BREAST AND NIPPLE: Chronic | ICD-10-CM

## 2020-10-29 DIAGNOSIS — Z11.59 ENCOUNTER FOR SCREENING FOR OTHER VIRAL DISEASES: ICD-10-CM

## 2020-10-29 DIAGNOSIS — Z98.890 OTHER SPECIFIED POSTPROCEDURAL STATES: Chronic | ICD-10-CM

## 2020-10-29 DIAGNOSIS — Z90.13 ACQUIRED ABSENCE OF BILATERAL BREASTS AND NIPPLES: Chronic | ICD-10-CM

## 2020-10-29 LAB — SARS-COV-2 RNA SPEC QL NAA+PROBE: SIGNIFICANT CHANGE UP

## 2020-10-29 PROCEDURE — U0003: CPT

## 2020-10-30 ENCOUNTER — APPOINTMENT (OUTPATIENT)
Dept: FAMILY MEDICINE | Facility: CLINIC | Age: 60
End: 2020-10-30
Payer: COMMERCIAL

## 2020-10-30 VITALS
HEART RATE: 75 BPM | OXYGEN SATURATION: 98 % | BODY MASS INDEX: 40.25 KG/M2 | DIASTOLIC BLOOD PRESSURE: 84 MMHG | SYSTOLIC BLOOD PRESSURE: 140 MMHG | WEIGHT: 257 LBS | RESPIRATION RATE: 14 BRPM | TEMPERATURE: 96.2 F

## 2020-10-30 DIAGNOSIS — G43.009 MIGRAINE W/OUT AURA, NOT INTRACTABLE, W/OUT STATUS MIGRAINOSUS: ICD-10-CM

## 2020-10-30 DIAGNOSIS — Z20.828 CONTACT WITH AND (SUSPECTED) EXPOSURE TO OTHER VIRAL COMMUNICABLE DISEASES: ICD-10-CM

## 2020-10-30 PROCEDURE — 99213 OFFICE O/P EST LOW 20 MIN: CPT

## 2020-10-30 PROCEDURE — 99072 ADDL SUPL MATRL&STAF TM PHE: CPT

## 2020-10-30 NOTE — ASSESSMENT
[High Risk Surgery - Intraperitoneal, Intrathoracic or Supringuinal Vascular Procedures] : High Risk Surgery - Intraperitoneal, Intrathoracic or Supringuinal Vascular Procedures - No (0) [Ischemic Heart Disease] : Ischemic Heart Disease - No (0) [Congestive Heart Failure] : Congestive Heart Failure - No (0) [Prior Cerebrovascular Accident or TIA] : Prior Cerebrovascular Accident or TIA - No (0) [Creatinine >= 2mg/dL (1 Point)] : Creatinine >= 2mg/dL - No (0) [Insulin-dependent Diabetic (1 Point)] : Insulin-dependent Diabetic - No (0) [0] : 0 , RCRI Class: I, Risk of Post-Op Cardiac Complications: 3.9%, 95% CI for Risk Estimate: 2.8% - 5.4% [Patient Optimized for Surgery] : Patient optimized for surgery [Modify anticoagulant treatment prior to procedure] : Modify anticoagulant treatment prior to procedure [Modify anti-platelet treatment prior to procedure] : Modify anti-platelet treatment prior to procedure [Modify medications prior to procedure] : Modify medications prior to procedure [As per surgery] : as per surgery

## 2020-10-30 NOTE — PHYSICAL EXAM
[No Acute Distress] : no acute distress [Well Nourished] : well nourished [Normal Sclera/Conjunctiva] : normal sclera/conjunctiva [PERRL] : pupils equal round and reactive to light [Normal Outer Ear/Nose] : the outer ears and nose were normal in appearance [Normal Oropharynx] : the oropharynx was normal [Normal TMs] : both tympanic membranes were normal [No JVD] : no jugular venous distention [No Lymphadenopathy] : no lymphadenopathy [Thyroid Normal, No Nodules] : the thyroid was normal and there were no nodules present [No Respiratory Distress] : no respiratory distress  [No Accessory Muscle Use] : no accessory muscle use [Clear to Auscultation] : lungs were clear to auscultation bilaterally [Normal Rate] : normal rate  [Regular Rhythm] : with a regular rhythm [Normal S1, S2] : normal S1 and S2 [No Murmur] : no murmur heard [Pedal Pulses Present] : the pedal pulses are present [No Edema] : there was no peripheral edema [No Axillary Lymphadenopathy] : no axillary lymphadenopathy [Soft] : abdomen soft [Non Tender] : non-tender [Non-distended] : non-distended [No Masses] : no abdominal mass palpated [No HSM] : no HSM [Declined Rectal Exam] : declined rectal exam [Normal Supraclavicular Nodes] : no supraclavicular lymphadenopathy [Normal Posterior Cervical Nodes] : no posterior cervical lymphadenopathy [Normal Anterior Cervical Nodes] : no anterior cervical lymphadenopathy [No CVA Tenderness] : no CVA  tenderness [No Rash] : no rash [Normal Gait] : normal gait [Normal Affect] : the affect was normal [Alert and Oriented x3] : oriented to person, place, and time [Normal Insight/Judgement] : insight and judgment were intact [de-identified] : varicose veins right lower extremity no swelling currently and nontender [de-identified] :  some scar tissue noted right breast underneath and area leading to the sternum, minimally tender to palpation [de-identified] : defer at this time [de-identified] : mildly tender over the lumbar region of the back with mild tenderness of the thoracic spine as well with some thoracic scoliosis noted [de-identified] : hair has grown in again- minimal regrowth of eyebrows and lashes, nails regrowing as well almost normal in appearance [de-identified] : reduced sensation of the fingers and toes

## 2020-10-30 NOTE — REVIEW OF SYSTEMS
[Joint Pain] : joint pain [Back Pain] : back pain [Nail Changes] : nail changes [Hair Changes] : hair changes [Headache] : headache [Anxiety] : anxiety [Negative] : Heme/Lymph [Diarrhea] : diarrhea [Hematuria] : no hematuria [Skin Rash] : no skin rash [Dizziness] : no dizziness [Fainting] : no fainting [Confusion] : no confusion [Memory Loss] : no memory loss [Unsteady Walking] : no ataxia [Depression] : no depression [FreeTextEntry5] : chest wall tenderness over the right side secondary to pulling from the scarring of the right breast [FreeTextEntry9] : sciatica not too bad at this time [de-identified] : hair regrown and regrowth of nails as well [de-identified] : peripheral neuropathy- seems a bit worse during weather changes,  migraines stable post chemotherapy

## 2020-10-30 NOTE — HISTORY OF PRESENT ILLNESS
[No Pertinent Cardiac History] : no history of aortic stenosis, atrial fibrillation, coronary artery disease, recent myocardial infarction, or implantable device/pacemaker [No Pertinent Pulmonary History] : no history of asthma, COPD, sleep apnea, or smoking [No Adverse Anesthesia Reaction] : no adverse anesthesia reaction in self or family member [Chronic Anticoagulation] : no chronic anticoagulation [Chronic Kidney Disease] : no chronic kidney disease [Diabetes] : no diabetes [FreeTextEntry1] : breast reconstruction s/p bilateral mastectomy [FreeTextEntry2] : 11/3/20 [FreeTextEntry3] : Dr. Antonio [FreeTextEntry4] : 59 yo female with right breast cancer status post mastectomy, radiation and chemotherapy, hypertension and peripheral neuropathy psot chemo. Doing otherwise well and mow admitted for breast reconstruction of the right breast.

## 2020-11-02 ENCOUNTER — TRANSCRIPTION ENCOUNTER (OUTPATIENT)
Age: 60
End: 2020-11-02

## 2020-11-03 ENCOUNTER — RESULT REVIEW (OUTPATIENT)
Age: 60
End: 2020-11-03

## 2020-11-03 ENCOUNTER — OUTPATIENT (OUTPATIENT)
Dept: OUTPATIENT SERVICES | Facility: HOSPITAL | Age: 60
LOS: 1 days | End: 2020-11-03
Payer: COMMERCIAL

## 2020-11-03 VITALS
HEART RATE: 80 BPM | DIASTOLIC BLOOD PRESSURE: 84 MMHG | WEIGHT: 255.96 LBS | HEIGHT: 67 IN | OXYGEN SATURATION: 97 % | SYSTOLIC BLOOD PRESSURE: 136 MMHG | TEMPERATURE: 97 F | RESPIRATION RATE: 16 BRPM

## 2020-11-03 VITALS
RESPIRATION RATE: 16 BRPM | HEART RATE: 73 BPM | OXYGEN SATURATION: 100 % | DIASTOLIC BLOOD PRESSURE: 74 MMHG | SYSTOLIC BLOOD PRESSURE: 142 MMHG

## 2020-11-03 DIAGNOSIS — Z85.3 PERSONAL HISTORY OF MALIGNANT NEOPLASM OF BREAST: ICD-10-CM

## 2020-11-03 DIAGNOSIS — Z01.818 ENCOUNTER FOR OTHER PREPROCEDURAL EXAMINATION: ICD-10-CM

## 2020-11-03 DIAGNOSIS — Z90.13 ACQUIRED ABSENCE OF BILATERAL BREASTS AND NIPPLES: ICD-10-CM

## 2020-11-03 DIAGNOSIS — Z90.13 ACQUIRED ABSENCE OF BILATERAL BREASTS AND NIPPLES: Chronic | ICD-10-CM

## 2020-11-03 DIAGNOSIS — Z98.890 OTHER SPECIFIED POSTPROCEDURAL STATES: Chronic | ICD-10-CM

## 2020-11-03 DIAGNOSIS — Z90.11 ACQUIRED ABSENCE OF RIGHT BREAST AND NIPPLE: Chronic | ICD-10-CM

## 2020-11-03 PROCEDURE — 88304 TISSUE EXAM BY PATHOLOGIST: CPT

## 2020-11-03 PROCEDURE — 19350 NIPPLE/AREOLA RECONSTRUCTION: CPT | Mod: 50

## 2020-11-03 PROCEDURE — 88304 TISSUE EXAM BY PATHOLOGIST: CPT | Mod: 26

## 2020-11-03 RX ORDER — SODIUM CHLORIDE 9 MG/ML
1000 INJECTION, SOLUTION INTRAVENOUS
Refills: 0 | Status: DISCONTINUED | OUTPATIENT
Start: 2020-11-03 | End: 2020-11-17

## 2020-11-03 RX ORDER — APREPITANT 80 MG/1
40 CAPSULE ORAL ONCE
Refills: 0 | Status: COMPLETED | OUTPATIENT
Start: 2020-11-03 | End: 2020-11-03

## 2020-11-03 RX ORDER — CHLORHEXIDINE GLUCONATE 213 G/1000ML
1 SOLUTION TOPICAL ONCE
Refills: 0 | Status: COMPLETED | OUTPATIENT
Start: 2020-11-03 | End: 2020-11-03

## 2020-11-03 RX ORDER — ONDANSETRON 8 MG/1
4 TABLET, FILM COATED ORAL ONCE
Refills: 0 | Status: DISCONTINUED | OUTPATIENT
Start: 2020-11-03 | End: 2020-11-17

## 2020-11-03 RX ADMIN — SODIUM CHLORIDE 3 MILLILITER(S): 9 INJECTION INTRAMUSCULAR; INTRAVENOUS; SUBCUTANEOUS at 11:55

## 2020-11-03 RX ADMIN — CHLORHEXIDINE GLUCONATE 1 APPLICATION(S): 213 SOLUTION TOPICAL at 11:54

## 2020-11-03 RX ADMIN — APREPITANT 40 MILLIGRAM(S): 80 CAPSULE ORAL at 11:54

## 2020-11-03 NOTE — PRE-ANESTHESIA EVALUATION ADULT - NSANTHALCOHOLSD_GEN_ALL_CORE
Orthopaedic Surgery - Office Note  Lana Gupta (32 y o  female)   : 1944   MRN: [id number]  Encounter Date: 2019    Chief Complaint   Patient presents with    Left Shoulder - Follow-up       Assessment / Plan  6 month Status Post Left reverse total shoulder arthroplasty with biceps tenotomy on 18    · Continue ROM and strengthening  · Ice and analgesics as needed  Return in about 6 months (around 2019) for 1 year post-op recheck  History of Present Illness  Lana Gupta is a 76 y o  female who presents to the office with her  for 6 month follow-up of Left reverse total shoulder arthroplasty with bicep tenotomy on 18  She stated that she has taken a couple falls recently due to her Parkinson's, the most recent being this morning  She stated that none of the falls resulted in her coming in direct contact with her Left shoulder or caused her to go to the ER  Her  stated that because of her Parkinson's she puts herself in difficult positions or does actions that she can't complete without help  She stated that she doesn't have any pain in her shoulder  She does experience some acheyness but that has been present before the falls  Her ROM is well except supination of her hand is difficult when her arm is externally rotated but she can perform supination in the neutral position  Her condition is well overall  She denies numbness and tingling  Review of Systems  Const: negative  Resp: negative  CV: negative  Skin: negative  MSK:positive for arthralgias  Neuro: positive for coordination problems and memory problems    Physical Exam  /84   Pulse 57   Wt 106 kg (234 lb 9 6 oz)   BMI 36 74 kg/m²   Cons: Appears well  No apparent distress  Psych: Alert  Oriented x3  Mood and affect normal   Eyes: PERRLA, EOMI  Resp: Normal effort  No audible wheezing or stridor  CV: Palpable pulse  No discernable arrhythmia  No LE edema    Lymph:  No palpable cervical, axillary, or inguinal lymphadenopathy  Skin: Warm  No palpable masses  No visible lesions  Neuro: Normal muscle tone  Normal and symmetric DTR's  Left Shoulder Exam  Alignment / Posture:  Normal shoulder posture  Inspection:  No swelling  No edema  No erythema  Incision healed  Palpation:  No tenderness  No effusion  No warmth  No crepitus  ROM:  Shoulder   Shoulder ER 30  Shoulder IR T8  Strength:  Supraspinatus 4/5  Infraspinatus 2/5  Subscapularis 4/5  5/5 biceps and triceps  Infraspinatus and teres minor lag present  Stability:  No objective shoulder instability  Neurovascular:  Sensation intact in Ax/R/M/U nerve distributions  2+ radial pulse  Studies Reviewed  No studies to review    Procedures  No procedures today  Medical, Surgical, Family, and Social History  The patient's medical history, family history, and social history, were reviewed and updated as appropriate  Past Medical History:   Diagnosis Date    Anxiety     Diabetes mellitus ([facility] [address] )     Diabetes mellitus type 2, diet-controlled ([facility] [place] [address] )     Hyperlipidemia     Hypertension     Parkinson disease ([facility] [place] [address] )        Past Surgical History:   Procedure Laterality Date    ACHILLES TENDON SURGERY      DEEP BRAIN STIMULATOR PLACEMENT      DENTAL SURGERY      CT IMP STIM,CRANIAL,SUBQ,1 ARRAY Right 12/18/2018    Procedure: REPLACEMENT IMPLANTABLE PULSE GENERATOR (IPG) DEEP BRAIN STIMULATION (DBS);   Surgeon: Allison Kehr, MD;  Location:  MAIN OR;  Service: Neurosurgery    REPLACEMENT TOTAL KNEE      REVERSE TOTAL SHOULDER ARTHROPLASTY Left 8/23/2018    Procedure: ARTHROPLASTY SHOULDER REVERSE;  Surgeon: Tiffanie Reynolds MD;  Location: [facility];  Service: Orthopedics    TONSILLECTOMY         Family History   Problem Relation Age of Onset    Arthritis Mother        Social History     Occupational History    Not on file   Tobacco Use    Smoking status: Never Smoker    Smokeless tobacco: Never Used   Substance and Sexual Activity    Alcohol use: No    Drug use: No    Sexual activity: Not on file       Allergies   Allergen Reactions    Sulfa Antibiotics Hives         Current Outpatient Medications:     aspirin 81 MG tablet, Take 1 tablet by mouth daily, Disp: , Rfl:     atenolol (TENORMIN) 50 mg tablet, Take 1 tablet (50 mg total) by mouth daily, Disp: 30 tablet, Rfl: 0    Cinnamon 500 MG TABS, Take 2 tablets by mouth, Disp: , Rfl:     Coenzyme Q10 400 MG CAPS, Take 1 capsule (400 mg total) by mouth daily, Disp: 30 capsule, Rfl: 0    Omega-3 Fatty Acids (FISH OIL) 1,000 mg, Take 1 capsule (1,000 mg total) by mouth daily, Disp: 30 capsule, Rfl: 0    PARoxetine (PAXIL) 20 mg tablet, Take 1 tablet (20 mg total) by mouth daily, Disp: 30 tablet, Rfl: 0    polyethylene glycol (MIRALAX) 17 g packet, Take 17 g by mouth daily (Patient taking differently: Take 17 g by mouth daily as needed  ), Disp: 30 each, Rfl: 0    rasagiline (AZILECT) 1 MG, Take 1 tablet (1 mg total) by mouth daily, Disp: 30 each, Rfl: 0    rosuvastatin (CRESTOR) 5 mg tablet, Take 1 tablet (5 mg total) by mouth every other day, Disp: 30 tablet, Rfl: 0      Julianne Francois PA-C Yes/Wine 2/week

## 2020-11-03 NOTE — BRIEF OPERATIVE NOTE - OPERATION/FINDINGS
Bilateral nipple reconstruction. Bilateral reconstructed breast scar revision. Bilateral breast liposuction.

## 2020-11-03 NOTE — ASU DISCHARGE PLAN (ADULT/PEDIATRIC) - ASU DC SPECIAL INSTRUCTIONSFT
You may shower 48 hours after surgery. Do not scrub or rub your incisions. Pat the surgical sites dry. Do not fully submerge in water.    Keep surgical bra on until cleared.    Please keep your head elevated while in bed. You can place several pillows under your back to keep your head up.    No exercising, strenuous activity, or heavy lifting. Keep your arms below your head and your elbows below your shoulders.    Take medications as prescribed.    Please follow up with Dr. Antonio within x1 week after discharge from the hospital. You may call [phone] to schedule an appointment.

## 2020-11-03 NOTE — ASU PATIENT PROFILE, ADULT - PSH
H/O bilateral mastectomy  preop chemo 08/2018right in 03/2019, left 11/2019 with reconstruction deisy flap,  H/O mastectomy, right  with expander placement 3/2019  History of laparoscopy  1980s for endometriosis  S/P FESS (functional endoscopic sinus surgery)  turbinectomy 1997

## 2020-11-03 NOTE — ASU DISCHARGE PLAN (ADULT/PEDIATRIC) - CARE PROVIDER_API CALL
Ehsan Antonio (MD)  Plastic Surgery  [address], [address]  [place] [address]  Phone: [phone]  Fax: [phone]  Follow Up Time:

## 2020-11-03 NOTE — ASU PATIENT PROFILE, ADULT - PMH
Acquired deviated nasal septum    Anemia  related to post chemo  Anxiety    Breast cancer  right HER2 positive and estrogen positive. completed chemo 2 weeks ago  Depression    Diarrhea    Environmental allergies    Frozen shoulder  bilateral  GERD (gastroesophageal reflux disease)    Hypertension    Lumbar disc herniation  unsure level  Lumbar stenosis  L5-S1, last epidural injection April 2018  Migraine    Morbid obesity with BMI of 40.0-44.9, adult    Mucositis    Neuropathy  lower extremities  Osteoarthritis  back pain  Osteophyte of spine  T 7-9 and T 11-12  Palpitations  anxiety induced  Sinusitis    Varicose vein of leg  right posterior patella

## 2020-11-06 LAB — SURGICAL PATHOLOGY STUDY: SIGNIFICANT CHANGE UP

## 2020-11-09 ENCOUNTER — APPOINTMENT (OUTPATIENT)
Dept: HEMATOLOGY ONCOLOGY | Facility: CLINIC | Age: 60
End: 2020-11-09
Payer: COMMERCIAL

## 2020-11-09 DIAGNOSIS — G89.18 OTHER ACUTE POSTPROCEDURAL PAIN: ICD-10-CM

## 2020-11-09 PROCEDURE — 99204 OFFICE O/P NEW MOD 45 MIN: CPT | Mod: 95

## 2020-11-13 ENCOUNTER — OUTPATIENT (OUTPATIENT)
Dept: OUTPATIENT SERVICES | Facility: HOSPITAL | Age: 60
LOS: 1 days | Discharge: HOME | End: 2020-11-13

## 2020-11-13 DIAGNOSIS — Z98.890 OTHER SPECIFIED POSTPROCEDURAL STATES: Chronic | ICD-10-CM

## 2020-11-13 DIAGNOSIS — Z90.11 ACQUIRED ABSENCE OF RIGHT BREAST AND NIPPLE: Chronic | ICD-10-CM

## 2020-11-13 DIAGNOSIS — Z90.13 ACQUIRED ABSENCE OF BILATERAL BREASTS AND NIPPLES: Chronic | ICD-10-CM

## 2020-11-13 DIAGNOSIS — C50.911 MALIGNANT NEOPLASM OF UNSPECIFIED SITE OF RIGHT FEMALE BREAST: ICD-10-CM

## 2020-11-13 PROBLEM — D64.9 ANEMIA, UNSPECIFIED: Chronic | Status: ACTIVE | Noted: 2019-03-07

## 2020-11-13 PROBLEM — M19.90 UNSPECIFIED OSTEOARTHRITIS, UNSPECIFIED SITE: Chronic | Status: ACTIVE | Noted: 2019-03-07

## 2020-11-14 ENCOUNTER — RX RENEWAL (OUTPATIENT)
Age: 60
End: 2020-11-14

## 2020-11-16 ENCOUNTER — RX RENEWAL (OUTPATIENT)
Age: 60
End: 2020-11-16

## 2020-11-17 PROBLEM — G89.18 POST-OPERATIVE PAIN: Status: ACTIVE | Noted: 2020-11-17

## 2020-11-17 NOTE — ASSESSMENT
[FreeTextEntry1] : 60yoF with:\par \par 1. Breast Cancer - On Arimidex. Med Onc follow up. \par \par 2. Post-op pain - Medical cannabis certification completed today. Provided cannabis education, overview of state program, and discussed adverse effects in detail. Counseled that vaporized cannabis is not the preferred route of administration due to the fact that both short-term and long-term risks associated with vaporizing oils are not yet fully understood. Recommend starting with 1:1 THC:CBD formulation at low dose of THC (~2mg/dose).\par  \par 3. Chronic back pain 2/2 spinal stenosis - Patient to initiate medicinal cannabis. \par \par 4. Encounter for Palliative Care - Emotional support provided.\par \par Follow up in 1 month, call sooner with questions or issues.

## 2020-11-17 NOTE — HISTORY OF PRESENT ILLNESS
[FreeTextEntry1] : 60yoF with breast cancer presents for\par PMH also significant for spinal stenosis and chronic back pain. \par \par On Anastrazole. \par \par Patient initially diagnosed in 2018, underwent kendra-adjuvant treatment followed by mastectomy and RT. \par \par She recently underwent revision surgery (last week).  She has pain at the surgical site. \par She uses meloxicam once daily for her back pain. On occasion she is using tylenol during the day. \par Last month she had a cortisone shot in her great toe for pain due to an osteophyte. \par She had been on gabapentin in the past for her back pain, which she did not like due to it making her feel very groggy. \par \par During the time she was on chemotherapy she was smoking marijuana which she found to be helpful. She is interested in medical cannabis certification. \par \par ROS:\par +migraine headaches - uses prn fioricet and prn triptan\par +trouble falling asleep\par Denies nausea, vomiting, \par \par Patient is , lives with her . She has an adult daughter who lives nearby. \par She works as a , currently is working part time and plans to go back full time soon. \par \par PMD: Dr. Rachel Ramirez\par \par I-Stop Ref#: [id number]

## 2020-11-19 ENCOUNTER — APPOINTMENT (OUTPATIENT)
Dept: HEMATOLOGY ONCOLOGY | Facility: CLINIC | Age: 60
End: 2020-11-19
Payer: COMMERCIAL

## 2020-11-19 VITALS
TEMPERATURE: 98 F | OXYGEN SATURATION: 98 % | RESPIRATION RATE: 14 BRPM | HEART RATE: 78 BPM | DIASTOLIC BLOOD PRESSURE: 70 MMHG | SYSTOLIC BLOOD PRESSURE: 130 MMHG | WEIGHT: 256.84 LBS | BODY MASS INDEX: 40.23 KG/M2

## 2020-11-19 PROCEDURE — 99214 OFFICE O/P EST MOD 30 MIN: CPT

## 2020-12-02 ENCOUNTER — NON-APPOINTMENT (OUTPATIENT)
Age: 60
End: 2020-12-02

## 2020-12-02 ENCOUNTER — APPOINTMENT (OUTPATIENT)
Dept: CARDIOLOGY | Facility: CLINIC | Age: 60
End: 2020-12-02
Payer: COMMERCIAL

## 2020-12-02 VITALS
SYSTOLIC BLOOD PRESSURE: 112 MMHG | WEIGHT: 250 LBS | HEART RATE: 76 BPM | OXYGEN SATURATION: 96 % | BODY MASS INDEX: 39.24 KG/M2 | HEIGHT: 67 IN | DIASTOLIC BLOOD PRESSURE: 76 MMHG

## 2020-12-02 PROCEDURE — 99072 ADDL SUPL MATRL&STAF TM PHE: CPT

## 2020-12-02 PROCEDURE — 99214 OFFICE O/P EST MOD 30 MIN: CPT

## 2020-12-02 PROCEDURE — 93000 ELECTROCARDIOGRAM COMPLETE: CPT

## 2020-12-14 ENCOUNTER — APPOINTMENT (OUTPATIENT)
Dept: RADIATION ONCOLOGY | Facility: CLINIC | Age: 60
End: 2020-12-14
Payer: COMMERCIAL

## 2020-12-14 PROCEDURE — 99213 OFFICE O/P EST LOW 20 MIN: CPT | Mod: GC

## 2020-12-14 PROCEDURE — 99072 ADDL SUPL MATRL&STAF TM PHE: CPT

## 2020-12-15 NOTE — HISTORY OF PRESENT ILLNESS
[FreeTextEntry1] : Ms. Harrison is a 60 year old female with cT4 N0 ER positive, Her-2 positive poorly differentiated invasive ductal carcinoma of the right breast, s/p neoadjuvant chemotherapy, s/p right mastectomy and sentinel node biopsy showing ypT0N0 disease. She was treated to a dose of 5,000 cGy to the right chest wall/regional nodes from 5/10/2019 - 6/14/2019 (35 days).  \par \par She then underwent ANTONIO flap right breast reconstruction on 10/29/2019 by Dr. Antonio and left breast mastectomy by Dr. Luna. Herceptin/Perjeta C17 completed 11/7/19.  She then had a second minor surgery in 2/2020 to tweak the right breast size, to even the shapes/sizes of her breasts. She continues on anastrazole, under the care of Dr. Cox. \par \par The patient reports feeling generally well. She is involved in her usual activities.  She had a recent surgical procedure to her right breast, nipple reconstruction. Doing well.  Spinal stenosis pain under fair control with Mobic.\par

## 2020-12-15 NOTE — VITALS
[Least Pain Intensity: 0/10] : 0/10 [Pain Duration: ___] : Pain duration: [unfilled] [OTC] : OTC [Maximal Pain Intensity: 6/10] : 6/10 [Pain Description/Quality: ___] : Pain description/quality: [unfilled] [Pain Location: ___] : Pain Location: [unfilled] [80: Normal activity with effort; some signs or symptoms of disease.] : 80: Normal activity with effort; some signs or symptoms of disease.  [Pain Interferes with ADLs] : Pain does not interfere with activities of daily living

## 2020-12-15 NOTE — REVIEW OF SYSTEMS
[Joint Pain] : joint pain [Negative] : Psychiatric [Alopecia: Grade 0] : Alopecia: Grade 0 [Pruritus: Grade 0] : Pruritus: Grade 0 [Skin Atrophy: Grade 0] : Skin Atrophy: Grade 0 [Skin Hyperpigmentation: Grade 0] : Skin Hyperpigmentation: Grade 0 [Skin Induration: Grade 0] : Skin Induration: Grade 0 [Dermatitis Radiation: Grade 0] : Dermatitis Radiation: Grade 0

## 2020-12-15 NOTE — PHYSICAL EXAM
[Normal] : oriented to person, place and time, the affect was normal, the mood was normal and not anxious [No UE Edema] : there is no upper extremity edema [Abdomen Soft] : soft [Nondistended] : nondistended [Axillary Lymph Nodes Enlarged Bilaterally] : axillary [de-identified] : s/p b/l mastectomies, R chest wall RT, multiple reconstructive surgical scars, clean and dry, drainage

## 2020-12-31 NOTE — HISTORY OF PRESENT ILLNESS
[FreeTextEntry1] : 59yo woman who is a  She has 1 adult daughter (25) and is  to Gonzales Harrison who own a [employer].\par \par PMH: Migraines, back pain and DLD s/p epidural injections, menopausal, anxiety and depression on medication, Obesity BMI 45. CHOL 218 ,  in 2016 She has no prior History of HTN. \par Labs:P \par \par Cancer Dx: Left Poorly differentiated ductal Breast cancer ER+, [place]- HER+  multifocal with multiple masses and LNs involved, Diagnosed in august 2018. \par \par Under Corey and Dr. Cox she is planned for neoadjuvant chemotherapy  ACTHP.  She presents today for evaluation of HTN and prechemotherapy cardiac risk stratification.\par \par Denies: SOB, LE edema, palpitation, chest pain\par \par Echo 9/11/2018. \par EF 63%  Normal left ventricular internal dimensions and wall thicknesses. Normal left ventricular systolic function. No segmental wall motion abnormalities. Normal right ventricular size and function. Global longitudinal strain equals - 21.2%, which is normal.\par \par \par Oncologist: Dr. Noa Cox\par PCP: DR. CHRISTINA NELSON\par \par Interval follow up 4/23/19\par \par Echo at plain view with mass on aortic valve- reviewed by me. Not a mass- just calcification of the non cusp. EF reported as 55%\par \par \par ROS: Denies Chest pain, dyspnea, palpitations, dizziness or syncope.\par here with  \par \par Follow-up December 2, 2020\par Doing well offers no complaints.

## 2020-12-31 NOTE — PHYSICAL EXAM
[General Appearance - Well Developed] : well developed [Normal Appearance] : normal appearance [Well Groomed] : well groomed [General Appearance - Well Nourished] : well nourished [No Deformities] : no deformities [General Appearance - In No Acute Distress] : no acute distress [Normal Conjunctiva] : the conjunctiva exhibited no abnormalities [Eyelids - No Xanthelasma] : the eyelids demonstrated no xanthelasmas [Normal Oral Mucosa] : normal oral mucosa [No Oral Pallor] : no oral pallor [No Oral Cyanosis] : no oral cyanosis [Normal Jugular Venous A Waves Present] : normal jugular venous A waves present [Normal Jugular Venous V Waves Present] : normal jugular venous V waves present [No Jugular Venous Jones A Waves] : no jugular venous jones A waves [Respiration, Rhythm And Depth] : normal respiratory rhythm and effort [Exaggerated Use Of Accessory Muscles For Inspiration] : no accessory muscle use [Auscultation Breath Sounds / Voice Sounds] : lungs were clear to auscultation bilaterally [Heart Rate And Rhythm] : heart rate and rhythm were normal [Heart Sounds] : normal S1 and S2 [Abdomen Soft] : soft [Abdomen Tenderness] : non-tender [] : no hepato-splenomegaly [Nail Clubbing] : no clubbing of the fingernails [Cyanosis, Localized] : no localized cyanosis [Oriented To Time, Place, And Person] : oriented to person, place, and time

## 2020-12-31 NOTE — REVIEW OF SYSTEMS
[Feeling Fatigued] : feeling fatigued [Joint Pain] : joint pain [Anxiety] : anxiety [Negative] : Heme/Lymph

## 2020-12-31 NOTE — ASSESSMENT
[FreeTextEntry1] : 57 yo with  Migraines, back pain and DLD s/p epidural injections, menopausal, anxiety and depression on medication, Obesity BMI 45. CHOL 218 , .  Baseline EF of 63%  with a GLS of -21.\par \par History of  Left Poorly differentiated ductal Breast cancer ER+, RI- HER+  multifocal with multiple masses and LNs involved.  neoadjuvant chemotherapy  THP.  Status post radiation therapy to the right chest wall and regional lymph nodes on June 2019 she is also had reconstruction of the breast.\par \par -echo in plain view reviewed with EF 55% with stable calcifcation on Ao valve.\par \par Cardioprotection with c/w lisinopril for cardioprotection \par Annual echo for evaluation post Herceptin reviewed and it was done at Dr. Quinones's office ejection fraction was within normal limits. \par EKG and blood pressure stable\par Exercise regimen discussed in detail\par Follow-up with PCP and oncology\par Follow-up 1 year in cardio oncology\par \par \par \par \par

## 2021-01-07 ENCOUNTER — NON-APPOINTMENT (OUTPATIENT)
Age: 61
End: 2021-01-07

## 2021-01-28 ENCOUNTER — NON-APPOINTMENT (OUTPATIENT)
Age: 61
End: 2021-01-28

## 2021-02-12 ENCOUNTER — RESULT REVIEW (OUTPATIENT)
Age: 61
End: 2021-02-12

## 2021-02-12 ENCOUNTER — LABORATORY RESULT (OUTPATIENT)
Age: 61
End: 2021-02-12

## 2021-02-12 ENCOUNTER — OUTPATIENT (OUTPATIENT)
Dept: OUTPATIENT SERVICES | Facility: HOSPITAL | Age: 61
LOS: 1 days | Discharge: HOME | End: 2021-02-12

## 2021-02-12 ENCOUNTER — APPOINTMENT (OUTPATIENT)
Dept: INFUSION THERAPY | Facility: HOSPITAL | Age: 61
End: 2021-02-12

## 2021-02-12 DIAGNOSIS — Z90.13 ACQUIRED ABSENCE OF BILATERAL BREASTS AND NIPPLES: Chronic | ICD-10-CM

## 2021-02-12 DIAGNOSIS — Z98.890 OTHER SPECIFIED POSTPROCEDURAL STATES: Chronic | ICD-10-CM

## 2021-02-12 DIAGNOSIS — Z90.11 ACQUIRED ABSENCE OF RIGHT BREAST AND NIPPLE: Chronic | ICD-10-CM

## 2021-02-12 DIAGNOSIS — C50.911 MALIGNANT NEOPLASM OF UNSPECIFIED SITE OF RIGHT FEMALE BREAST: ICD-10-CM

## 2021-02-12 LAB
BASOPHILS # BLD AUTO: 0.03 K/UL — SIGNIFICANT CHANGE UP (ref 0–0.2)
BASOPHILS NFR BLD AUTO: 0.5 % — SIGNIFICANT CHANGE UP (ref 0–2)
EOSINOPHIL # BLD AUTO: 0.26 K/UL — SIGNIFICANT CHANGE UP (ref 0–0.5)
EOSINOPHIL NFR BLD AUTO: 4.3 % — SIGNIFICANT CHANGE UP (ref 0–6)
HCT VFR BLD CALC: 37.1 % — SIGNIFICANT CHANGE UP (ref 34.5–45)
HGB BLD-MCNC: 11.8 G/DL — SIGNIFICANT CHANGE UP (ref 11.5–15.5)
IMM GRANULOCYTES NFR BLD AUTO: 0.3 % — SIGNIFICANT CHANGE UP (ref 0–1.5)
LYMPHOCYTES # BLD AUTO: 1.37 K/UL — SIGNIFICANT CHANGE UP (ref 1–3.3)
LYMPHOCYTES # BLD AUTO: 22.4 % — SIGNIFICANT CHANGE UP (ref 13–44)
MCHC RBC-ENTMCNC: 28.9 PG — SIGNIFICANT CHANGE UP (ref 27–34)
MCHC RBC-ENTMCNC: 31.8 G/DL — LOW (ref 32–36)
MCV RBC AUTO: 90.9 FL — SIGNIFICANT CHANGE UP (ref 80–100)
MONOCYTES # BLD AUTO: 0.55 K/UL — SIGNIFICANT CHANGE UP (ref 0–0.9)
MONOCYTES NFR BLD AUTO: 9 % — SIGNIFICANT CHANGE UP (ref 2–14)
NEUTROPHILS # BLD AUTO: 3.88 K/UL — SIGNIFICANT CHANGE UP (ref 1.8–7.4)
NEUTROPHILS NFR BLD AUTO: 63.5 % — SIGNIFICANT CHANGE UP (ref 43–77)
NRBC # BLD: 0 /100 WBCS — SIGNIFICANT CHANGE UP (ref 0–0)
PLATELET # BLD AUTO: 321 K/UL — SIGNIFICANT CHANGE UP (ref 150–400)
RBC # BLD: 4.08 M/UL — SIGNIFICANT CHANGE UP (ref 3.8–5.2)
RBC # FLD: 14.1 % — SIGNIFICANT CHANGE UP (ref 10.3–14.5)
WBC # BLD: 6.11 K/UL — SIGNIFICANT CHANGE UP (ref 3.8–10.5)
WBC # FLD AUTO: 6.11 K/UL — SIGNIFICANT CHANGE UP (ref 3.8–10.5)

## 2021-02-22 ENCOUNTER — RX RENEWAL (OUTPATIENT)
Age: 61
End: 2021-02-22

## 2021-03-03 NOTE — H&P PST ADULT - BACK
3/3/21 Update CM Note: Precert remains pending for return to [facility]. Will follow for readiness to discharge once precert obtained.  Fredy Cunningham RN CM No deformity or limitation of movement

## 2021-03-18 ENCOUNTER — APPOINTMENT (OUTPATIENT)
Dept: HEMATOLOGY ONCOLOGY | Facility: CLINIC | Age: 61
End: 2021-03-18
Payer: COMMERCIAL

## 2021-03-18 VITALS
SYSTOLIC BLOOD PRESSURE: 152 MMHG | BODY MASS INDEX: 41.61 KG/M2 | HEART RATE: 98 BPM | WEIGHT: 265.66 LBS | DIASTOLIC BLOOD PRESSURE: 86 MMHG | TEMPERATURE: 98 F | RESPIRATION RATE: 14 BRPM | OXYGEN SATURATION: 98 %

## 2021-03-18 PROCEDURE — 99214 OFFICE O/P EST MOD 30 MIN: CPT

## 2021-03-18 PROCEDURE — 99072 ADDL SUPL MATRL&STAF TM PHE: CPT

## 2021-03-22 ENCOUNTER — OUTPATIENT (OUTPATIENT)
Dept: OUTPATIENT SERVICES | Facility: HOSPITAL | Age: 61
LOS: 1 days | End: 2021-03-22
Payer: COMMERCIAL

## 2021-03-22 VITALS
TEMPERATURE: 98 F | DIASTOLIC BLOOD PRESSURE: 81 MMHG | HEIGHT: 67 IN | WEIGHT: 261.03 LBS | OXYGEN SATURATION: 98 % | RESPIRATION RATE: 18 BRPM | HEART RATE: 75 BPM | SYSTOLIC BLOOD PRESSURE: 129 MMHG

## 2021-03-22 DIAGNOSIS — Z98.890 OTHER SPECIFIED POSTPROCEDURAL STATES: Chronic | ICD-10-CM

## 2021-03-22 DIAGNOSIS — Z90.13 ACQUIRED ABSENCE OF BILATERAL BREASTS AND NIPPLES: Chronic | ICD-10-CM

## 2021-03-22 DIAGNOSIS — Z85.3 PERSONAL HISTORY OF MALIGNANT NEOPLASM OF BREAST: ICD-10-CM

## 2021-03-22 DIAGNOSIS — Z90.11 ACQUIRED ABSENCE OF RIGHT BREAST AND NIPPLE: Chronic | ICD-10-CM

## 2021-03-22 DIAGNOSIS — Z90.13 ACQUIRED ABSENCE OF BILATERAL BREASTS AND NIPPLES: ICD-10-CM

## 2021-03-22 DIAGNOSIS — C50.919 MALIGNANT NEOPLASM OF UNSPECIFIED SITE OF UNSPECIFIED FEMALE BREAST: ICD-10-CM

## 2021-03-22 LAB
ANION GAP SERPL CALC-SCNC: 11 MMOL/L — SIGNIFICANT CHANGE UP (ref 5–17)
BLD GP AB SCN SERPL QL: NEGATIVE — SIGNIFICANT CHANGE UP
BUN SERPL-MCNC: 16 MG/DL — SIGNIFICANT CHANGE UP (ref 7–23)
CALCIUM SERPL-MCNC: 9.2 MG/DL — SIGNIFICANT CHANGE UP (ref 8.4–10.5)
CHLORIDE SERPL-SCNC: 103 MMOL/L — SIGNIFICANT CHANGE UP (ref 96–108)
CO2 SERPL-SCNC: 24 MMOL/L — SIGNIFICANT CHANGE UP (ref 22–31)
CREAT SERPL-MCNC: 0.68 MG/DL — SIGNIFICANT CHANGE UP (ref 0.5–1.3)
GLUCOSE SERPL-MCNC: 88 MG/DL — SIGNIFICANT CHANGE UP (ref 70–99)
HCT VFR BLD CALC: 35.9 % — SIGNIFICANT CHANGE UP (ref 34.5–45)
HGB BLD-MCNC: 10.9 G/DL — LOW (ref 11.5–15.5)
MCHC RBC-ENTMCNC: 28.1 PG — SIGNIFICANT CHANGE UP (ref 27–34)
MCHC RBC-ENTMCNC: 30.4 GM/DL — LOW (ref 32–36)
MCV RBC AUTO: 92.5 FL — SIGNIFICANT CHANGE UP (ref 80–100)
NRBC # BLD: 0 /100 WBCS — SIGNIFICANT CHANGE UP (ref 0–0)
PLATELET # BLD AUTO: 303 K/UL — SIGNIFICANT CHANGE UP (ref 150–400)
POTASSIUM SERPL-MCNC: 4.4 MMOL/L — SIGNIFICANT CHANGE UP (ref 3.5–5.3)
POTASSIUM SERPL-SCNC: 4.4 MMOL/L — SIGNIFICANT CHANGE UP (ref 3.5–5.3)
RBC # BLD: 3.88 M/UL — SIGNIFICANT CHANGE UP (ref 3.8–5.2)
RBC # FLD: 14.6 % — HIGH (ref 10.3–14.5)
RH IG SCN BLD-IMP: POSITIVE — SIGNIFICANT CHANGE UP
SODIUM SERPL-SCNC: 138 MMOL/L — SIGNIFICANT CHANGE UP (ref 135–145)
WBC # BLD: 7.4 K/UL — SIGNIFICANT CHANGE UP (ref 3.8–10.5)
WBC # FLD AUTO: 7.4 K/UL — SIGNIFICANT CHANGE UP (ref 3.8–10.5)

## 2021-03-22 PROCEDURE — 85027 COMPLETE CBC AUTOMATED: CPT

## 2021-03-22 PROCEDURE — 86901 BLOOD TYPING SEROLOGIC RH(D): CPT

## 2021-03-22 PROCEDURE — 86900 BLOOD TYPING SEROLOGIC ABO: CPT

## 2021-03-22 PROCEDURE — 80048 BASIC METABOLIC PNL TOTAL CA: CPT

## 2021-03-22 PROCEDURE — G0463: CPT

## 2021-03-22 PROCEDURE — 86850 RBC ANTIBODY SCREEN: CPT

## 2021-03-22 RX ORDER — SODIUM CHLORIDE 9 MG/ML
3 INJECTION INTRAMUSCULAR; INTRAVENOUS; SUBCUTANEOUS EVERY 8 HOURS
Refills: 0 | Status: DISCONTINUED | OUTPATIENT
Start: 2021-04-06 | End: 2021-04-21

## 2021-03-22 RX ORDER — LIDOCAINE HCL 20 MG/ML
0.2 VIAL (ML) INJECTION ONCE
Refills: 0 | Status: DISCONTINUED | OUTPATIENT
Start: 2021-04-06 | End: 2021-04-21

## 2021-03-22 NOTE — H&P PST ADULT - WEIGHT IN LBS
[facility] introduces [facility]yscraper patient portal. Now you can access parts of your medical record, email your doctor's office, and request medication refills online. 1. In your internet browser, go to [email or web address] [email or web address] 
2. Click on the First Time User? Click Here link in the Sign In box. You will see the New Member Sign Up page. 3. Enter your Skyscraper Access Code exactly as it appears below. You will not need to use this code after youve completed the sign-up process. If you do not sign up before the expiration date, you must request a new code. · [email or web address] Access Code: Activation code not generated · Patient is below the minimum allowed age for Skyscraper access. 4. Enter the last four digits of your Social Security Number (xxxx) and Date of Birth (mm/dd/yyyy) as indicated and click Submit. You will be taken to the next sign-up page. 5. Create a Skyscraper ID. This will be your Skyscraper login ID and cannot be changed, so think of one that is secure and easy to remember. 6. Create a [facility]per password. You can change your password at any time. 7. Enter your Password Reset Question and Answer. This can be used at a later time if you forget your password. 8. Enter your e-mail address. You will receive e-mail notification when new information is available in [address] 9. Click Sign Up. You can now view and download portions of your medical record. 10. Click the Download Summary menu link to download a portable copy of your medical information. If you have questions, please visit the Frequently Asked Questions section of the Skyscraper website. Remember, Skyscraper is NOT to be used for urgent needs. For medical emergencies, dial 911. Now available from your iPhone and Android!
[phone]

## 2021-03-22 NOTE — H&P PST ADULT - NSICDXPASTMEDICALHX_GEN_ALL_CORE_FT
PAST MEDICAL HISTORY:  Acquired deviated nasal septum     Anemia related to post chemo    Anxiety     Breast cancer right HER2 positive and estrogen positive. 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

## 2021-03-22 NOTE — H&P PST ADULT - HISTORY OF PRESENT ILLNESS
60 YEAR OLD FEMALE WITH PMH OF HTN, MIGRAINES, DEPRESSION, GERD WITH RIGHT BREAST CANCER, S/P CHEMOTHERAPY, AND BILATERAL MASTECTOMY 2019, S/P BILATERAL NIPPLE RECONSTRUCTION AND BILATERAL BREAST REVISION RECONSTRUCTION 11/2020.  SHE NOW PRESENTS TO [facility] FOR EVALUATION PRIOR TO BILATERAL BREAST REVISION RECONSTRUCTION ON 4/6/2021. SHE DENIES FEVER, CHILLS, HISTORY OF COVID-19 INFECTION, RECENT TRAVEL OR SICK CONTACTS.    PREOP COVID TESTING 4/3 AT [facility]            60 year old female with h/o malignant neoplasm of right breast 08/2018 , s/p preop chemo, Double mastectomy(03/2019 right, left in 11/2019 with Reconstruction with ANTONIO flap), now presents for bilateral nipple reconstruction & bilateral breast revision reconstruction on 11/03/2020.  ***"Preop covid testing ON 11/01/20".   60 YEAR OLD FEMALE WITH PMH OF HTN, MIGRAINES, DEPRESSION, GERD WITH RIGHT BREAST CANCER, S/P CHEMOTHERAPY, AND BILATERAL MASTECTOMY 2019, S/P BILATERAL NIPPLE RECONSTRUCTION AND BILATERAL BREAST REVISION RECONSTRUCTION 11/2020.  SHE NOW PRESENTS TO [facility] FOR EVALUATION PRIOR TO BILATERAL BREAST REVISION RECONSTRUCTION ON 4/6/2021. SHE DENIES FEVER, CHILLS, HISTORY OF COVID-19 INFECTION, RECENT TRAVEL OR SICK CONTACTS.    PREOP COVID TESTING 4/3 AT [facility]

## 2021-03-22 NOTE — H&P PST ADULT - OTHER CARE PROVIDERS
ONCOLOGIST--DR. BRYNN HUSSEIN, [facility], [phone]///CARDIOLOGIST DR. JOSE ANGEL GOODMAN, [facility] [phone]

## 2021-03-22 NOTE — H&P PST ADULT - NSICDXPROBLEM_GEN_ALL_CORE_FT
PROBLEM DIAGNOSES  Problem: Primary malignant neoplasm of breast  Assessment and Plan: Bilateral Breast Revision Reconstruction  -cbc, bmp, type and screen drawn at PAT  -preop instruction  -ABO DOS

## 2021-03-22 NOTE — H&P PST ADULT - NSICDXPASTSURGICALHX_GEN_ALL_CORE_FT
PAST SURGICAL HISTORY:  H/O bilateral mastectomy preop chemo 08/2018 right breast 03/2019, left [id number] with reconstruction deisy flap,    H/O mastectomy, right with expander placement 3/2019    History of laparoscopy 1980s for endometriosis    S/P FESS (functional endoscopic sinus surgery) turbinectomy 1997

## 2021-03-30 PROBLEM — M51.26 OTHER INTERVERTEBRAL DISC DISPLACEMENT, LUMBAR REGION: Chronic | Status: ACTIVE | Noted: 2019-03-07

## 2021-03-30 PROBLEM — G62.9 POLYNEUROPATHY, UNSPECIFIED: Chronic | Status: ACTIVE | Noted: 2021-03-22

## 2021-03-31 ENCOUNTER — APPOINTMENT (OUTPATIENT)
Dept: FAMILY MEDICINE | Facility: CLINIC | Age: 61
End: 2021-03-31
Payer: COMMERCIAL

## 2021-03-31 VITALS
RESPIRATION RATE: 14 BRPM | HEART RATE: 96 BPM | OXYGEN SATURATION: 97 % | HEIGHT: 67 IN | BODY MASS INDEX: 41.44 KG/M2 | WEIGHT: 264 LBS | DIASTOLIC BLOOD PRESSURE: 78 MMHG | TEMPERATURE: 97.4 F | SYSTOLIC BLOOD PRESSURE: 130 MMHG

## 2021-03-31 DIAGNOSIS — Z51.5 ENCOUNTER FOR PALLIATIVE CARE: ICD-10-CM

## 2021-03-31 DIAGNOSIS — L30.9 DERMATITIS, UNSPECIFIED: ICD-10-CM

## 2021-03-31 DIAGNOSIS — M47.24 OTHER SPONDYLOSIS WITH RADICULOPATHY, THORACIC REGION: ICD-10-CM

## 2021-03-31 PROCEDURE — 99214 OFFICE O/P EST MOD 30 MIN: CPT

## 2021-03-31 PROCEDURE — 99072 ADDL SUPL MATRL&STAF TM PHE: CPT

## 2021-03-31 NOTE — CURRENT MEDS
[Takes medication as prescribed] : takes [None] : Patient does not have any barriers to medication adherence [Yes] : Reviewed medication list for presence of high-risk medications. [Opioids] : opioids [Sedatives] : sedatives

## 2021-03-31 NOTE — RESULTS/DATA
[] : results reviewed [de-identified] : normal except mild anemia hb 10.9- not a contraindication to procedure [de-identified] : normal

## 2021-03-31 NOTE — ASSESSMENT
[High Risk Surgery - Intraperitoneal, Intrathoracic or Supringuinal Vascular Procedures] : High Risk Surgery - Intraperitoneal, Intrathoracic or Supringuinal Vascular Procedures - No (0) [Ischemic Heart Disease] : Ischemic Heart Disease - No (0) [Congestive Heart Failure] : Congestive Heart Failure - No (0) [Prior Cerebrovascular Accident or TIA] : Prior Cerebrovascular Accident or TIA - No (0) [Creatinine >= 2mg/dL (1 Point)] : Creatinine >= 2mg/dL - No (0) [Insulin-dependent Diabetic (1 Point)] : Insulin-dependent Diabetic - No (0) [0] : 0 , RCRI Class: I, Risk of Post-Op Cardiac Complications: 3.9%, 95% CI for Risk Estimate: 2.8% - 5.4% [Patient Optimized for Surgery] : Patient optimized for surgery [No Further Testing Recommended] : no further testing recommended [Continue medications as is] : Continue current medications [FreeTextEntry4] : 61 yo with mild hypertension well controlled and osteoarthritis of many sites, as well as migraine headaches presenting for medical evaluation prior to followup breast procedure to complete the post breast cancer reconstruction. She is medically optimized for the procedure. Advised to undergo colonoscopy due to mild anemia and for general screening as a higher than average risk patient. She agrees to this procedure once her breast surgery is completed. Follow up her as needed. [FreeTextEntry5] : n/a [FreeTextEntry6] : n/a [FreeTextEntry7] : lisinopril day of procedure

## 2021-03-31 NOTE — REVIEW OF SYSTEMS
[Joint Pain] : joint pain [Back Pain] : back pain [Nail Changes] : nail changes [Hair Changes] : hair changes [Headache] : headache [Anxiety] : anxiety [Negative] : Heme/Lymph [Diarrhea] : diarrhea [Hematuria] : no hematuria [Skin Rash] : no skin rash [Dizziness] : no dizziness [Fainting] : no fainting [Confusion] : no confusion [Memory Loss] : no memory loss [Unsteady Walking] : no ataxia [Depression] : no depression [FreeTextEntry5] : chest wall tenderness over the right side secondary to pulling from the scarring of the right breast [FreeTextEntry9] : sciatica not too bad at this time [de-identified] : hair regrown and regrowth of nails as well [de-identified] : peripheral neuropathy- seems a bit worse during weather changes,  migraines stable post chemotherapy

## 2021-03-31 NOTE — HISTORY OF PRESENT ILLNESS
[No Pertinent Cardiac History] : no history of aortic stenosis, atrial fibrillation, coronary artery disease, recent myocardial infarction, or implantable device/pacemaker [No Pertinent Pulmonary History] : no history of asthma, COPD, sleep apnea, or smoking [No Adverse Anesthesia Reaction] : no adverse anesthesia reaction in self or family member [(Patient denies any chest pain, claudication, dyspnea on exertion, orthopnea, palpitations or syncope)] : Patient denies any chest pain, claudication, dyspnea on exertion, orthopnea, palpitations or syncope [Chronic Anticoagulation] : no chronic anticoagulation [Chronic Kidney Disease] : no chronic kidney disease [Diabetes] : no diabetes [FreeTextEntry1] : breast reconstruction s/p bilateral mastectomy [FreeTextEntry2] : 4/6/21 [FreeTextEntry3] : Dr. Antonio [FreeTextEntry4] : 61 yo female with right breast cancer status post mastectomy, radiation and chemotherapy, hypertension and peripheral neuropathy post chemo. Doing otherwise well and mow admitted for additional breast reconstruction to get more even appearance.

## 2021-03-31 NOTE — PHYSICAL EXAM
[Well Nourished] : well nourished [Normal Sclera/Conjunctiva] : normal sclera/conjunctiva [PERRL] : pupils equal round and reactive to light [Normal Outer Ear/Nose] : the outer ears and nose were normal in appearance [Normal Oropharynx] : the oropharynx was normal [Normal TMs] : both tympanic membranes were normal [No JVD] : no jugular venous distention [No Lymphadenopathy] : no lymphadenopathy [Thyroid Normal, No Nodules] : the thyroid was normal and there were no nodules present [No Respiratory Distress] : no respiratory distress  [No Accessory Muscle Use] : no accessory muscle use [Clear to Auscultation] : lungs were clear to auscultation bilaterally [Normal Rate] : normal rate  [Regular Rhythm] : with a regular rhythm [Normal S1, S2] : normal S1 and S2 [No Murmur] : no murmur heard [Pedal Pulses Present] : the pedal pulses are present [No Edema] : there was no peripheral edema [No Axillary Lymphadenopathy] : no axillary lymphadenopathy [Soft] : abdomen soft [Non Tender] : non-tender [Non-distended] : non-distended [No Masses] : no abdominal mass palpated [No HSM] : no HSM [Declined Rectal Exam] : declined rectal exam [Normal Supraclavicular Nodes] : no supraclavicular lymphadenopathy [Normal Posterior Cervical Nodes] : no posterior cervical lymphadenopathy [Normal Anterior Cervical Nodes] : no anterior cervical lymphadenopathy [No CVA Tenderness] : no CVA  tenderness [No Rash] : no rash [Normal Gait] : normal gait [Normal Affect] : the affect was normal [Alert and Oriented x3] : oriented to person, place, and time [Normal Insight/Judgement] : insight and judgment were intact [de-identified] : varicose veins right lower extremity no swelling currently and nontender [de-identified] :  some scar tissue noted right breast underneath and area leading to the sternum, minimally tender to palpation [de-identified] : defer at this time [de-identified] : mildly tender over the lumbar region of the back with mild tenderness of the thoracic spine as well with some thoracic scoliosis noted [de-identified] : hair has grown in again- minimal regrowth of eyebrows and lashes, nails regrowing as well almost normal in appearance [de-identified] : reduced sensation of the fingers and toes

## 2021-04-03 ENCOUNTER — OUTPATIENT (OUTPATIENT)
Dept: OUTPATIENT SERVICES | Facility: HOSPITAL | Age: 61
LOS: 1 days | End: 2021-04-03
Payer: COMMERCIAL

## 2021-04-03 DIAGNOSIS — Z90.13 ACQUIRED ABSENCE OF BILATERAL BREASTS AND NIPPLES: Chronic | ICD-10-CM

## 2021-04-03 DIAGNOSIS — Z98.890 OTHER SPECIFIED POSTPROCEDURAL STATES: Chronic | ICD-10-CM

## 2021-04-03 DIAGNOSIS — Z90.11 ACQUIRED ABSENCE OF RIGHT BREAST AND NIPPLE: Chronic | ICD-10-CM

## 2021-04-03 DIAGNOSIS — Z11.52 ENCOUNTER FOR SCREENING FOR COVID-19: ICD-10-CM

## 2021-04-03 LAB — SARS-COV-2 RNA SPEC QL NAA+PROBE: SIGNIFICANT CHANGE UP

## 2021-04-03 PROCEDURE — C9803: CPT

## 2021-04-03 PROCEDURE — U0005: CPT

## 2021-04-03 PROCEDURE — U0003: CPT

## 2021-04-05 ENCOUNTER — TRANSCRIPTION ENCOUNTER (OUTPATIENT)
Age: 61
End: 2021-04-05

## 2021-04-06 ENCOUNTER — RESULT REVIEW (OUTPATIENT)
Age: 61
End: 2021-04-06

## 2021-04-06 ENCOUNTER — OUTPATIENT (OUTPATIENT)
Dept: OUTPATIENT SERVICES | Facility: HOSPITAL | Age: 61
LOS: 1 days | Discharge: HOME | End: 2021-04-06
Payer: COMMERCIAL

## 2021-04-06 VITALS — DIASTOLIC BLOOD PRESSURE: 56 MMHG | SYSTOLIC BLOOD PRESSURE: 112 MMHG | HEART RATE: 83 BPM | OXYGEN SATURATION: 100 %

## 2021-04-06 VITALS
OXYGEN SATURATION: 98 % | DIASTOLIC BLOOD PRESSURE: 72 MMHG | WEIGHT: 261.03 LBS | SYSTOLIC BLOOD PRESSURE: 105 MMHG | HEIGHT: 67 IN | TEMPERATURE: 97 F | RESPIRATION RATE: 16 BRPM | HEART RATE: 75 BPM

## 2021-04-06 DIAGNOSIS — Z98.890 OTHER SPECIFIED POSTPROCEDURAL STATES: Chronic | ICD-10-CM

## 2021-04-06 DIAGNOSIS — Z85.3 PERSONAL HISTORY OF MALIGNANT NEOPLASM OF BREAST: ICD-10-CM

## 2021-04-06 DIAGNOSIS — Z90.11 ACQUIRED ABSENCE OF RIGHT BREAST AND NIPPLE: Chronic | ICD-10-CM

## 2021-04-06 DIAGNOSIS — Z90.13 ACQUIRED ABSENCE OF BILATERAL BREASTS AND NIPPLES: Chronic | ICD-10-CM

## 2021-04-06 DIAGNOSIS — Z90.13 ACQUIRED ABSENCE OF BILATERAL BREASTS AND NIPPLES: ICD-10-CM

## 2021-04-06 LAB — RH IG SCN BLD-IMP: POSITIVE — SIGNIFICANT CHANGE UP

## 2021-04-06 PROCEDURE — 19380 REVJ RECONSTRUCTED BREAST: CPT | Mod: 50

## 2021-04-06 PROCEDURE — 88304 TISSUE EXAM BY PATHOLOGIST: CPT | Mod: 26

## 2021-04-06 PROCEDURE — 88304 TISSUE EXAM BY PATHOLOGIST: CPT

## 2021-04-06 RX ORDER — OXYCODONE HYDROCHLORIDE 5 MG/1
5 TABLET ORAL ONCE
Refills: 0 | Status: DISCONTINUED | OUTPATIENT
Start: 2021-04-06 | End: 2021-04-06

## 2021-04-06 RX ORDER — FENTANYL CITRATE 50 UG/ML
25 INJECTION INTRAVENOUS
Refills: 0 | Status: DISCONTINUED | OUTPATIENT
Start: 2021-04-06 | End: 2021-04-06

## 2021-04-06 RX ORDER — CHLORHEXIDINE GLUCONATE 213 G/1000ML
1 SOLUTION TOPICAL ONCE
Refills: 0 | Status: COMPLETED | OUTPATIENT
Start: 2021-04-06 | End: 2021-04-06

## 2021-04-06 RX ORDER — APREPITANT 80 MG/1
40 CAPSULE ORAL ONCE
Refills: 0 | Status: COMPLETED | OUTPATIENT
Start: 2021-04-06 | End: 2021-04-06

## 2021-04-06 RX ORDER — ONDANSETRON 8 MG/1
4 TABLET, FILM COATED ORAL ONCE
Refills: 0 | Status: DISCONTINUED | OUTPATIENT
Start: 2021-04-06 | End: 2021-04-21

## 2021-04-06 RX ORDER — SODIUM CHLORIDE 9 MG/ML
1000 INJECTION, SOLUTION INTRAVENOUS
Refills: 0 | Status: DISCONTINUED | OUTPATIENT
Start: 2021-04-06 | End: 2021-04-21

## 2021-04-06 RX ADMIN — APREPITANT 40 MILLIGRAM(S): 80 CAPSULE ORAL at 13:09

## 2021-04-06 RX ADMIN — CHLORHEXIDINE GLUCONATE 1 APPLICATION(S): 213 SOLUTION TOPICAL at 13:09

## 2021-04-06 NOTE — BRIEF OPERATIVE NOTE - OPERATION/FINDINGS
Liposuction of areas of bilateral breasts for symmetry, scar revision of bilateral breast scars and advancement of skin flaps.

## 2021-04-06 NOTE — ASU DISCHARGE PLAN (ADULT/PEDIATRIC) - CARE PROVIDER_API CALL
Ehsan Antonio)  Plastic Surgery  [address] [facility], [address]  [place] [address]  Phone: [phone]  Fax: [phone]  Follow Up Time: 1 week

## 2021-04-06 NOTE — ASU DISCHARGE PLAN (ADULT/PEDIATRIC) - NURSING INSTRUCTIONS
You were given Tylenol and Ibuprofen (Motrin/Advil) in the operating room today. You may take Ibuprofen again at 8:30pm. You can take Tylenol again at 8:30pm if needed. DO NOT take any additional products containing TYLENOL/ACETAMINOPHEN such as: VICODIN, PERCOCET, NORCO, EXCEDRIC, and any over the counter cold medications until this time. Do not consume more than 4000MG of Tylenol in a 24-hour period.

## 2021-04-06 NOTE — ASU PREOP CHECKLIST - BLOOD AVAILABLE
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

## 2021-04-06 NOTE — ASU PATIENT PROFILE, ADULT - PMH
Acquired deviated nasal septum    Anemia  related to post chemo  Anxiety    Breast cancer  right HER2 positive and estrogen positive. completed chemo 2 weeks ago  Depression    Diarrhea    Environmental allergies    Frozen shoulder  bilateral  GERD (gastroesophageal reflux disease)    Hypertension    Lumbar disc herniation    Lumbar stenosis  L5-S1, last epidural injection April 2018  Migraine    Morbid obesity with BMI of 40.0-44.9, adult    Mucositis    Neuropathy  hand and feet--secondary to chemothathy  Neuropathy  lower extremities  Osteoarthritis  back pain  Osteophyte of spine  T 7-9 and T 11-12  Palpitations  anxiety induced  Sinusitis    Varicose vein of leg  right posterior patella

## 2021-04-06 NOTE — PRE-ANESTHESIA EVALUATION ADULT - NSANTHAGERD_ENT_A_CORE
Follow-up visit to continue to provide support as Júnior Cox continue to seek connection and purpose in her journey. She noted she is very interested in good food these days. Her family will be bring her dinner tonight. Pastoral Care continues to be available. Please page if needed.     Sharmila Wright., [address], [address] [place] [address]
Yes

## 2021-04-06 NOTE — ASU DISCHARGE PLAN (ADULT/PEDIATRIC) - ASU DC SPECIAL INSTRUCTIONSFT
Please take prescriptions as prescribed by Dr. Antonio.     You may shower after 48 hours. Please place the bra on again after showering and continue to wear the bra at all times to prevent swelling.    Please see Dr. Antonio in 1 week for follow-up. Please take prescriptions as prescribed by Dr. Antonio. You may shower after 48 hours. Please place the bra on again after showering and continue to wear the bra at all times to prevent swelling. Please see Dr. Antonio in 1 week for follow-up.

## 2021-04-06 NOTE — ASU PATIENT PROFILE, ADULT - PSH
H/O bilateral mastectomy  preop chemo 08/2018 right breast 03/2019, left [id number]/2019 with reconstruction deisy flap,  H/O mastectomy, right  with expander placement 3/2019  History of laparoscopy  1980s for endometriosis  S/P FESS (functional endoscopic sinus surgery)  turbinectomy 1997

## 2021-04-06 NOTE — ASU DISCHARGE PLAN (ADULT/PEDIATRIC) - CALL YOUR DOCTOR IF YOU HAVE ANY OF THE FOLLOWING:
[Smiles responsively] : smiles responsively [Regards face] : regards face [Follows to midline] : follows to midline [Vocalizes] : vocalizes [Lifts Head] : lifts head Bleeding that does not stop/Swelling that gets worse/Pain not relieved by Medications/Wound/Surgical Site with redness, or foul smelling discharge or pus/Numbness, tingling, color or temperature change to extremity

## 2021-04-06 NOTE — BRIEF OPERATIVE NOTE - NSICDXBRIEFPROCEDURE_GEN_ALL_CORE_FT
PROCEDURES:  Liposuction, torso 06-Apr-2021 14:57:15  Robert Santana repair of trunk, 5.1cm to 7.5cm 06-Apr-2021 14:58:03  Robert Santana repair of trunk, each additional 5 cm or less 06-Apr-2021 14:58:18  Robert Santana

## 2021-04-08 LAB — SURGICAL PATHOLOGY STUDY: SIGNIFICANT CHANGE UP

## 2021-05-05 ENCOUNTER — APPOINTMENT (OUTPATIENT)
Dept: DERMATOLOGY | Facility: CLINIC | Age: 61
End: 2021-05-05
Payer: SELF-PAY

## 2021-05-05 DIAGNOSIS — D22.9 MELANOCYTIC NEVI, UNSPECIFIED: ICD-10-CM

## 2021-05-05 DIAGNOSIS — L82.1 OTHER SEBORRHEIC KERATOSIS: ICD-10-CM

## 2021-05-05 DIAGNOSIS — Z12.83 ENCOUNTER FOR SCREENING FOR MALIGNANT NEOPLASM OF SKIN: ICD-10-CM

## 2021-05-05 PROCEDURE — 99213 OFFICE O/P EST LOW 20 MIN: CPT

## 2021-05-09 ENCOUNTER — RX RENEWAL (OUTPATIENT)
Age: 61
End: 2021-05-09

## 2021-05-12 ENCOUNTER — RX RENEWAL (OUTPATIENT)
Age: 61
End: 2021-05-12

## 2021-06-22 ENCOUNTER — OUTPATIENT (OUTPATIENT)
Dept: OUTPATIENT SERVICES | Facility: HOSPITAL | Age: 61
LOS: 1 days | Discharge: HOME | End: 2021-06-22

## 2021-06-22 DIAGNOSIS — Z90.13 ACQUIRED ABSENCE OF BILATERAL BREASTS AND NIPPLES: Chronic | ICD-10-CM

## 2021-06-22 DIAGNOSIS — C50.911 MALIGNANT NEOPLASM OF UNSPECIFIED SITE OF RIGHT FEMALE BREAST: ICD-10-CM

## 2021-06-22 DIAGNOSIS — Z98.890 OTHER SPECIFIED POSTPROCEDURAL STATES: Chronic | ICD-10-CM

## 2021-06-22 DIAGNOSIS — Z90.11 ACQUIRED ABSENCE OF RIGHT BREAST AND NIPPLE: Chronic | ICD-10-CM

## 2021-06-23 ENCOUNTER — RX RENEWAL (OUTPATIENT)
Age: 61
End: 2021-06-23

## 2021-06-24 ENCOUNTER — OUTPATIENT (OUTPATIENT)
Dept: OUTPATIENT SERVICES | Facility: HOSPITAL | Age: 61
LOS: 1 days | Discharge: HOME | End: 2021-06-24

## 2021-06-24 ENCOUNTER — APPOINTMENT (OUTPATIENT)
Dept: INFUSION THERAPY | Facility: HOSPITAL | Age: 61
End: 2021-06-24

## 2021-06-24 ENCOUNTER — APPOINTMENT (OUTPATIENT)
Dept: HEMATOLOGY ONCOLOGY | Facility: CLINIC | Age: 61
End: 2021-06-24
Payer: COMMERCIAL

## 2021-06-24 ENCOUNTER — LABORATORY RESULT (OUTPATIENT)
Age: 61
End: 2021-06-24

## 2021-06-24 ENCOUNTER — RESULT REVIEW (OUTPATIENT)
Age: 61
End: 2021-06-24

## 2021-06-24 VITALS
RESPIRATION RATE: 16 BRPM | HEART RATE: 91 BPM | HEIGHT: 66.14 IN | DIASTOLIC BLOOD PRESSURE: 66 MMHG | BODY MASS INDEX: 41.63 KG/M2 | WEIGHT: 259.04 LBS | OXYGEN SATURATION: 97 % | SYSTOLIC BLOOD PRESSURE: 116 MMHG | TEMPERATURE: 97.2 F

## 2021-06-24 DIAGNOSIS — Z98.890 OTHER SPECIFIED POSTPROCEDURAL STATES: Chronic | ICD-10-CM

## 2021-06-24 DIAGNOSIS — Z90.13 ACQUIRED ABSENCE OF BILATERAL BREASTS AND NIPPLES: Chronic | ICD-10-CM

## 2021-06-24 DIAGNOSIS — Z90.11 ACQUIRED ABSENCE OF RIGHT BREAST AND NIPPLE: Chronic | ICD-10-CM

## 2021-06-24 DIAGNOSIS — C50.911 MALIGNANT NEOPLASM OF UNSPECIFIED SITE OF RIGHT FEMALE BREAST: ICD-10-CM

## 2021-06-24 LAB
BASOPHILS # BLD AUTO: 0.04 K/UL — SIGNIFICANT CHANGE UP (ref 0–0.2)
BASOPHILS NFR BLD AUTO: 0.5 % — SIGNIFICANT CHANGE UP (ref 0–2)
EOSINOPHIL # BLD AUTO: 0.15 K/UL — SIGNIFICANT CHANGE UP (ref 0–0.5)
EOSINOPHIL NFR BLD AUTO: 1.8 % — SIGNIFICANT CHANGE UP (ref 0–6)
HCT VFR BLD CALC: 36.3 % — SIGNIFICANT CHANGE UP (ref 34.5–45)
HGB BLD-MCNC: 11.5 G/DL — SIGNIFICANT CHANGE UP (ref 11.5–15.5)
IMM GRANULOCYTES NFR BLD AUTO: 0.2 % — SIGNIFICANT CHANGE UP (ref 0–1.5)
LYMPHOCYTES # BLD AUTO: 1.66 K/UL — SIGNIFICANT CHANGE UP (ref 1–3.3)
LYMPHOCYTES # BLD AUTO: 19.4 % — SIGNIFICANT CHANGE UP (ref 13–44)
MCHC RBC-ENTMCNC: 28.9 PG — SIGNIFICANT CHANGE UP (ref 27–34)
MCHC RBC-ENTMCNC: 31.7 G/DL — LOW (ref 32–36)
MCV RBC AUTO: 91.2 FL — SIGNIFICANT CHANGE UP (ref 80–100)
MONOCYTES # BLD AUTO: 0.55 K/UL — SIGNIFICANT CHANGE UP (ref 0–0.9)
MONOCYTES NFR BLD AUTO: 6.4 % — SIGNIFICANT CHANGE UP (ref 2–14)
NEUTROPHILS # BLD AUTO: 6.14 K/UL — SIGNIFICANT CHANGE UP (ref 1.8–7.4)
NEUTROPHILS NFR BLD AUTO: 71.7 % — SIGNIFICANT CHANGE UP (ref 43–77)
NRBC # BLD: 0 /100 WBCS — SIGNIFICANT CHANGE UP (ref 0–0)
PLATELET # BLD AUTO: 311 K/UL — SIGNIFICANT CHANGE UP (ref 150–400)
RBC # BLD: 3.98 M/UL — SIGNIFICANT CHANGE UP (ref 3.8–5.2)
RBC # FLD: 14.5 % — SIGNIFICANT CHANGE UP (ref 10.3–14.5)
WBC # BLD: 8.56 K/UL — SIGNIFICANT CHANGE UP (ref 3.8–10.5)
WBC # FLD AUTO: 8.56 K/UL — SIGNIFICANT CHANGE UP (ref 3.8–10.5)

## 2021-06-24 PROCEDURE — 99214 OFFICE O/P EST MOD 30 MIN: CPT

## 2021-06-24 PROCEDURE — 99072 ADDL SUPL MATRL&STAF TM PHE: CPT

## 2021-06-26 ENCOUNTER — NON-APPOINTMENT (OUTPATIENT)
Age: 61
End: 2021-06-26

## 2021-06-27 ENCOUNTER — NON-APPOINTMENT (OUTPATIENT)
Age: 61
End: 2021-06-27

## 2021-07-17 ENCOUNTER — APPOINTMENT (OUTPATIENT)
Dept: GASTROENTEROLOGY | Facility: CLINIC | Age: 61
End: 2021-07-17
Payer: COMMERCIAL

## 2021-07-17 VITALS
OXYGEN SATURATION: 97 % | HEIGHT: 67 IN | TEMPERATURE: 97.6 F | SYSTOLIC BLOOD PRESSURE: 122 MMHG | DIASTOLIC BLOOD PRESSURE: 78 MMHG | HEART RATE: 80 BPM | WEIGHT: 260 LBS | BODY MASS INDEX: 40.81 KG/M2

## 2021-07-17 DIAGNOSIS — Z12.11 ENCOUNTER FOR SCREENING FOR MALIGNANT NEOPLASM OF COLON: ICD-10-CM

## 2021-07-17 DIAGNOSIS — R12 HEARTBURN: ICD-10-CM

## 2021-07-17 PROCEDURE — 99203 OFFICE O/P NEW LOW 30 MIN: CPT

## 2021-07-17 PROCEDURE — 99072 ADDL SUPL MATRL&STAF TM PHE: CPT

## 2021-07-17 NOTE — ASSESSMENT
[FreeTextEntry1] : Patient with no Prior CCS. Extensive Multicentric Breast lesion. \par \par Plan\par Colon / EGD with Plenvu\par Cont Zegerid

## 2021-07-17 NOTE — HISTORY OF PRESENT ILLNESS
[de-identified] : 60 yo s/p b/l mastectomy , s/p rad , Herseptin and Taxol. Never had colon. No rectal bleeding. Mother  of melanoma. No other issues. No diabetes .Minimal HTN . Takes Zegerid for Heartburn. Had EGD 3-5 yo . No Nausea ,V or Diarrhea. Otherwise doing well. Last PET .

## 2021-08-13 ENCOUNTER — APPOINTMENT (OUTPATIENT)
Dept: GASTROENTEROLOGY | Facility: AMBULATORY MEDICAL SERVICES | Age: 61
End: 2021-08-13
Payer: COMMERCIAL

## 2021-08-13 PROCEDURE — 43239 EGD BIOPSY SINGLE/MULTIPLE: CPT

## 2021-08-13 PROCEDURE — 45380 COLONOSCOPY AND BIOPSY: CPT

## 2021-09-10 ENCOUNTER — APPOINTMENT (OUTPATIENT)
Dept: FAMILY MEDICINE | Facility: CLINIC | Age: 61
End: 2021-09-10
Payer: COMMERCIAL

## 2021-09-10 VITALS
BODY MASS INDEX: 40.81 KG/M2 | DIASTOLIC BLOOD PRESSURE: 80 MMHG | WEIGHT: 260 LBS | SYSTOLIC BLOOD PRESSURE: 120 MMHG | HEIGHT: 67 IN | HEART RATE: 82 BPM | OXYGEN SATURATION: 98 % | RESPIRATION RATE: 16 BRPM

## 2021-09-10 PROCEDURE — 99214 OFFICE O/P EST MOD 30 MIN: CPT

## 2021-09-11 ENCOUNTER — RESULT REVIEW (OUTPATIENT)
Age: 61
End: 2021-09-11

## 2021-09-11 LAB
CRP SERPL-MCNC: 19 MG/L
ERYTHROCYTE [SEDIMENTATION RATE] IN BLOOD BY WESTERGREN METHOD: 30 MM/HR
RHEUMATOID FACT SER QL: <10 IU/ML
TSH SERPL-ACNC: 0.39 UIU/ML

## 2021-09-12 ENCOUNTER — RESULT REVIEW (OUTPATIENT)
Age: 61
End: 2021-09-12

## 2021-09-12 LAB
ANA SER IF-ACNC: NEGATIVE
DSDNA AB SER-ACNC: <12 IU/ML

## 2021-09-14 NOTE — HISTORY OF PRESENT ILLNESS
[FreeTextEntry8] : CC: sever joint pains for the last several months. She has also recently noted a different type of rash particularly on her right foot which is more flakey and silvery than her usual eczema. The rash has begun around the same time as the worsening of her joint symptoms. She denies any specific other symptoms other than her low back pain which has been intermittent as before. She has pain in the hands, knees and feet as well as in the shoulders. They come and go and have not responded well to tylenol, nsaids or the oxycodone she takes for her back when it gets very bad and causes sciatica.

## 2021-09-14 NOTE — HEALTH RISK ASSESSMENT
[de-identified] : oncology- notes reviewed [de-identified] : sednetary due to her pain [de-identified] : diet has been healthy but not all the time and recently eating things she had not been eating previously

## 2021-09-14 NOTE — REVIEW OF SYSTEMS
[Fatigue] : fatigue [Joint Pain] : joint pain [Back Pain] : back pain [Nail Changes] : nail changes [Hair Changes] : hair changes [Headache] : headache [Anxiety] : anxiety [Negative] : Heme/Lymph [Fever] : no fever [Chills] : no chills [Diarrhea] : diarrhea [Hematuria] : no hematuria [Skin Rash] : no skin rash [Dizziness] : no dizziness [Fainting] : no fainting [Confusion] : no confusion [Memory Loss] : no memory loss [Unsteady Walking] : no ataxia [Suicidal] : not suicidal [Depression] : no depression [FreeTextEntry9] : see hpi [de-identified] : hair regrown and regrowth of nails as well [de-identified] : peripheral neuropathy- seems a bit worse during weather changes,  migraines stable post chemotherapy

## 2021-09-14 NOTE — CURRENT MEDS
[Takes medication as prescribed] : takes [None] : Patient does not have any barriers to medication adherence [Yes] : Reviewed medication list for presence of high-risk medications. [Opioids] : opioids [FreeTextEntry1] : for chronic back pain- breast cancer post chemo and RT

## 2021-09-14 NOTE — PHYSICAL EXAM
[No Acute Distress] : no acute distress [Normal Sclera/Conjunctiva] : normal sclera/conjunctiva [No JVD] : no jugular venous distention [No Respiratory Distress] : no respiratory distress  [No Accessory Muscle Use] : no accessory muscle use [Clear to Auscultation] : lungs were clear to auscultation bilaterally [Normal Rate] : normal rate  [Regular Rhythm] : with a regular rhythm [Normal S1, S2] : normal S1 and S2 [de-identified] : obese [de-identified] : no erythema or warmth of the joints, tenderness of the knees and hands at the first mcp joints bilaterally, no scitic notch tenderness today and no other joint findins [de-identified] : several patches of erythematous plaques with superficial silvery scales on the right foot, right  arm and

## 2021-09-14 NOTE — ASSESSMENT
[FreeTextEntry1] : Discussion on possible diagnoses with inflammatory arthritis prominent on the list specifically psoriatic arthritis with the issue of possible ankylosing spondylitis and other similar diagnoses possible- will order her labs and refer to rheumatology for further evaluation and treatment which may include steroids or other immunosuppressive meds. May need to discuss those options with oncology.

## 2021-09-15 ENCOUNTER — OUTPATIENT (OUTPATIENT)
Dept: OUTPATIENT SERVICES | Facility: HOSPITAL | Age: 61
LOS: 1 days | Discharge: HOME | End: 2021-09-15

## 2021-09-15 DIAGNOSIS — Z90.11 ACQUIRED ABSENCE OF RIGHT BREAST AND NIPPLE: Chronic | ICD-10-CM

## 2021-09-15 DIAGNOSIS — Z90.13 ACQUIRED ABSENCE OF BILATERAL BREASTS AND NIPPLES: Chronic | ICD-10-CM

## 2021-09-15 DIAGNOSIS — Z98.890 OTHER SPECIFIED POSTPROCEDURAL STATES: Chronic | ICD-10-CM

## 2021-09-15 DIAGNOSIS — C50.911 MALIGNANT NEOPLASM OF UNSPECIFIED SITE OF RIGHT FEMALE BREAST: ICD-10-CM

## 2021-09-16 ENCOUNTER — APPOINTMENT (OUTPATIENT)
Dept: HEMATOLOGY ONCOLOGY | Facility: CLINIC | Age: 61
End: 2021-09-16

## 2021-09-16 ENCOUNTER — RESULT REVIEW (OUTPATIENT)
Age: 61
End: 2021-09-16

## 2021-09-16 LAB — HLA-B27 RELATED AG QL: NEGATIVE

## 2021-09-17 ENCOUNTER — RESULT REVIEW (OUTPATIENT)
Age: 61
End: 2021-09-17

## 2021-09-17 ENCOUNTER — LABORATORY RESULT (OUTPATIENT)
Age: 61
End: 2021-09-17

## 2021-09-17 ENCOUNTER — APPOINTMENT (OUTPATIENT)
Dept: INFUSION THERAPY | Facility: HOSPITAL | Age: 61
End: 2021-09-17

## 2021-09-17 LAB
BASOPHILS # BLD AUTO: 0.04 K/UL — SIGNIFICANT CHANGE UP (ref 0–0.2)
BASOPHILS NFR BLD AUTO: 0.5 % — SIGNIFICANT CHANGE UP (ref 0–2)
EOSINOPHIL # BLD AUTO: 0.16 K/UL — SIGNIFICANT CHANGE UP (ref 0–0.5)
EOSINOPHIL NFR BLD AUTO: 2.2 % — SIGNIFICANT CHANGE UP (ref 0–6)
HCT VFR BLD CALC: 36 % — SIGNIFICANT CHANGE UP (ref 34.5–45)
HGB BLD-MCNC: 11.5 G/DL — SIGNIFICANT CHANGE UP (ref 11.5–15.5)
IMM GRANULOCYTES NFR BLD AUTO: 0.4 % — SIGNIFICANT CHANGE UP (ref 0–1.5)
LYMPHOCYTES # BLD AUTO: 1.57 K/UL — SIGNIFICANT CHANGE UP (ref 1–3.3)
LYMPHOCYTES # BLD AUTO: 21.5 % — SIGNIFICANT CHANGE UP (ref 13–44)
MCHC RBC-ENTMCNC: 29.3 PG — SIGNIFICANT CHANGE UP (ref 27–34)
MCHC RBC-ENTMCNC: 31.9 G/DL — LOW (ref 32–36)
MCV RBC AUTO: 91.8 FL — SIGNIFICANT CHANGE UP (ref 80–100)
MONOCYTES # BLD AUTO: 0.52 K/UL — SIGNIFICANT CHANGE UP (ref 0–0.9)
MONOCYTES NFR BLD AUTO: 7.1 % — SIGNIFICANT CHANGE UP (ref 2–14)
NEUTROPHILS # BLD AUTO: 4.98 K/UL — SIGNIFICANT CHANGE UP (ref 1.8–7.4)
NEUTROPHILS NFR BLD AUTO: 68.3 % — SIGNIFICANT CHANGE UP (ref 43–77)
NRBC # BLD: 0 /100 WBCS — SIGNIFICANT CHANGE UP (ref 0–0)
PLATELET # BLD AUTO: 319 K/UL — SIGNIFICANT CHANGE UP (ref 150–400)
RBC # BLD: 3.92 M/UL — SIGNIFICANT CHANGE UP (ref 3.8–5.2)
RBC # FLD: 14.1 % — SIGNIFICANT CHANGE UP (ref 10.3–14.5)
WBC # BLD: 7.3 K/UL — SIGNIFICANT CHANGE UP (ref 3.8–10.5)
WBC # FLD AUTO: 7.3 K/UL — SIGNIFICANT CHANGE UP (ref 3.8–10.5)

## 2021-09-29 ENCOUNTER — APPOINTMENT (OUTPATIENT)
Dept: HEMATOLOGY ONCOLOGY | Facility: CLINIC | Age: 61
End: 2021-09-29
Payer: COMMERCIAL

## 2021-09-29 VITALS
TEMPERATURE: 96.8 F | HEART RATE: 78 BPM | WEIGHT: 259.7 LBS | HEIGHT: 67.05 IN | SYSTOLIC BLOOD PRESSURE: 121 MMHG | BODY MASS INDEX: 40.76 KG/M2 | DIASTOLIC BLOOD PRESSURE: 79 MMHG | RESPIRATION RATE: 18 BRPM | OXYGEN SATURATION: 99 %

## 2021-09-29 PROCEDURE — 99215 OFFICE O/P EST HI 40 MIN: CPT

## 2021-09-29 RX ORDER — POLYETHYLENE GLYCOL 3350, SODIUM SULFATE, SODIUM CHLORIDE, POTASSIUM CHLORIDE, ASCORBIC ACID, SODIUM ASCORBATE 140-9-5.2G
140 KIT ORAL
Qty: 1 | Refills: 0 | Status: DISCONTINUED | COMMUNITY
Start: 2021-07-17 | End: 2021-09-29

## 2021-10-01 NOTE — PHYSICAL EXAM
[Fully active, able to carry on all pre-disease performance without restriction] : Status 0 - Fully active, able to carry on all pre-disease performance without restriction [Obese] : obese [Normal] : affect appropriate [de-identified] : anicteric no injection [de-identified] : no LE edema, no calf tenderness. [de-identified] : bilateral reconstructed breast with well healed scars and no discrete palpable masses, nipple reconstruction b/l well healed, no palpable axillary nodes.

## 2021-10-01 NOTE — REVIEW OF SYSTEMS
[Negative] : Psychiatric [FreeTextEntry2] : as above [FreeTextEntry9] : as above [de-identified] : as above [de-identified] : as above

## 2021-10-01 NOTE — HISTORY OF PRESENT ILLNESS
[Disease: _____________________] : Disease: [unfilled] [T: ___] : T[unfilled] [N: ___] : N[unfilled] [AJCC Stage: ____] : AJCC Stage: [unfilled] [Treatment Protocol] : Treatment Protocol [de-identified] : The pt was seen for an initial visit on 9/29/21 in order to transfer her care to me from that of Dr Cox who left the [facility]. Her history below is as per review of the AEHR notes and confirmation with the pt. \par \par History of ER+NH-HER2+ locally advanced, inflammatory poorly differentiated ductal breast cancer.\par \par She noted summer 2018 she felt a mass near her nipple in her R breast. She went to her PMD who ordered a mammogram and sonogram, which revealed a R abnormal hypoechoic mass and abnormal appearing axillary LN.\par \par Image guided biopsy of mass and axilla revealed invasive poorly differentiated ductal carcinoma, +LVI, 1.2cm in specimen, [place] 8/9, DCIS, microcalcifications present, ER >95%, NH negative, HER2+ 3+ IHC, Lymph node biopsy with metastatic carcinoma.\par \par She then saw Dr. Camacho breast surgeon who ordered and MRI of the breasts. MRI showed: Biopsy proven mammary carcinoma in the R retroareolar region 5:00 manifested as multiple irregular masses. The tumor is extensive in nature and spanning an area of over 3 cm with non-mass enhancement extending posteriorly suggesting extensive intraductal component as well. Two other highly suspicious masses noted in the RUOQ at 11:00 measuring >3 cm and at 10:00 measuring 2.6 cm consistent with multicentric disease. Biopsy proven metastatic disease to an axillary LN with additional suspicious LNs (additional with replaced fatty hilum and abnormally enlarged cortices, some have irregular borders, some posterior to pectoralis muscle, also abnormal appearing node in soft tissue lateral to the R chest wall).\par \par Dr. Camacho recommended neoadjuvant chemotherapy and the option for mastectomy with possible axillary lymph node dissection pending response to therapy.\par \par She then saw Dr Cox in consultation. She underwent PETCT that found a peripancreatic lymph node that was enlarged and SUV avid, b/l laryngeal FDG avidity (likely due to speaking during exam), and SUV avid breast mass/axilla on R with breast skin noted to be thickened. She underwent EGD/EUS and biopsy of this peripancreatic LN (Dr. Giovanni Felton) which was consistent with benign/reactive lymphatic tissue. She had a mediport placed by IR on 9/26. She saw oncocardiology Dr. hKan for risk assessment prior to anthracycline/anti-HER2 monoclonal therapy and was started on ACEi for HTN and for cardioprotection. She saw dermatology for skin biopsy of R breast prior to treatment to evaluate/confirm inflammatory disease; biopsy results with skin involvement by poorly differentiated carcinoma. She started neoadjuvant chemotherapy with dose-dense AC on 9/26. dose-dense AC-->THP (weekly taxol 80mg/m2)\par \par 11/23 THP #1\par 11/30 Taxol 80mg/m2 held due to neuropathy\par 12/7 resumed Taxol weekly without issue\par 12/15 taxol completed x 12 weeks\par 3/8 Herceptin and Perjeta administered pre-operatively\par \par MRI 2/4/19 BIRADS 2, masses and adenopathy resolved. Some residual R breast skin thickening noted.\par \par 3/22/19 R mastectomy and ALND: 0/6 LN removed involved, no residual invasive carcinoma or DCIS identified. [id number] ([facility])\par \par 4/3 Herceptin and Perjeta continued post-operatively every 3 weeks, adjuvant radiation started 5/2019 and completed 6/2019 c/b grade 3 dermatitis now resolved.\par \par LH/FSH/estradiol levels May/June 2019 consistent with menopause.\par Arimidex started July 2019.\par \par She underwent ANTONIO reconstruction 10/2019 without event. Pathology negative.\par \par Herceptin/Perjeta C17 completed 11/7/19. She started extended adjuvant therapy with neratinib 12/2019 -2/2020 - self discontinued due to toxicity of diarrhea. She continues on arimidex.\par \par [place]  [place] (pret[place] ). She has a daughter (an artist and ). She works full time as a . She notes a history of smoking in the distant past. Mother passed away in 70s from metastatic melanoma, and a maternal aunt had breast cancer; no other significant close family history of cancers. She notes overall migraines (which she has had for many years, seen neurologist, stable lytic medications help, associated with one sided facial numbness when they occur) have gotten better with menopause – her LMP was 2/2018 and it was infrequent prior to that. She has had some hot flashes. She used OCPs in the past for years particularly for endometriosis treatment. She has had issues with DJD of back and spine with pain, MRI demonstrating spinal stenosis, with epidural injections that have helped with this significantly as well as occasional NSAID use.\par \par She completed bilateral reconstruction with Dr. Antonio on 4/6/21 [de-identified] : ER+MS-HER2+ [FreeTextEntry1] : dose-dense AC (adriamycin 60mg/m2 and cytoxan 600mg/m2) x 4 cycles, followed by weekly taxol 80mg/m2 x 12 with herceptin/perjeta q3 weeks, arimidex [de-identified] : Patient presents for follow up.  \par \par She remains on anastrozole.\par \par She is tolerating it well with occasional hot flashes, which patient states are easily tolerated. \par \par She has chronic low grade fatigue since completing chemo but doesnot affect QOL or any activities.\par \par She c/o arthralgias of hands, hips, knees, feet, shoulders since chemo finished and before the start of anastrozole - had bloood test drawn which showed some inflammatory markers - to see a rheumatologist for a further evaluation. \par \par  Her prior mild CIPN of hands / feet cont to improve \par \par She denies any new complaints.  \par \par Her daughter is getting  in 2 weeks. \par \par \par DEXA 8/20 WNL

## 2021-10-04 ENCOUNTER — NON-APPOINTMENT (OUTPATIENT)
Age: 61
End: 2021-10-04

## 2021-10-11 ENCOUNTER — NON-APPOINTMENT (OUTPATIENT)
Age: 61
End: 2021-10-11

## 2021-11-03 ENCOUNTER — OUTPATIENT (OUTPATIENT)
Dept: OUTPATIENT SERVICES | Facility: HOSPITAL | Age: 61
LOS: 1 days | Discharge: HOME | End: 2021-11-03

## 2021-11-03 DIAGNOSIS — Z90.11 ACQUIRED ABSENCE OF RIGHT BREAST AND NIPPLE: Chronic | ICD-10-CM

## 2021-11-03 DIAGNOSIS — Z90.13 ACQUIRED ABSENCE OF BILATERAL BREASTS AND NIPPLES: Chronic | ICD-10-CM

## 2021-11-03 DIAGNOSIS — Z17.0 ESTROGEN RECEPTOR POSITIVE STATUS [ER+]: ICD-10-CM

## 2021-11-03 DIAGNOSIS — C50.911 MALIGNANT NEOPLASM OF UNSPECIFIED SITE OF RIGHT FEMALE BREAST: ICD-10-CM

## 2021-11-03 DIAGNOSIS — Z98.890 OTHER SPECIFIED POSTPROCEDURAL STATES: Chronic | ICD-10-CM

## 2021-11-03 DIAGNOSIS — C77.3 SECONDARY AND UNSPECIFIED MALIGNANT NEOPLASM OF AXILLA AND UPPER LIMB LYMPH NODES: ICD-10-CM

## 2021-11-05 ENCOUNTER — LABORATORY RESULT (OUTPATIENT)
Age: 61
End: 2021-11-05

## 2021-11-05 ENCOUNTER — RESULT REVIEW (OUTPATIENT)
Age: 61
End: 2021-11-05

## 2021-11-05 ENCOUNTER — APPOINTMENT (OUTPATIENT)
Dept: HEMATOLOGY ONCOLOGY | Facility: CLINIC | Age: 61
End: 2021-11-05
Payer: COMMERCIAL

## 2021-11-05 ENCOUNTER — APPOINTMENT (OUTPATIENT)
Dept: INFUSION THERAPY | Facility: HOSPITAL | Age: 61
End: 2021-11-05

## 2021-11-05 VITALS
OXYGEN SATURATION: 98 % | SYSTOLIC BLOOD PRESSURE: 141 MMHG | TEMPERATURE: 97.2 F | RESPIRATION RATE: 17 BRPM | HEART RATE: 88 BPM | HEIGHT: 67.05 IN | DIASTOLIC BLOOD PRESSURE: 88 MMHG | WEIGHT: 259.7 LBS | BODY MASS INDEX: 40.76 KG/M2

## 2021-11-05 DIAGNOSIS — M54.9 DORSALGIA, UNSPECIFIED: ICD-10-CM

## 2021-11-05 DIAGNOSIS — G89.29 DORSALGIA, UNSPECIFIED: ICD-10-CM

## 2021-11-05 LAB
BASOPHILS # BLD AUTO: 0.03 K/UL — SIGNIFICANT CHANGE UP (ref 0–0.2)
BASOPHILS NFR BLD AUTO: 0.4 % — SIGNIFICANT CHANGE UP (ref 0–2)
EOSINOPHIL # BLD AUTO: 0.11 K/UL — SIGNIFICANT CHANGE UP (ref 0–0.5)
EOSINOPHIL NFR BLD AUTO: 1.5 % — SIGNIFICANT CHANGE UP (ref 0–6)
HCT VFR BLD CALC: 36.6 % — SIGNIFICANT CHANGE UP (ref 34.5–45)
HGB BLD-MCNC: 11.5 G/DL — SIGNIFICANT CHANGE UP (ref 11.5–15.5)
IMM GRANULOCYTES NFR BLD AUTO: 0.3 % — SIGNIFICANT CHANGE UP (ref 0–1.5)
LYMPHOCYTES # BLD AUTO: 1.15 K/UL — SIGNIFICANT CHANGE UP (ref 1–3.3)
LYMPHOCYTES # BLD AUTO: 15.9 % — SIGNIFICANT CHANGE UP (ref 13–44)
MCHC RBC-ENTMCNC: 29 PG — SIGNIFICANT CHANGE UP (ref 27–34)
MCHC RBC-ENTMCNC: 31.4 G/DL — LOW (ref 32–36)
MCV RBC AUTO: 92.2 FL — SIGNIFICANT CHANGE UP (ref 80–100)
MONOCYTES # BLD AUTO: 0.47 K/UL — SIGNIFICANT CHANGE UP (ref 0–0.9)
MONOCYTES NFR BLD AUTO: 6.5 % — SIGNIFICANT CHANGE UP (ref 2–14)
NEUTROPHILS # BLD AUTO: 5.44 K/UL — SIGNIFICANT CHANGE UP (ref 1.8–7.4)
NEUTROPHILS NFR BLD AUTO: 75.4 % — SIGNIFICANT CHANGE UP (ref 43–77)
NRBC # BLD: 0 /100 WBCS — SIGNIFICANT CHANGE UP (ref 0–0)
PLATELET # BLD AUTO: 339 K/UL — SIGNIFICANT CHANGE UP (ref 150–400)
RBC # BLD: 3.97 M/UL — SIGNIFICANT CHANGE UP (ref 3.8–5.2)
RBC # FLD: 13.7 % — SIGNIFICANT CHANGE UP (ref 10.3–14.5)
WBC # BLD: 7.22 K/UL — SIGNIFICANT CHANGE UP (ref 3.8–10.5)
WBC # FLD AUTO: 7.22 K/UL — SIGNIFICANT CHANGE UP (ref 3.8–10.5)

## 2021-11-05 PROCEDURE — 99212 OFFICE O/P EST SF 10 MIN: CPT

## 2021-11-05 NOTE — PHYSICAL EXAM
[General Appearance - Alert] : alert [General Appearance - In No Acute Distress] : in no acute distress [Oriented To Time, Place, And Person] : oriented to person, place, and time [Affect] : the affect was normal [Sclera] : the sclera and conjunctiva were normal [Normal Oral Mucosa] : normal oral mucosa [Neck Appearance] : the appearance of the neck was normal [Auscultation Breath Sounds / Voice Sounds] : lungs were clear to auscultation bilaterally [Heart Rate And Rhythm] : heart rate was normal and rhythm regular [Heart Sounds] : normal S1 and S2 [Abnormal Walk] : normal gait [Skin Color & Pigmentation] : normal skin color and pigmentation [No Focal Deficits] : no focal deficits

## 2021-11-05 NOTE — ASSESSMENT
[FreeTextEntry1] : 61yoF with:\par \par 1. Breast Cancer - On Arimidex. Med Onc follow up. \par \par 2. Chronic back pain 2/2 spinal stenosis - Medical cannabis re-certification completed today.\par \par 3. Encounter for Palliative Care - Emotional support provided.\par \par Follow up as needed

## 2021-11-05 NOTE — HISTORY OF PRESENT ILLNESS
[FreeTextEntry1] : 61yoF with breast cancer presents for follow up visit.\par PMH also significant for spinal stenosis and chronic back pain. \par \par On Anastrazole. \par \par Patient initially diagnosed in 2018, underwent kendra-adjuvant treatment followed by mastectomy and RT. \par \par She recently underwent revision surgery (last week).  She has pain at the surgical site. \par She uses meloxicam once daily for her back pain. On occasion she is using tylenol during the day. \par Last month she had a cortisone shot in her great toe for pain due to an osteophyte. \par She had been on gabapentin in the past for her back pain, which she did not like due to it making her feel very groggy. \par \par Interval Hx (11/5/21):\par Patient presents today for medical cannabis recertification. She has found it to be helpful with pain, neuropathy and sleep. Tend to use the vape only once at night. She uses a 20:1 THC:CBD formulation. \par She uses Meloxicam once daily for her arthritis pain and PRN Percocet 5/325 just once at night. \par Will be meeting with Rheumatology soon.\par \par ROS:\par +migraine headaches - uses prn fioricet and prn triptan\par Denies nausea, vomiting, \par \par Patient is , lives with her . She has an adult daughter who lives nearby. \par She works as a , currently is working part time and plans to go back full time soon. \par \par PMD: Dr. Rachel Ramirez\par \par [employer] Ref#: [id number]

## 2021-11-17 ENCOUNTER — RX RENEWAL (OUTPATIENT)
Age: 61
End: 2021-11-17

## 2021-11-24 ENCOUNTER — NON-APPOINTMENT (OUTPATIENT)
Age: 61
End: 2021-11-24

## 2021-11-24 ENCOUNTER — APPOINTMENT (OUTPATIENT)
Dept: RHEUMATOLOGY | Facility: CLINIC | Age: 61
End: 2021-11-24
Payer: COMMERCIAL

## 2021-11-24 VITALS
SYSTOLIC BLOOD PRESSURE: 130 MMHG | DIASTOLIC BLOOD PRESSURE: 80 MMHG | HEART RATE: 120 BPM | BODY MASS INDEX: 40.18 KG/M2 | HEIGHT: 67.5 IN | OXYGEN SATURATION: 97 % | RESPIRATION RATE: 15 BRPM | WEIGHT: 259 LBS | TEMPERATURE: 97.9 F

## 2021-11-24 PROCEDURE — 99204 OFFICE O/P NEW MOD 45 MIN: CPT

## 2021-11-26 NOTE — HISTORY OF PRESENT ILLNESS
[FreeTextEntry1] : FIDEL THOMAS is a 61 year old woman who presents for evaluation for PsA. 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 \par + peripheral sensory neuropathy 2/2 chemo \par \par Labs - ESR/CRP\par Negative - ROYCE, dsDNA, HLA B27\par FH - ?RA, cousin with SLE

## 2021-11-26 NOTE — ASSESSMENT
[FreeTextEntry1] : FIDEL THOMAS is a 61 year old woman who presents with months of symmetrical small joint stiffness and pain, b/l Achilles tenderness, L knee warmth on exam, psoriatic plaque on R foot and some fissuring in palms which may be psoriasis vs eczema. History and exam today consistent with PsA. Improved with short course of steroids recently. \par \par - resume prednisone 20mg/day. Risks and benefits of steroids were d/w patient including but not limited to weight gain, diabetes, HTN, avascular necrosis, osteoporosis, glaucoma, cataract, infections and immunosuppression.\par - start MTX 3 tabs qweekly, FA 1 mg daily as DMARD therapy. Risks and benefits of MTX use were d/w patient including but not limited to oral ulcers, gastric intolerance, leukopenia, anemia, liver toxicity, and rarely pulmonary fibrosis. Patient is aware to avoid daily alcohol consumption.\par - check labs in 1 month \par - RTC in 1 month

## 2021-11-26 NOTE — PHYSICAL EXAM
[General Appearance - Alert] : alert [General Appearance - In No Acute Distress] : in no acute distress [General Appearance - Well Nourished] : well nourished [Oriented To Time, Place, And Person] : oriented to person, place, and time [Impaired Insight] : insight and judgment were intact [Affect] : the affect was normal [Sclera] : the sclera and conjunctiva were normal [PERRL With Normal Accommodation] : pupils were equal in size, round, and reactive to light [Extraocular Movements] : extraocular movements were intact [Outer Ear] : the ears and nose were normal in appearance [Oropharynx] : the oropharynx was normal [Neck Appearance] : the appearance of the neck was normal [Auscultation Breath Sounds / Voice Sounds] : lungs were clear to auscultation bilaterally [Heart Rate And Rhythm] : heart rate was normal and rhythm regular [Heart Sounds] : normal S1 and S2 [Edema] : there was no peripheral edema [Bowel Sounds] : normal bowel sounds [Abdomen Soft] : soft [Abdomen Tenderness] : non-tender [] : no hepato-splenomegaly [Cervical Lymph Nodes Enlarged Posterior Bilaterally] : posterior cervical [Cervical Lymph Nodes Enlarged Anterior Bilaterally] : anterior cervical [Supraclavicular Lymph Nodes Enlarged Bilaterally] : supraclavicular [No CVA Tenderness] : no ~M costovertebral angle tenderness [No Spinal Tenderness] : no spinal tenderness [Abnormal Walk] : normal gait [Nail Clubbing] : no clubbing  or cyanosis of the fingernails [Musculoskeletal - Swelling] : no joint swelling seen [Motor Tone] : muscle strength and tone were normal [Skin Color & Pigmentation] : normal skin color and pigmentation [FreeTextEntry1] : Small areas of fissuring rash over palms, psoriatic plaque on R foot [No Focal Deficits] : no focal deficits

## 2021-11-26 NOTE — REVIEW OF SYSTEMS
[Negative] : Heme/Lymph [Feeling Poorly] : feeling poorly [Arthralgias] : arthralgias [Joint Pain] : joint pain [Joint Stiffness] : joint stiffness [As Noted in HPI] : as noted in HPI

## 2021-12-01 ENCOUNTER — APPOINTMENT (OUTPATIENT)
Dept: CARDIOLOGY | Facility: CLINIC | Age: 61
End: 2021-12-01
Payer: COMMERCIAL

## 2021-12-01 ENCOUNTER — NON-APPOINTMENT (OUTPATIENT)
Age: 61
End: 2021-12-01

## 2021-12-01 VITALS
BODY MASS INDEX: 40.33 KG/M2 | OXYGEN SATURATION: 93 % | SYSTOLIC BLOOD PRESSURE: 133 MMHG | HEIGHT: 67.5 IN | DIASTOLIC BLOOD PRESSURE: 79 MMHG | RESPIRATION RATE: 16 BRPM | WEIGHT: 260 LBS | HEART RATE: 100 BPM

## 2021-12-01 PROCEDURE — 99214 OFFICE O/P EST MOD 30 MIN: CPT

## 2021-12-01 PROCEDURE — 93000 ELECTROCARDIOGRAM COMPLETE: CPT

## 2021-12-08 ENCOUNTER — OUTPATIENT (OUTPATIENT)
Dept: OUTPATIENT SERVICES | Facility: HOSPITAL | Age: 61
LOS: 1 days | Discharge: HOME | End: 2021-12-08

## 2021-12-08 DIAGNOSIS — C50.911 MALIGNANT NEOPLASM OF UNSPECIFIED SITE OF RIGHT FEMALE BREAST: ICD-10-CM

## 2021-12-08 DIAGNOSIS — Z98.890 OTHER SPECIFIED POSTPROCEDURAL STATES: Chronic | ICD-10-CM

## 2021-12-08 DIAGNOSIS — Z90.13 ACQUIRED ABSENCE OF BILATERAL BREASTS AND NIPPLES: Chronic | ICD-10-CM

## 2021-12-08 DIAGNOSIS — Z90.11 ACQUIRED ABSENCE OF RIGHT BREAST AND NIPPLE: Chronic | ICD-10-CM

## 2021-12-10 ENCOUNTER — LABORATORY RESULT (OUTPATIENT)
Age: 61
End: 2021-12-10

## 2021-12-10 ENCOUNTER — RESULT REVIEW (OUTPATIENT)
Age: 61
End: 2021-12-10

## 2021-12-10 ENCOUNTER — APPOINTMENT (OUTPATIENT)
Dept: INFUSION THERAPY | Facility: HOSPITAL | Age: 61
End: 2021-12-10

## 2021-12-10 LAB
BASOPHILS # BLD AUTO: 0.03 K/UL — SIGNIFICANT CHANGE UP (ref 0–0.2)
BASOPHILS NFR BLD AUTO: 0.3 % — SIGNIFICANT CHANGE UP (ref 0–2)
EOSINOPHIL # BLD AUTO: 0.02 K/UL — SIGNIFICANT CHANGE UP (ref 0–0.5)
EOSINOPHIL NFR BLD AUTO: 0.2 % — SIGNIFICANT CHANGE UP (ref 0–6)
HCT VFR BLD CALC: 37.2 % — SIGNIFICANT CHANGE UP (ref 34.5–45)
HGB BLD-MCNC: 11.7 G/DL — SIGNIFICANT CHANGE UP (ref 11.5–15.5)
IMM GRANULOCYTES NFR BLD AUTO: 0.4 % — SIGNIFICANT CHANGE UP (ref 0–1.5)
LYMPHOCYTES # BLD AUTO: 0.73 K/UL — LOW (ref 1–3.3)
LYMPHOCYTES # BLD AUTO: 6.3 % — LOW (ref 13–44)
MCHC RBC-ENTMCNC: 28.9 PG — SIGNIFICANT CHANGE UP (ref 27–34)
MCHC RBC-ENTMCNC: 31.5 G/DL — LOW (ref 32–36)
MCV RBC AUTO: 91.9 FL — SIGNIFICANT CHANGE UP (ref 80–100)
MONOCYTES # BLD AUTO: 0.24 K/UL — SIGNIFICANT CHANGE UP (ref 0–0.9)
MONOCYTES NFR BLD AUTO: 2.1 % — SIGNIFICANT CHANGE UP (ref 2–14)
NEUTROPHILS # BLD AUTO: 10.54 K/UL — HIGH (ref 1.8–7.4)
NEUTROPHILS NFR BLD AUTO: 90.7 % — HIGH (ref 43–77)
NRBC # BLD: 0 /100 WBCS — SIGNIFICANT CHANGE UP (ref 0–0)
PLATELET # BLD AUTO: 324 K/UL — SIGNIFICANT CHANGE UP (ref 150–400)
RBC # BLD: 4.05 M/UL — SIGNIFICANT CHANGE UP (ref 3.8–5.2)
RBC # FLD: 14.6 % — HIGH (ref 10.3–14.5)
WBC # BLD: 11.61 K/UL — HIGH (ref 3.8–10.5)
WBC # FLD AUTO: 11.61 K/UL — HIGH (ref 3.8–10.5)

## 2021-12-15 ENCOUNTER — LABORATORY RESULT (OUTPATIENT)
Age: 61
End: 2021-12-15

## 2021-12-16 LAB
ALBUMIN SERPL ELPH-MCNC: 4.3 G/DL
ALP BLD-CCNC: 92 U/L
ALT SERPL-CCNC: 15 U/L
ANION GAP SERPL CALC-SCNC: 13 MMOL/L
AST SERPL-CCNC: 11 U/L
BASOPHILS # BLD AUTO: 0.04 K/UL
BASOPHILS NFR BLD AUTO: 0.5 %
BILIRUB SERPL-MCNC: 0.3 MG/DL
BUN SERPL-MCNC: 12 MG/DL
CALCIUM SERPL-MCNC: 9.4 MG/DL
CHLORIDE SERPL-SCNC: 103 MMOL/L
CO2 SERPL-SCNC: 26 MMOL/L
CREAT SERPL-MCNC: 0.74 MG/DL
CRP SERPL-MCNC: 9 MG/L
EOSINOPHIL # BLD AUTO: 0.08 K/UL
EOSINOPHIL NFR BLD AUTO: 1 %
ERYTHROCYTE [SEDIMENTATION RATE] IN BLOOD BY WESTERGREN METHOD: 36 MM/HR
GLUCOSE SERPL-MCNC: 78 MG/DL
HCT VFR BLD CALC: 38.9 %
HGB BLD-MCNC: 12.1 G/DL
IMM GRANULOCYTES NFR BLD AUTO: 0.4 %
LYMPHOCYTES # BLD AUTO: 2.69 K/UL
LYMPHOCYTES NFR BLD AUTO: 34.2 %
MAN DIFF?: NORMAL
MCHC RBC-ENTMCNC: 28.7 PG
MCHC RBC-ENTMCNC: 31.1 GM/DL
MCV RBC AUTO: 92.2 FL
MONOCYTES # BLD AUTO: 0.52 K/UL
MONOCYTES NFR BLD AUTO: 6.6 %
NEUTROPHILS # BLD AUTO: 4.51 K/UL
NEUTROPHILS NFR BLD AUTO: 57.3 %
PLATELET # BLD AUTO: 354 K/UL
POTASSIUM SERPL-SCNC: 4.6 MMOL/L
PROT SERPL-MCNC: 6.6 G/DL
RBC # BLD: 4.22 M/UL
RBC # FLD: 14.8 %
SODIUM SERPL-SCNC: 142 MMOL/L
WBC # FLD AUTO: 7.87 K/UL

## 2021-12-17 ENCOUNTER — APPOINTMENT (OUTPATIENT)
Dept: RHEUMATOLOGY | Facility: CLINIC | Age: 61
End: 2021-12-17
Payer: COMMERCIAL

## 2021-12-17 VITALS
HEART RATE: 97 BPM | TEMPERATURE: 97.4 F | DIASTOLIC BLOOD PRESSURE: 80 MMHG | OXYGEN SATURATION: 96 % | SYSTOLIC BLOOD PRESSURE: 124 MMHG | WEIGHT: 259 LBS | BODY MASS INDEX: 40.18 KG/M2 | RESPIRATION RATE: 15 BRPM | HEIGHT: 67.5 IN

## 2021-12-17 LAB
CCP AB SER IA-ACNC: <8 UNITS
RF+CCP IGG SER-IMP: NEGATIVE

## 2021-12-17 PROCEDURE — 99214 OFFICE O/P EST MOD 30 MIN: CPT

## 2021-12-17 NOTE — REVIEW OF SYSTEMS
[Feeling Poorly] : feeling poorly [Arthralgias] : arthralgias [Joint Pain] : joint pain [Joint Stiffness] : joint stiffness [As Noted in HPI] : as noted in HPI [Joint Swelling] : no joint swelling [Negative] : Eyes

## 2021-12-17 NOTE — REASON FOR VISIT
[Follow-Up: _____] : a [unfilled] follow-up visit [Initial Evaluation] : an initial evaluation [FreeTextEntry1] : PsA

## 2021-12-17 NOTE — PHYSICAL EXAM
[General Appearance - Alert] : alert [General Appearance - In No Acute Distress] : in no acute distress [General Appearance - Well Nourished] : well nourished [Sclera] : the sclera and conjunctiva were normal [PERRL With Normal Accommodation] : pupils were equal in size, round, and reactive to light [Extraocular Movements] : extraocular movements were intact [Outer Ear] : the ears and nose were normal in appearance [Oropharynx] : the oropharynx was normal [Neck Appearance] : the appearance of the neck was normal [] : no respiratory distress [Auscultation Breath Sounds / Voice Sounds] : lungs were clear to auscultation bilaterally [Heart Rate And Rhythm] : heart rate was normal and rhythm regular [Heart Sounds] : normal S1 and S2 [Edema] : there was no peripheral edema [Bowel Sounds] : normal bowel sounds [Abdomen Soft] : soft [Abdomen Tenderness] : non-tender [Cervical Lymph Nodes Enlarged Posterior Bilaterally] : posterior cervical [Cervical Lymph Nodes Enlarged Anterior Bilaterally] : anterior cervical [Supraclavicular Lymph Nodes Enlarged Bilaterally] : supraclavicular [No CVA Tenderness] : no ~M costovertebral angle tenderness [No Spinal Tenderness] : no spinal tenderness [Abnormal Walk] : normal gait [Nail Clubbing] : no clubbing  or cyanosis of the fingernails [Musculoskeletal - Swelling] : no joint swelling seen [Motor Tone] : muscle strength and tone were normal [Skin Color & Pigmentation] : normal skin color and pigmentation [No Focal Deficits] : no focal deficits [Oriented To Time, Place, And Person] : oriented to person, place, and time [Impaired Insight] : insight and judgment were intact [Affect] : the affect was normal [FreeTextEntry1] : Small areas of fissuring rash over palms, psoriatic plaque on R foot both markedly improved

## 2021-12-17 NOTE — ASSESSMENT
[FreeTextEntry1] : FIDEL THOMAS is a 61 year old woman with PsA -- months of symmetrical small joint stiffness and pain, b/l Achilles tenderness, L knee warmth on exam, psoriatic plaque on R foot and some fissuring in palms which may be psoriasis vs eczema. Marked response to steroids but ongoing activity, tolerating MTX initiation. \par \par -  reviewed labs in detail with patient, all questions answered  --improved but ongoing activity \par - c/w prednisone 20mg/day given she still remains symptomatic, will taper as soon as feasible to limit overall steroid exposure \par - increase MTX to 5 tabs x 2 weeks, then 7 tabs x 2 weeks, then 8 tabs until next visit. C/w FA 1 mg daily \par - check labs prior to next visit, check pre immunosuppression labs: Hepatitis panel, Quantiferon in case step up therapy is needed \par - RTC in 2 months

## 2021-12-17 NOTE — HISTORY OF PRESENT ILLNESS
[FreeTextEntry1] : FIDEL THOMAS is a 61 year old woman with PsA. 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 Some insomnia and sweats with prednisone, otherwise no SE, some GI upset but mild with MTX, otherwise tolerating it well. No infectious sx, rash improved, no extra-articular manifestations.

## 2022-01-03 NOTE — REASON FOR VISIT
[Follow-Up - Clinic] : a clinic follow-up of [Symptom and Test Evaluation] : symptom and test evaluation [FreeTextEntry2] : cardio oncology

## 2022-01-03 NOTE — ASSESSMENT
[FreeTextEntry1] : 60yo with  Migraines, back pain and DLD s/p epidural injections, menopausal, anxiety and depression on medication, Obesity BMI 45. CHOL 218 , .  Baseline EF of 63%  with a GLS of -21.\par \par History of  Left Poorly differentiated ductal Breast cancer ER+, [place]- HER+  multifocal with multiple masses and LNs involved.  neoadjuvant chemotherapy  THP.  Status post radiation therapy to the right chest wall and regional lymph nodes on June 2019 she is also had reconstruction of the breast.\par \par - At risk for CTRCD (Cancer Therapy Related Cardiac Dysfunction)\par - Chemotherapy exposure to:  THP\par - History of right  Chest radiation \par - Today's EKG unchanged compared to previous/ normal\par - Labs and echo  ordered.\par Exercise recommended 150 mins a week\par \par \par \par

## 2022-01-03 NOTE — HISTORY OF PRESENT ILLNESS
[FreeTextEntry1] : 62yo woman who is a  She has 1 adult daughter (25) and is  to [place] who own a [employer]par \par PMH: Migraines, back pain and DLD s/p epidural injections, menopausal, anxiety and depression on medication, Obesity BMI 45. CHOL 218 ,  in 2016 She has no prior History of HTN. \par Labs:P \par \par Cancer Dx: Left Poorly differentiated ductal Breast cancer ER+, [place]- HER+  multifocal with multiple masses and LNs involved, Diagnosed in august 2018. \par \par Under [facility] and Dr. Cox she is planned for neoadjuvant chemotherapy  ACT[facility].  She presents today for evaluation of HTN and prechemotherapy cardiac risk stratification.\par \par Denies: SOB, LE edema, palpitation, chest pain\par \par Echo 9/11/2018. \par EF 63%  Normal left ventricular internal dimensions and wall thicknesses. Normal left ventricular systolic function. No segmental wall motion abnormalities. Normal right ventricular size and function. Global longitudinal strain equals - 21.2%, which is normal.\par \par \par Oncologist: Dr. Noa Cox\par PCP: DR. CHRISTINA NELSON\par \par Interval follow up 4/23/19\par \par Echo at [facility] with mass on aortic valve- reviewed by me. Not a mass- just calcification of the non cusp. EF reported as 55%\par \par \par ROS: Denies Chest pain, dyspnea, palpitations, dizziness or syncope.\par here with  \par \par Follow-up December 2, 2020\par Doing well offers no complaints.\par \par \par Interval follow up 12/1/2021\par \par compliant with medication \par compliant with diet\par \par No recent hospitalization \par No new medication\par \par Here for routine follow up no complaints today.\par ROS: Denies Chest pain, dyspnea, palpitations, dizziness or syncope.\par \par

## 2022-01-19 ENCOUNTER — APPOINTMENT (OUTPATIENT)
Dept: CARDIOLOGY | Facility: CLINIC | Age: 62
End: 2022-01-19
Payer: COMMERCIAL

## 2022-01-19 PROCEDURE — 93306 TTE W/DOPPLER COMPLETE: CPT

## 2022-02-04 ENCOUNTER — RX RENEWAL (OUTPATIENT)
Age: 62
End: 2022-02-04

## 2022-02-09 ENCOUNTER — LABORATORY RESULT (OUTPATIENT)
Age: 62
End: 2022-02-09

## 2022-02-09 LAB
ALBUMIN SERPL ELPH-MCNC: 4 G/DL
ALP BLD-CCNC: 74 U/L
ALT SERPL-CCNC: 12 U/L
ANION GAP SERPL CALC-SCNC: 14 MMOL/L
AST SERPL-CCNC: 11 U/L
BASOPHILS # BLD AUTO: 0.03 K/UL
BASOPHILS NFR BLD AUTO: 0.4 %
BILIRUB SERPL-MCNC: 0.2 MG/DL
BUN SERPL-MCNC: 11 MG/DL
CALCIUM SERPL-MCNC: 9.1 MG/DL
CHLORIDE SERPL-SCNC: 105 MMOL/L
CO2 SERPL-SCNC: 26 MMOL/L
CREAT SERPL-MCNC: 0.7 MG/DL
CRP SERPL-MCNC: 7 MG/L
EOSINOPHIL # BLD AUTO: 0.06 K/UL
EOSINOPHIL NFR BLD AUTO: 0.8 %
GLUCOSE SERPL-MCNC: 102 MG/DL
HCT VFR BLD CALC: 38.8 %
HGB BLD-MCNC: 11.8 G/DL
IMM GRANULOCYTES NFR BLD AUTO: 0.4 %
LYMPHOCYTES # BLD AUTO: 2.29 K/UL
LYMPHOCYTES NFR BLD AUTO: 29.3 %
MAN DIFF?: NORMAL
MCHC RBC-ENTMCNC: 28.9 PG
MCHC RBC-ENTMCNC: 30.4 GM/DL
MCV RBC AUTO: 95.1 FL
MONOCYTES # BLD AUTO: 0.49 K/UL
MONOCYTES NFR BLD AUTO: 6.3 %
NEUTROPHILS # BLD AUTO: 4.91 K/UL
NEUTROPHILS NFR BLD AUTO: 62.8 %
PLATELET # BLD AUTO: 340 K/UL
POTASSIUM SERPL-SCNC: 4.5 MMOL/L
PROT SERPL-MCNC: 6 G/DL
RBC # BLD: 4.08 M/UL
RBC # FLD: 15.6 %
SODIUM SERPL-SCNC: 144 MMOL/L
WBC # FLD AUTO: 7.81 K/UL

## 2022-02-10 ENCOUNTER — RX RENEWAL (OUTPATIENT)
Age: 62
End: 2022-02-10

## 2022-02-10 LAB
ERYTHROCYTE [SEDIMENTATION RATE] IN BLOOD BY WESTERGREN METHOD: 20 MM/HR
HBV CORE IGG+IGM SER QL: NONREACTIVE
HBV SURFACE AB SER QL: NONREACTIVE
HBV SURFACE AG SER QL: NONREACTIVE
HCV AB SER QL: NONREACTIVE
HCV S/CO RATIO: 0.08 S/CO

## 2022-02-11 ENCOUNTER — APPOINTMENT (OUTPATIENT)
Dept: RHEUMATOLOGY | Facility: CLINIC | Age: 62
End: 2022-02-11
Payer: COMMERCIAL

## 2022-02-11 VITALS
SYSTOLIC BLOOD PRESSURE: 122 MMHG | BODY MASS INDEX: 41.11 KG/M2 | HEART RATE: 106 BPM | OXYGEN SATURATION: 95 % | RESPIRATION RATE: 15 BRPM | DIASTOLIC BLOOD PRESSURE: 80 MMHG | WEIGHT: 265 LBS | HEIGHT: 67.5 IN | TEMPERATURE: 97.8 F

## 2022-02-11 DIAGNOSIS — M71.22 SYNOVIAL CYST OF POPLITEAL SPACE [BAKER], LEFT KNEE: ICD-10-CM

## 2022-02-11 PROCEDURE — 99213 OFFICE O/P EST LOW 20 MIN: CPT

## 2022-02-14 ENCOUNTER — RX RENEWAL (OUTPATIENT)
Age: 62
End: 2022-02-14

## 2022-02-15 ENCOUNTER — OUTPATIENT (OUTPATIENT)
Dept: OUTPATIENT SERVICES | Facility: HOSPITAL | Age: 62
LOS: 1 days | Discharge: HOME | End: 2022-02-15

## 2022-02-15 DIAGNOSIS — Z98.890 OTHER SPECIFIED POSTPROCEDURAL STATES: Chronic | ICD-10-CM

## 2022-02-15 DIAGNOSIS — Z90.11 ACQUIRED ABSENCE OF RIGHT BREAST AND NIPPLE: Chronic | ICD-10-CM

## 2022-02-15 DIAGNOSIS — Z90.13 ACQUIRED ABSENCE OF BILATERAL BREASTS AND NIPPLES: Chronic | ICD-10-CM

## 2022-02-15 DIAGNOSIS — C50.911 MALIGNANT NEOPLASM OF UNSPECIFIED SITE OF RIGHT FEMALE BREAST: ICD-10-CM

## 2022-02-16 ENCOUNTER — LABORATORY RESULT (OUTPATIENT)
Age: 62
End: 2022-02-16

## 2022-02-16 ENCOUNTER — RESULT REVIEW (OUTPATIENT)
Age: 62
End: 2022-02-16

## 2022-02-16 ENCOUNTER — APPOINTMENT (OUTPATIENT)
Dept: INFUSION THERAPY | Facility: HOSPITAL | Age: 62
End: 2022-02-16

## 2022-02-16 LAB
BASOPHILS # BLD AUTO: 0.03 K/UL — SIGNIFICANT CHANGE UP (ref 0–0.2)
BASOPHILS NFR BLD AUTO: 0.3 % — SIGNIFICANT CHANGE UP (ref 0–2)
EOSINOPHIL # BLD AUTO: 0.02 K/UL — SIGNIFICANT CHANGE UP (ref 0–0.5)
EOSINOPHIL NFR BLD AUTO: 0.2 % — SIGNIFICANT CHANGE UP (ref 0–6)
HCT VFR BLD CALC: 38.9 % — SIGNIFICANT CHANGE UP (ref 34.5–45)
HGB BLD-MCNC: 12.1 G/DL — SIGNIFICANT CHANGE UP (ref 11.5–15.5)
IMM GRANULOCYTES NFR BLD AUTO: 0.4 % — SIGNIFICANT CHANGE UP (ref 0–1.5)
LYMPHOCYTES # BLD AUTO: 0.76 K/UL — LOW (ref 1–3.3)
LYMPHOCYTES # BLD AUTO: 6.7 % — LOW (ref 13–44)
MCHC RBC-ENTMCNC: 29.2 PG — SIGNIFICANT CHANGE UP (ref 27–34)
MCHC RBC-ENTMCNC: 31.1 G/DL — LOW (ref 32–36)
MCV RBC AUTO: 93.7 FL — SIGNIFICANT CHANGE UP (ref 80–100)
MONOCYTES # BLD AUTO: 0.3 K/UL — SIGNIFICANT CHANGE UP (ref 0–0.9)
MONOCYTES NFR BLD AUTO: 2.6 % — SIGNIFICANT CHANGE UP (ref 2–14)
NEUTROPHILS # BLD AUTO: 10.27 K/UL — HIGH (ref 1.8–7.4)
NEUTROPHILS NFR BLD AUTO: 89.8 % — HIGH (ref 43–77)
NRBC # BLD: 0 /100 WBCS — SIGNIFICANT CHANGE UP (ref 0–0)
PLATELET # BLD AUTO: 324 K/UL — SIGNIFICANT CHANGE UP (ref 150–400)
RBC # BLD: 4.15 M/UL — SIGNIFICANT CHANGE UP (ref 3.8–5.2)
RBC # FLD: 15.7 % — HIGH (ref 10.3–14.5)
WBC # BLD: 11.42 K/UL — HIGH (ref 3.8–10.5)
WBC # FLD AUTO: 11.42 K/UL — HIGH (ref 3.8–10.5)

## 2022-03-11 NOTE — ASSESSMENT
[FreeTextEntry1] : FIDEL THOMAS is a 61 year old woman with PsA -- months of symmetrical small joint stiffness and pain, b/l Achilles tenderness, L knee warmth on exam, psoriatic plaque on R foot and some fissuring in palms which may be psoriasis vs eczema. Marked response to steroids but ongoing activity, tolerating MTX initiation. L popliteal cyst, likely at least partially inflammatory mediated. \par \par - reviewed labs in detail with patient, all questions answered  --improved \par - c/w prednisone but begin taper -- 15mg/day for now \par - c/w MTX 8 tabs until next visit. C/w FA 1 mg daily \par - repeat labs prior to next visit\par - US guided popliteal cyst drainage/injection to see if helps taper steroids faster \par - RTC in 2 months

## 2022-03-11 NOTE — HISTORY OF PRESENT ILLNESS
[FreeTextEntry1] : FIDEL THOMAS is a 61 year old woman with PsA. 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 Some insomnia and sweats with prednisone, otherwise no SE, some GI upset but mild with MTX, otherwise tolerating it well. No infectious sx, rash improved, no extra-articular manifestations. \par \par 2/11/22 -- Ongoing improvement in joints but still with activity, L knee posterior fullness and pain is most active. She is amenable to trying to taper steroids regardless as is worried about long term use. No extra-articular manifestations, no infectious sx.

## 2022-03-11 NOTE — REVIEW OF SYSTEMS
[Feeling Poorly] : feeling poorly [Arthralgias] : arthralgias [Joint Pain] : joint pain [Joint Stiffness] : joint stiffness [As Noted in HPI] : as noted in HPI [Negative] : Heme/Lymph [Joint Swelling] : no joint swelling

## 2022-03-24 LAB
ALBUMIN SERPL ELPH-MCNC: 4.1 G/DL
ALP BLD-CCNC: 77 U/L
ALT SERPL-CCNC: 15 U/L
ANION GAP SERPL CALC-SCNC: 11 MMOL/L
AST SERPL-CCNC: 14 U/L
BASOPHILS # BLD AUTO: 0.04 K/UL
BASOPHILS NFR BLD AUTO: 0.6 %
BILIRUB SERPL-MCNC: 0.2 MG/DL
BUN SERPL-MCNC: 11 MG/DL
CALCIUM SERPL-MCNC: 9.3 MG/DL
CHLORIDE SERPL-SCNC: 105 MMOL/L
CO2 SERPL-SCNC: 26 MMOL/L
CREAT SERPL-MCNC: 0.71 MG/DL
CRP SERPL-MCNC: 12 MG/L
EGFR: 97 ML/MIN/1.73M2
EOSINOPHIL # BLD AUTO: 0.08 K/UL
EOSINOPHIL NFR BLD AUTO: 1.1 %
ERYTHROCYTE [SEDIMENTATION RATE] IN BLOOD BY WESTERGREN METHOD: 11 MM/HR
GLUCOSE SERPL-MCNC: 79 MG/DL
HCT VFR BLD CALC: 39.7 %
HGB BLD-MCNC: 12 G/DL
IMM GRANULOCYTES NFR BLD AUTO: 0.3 %
LYMPHOCYTES # BLD AUTO: 2.12 K/UL
LYMPHOCYTES NFR BLD AUTO: 29.9 %
MAN DIFF?: NORMAL
MCHC RBC-ENTMCNC: 29.1 PG
MCHC RBC-ENTMCNC: 30.2 GM/DL
MCV RBC AUTO: 96.4 FL
MONOCYTES # BLD AUTO: 0.45 K/UL
MONOCYTES NFR BLD AUTO: 6.4 %
NEUTROPHILS # BLD AUTO: 4.37 K/UL
NEUTROPHILS NFR BLD AUTO: 61.7 %
PLATELET # BLD AUTO: 343 K/UL
POTASSIUM SERPL-SCNC: 4.5 MMOL/L
PROT SERPL-MCNC: 6.2 G/DL
RBC # BLD: 4.12 M/UL
RBC # FLD: 15.9 %
SODIUM SERPL-SCNC: 142 MMOL/L
WBC # FLD AUTO: 7.08 K/UL

## 2022-03-25 ENCOUNTER — APPOINTMENT (OUTPATIENT)
Dept: RHEUMATOLOGY | Facility: CLINIC | Age: 62
End: 2022-03-25
Payer: COMMERCIAL

## 2022-03-25 VITALS
DIASTOLIC BLOOD PRESSURE: 69 MMHG | RESPIRATION RATE: 16 BRPM | OXYGEN SATURATION: 94 % | HEIGHT: 67.5 IN | SYSTOLIC BLOOD PRESSURE: 113 MMHG | TEMPERATURE: 97.2 F | HEART RATE: 80 BPM | WEIGHT: 270 LBS | BODY MASS INDEX: 41.88 KG/M2

## 2022-03-25 DIAGNOSIS — M53.3 SACROCOCCYGEAL DISORDERS, NOT ELSEWHERE CLASSIFIED: ICD-10-CM

## 2022-03-25 PROCEDURE — 99214 OFFICE O/P EST MOD 30 MIN: CPT

## 2022-03-25 NOTE — PHYSICAL EXAM
[General Appearance - Alert] : alert [General Appearance - In No Acute Distress] : in no acute distress [General Appearance - Well Nourished] : well nourished [Sclera] : the sclera and conjunctiva were normal [PERRL With Normal Accommodation] : pupils were equal in size, round, and reactive to light [Extraocular Movements] : extraocular movements were intact [Outer Ear] : the ears and nose were normal in appearance [Oropharynx] : the oropharynx was normal [Neck Appearance] : the appearance of the neck was normal [] : no respiratory distress [Auscultation Breath Sounds / Voice Sounds] : lungs were clear to auscultation bilaterally [Heart Rate And Rhythm] : heart rate was normal and rhythm regular [Heart Sounds] : normal S1 and S2 [Bowel Sounds] : normal bowel sounds [Edema] : there was no peripheral edema [Abdomen Soft] : soft [Abdomen Tenderness] : non-tender [Cervical Lymph Nodes Enlarged Posterior Bilaterally] : posterior cervical [Cervical Lymph Nodes Enlarged Anterior Bilaterally] : anterior cervical [Supraclavicular Lymph Nodes Enlarged Bilaterally] : supraclavicular [No CVA Tenderness] : no ~M costovertebral angle tenderness [No Spinal Tenderness] : no spinal tenderness [Nail Clubbing] : no clubbing  or cyanosis of the fingernails [Musculoskeletal - Swelling] : no joint swelling seen [Motor Tone] : muscle strength and tone were normal [Skin Color & Pigmentation] : normal skin color and pigmentation [No Focal Deficits] : no focal deficits [Oriented To Time, Place, And Person] : oriented to person, place, and time [Impaired Insight] : insight and judgment were intact [Affect] : the affect was normal [FreeTextEntry1] : Small areas of fissuring rash over palms ongoing, psoriatic plaque on R foot remain markedly improved

## 2022-03-25 NOTE — ASSESSMENT
[FreeTextEntry1] : FIDEL THOMAS is a 61 year old woman with PsA -- months of symmetrical small joint stiffness and pain, b/l Achilles tenderness, L knee warmth on exam, psoriatic plaque on R foot and some fissuring in palms which may be psoriasis vs eczema. Marked response to steroids but ongoing activity despite MTX initiation, new spinal sx which appear inflammatory necessitating need for biologic. L popliteal cyst, likely at least partially inflammatory mediated. \par \par - reviewed labs in detail with patient, stable but given worsening clinical picture warrants step up therapy \par - will get PA for Humira. Risks and benefits were d/w patient including but not limited to immunosuppression, reactivation of TB, lymphoma, malignancies, lupus like symptoms, exacerbation of psoriasis, and infections.\par - will need repeat TB screen as last one was cancelled, she will have ASAP \par - c/w prednisone 10mg/day, prn 15mg/day on severe days \par - c/w MTX 8 tabs. C/w FA 1 mg daily \par - US guided popliteal cyst drainage/injection to see if helps taper steroids faster -- will schedule as had family issue precluding\par - has upcoming optho f/u, advised to check for any ocular inflammation \par - RTC for injection teaching

## 2022-03-25 NOTE — REVIEW OF SYSTEMS
[Feeling Poorly] : feeling poorly [Arthralgias] : arthralgias [Joint Pain] : joint pain [Joint Stiffness] : joint stiffness [Negative] : Heme/Lymph [As Noted in HPI] : as noted in HPI [Dry Eyes] : dryness of the eyes [Joint Swelling] : joint swelling

## 2022-03-25 NOTE — HISTORY OF PRESENT ILLNESS
[FreeTextEntry1] : FIDEL THOMAS is a 61 year old woman with PsA. 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 Some insomnia and sweats with prednisone, otherwise no SE, some GI upset but mild with MTX, otherwise tolerating it well. No infectious sx, rash improved, no extra-articular manifestations. \par \par 2/11/22 -- Ongoing improvement in joints but still with activity, L knee posterior fullness and pain is most active. She is amenable to trying to taper steroids regardless as is worried about long term use. No extra-articular manifestations, no infectious sx. \par \par 3/25/22 -- Worsening of symmetrical joint pain and warmth, some b/l SI joint and L hip pain with prolonged AM stiffness and improved with exercise and not typical of her DJD related pain. Rash has not emerged but increased sensitivity over sites of prior rash. Ongoing dry eye but no inflammation. No SE with MTX, no infectious sx, above is worse since tapering to prednisone 10mg/day.

## 2022-03-28 ENCOUNTER — OUTPATIENT (OUTPATIENT)
Dept: OUTPATIENT SERVICES | Facility: HOSPITAL | Age: 62
LOS: 1 days | Discharge: HOME | End: 2022-03-28

## 2022-03-28 DIAGNOSIS — Z90.13 ACQUIRED ABSENCE OF BILATERAL BREASTS AND NIPPLES: Chronic | ICD-10-CM

## 2022-03-28 DIAGNOSIS — Z98.890 OTHER SPECIFIED POSTPROCEDURAL STATES: Chronic | ICD-10-CM

## 2022-03-28 DIAGNOSIS — Z90.11 ACQUIRED ABSENCE OF RIGHT BREAST AND NIPPLE: Chronic | ICD-10-CM

## 2022-03-28 DIAGNOSIS — C50.911 MALIGNANT NEOPLASM OF UNSPECIFIED SITE OF RIGHT FEMALE BREAST: ICD-10-CM

## 2022-03-30 ENCOUNTER — APPOINTMENT (OUTPATIENT)
Dept: HEMATOLOGY ONCOLOGY | Facility: CLINIC | Age: 62
End: 2022-03-30
Payer: COMMERCIAL

## 2022-03-30 VITALS
HEIGHT: 67.52 IN | BODY MASS INDEX: 41.47 KG/M2 | OXYGEN SATURATION: 97 % | TEMPERATURE: 97.1 F | HEART RATE: 84 BPM | SYSTOLIC BLOOD PRESSURE: 124 MMHG | RESPIRATION RATE: 16 BRPM | WEIGHT: 270.49 LBS | DIASTOLIC BLOOD PRESSURE: 80 MMHG

## 2022-03-30 PROCEDURE — 99214 OFFICE O/P EST MOD 30 MIN: CPT

## 2022-03-30 RX ORDER — PREDNISONE 10 MG
TABLET ORAL
Refills: 0 | Status: DISCONTINUED | COMMUNITY
End: 2022-03-30

## 2022-03-30 RX ORDER — METHOTREXATE 2.5 MG/1
TABLET ORAL
Refills: 0 | Status: DISCONTINUED | COMMUNITY
End: 2022-03-30

## 2022-03-30 NOTE — PRE-OP CHECKLIST - HEIGHT IN CM
H&P reviewed  After examining the patient I find no changes in the patients condition since the H&P had been written      Vitals:    03/30/22 0744   BP: 164/77   Pulse: 75   Resp: 19   Temp: 97 8 °F (36 6 °C)   SpO2: 98% 170.18

## 2022-03-31 ENCOUNTER — NON-APPOINTMENT (OUTPATIENT)
Age: 62
End: 2022-03-31

## 2022-03-31 NOTE — HISTORY OF PRESENT ILLNESS
[Disease: _____________________] : Disease: [unfilled] [T: ___] : T[unfilled] [N: ___] : N[unfilled] [AJCC Stage: ____] : AJCC Stage: [unfilled] [Treatment Protocol] : Treatment Protocol [de-identified] : The pt was seen for an initial visit on 9/29/21 in order to transfer her care to me from that of Dr Cox who left the [facility]. Her history below is as per review of the AEHR notes and confirmation with the pt. \par \par History of ER+MS-HER2+ locally advanced, inflammatory poorly differentiated ductal breast cancer.\par \par She noted summer 2018 she felt a mass near her nipple in her R breast. She went to her PMD who ordered a mammogram and sonogram, which revealed a R abnormal hypoechoic mass and abnormal appearing axillary LN.\par \par Image guided biopsy of mass and axilla revealed invasive poorly differentiated ductal carcinoma, +LVI, 1.2cm in specimen, [place] 8/9, DCIS, microcalcifications present, ER >95%, MS negative, HER2+ 3+ IHC, Lymph node biopsy with metastatic carcinoma.\par \par She then saw Dr. Camacho breast surgeon who ordered and MRI of the breasts. MRI showed: Biopsy proven mammary carcinoma in the R retroareolar region 5:00 manifested as multiple irregular masses. The tumor is extensive in nature and spanning an area of over 3 cm with non-mass enhancement extending posteriorly suggesting extensive intraductal component as well. Two other highly suspicious masses noted in the RUOQ at 11:00 measuring >3 cm and at 10:00 measuring 2.6 cm consistent with multicentric disease. Biopsy proven metastatic disease to an axillary LN with additional suspicious LNs (additional with replaced fatty hilum and abnormally enlarged cortices, some have irregular borders, some posterior to pectoralis muscle, also abnormal appearing node in soft tissue lateral to the R chest wall).\par \par Dr. Camacho recommended neoadjuvant chemotherapy and the option for mastectomy with possible axillary lymph node dissection pending response to therapy.\par \par She then saw Dr Cox in consultation. She underwent PETCT that found a peripancreatic lymph node that was enlarged and SUV avid, b/l laryngeal FDG avidity (likely due to speaking during exam), and SUV avid breast mass/axilla on R with breast skin noted to be thickened. She underwent EGD/EUS and biopsy of this peripancreatic LN (Dr. Giovanni Felton) which was consistent with benign/reactive lymphatic tissue. She had a mediport placed by IR on 9/26. She saw oncocardiology Dr. Khan for risk assessment prior to anthracycline/anti-HER2 monoclonal therapy and was started on ACEi for HTN and for cardioprotection. She saw dermatology for skin biopsy of R breast prior to treatment to evaluate/confirm inflammatory disease; biopsy results with skin involvement by poorly differentiated carcinoma. She started neoadjuvant chemotherapy with dose-dense AC on 9/26. dose-dense AC-->THP (weekly taxol 80mg/m2)\par \par 11/23 THP #1\par 11/30 Taxol 80mg/m2 held due to neuropathy\par 12/7 resumed Taxol weekly without issue\par 12/15 taxol completed x 12 weeks\par 3/8 Herceptin and Perjeta administered pre-operatively\par \par MRI 2/4/19 BIRADS 2, masses and adenopathy resolved. Some residual R breast skin thickening noted.\par \par 3/22/19 R mastectomy and ALND: 0/6 LN removed involved, no residual invasive carcinoma or DCIS identified. [id number] ([facility])\par \par 4/3 Herceptin and Perjeta continued post-operatively every 3 weeks, adjuvant radiation started 5/2019 and completed 6/2019 c/b grade 3 dermatitis now resolved.\par \par LH/FSH/estradiol levels May/June 2019 consistent with menopause.\par Arimidex started July 2019.\par \par She underwent ANTONIO reconstruction 10/2019 without event. Pathology negative.\par \par Herceptin/Perjeta C17 completed 11/7/19. She started extended adjuvant therapy with neratinib 12/2019 -2/2020 - self discontinued due to toxicity of diarrhea. She continues on arimidex.\par \par [place] with her  Gonzales (pretzel ). She has a daughter (an artist and ). She works full time as a . She notes a history of smoking in the distant past. Mother passed away in 70s from metastatic melanoma, and a maternal aunt had breast cancer; no other significant close family history of cancers. She notes overall migraines (which she has had for many years, seen neurologist, stable lytic medications help, associated with one sided facial numbness when they occur) have gotten better with menopause – her LMP was 2/2018 and it was infrequent prior to that. She has had some hot flashes. She used OCPs in the past for years particularly for endometriosis treatment. She has had issues with DJD of back and spine with pain, MRI demonstrating spinal stenosis, with epidural injections that have helped with this significantly as well as occasional NSAID use.\par \par She completed bilateral reconstruction with Dr. Antonio on 4/6/21 [de-identified] : ER+OR-HER2+ [FreeTextEntry1] : dose-dense AC (adriamycin 60mg/m2 and cytoxan 600mg/m2) x 4 cycles, followed by weekly taxol 80mg/m2 x 12 with herceptin/perjeta q3 weeks, arimidex [de-identified] : Patient presents for follow up.  \par \par She remains on anastrozole.\par \par She is tolerating it well with occasional hot flashes, which patient states are easily tolerated. \par \par She had chronic low grade fatigue since completing chemo. Unchanged. \par \par She c/o arthralgias of hands, hips, knees, feet, shoulders since chemo finished and before the start of anastrozole - had blood test drawn which showed some inflammatory markers - has been dxd with psoriatic arthritis in the interim - started on MTX and prednisone and likley to start on Humira soon. to see a rheumatologist for a further evaluation. \par \par She denies any other new complaints.  \par \par DEXA 8/20 WNL

## 2022-03-31 NOTE — PHYSICAL EXAM
[Fully active, able to carry on all pre-disease performance without restriction] : Status 0 - Fully active, able to carry on all pre-disease performance without restriction [Obese] : obese [Normal] : affect appropriate [de-identified] : bilateral reconstructed breast with well healed scars and no discrete palpable masses, nipple reconstruction b/l well healed, no palpable axillary nodes.

## 2022-03-31 NOTE — REVIEW OF SYSTEMS
[Negative] : Psychiatric [FreeTextEntry2] : as above [FreeTextEntry9] : as above [de-identified] : as above

## 2022-04-08 ENCOUNTER — APPOINTMENT (OUTPATIENT)
Dept: RHEUMATOLOGY | Facility: CLINIC | Age: 62
End: 2022-04-08
Payer: COMMERCIAL

## 2022-04-08 PROCEDURE — 96372 THER/PROPH/DIAG INJ SC/IM: CPT

## 2022-04-08 RX ORDER — ADALIMUMAB 40MG/0.4ML
40 KIT SUBCUTANEOUS
Refills: 0 | Status: COMPLETED | OUTPATIENT
Start: 2022-04-08

## 2022-04-08 RX ADMIN — ADALIMUMAB 0 MG/0.4ML: KIT at 00:00

## 2022-04-08 NOTE — ASSESSMENT
[FreeTextEntry1] : FIDEL THOMAS is a 61 year old woman with PsA -- months of symmetrical small joint stiffness and pain, b/l Achilles tenderness, L knee warmth on exam, psoriatic plaque on R foot and some fissuring in palms which may be psoriasis vs eczema. Marked response to steroids but ongoing activity despite MTX initiation, new spinal sx which appear inflammatory necessitating need for biologic. L popliteal cyst, likely at least partially inflammatory mediated. \par \par - reviewed labs in detail with patient, stable but given worsening clinical picture warrants step up therapy \par - S/p Humira teaching today, c/w dosing q2 weeks\par - c/w prednisone 10mg/day, prn 15mg/day on severe days -- will start tapering at next visit \par - c/w MTX 8 tabs. C/w FA 1 mg daily \par - US guided popliteal cyst drainage/injection to see if helps taper steroids faster -- will schedule as had family issue precluding\par - labs prior to next visit, will do Quantiferon with these \par - RTC in 3 months

## 2022-04-08 NOTE — PROCEDURE
[Today's Date:] : Date: [unfilled] [FreeTextEntry1] : Humira teaching/administration of 1st dose\par \par Procedure and pen explained to patient, employed teach back and provided time for questions, all questions answered. Pt administered prefilled pen with assistance of provider into L abdomen, no complications. Pt verbalized she feels comfortable with further injection administration at home.  Induction

## 2022-04-26 ENCOUNTER — OUTPATIENT (OUTPATIENT)
Dept: OUTPATIENT SERVICES | Facility: HOSPITAL | Age: 62
LOS: 1 days | Discharge: HOME | End: 2022-04-26

## 2022-04-26 DIAGNOSIS — C50.911 MALIGNANT NEOPLASM OF UNSPECIFIED SITE OF RIGHT FEMALE BREAST: ICD-10-CM

## 2022-04-26 DIAGNOSIS — Z98.890 OTHER SPECIFIED POSTPROCEDURAL STATES: Chronic | ICD-10-CM

## 2022-04-26 DIAGNOSIS — Z90.11 ACQUIRED ABSENCE OF RIGHT BREAST AND NIPPLE: Chronic | ICD-10-CM

## 2022-04-26 DIAGNOSIS — Z90.13 ACQUIRED ABSENCE OF BILATERAL BREASTS AND NIPPLES: Chronic | ICD-10-CM

## 2022-04-29 ENCOUNTER — APPOINTMENT (OUTPATIENT)
Dept: INFUSION THERAPY | Facility: HOSPITAL | Age: 62
End: 2022-04-29

## 2022-05-09 ENCOUNTER — RX RENEWAL (OUTPATIENT)
Age: 62
End: 2022-05-09

## 2022-05-16 ENCOUNTER — RX RENEWAL (OUTPATIENT)
Age: 62
End: 2022-05-16

## 2022-05-22 ENCOUNTER — NON-APPOINTMENT (OUTPATIENT)
Age: 62
End: 2022-05-22

## 2022-05-23 NOTE — PATIENT PROFILE ADULT - OVER THE PAST TWO WEEKS HAVE YOU FELT DOWN, DEPRESSED OR HOPELESS?
"a little depressed"/yes Tetracycline Counseling: Patient counseled regarding possible photosensitivity and increased risk for sunburn.  Patient instructed to avoid sunlight, if possible.  When exposed to sunlight, patients should wear protective clothing, sunglasses, and sunscreen.  The patient was instructed to call the office immediately if the following severe adverse effects occur:  hearing changes, easy bruising/bleeding, severe headache, or vision changes.  The patient verbalized understanding of the proper use and possible adverse effects of tetracycline.  All of the patient's questions and concerns were addressed. Patient understands to avoid pregnancy while on therapy due to potential birth defects.

## 2022-05-27 ENCOUNTER — APPOINTMENT (OUTPATIENT)
Dept: FAMILY MEDICINE | Facility: CLINIC | Age: 62
End: 2022-05-27
Payer: COMMERCIAL

## 2022-05-27 VITALS
WEIGHT: 272 LBS | HEIGHT: 67.52 IN | BODY MASS INDEX: 41.7 KG/M2 | OXYGEN SATURATION: 96 % | SYSTOLIC BLOOD PRESSURE: 121 MMHG | RESPIRATION RATE: 16 BRPM | HEART RATE: 88 BPM | TEMPERATURE: 97.3 F | DIASTOLIC BLOOD PRESSURE: 77 MMHG

## 2022-05-27 DIAGNOSIS — S89.92XA UNSPECIFIED INJURY OF LEFT LOWER LEG, INITIAL ENCOUNTER: ICD-10-CM

## 2022-05-27 PROCEDURE — 99213 OFFICE O/P EST LOW 20 MIN: CPT

## 2022-05-30 NOTE — PHYSICAL EXAM
[No Acute Distress] : no acute distress [No Respiratory Distress] : no respiratory distress  [No Accessory Muscle Use] : no accessory muscle use [Clear to Auscultation] : lungs were clear to auscultation bilaterally [Normal Rate] : normal rate  [Regular Rhythm] : with a regular rhythm [Normal S1, S2] : normal S1 and S2 [de-identified] : obese [de-identified] : right knee with pain in the area behind the knee. ROM with some decreased flexion, no effusion palpable

## 2022-05-30 NOTE — HISTORY OF PRESENT ILLNESS
[Musculoskeletal Symptoms Knees] : knee [___ Weeks ago] : [unfilled] weeks ago [Paroxysmal] : paroxysmal [Moderate] : moderate [Rest] : rest [Activity] : with activity [Worsening] : worsening

## 2022-05-30 NOTE — REVIEW OF SYSTEMS
[Fatigue] : fatigue [Joint Pain] : joint pain [Back Pain] : back pain [Nail Changes] : nail changes [Hair Changes] : hair changes [Headache] : headache [Anxiety] : anxiety [Negative] : Heme/Lymph [Fever] : no fever [Chills] : no chills [Diarrhea] : diarrhea [Hematuria] : no hematuria [Skin Rash] : no skin rash [Dizziness] : no dizziness [Fainting] : no fainting [Confusion] : no confusion [Memory Loss] : no memory loss [Unsteady Walking] : no ataxia [Suicidal] : not suicidal [Depression] : no depression [FreeTextEntry9] : see hpi [de-identified] : peripheral neuropathy- seems a bit worse during weather changes,  migraines stable post chemotherapy

## 2022-06-01 ENCOUNTER — APPOINTMENT (OUTPATIENT)
Dept: ORTHOPEDIC SURGERY | Facility: CLINIC | Age: 62
End: 2022-06-01
Payer: COMMERCIAL

## 2022-06-01 DIAGNOSIS — M23.92 UNSPECIFIED INTERNAL DERANGEMENT OF LEFT KNEE: ICD-10-CM

## 2022-06-01 PROCEDURE — 73564 X-RAY EXAM KNEE 4 OR MORE: CPT | Mod: 50

## 2022-06-01 PROCEDURE — 99204 OFFICE O/P NEW MOD 45 MIN: CPT

## 2022-06-01 NOTE — PHYSICAL EXAM
[de-identified] : Constitutional\par o Appearance : well-nourished, well developed, alert, in no acute distress \par Head and Face\par o Head :\par ¦ Inspection : atraumatic, normocephalic\par o Face :\par ¦ Inspection : no visible rash or discoloration\par Respiratory\par o Respiratory Effort: breathing unlabored \par Neurologic\par o Mental Status Examination :\par ¦ Orientation : alert and oriented X 3\par Psychiatric\par o Mood and Affect: mood normal, affect appropriate\par Cardiovascular\par o Observation/Palpation : - no swelling\par Lymphatic\par o Additional Nodes : No palpable lymph nodes present\par \par Right Lower Extremity\par o Buttock : no tenderness, swelling or deformities \par o Right Hip :\par ¦ Inspection/Palpation : no tenderness, swelling or deformities\par ¦ Range of Motion : full and painless in all planes, no crepitance\par ¦ Stability : joint stability intact\par ¦ Strength : extension, flexion, adduction, abduction, internal rotation and external rotation, 5/5\par \par o Right Knee :\par ¦ Inspection/Palpation : no tenderness to palpation, no swelling\par ¦ Range of Motion : 5-110°, pain with full flexion, patellofemoral crepitance\par ¦ Stability : no valgus or varus instability present on provocative testing\par ¦ Strength : flexion and extension 5/5\par ¦ Tests and Signs : negative Anterior Drawer, negative Lachman, negative Lien\par \par Left Lower Extremity\par o Buttock : no tenderness, swelling or deformities \par o Left Hip :\par ¦ Inspection/Palpation : no tenderness, no swelling or deformities\par ¦ Range of Motion : full and painless in all planes, no crepitance\par ¦ Stability : joint stability intact\par ¦ Strength : extension, flexion, adduction, abduction, internal rotation and external rotation, 5/5\par \par o Left Knee :\par ¦ Inspection/Palpation : medial compartment tenderness, lateral tibial plateau tenderness, lateral compartment tenderness, no swelling, no Baker's cyst\par ¦ Range of Motion : FROM, pain with full flexion, mild patellofemoral crepitance\par ¦ Stability : no valgus or varus instability present on provocative testing\par ¦ Strength : flexion and extension 5/5\par ¦ Tests and Signs : negative Anterior Drawer, negative Lachman, negative Lien\par \par Gait and Station:\par Gait: antalgic on the left with a cane in the right hand, no significant extremity swelling or lymphedema, no foot drop\par \par Radiology Results\par o Right Knee : Standing AP, lateral, tunnel, and skyline views of the knee were obtained and reveal severe tricompartmental osteoarthritis. \par o Left Knee : Standing AP, lateral, tunnel, and skyline views of the knee were obtained and reveal bone on bone in the lateral compartment with moderate patellofemoral arthritis. Questionable lateral tibial plateau fx.

## 2022-06-01 NOTE — DISCUSSION/SUMMARY
[de-identified] : I discussed the underlying pathophysiology of the patient's condition in great detail with the patient. I went over the patient's x-rays with them in great detail. The extent of the patient’s arthritis was discussed in great detail with them. We are requesting authorization for an MRI of the patient's left knee to r/o lateral tibial plateau fracture versus moderate to severe arthritis. A prescription was provided. Patient will follow-up to review the results. We discussed the use of ice to relieve pain. I instructed her on ROM exercises to do at home. She should stay on the cane in the right hand. She should limit her walking. I recommend alternative activities like riding a stationary bike on low tension or pool exercises. All of her questions were answered. She understands and consents to the plan.\par \par FU to review her left knee MRI.

## 2022-06-01 NOTE — CONSULT LETTER
[Dear  ___] : Dear  [unfilled], [Consult Letter:] : I had the pleasure of evaluating your patient, [unfilled]. [Please see my note below.] : Please see my note below. [Consult Closing:] : Thank you very much for allowing me to participate in the care of this patient.  If you have any questions, please do not hesitate to contact me. [Sincerely,] : Sincerely, [FreeTextEntry2] : CHRISTINA NELSON  [FreeTextEntry3] : Fareed Coates M.D.

## 2022-06-01 NOTE — ADDENDUM
[FreeTextEntry1] : I, Juan Francisco Munoz, acted solely as a scribe for Dr. Fareed Coates on this date 06/01/2022.\par All medical record entries made by the Scribe were at my, Dr. Fareed Coates, direction and personally dictated by me on 06/01/2022. I have reviewed the chart and agree that the record accurately reflects my personal performance of the history, physical exam, assessment and plan. I have also personally directed, reviewed, and agreed with the chart.

## 2022-06-01 NOTE — HISTORY OF PRESENT ILLNESS
[de-identified] : 61 year old female presents complaining of left knee pain that worsened on 5/14/22. She stepped down into a boat and landed hard on the left leg. She felt a cracking across the knee with pain and instability. It swelled a little bit. She could not bear weight for about a day after that. It has gotten a little better over time. She is walking with a cane. Going down stairs is worse than up. Her Rheumatologist is concerned that she has a cyst behind the knee. She did have knee pain before this incident. She was diagnosed with psoriatic arthritis in November. She takes prednisone 10mg/day, methotrexate, and Humira for that. She takes Percocet PRN for pain.

## 2022-06-02 ENCOUNTER — OUTPATIENT (OUTPATIENT)
Dept: OUTPATIENT SERVICES | Facility: HOSPITAL | Age: 62
LOS: 1 days | End: 2022-06-02
Payer: COMMERCIAL

## 2022-06-02 ENCOUNTER — APPOINTMENT (OUTPATIENT)
Dept: MRI IMAGING | Facility: IMAGING CENTER | Age: 62
End: 2022-06-02
Payer: COMMERCIAL

## 2022-06-02 DIAGNOSIS — Z98.890 OTHER SPECIFIED POSTPROCEDURAL STATES: Chronic | ICD-10-CM

## 2022-06-02 DIAGNOSIS — M17.0 BILATERAL PRIMARY OSTEOARTHRITIS OF KNEE: ICD-10-CM

## 2022-06-02 DIAGNOSIS — Z90.13 ACQUIRED ABSENCE OF BILATERAL BREASTS AND NIPPLES: Chronic | ICD-10-CM

## 2022-06-02 DIAGNOSIS — Z90.11 ACQUIRED ABSENCE OF RIGHT BREAST AND NIPPLE: Chronic | ICD-10-CM

## 2022-06-02 PROCEDURE — 73721 MRI JNT OF LWR EXTRE W/O DYE: CPT

## 2022-06-02 PROCEDURE — 73721 MRI JNT OF LWR EXTRE W/O DYE: CPT | Mod: 26,LT

## 2022-06-03 ENCOUNTER — NON-APPOINTMENT (OUTPATIENT)
Age: 62
End: 2022-06-03

## 2022-06-07 ENCOUNTER — RX RENEWAL (OUTPATIENT)
Age: 62
End: 2022-06-07

## 2022-06-08 ENCOUNTER — APPOINTMENT (OUTPATIENT)
Dept: ORTHOPEDIC SURGERY | Facility: CLINIC | Age: 62
End: 2022-06-08
Payer: COMMERCIAL

## 2022-06-08 DIAGNOSIS — S83.512D SPRAIN OF ANTERIOR CRUCIATE LIGAMENT OF LEFT KNEE, SUBSEQUENT ENCOUNTER: ICD-10-CM

## 2022-06-08 PROCEDURE — 99214 OFFICE O/P EST MOD 30 MIN: CPT

## 2022-06-08 NOTE — PHYSICAL EXAM
[de-identified] : Constitutional\par o Appearance : well-nourished, well developed, alert, in no acute distress \par Head and Face\par o Head :\par ¦ Inspection : atraumatic, normocephalic\par o Face :\par ¦ Inspection : no visible rash or discoloration\par Respiratory\par o Respiratory Effort: breathing unlabored \par Neurologic\par o Mental Status Examination :\par ¦ Orientation : alert and oriented X 3\par Psychiatric\par o Mood and Affect: mood normal, affect appropriate\par Cardiovascular\par o Observation/Palpation : - no swelling\par Lymphatic\par o Additional Nodes : No palpable lymph nodes present\par \par Right Lower Extremity\par o Buttock : no tenderness, swelling or deformities \par o Right Hip :\par ¦ Inspection/Palpation : no tenderness, swelling or deformities\par ¦ Range of Motion : full and painless in all planes, no crepitance\par ¦ Stability : joint stability intact\par ¦ Strength : extension, flexion, adduction, abduction, internal rotation and external rotation, 5/5\par \par o Right Knee :\par ¦ Inspection/Palpation : no tenderness to palpation, no swelling\par ¦ Range of Motion : 5-110°, pain with full flexion, patellofemoral crepitance\par ¦ Stability : no valgus or varus instability present on provocative testing\par ¦ Strength : flexion and extension 5/5\par ¦ Tests and Signs : negative Anterior Drawer, negative Lachman, negative Lien\par \par Left Lower Extremity\par o Buttock : no tenderness, swelling or deformities \par o Left Hip :\par ¦ Inspection/Palpation : no tenderness, no swelling or deformities\par ¦ Range of Motion : full and painless in all planes, no crepitance\par ¦ Stability : joint stability intact\par ¦ Strength : extension, flexion, adduction, abduction, internal rotation and external rotation, 5/5\par \par o Left Knee :\par ¦ Inspection/Palpation : medial compartment tenderness, lateral tibial plateau tenderness, lateral compartment tenderness, no swelling, no Baker's cyst\par ¦ Range of Motion : FROM, pain with full flexion, mild patellofemoral crepitance\par ¦ Stability : no valgus or varus instability present on provocative testing\par ¦ Strength : flexion and extension 5/5\par ¦ Tests and Signs : negative Anterior Drawer, negative Lachman, negative Lien\par \par Gait and Station:\par Gait: antalgic on the left with a cane in the right hand, no significant extremity swelling or lymphedema, no foot drop

## 2022-06-08 NOTE — DISCUSSION/SUMMARY
[de-identified] : I went over the pathophysiology of the patient's symptoms in great detail with the patient and her . I went over the patient's MRI with them in great detail and used models to aid in my explanation. I advised her that normally the treatment for a nondepressed plateau fracture is 6-9 weeks of nonweightbearing with a walker. I am not recommending this for the patient because her fracture has not depressed at 2-3 weeks from her injury and she already has bone on bone arthritis in the lateral compartment of her knee. At this time, she will start a course of physical therapy for strengthening and flexibility. A prescription was provided. After her fracture is healed, treatments including cortisone injections, viscosupplementation, and surgical intervention would be considered depending on her pain level. I stressed the importance of weight loss and its benefits to the patient’s joints and overall health. I advised her to ask her PCP about a nutritionist consult. All of their questions were answered. They understand and consent to the plan. \par \par FU in 1 month for repeat left knee x-rays.\par after undergoing PT for her left knee.\par after focusing on losing weight.

## 2022-06-08 NOTE — HISTORY OF PRESENT ILLNESS
[de-identified] : 61 year old female presents complaining of left knee pain that worsened on 5/14/22. She stepped down into a boat and landed hard on the left leg. She felt a cracking across the knee with pain and instability. It swelled a little bit. She could not bear weight for about a day after that. It has gotten a little better and has plateaued. She is walking with a cane. Going down stairs is worse than up. She did have knee pain before this incident. She was diagnosed with psoriatic arthritis in November. She takes prednisone 10mg/day, methotrexate, and Humira for that. She takes Percocet PRN for pain. She presents today to review her MRI results. \par \par Left Knee MRI obtained on 6/2/2022 reveals:\par 1. Nondepressed Schatzker type III fracture of the lateral tibial plateau.\par 2. Moderate to severe osteoarthritis.\par 3. Severe degenerative tearing of the medial meniscus posterior horn.\par 4. Degeneration and fraying of the lateral meniscus.\par 5. Anterior cruciate ligament rupture that is suspected to be chronic.\par \par Radiology Results taken on 6/1/2022:\par o Right Knee : Standing AP, lateral, tunnel, and skyline views of the knee were obtained and reveal severe tricompartmental osteoarthritis. \par o Left Knee : Standing AP, lateral, tunnel, and skyline views of the knee were obtained and reveal bone on bone in the lateral compartment with moderate patellofemoral arthritis. Questionable lateral tibial plateau fx.

## 2022-06-14 ENCOUNTER — OUTPATIENT (OUTPATIENT)
Dept: OUTPATIENT SERVICES | Facility: HOSPITAL | Age: 62
LOS: 1 days | Discharge: HOME | End: 2022-06-14

## 2022-06-14 DIAGNOSIS — C50.911 MALIGNANT NEOPLASM OF UNSPECIFIED SITE OF RIGHT FEMALE BREAST: ICD-10-CM

## 2022-06-14 DIAGNOSIS — Z90.13 ACQUIRED ABSENCE OF BILATERAL BREASTS AND NIPPLES: Chronic | ICD-10-CM

## 2022-06-14 DIAGNOSIS — Z98.890 OTHER SPECIFIED POSTPROCEDURAL STATES: Chronic | ICD-10-CM

## 2022-06-14 DIAGNOSIS — Z90.11 ACQUIRED ABSENCE OF RIGHT BREAST AND NIPPLE: Chronic | ICD-10-CM

## 2022-06-22 ENCOUNTER — APPOINTMENT (OUTPATIENT)
Dept: INFUSION THERAPY | Facility: HOSPITAL | Age: 62
End: 2022-06-22

## 2022-06-22 ENCOUNTER — RESULT REVIEW (OUTPATIENT)
Age: 62
End: 2022-06-22

## 2022-06-22 LAB
ALBUMIN SERPL ELPH-MCNC: 4 G/DL — SIGNIFICANT CHANGE UP (ref 3.3–5)
ALP SERPL-CCNC: 78 U/L — SIGNIFICANT CHANGE UP (ref 40–120)
ALT FLD-CCNC: 13 U/L — SIGNIFICANT CHANGE UP (ref 10–45)
ANION GAP SERPL CALC-SCNC: 11 MMOL/L — SIGNIFICANT CHANGE UP (ref 5–17)
AST SERPL-CCNC: 15 U/L — SIGNIFICANT CHANGE UP (ref 10–40)
BASOPHILS # BLD AUTO: 0.04 K/UL — SIGNIFICANT CHANGE UP (ref 0–0.2)
BASOPHILS NFR BLD AUTO: 0.4 % — SIGNIFICANT CHANGE UP (ref 0–2)
BILIRUB SERPL-MCNC: 0.2 MG/DL — SIGNIFICANT CHANGE UP (ref 0.2–1.2)
BUN SERPL-MCNC: 10 MG/DL — SIGNIFICANT CHANGE UP (ref 7–23)
CALCIUM SERPL-MCNC: 8.3 MG/DL — LOW (ref 8.4–10.5)
CHLORIDE SERPL-SCNC: 106 MMOL/L — SIGNIFICANT CHANGE UP (ref 96–108)
CO2 SERPL-SCNC: 22 MMOL/L — SIGNIFICANT CHANGE UP (ref 22–31)
CREAT SERPL-MCNC: 0.62 MG/DL — SIGNIFICANT CHANGE UP (ref 0.5–1.3)
EGFR: 101 ML/MIN/1.73M2 — SIGNIFICANT CHANGE UP
EOSINOPHIL # BLD AUTO: 0.07 K/UL — SIGNIFICANT CHANGE UP (ref 0–0.5)
EOSINOPHIL NFR BLD AUTO: 0.7 % — SIGNIFICANT CHANGE UP (ref 0–6)
GLUCOSE SERPL-MCNC: 100 MG/DL — HIGH (ref 70–99)
HCT VFR BLD CALC: 38.4 % — SIGNIFICANT CHANGE UP (ref 34.5–45)
HGB BLD-MCNC: 12.3 G/DL — SIGNIFICANT CHANGE UP (ref 11.5–15.5)
IMM GRANULOCYTES NFR BLD AUTO: 0.4 % — SIGNIFICANT CHANGE UP (ref 0–1.5)
LYMPHOCYTES # BLD AUTO: 0.97 K/UL — LOW (ref 1–3.3)
LYMPHOCYTES # BLD AUTO: 9.7 % — LOW (ref 13–44)
MCHC RBC-ENTMCNC: 30.4 PG — SIGNIFICANT CHANGE UP (ref 27–34)
MCHC RBC-ENTMCNC: 32 G/DL — SIGNIFICANT CHANGE UP (ref 32–36)
MCV RBC AUTO: 94.8 FL — SIGNIFICANT CHANGE UP (ref 80–100)
MONOCYTES # BLD AUTO: 0.46 K/UL — SIGNIFICANT CHANGE UP (ref 0–0.9)
MONOCYTES NFR BLD AUTO: 4.6 % — SIGNIFICANT CHANGE UP (ref 2–14)
NEUTROPHILS # BLD AUTO: 8.37 K/UL — HIGH (ref 1.8–7.4)
NEUTROPHILS NFR BLD AUTO: 84.2 % — HIGH (ref 43–77)
NRBC # BLD: 0 /100 WBCS — SIGNIFICANT CHANGE UP (ref 0–0)
PLATELET # BLD AUTO: 289 K/UL — SIGNIFICANT CHANGE UP (ref 150–400)
POTASSIUM SERPL-MCNC: 4.2 MMOL/L — SIGNIFICANT CHANGE UP (ref 3.5–5.3)
POTASSIUM SERPL-SCNC: 4.2 MMOL/L — SIGNIFICANT CHANGE UP (ref 3.5–5.3)
PROT SERPL-MCNC: 6 G/DL — SIGNIFICANT CHANGE UP (ref 6–8.3)
RBC # BLD: 4.05 M/UL — SIGNIFICANT CHANGE UP (ref 3.8–5.2)
RBC # FLD: 14.8 % — HIGH (ref 10.3–14.5)
SODIUM SERPL-SCNC: 140 MMOL/L — SIGNIFICANT CHANGE UP (ref 135–145)
WBC # BLD: 9.95 K/UL — SIGNIFICANT CHANGE UP (ref 3.8–10.5)
WBC # FLD AUTO: 9.95 K/UL — SIGNIFICANT CHANGE UP (ref 3.8–10.5)

## 2022-06-24 ENCOUNTER — APPOINTMENT (OUTPATIENT)
Dept: RHEUMATOLOGY | Facility: CLINIC | Age: 62
End: 2022-06-24
Payer: COMMERCIAL

## 2022-06-24 LAB
ALBUMIN SERPL ELPH-MCNC: 4.6 G/DL
ALP BLD-CCNC: 86 U/L
ALT SERPL-CCNC: 18 U/L
ANION GAP SERPL CALC-SCNC: 11 MMOL/L
AST SERPL-CCNC: 15 U/L
BASOPHILS # BLD AUTO: 0.03 K/UL
BASOPHILS NFR BLD AUTO: 0.3 %
BILIRUB SERPL-MCNC: 0.2 MG/DL
BUN SERPL-MCNC: 12 MG/DL
CALCIUM SERPL-MCNC: 9.5 MG/DL
CHLORIDE SERPL-SCNC: 103 MMOL/L
CO2 SERPL-SCNC: 28 MMOL/L
CREAT SERPL-MCNC: 0.66 MG/DL
CRP SERPL-MCNC: 10 MG/L
EGFR: 99 ML/MIN/1.73M2
EOSINOPHIL # BLD AUTO: 0.07 K/UL
EOSINOPHIL NFR BLD AUTO: 0.8 %
ERYTHROCYTE [SEDIMENTATION RATE] IN BLOOD BY WESTERGREN METHOD: 15 MM/HR
GLUCOSE SERPL-MCNC: 100 MG/DL
HCT VFR BLD CALC: 39 %
HGB BLD-MCNC: 12.1 G/DL
IMM GRANULOCYTES NFR BLD AUTO: 0.2 %
LYMPHOCYTES # BLD AUTO: 1.96 K/UL
LYMPHOCYTES NFR BLD AUTO: 21.8 %
MAN DIFF?: NORMAL
MCHC RBC-ENTMCNC: 30 PG
MCHC RBC-ENTMCNC: 31 GM/DL
MCV RBC AUTO: 96.5 FL
MONOCYTES # BLD AUTO: 0.57 K/UL
MONOCYTES NFR BLD AUTO: 6.3 %
NEUTROPHILS # BLD AUTO: 6.34 K/UL
NEUTROPHILS NFR BLD AUTO: 70.6 %
PLATELET # BLD AUTO: 329 K/UL
POTASSIUM SERPL-SCNC: 4.9 MMOL/L
PROT SERPL-MCNC: 6.7 G/DL
RBC # BLD: 4.04 M/UL
RBC # FLD: 14.9 %
SODIUM SERPL-SCNC: 142 MMOL/L
WBC # FLD AUTO: 8.99 K/UL

## 2022-06-24 PROCEDURE — 99213 OFFICE O/P EST LOW 20 MIN: CPT

## 2022-06-24 RX ORDER — PREDNISONE 20 MG/1
20 TABLET ORAL
Qty: 30 | Refills: 2 | Status: DISCONTINUED | COMMUNITY
Start: 2021-10-04 | End: 2022-06-24

## 2022-06-26 ENCOUNTER — NON-APPOINTMENT (OUTPATIENT)
Age: 62
End: 2022-06-26

## 2022-06-26 NOTE — HISTORY OF PRESENT ILLNESS
[FreeTextEntry1] : FIDEL THOMAS is a 62 year old woman with PsA. 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 Some insomnia and sweats with prednisone, otherwise no SE, some GI upset but mild with MTX, otherwise tolerating it well. No infectious sx, rash improved, no extra-articular manifestations. \par \par 2/11/22 -- Ongoing improvement in joints but still with activity, L knee posterior fullness and pain is most active. She is amenable to trying to taper steroids regardless as is worried about long term use. No extra-articular manifestations, no infectious sx. \par \par 3/25/22 -- Worsening of symmetrical joint pain and warmth, some b/l SI joint and L hip pain with prolonged AM stiffness and improved with exercise and not typical of her DJD related pain. Rash has not emerged but increased sensitivity over sites of prior rash. Ongoing dry eye but no inflammation. No SE with MTX, no infectious sx, above is worse since tapering to prednisone 10mg/day.  \par \par 6/24/22 -- Recent L knee nondisplaced tibial plateau fracture/ ACL sprain, slowly improving. Improvement in psoriasis, ongoing small and large joint arthralgias tho less overt swelling/warmth. Some positional RUE tingling, mild C spine pain. No CP/SOB, GI/ sx, no infectious sx.

## 2022-06-26 NOTE — REVIEW OF SYSTEMS
[Dry Eyes] : dryness of the eyes [As Noted in HPI] : as noted in HPI [Arthralgias] : arthralgias [Joint Pain] : joint pain [Joint Stiffness] : joint stiffness [Joint Swelling] : no joint swelling [Negative] : Integumentary

## 2022-06-26 NOTE — PHYSICAL EXAM
[General Appearance - Alert] : alert [General Appearance - In No Acute Distress] : in no acute distress [General Appearance - Well Nourished] : well nourished [Sclera] : the sclera and conjunctiva were normal [PERRL With Normal Accommodation] : pupils were equal in size, round, and reactive to light [Extraocular Movements] : extraocular movements were intact [Outer Ear] : the ears and nose were normal in appearance [Oropharynx] : the oropharynx was normal [Neck Appearance] : the appearance of the neck was normal [Auscultation Breath Sounds / Voice Sounds] : lungs were clear to auscultation bilaterally [Heart Rate And Rhythm] : heart rate was normal and rhythm regular [Heart Sounds] : normal S1 and S2 [Edema] : there was no peripheral edema [Bowel Sounds] : normal bowel sounds [Abdomen Soft] : soft [Abdomen Tenderness] : non-tender [Cervical Lymph Nodes Enlarged Posterior Bilaterally] : posterior cervical [Cervical Lymph Nodes Enlarged Anterior Bilaterally] : anterior cervical [Supraclavicular Lymph Nodes Enlarged Bilaterally] : supraclavicular [No CVA Tenderness] : no ~M costovertebral angle tenderness [No Spinal Tenderness] : no spinal tenderness [Nail Clubbing] : no clubbing  or cyanosis of the fingernails [Musculoskeletal - Swelling] : no joint swelling seen [Motor Tone] : muscle strength and tone were normal [Skin Color & Pigmentation] : normal skin color and pigmentation [No Focal Deficits] : no focal deficits [Oriented To Time, Place, And Person] : oriented to person, place, and time [Impaired Insight] : insight and judgment were intact [Affect] : the affect was normal [] : no rash [FreeTextEntry1] : Rash resolved

## 2022-06-26 NOTE — ASSESSMENT
[FreeTextEntry1] : FIDEL THOMAS is a 62 year old woman with PsA -- months of symmetrical small joint stiffness and pain, b/l Achilles tenderness, L knee warmth on exam, psoriatic plaque on R foot and some fissuring in palms which may be psoriasis vs eczema. Marked response to steroids but ongoing activity despite MTX initiation, new spinal sx which appear inflammatory necessitating need for biologic. L popliteal cyst, likely at least partially inflammatory mediated. \par \par # PsA \par - c/w Humira q2 weeks as marked improvement in skin and approx 70% improvement in joint findings today \par - reviewed labs in detail with patient, stable -- repeat prior to next visit \par - prednisone taper given chronic use and especially in light of fracture - Taper to 7.5mg x 2 weeks, then 5mg/day until next visit. If feeling improved s/p Humira can continue taper until off \par - c/w MTX 8 tabs. C/w FA 1 mg daily \par - US guided popliteal cyst drainage/injection on hold 2/2 recent fx \par - optho exam negative for ocular inflammation \par \par RTC 2 months

## 2022-07-13 ENCOUNTER — APPOINTMENT (OUTPATIENT)
Dept: ORTHOPEDIC SURGERY | Facility: CLINIC | Age: 62
End: 2022-07-13

## 2022-07-13 PROCEDURE — 73564 X-RAY EXAM KNEE 4 OR MORE: CPT | Mod: LT

## 2022-07-13 PROCEDURE — 99213 OFFICE O/P EST LOW 20 MIN: CPT

## 2022-07-13 NOTE — REASON FOR VISIT
[Follow-Up Visit] : a follow-up visit for [Knee Pain] : knee pain [Spouse] : spouse [FreeTextEntry2] : lateral tibial plateau fx

## 2022-07-13 NOTE — DISCUSSION/SUMMARY
[de-identified] : I discussed the underlying pathophysiology of the patient's condition in great detail with the patient and her . I went over the patient's x-rays with them in great detail. I stressed the importance of weight loss and its benefits to the patient’s joints and overall health. I am recommending the patient continues going to physical therapy to obtain increases in her strength and mobility. A prescription was provided. If she gets stronger but still has discomfort then viscosupplementation could be considered. We are requesting authorization for a left knee Monovisc injection. We discussed the use of ice to relieve pain. All of their questions were answered. They understand and consent to the plan.\par \par FU in 1 month.\par after continuing PT for her left knee.\par after focusing on losing weight.\par after viscosupplementation authorization.

## 2022-07-13 NOTE — PHYSICAL EXAM
[de-identified] : Constitutional\par o Appearance : well-nourished, well developed, alert, in no acute distress \par Head and Face\par o Head :\par ¦ Inspection : atraumatic, normocephalic\par o Face :\par ¦ Inspection : no visible rash or discoloration\par Respiratory\par o Respiratory Effort: breathing unlabored \par Neurologic\par o Mental Status Examination :\par ¦ Orientation : alert and oriented X 3\par Psychiatric\par o Mood and Affect: mood normal, affect appropriate\par Cardiovascular\par o Observation/Palpation : - no swelling\par Lymphatic\par o Additional Nodes : No palpable lymph nodes present\par \par Right Lower Extremity\par o Buttock : no tenderness, swelling or deformities \par o Right Hip :\par ¦ Inspection/Palpation : no tenderness, swelling or deformities\par ¦ Range of Motion : full and painless in all planes, no crepitance\par ¦ Stability : joint stability intact\par ¦ Strength : extension, flexion, adduction, abduction, internal rotation and external rotation, 5/5\par \par o Right Knee :\par ¦ Inspection/Palpation : no tenderness to palpation, no swelling\par ¦ Range of Motion : 5-110°, pain with full flexion, patellofemoral crepitance\par ¦ Stability : no valgus or varus instability present on provocative testing\par ¦ Strength : flexion and extension 5/5\par ¦ Tests and Signs : negative Anterior Drawer, negative Lachman, negative Lien\par \par Left Lower Extremity\par o Buttock : no tenderness, swelling or deformities \par o Left Hip :\par ¦ Inspection/Palpation : no tenderness, no swelling or deformities\par ¦ Range of Motion : full and painless in all planes, no crepitance\par ¦ Stability : joint stability intact\par ¦ Strength : extension, flexion, adduction, abduction, internal rotation and external rotation, 4-/5\par \par o Left Knee :\par ¦ Inspection/Palpation : medial compartment tenderness, lateral compartment tenderness, lateral tibial plateau tenderness, no swelling, no Baker's cyst\par ¦ Range of Motion : 0-115° with pain on full flexion, mild patellofemoral crepitance\par ¦ Stability : no valgus or varus instability present on provocative testing\par ¦ Strength : flexion and extension 4-/5\par ¦ Tests and Signs : negative Anterior Drawer, negative Lachman, negative Lien\par \par Gait and Station:\par Gait: ambulating with a cane in the right hand, no significant extremity swelling or lymphedema, no foot drop\par \par Radiology Results \par o Left Knee : Standing AP, lateral, and obliques views were obtained and revealed healing of the lateral tibial plateau fracture. Moderate to severe tricompartmental osteoarthritis.

## 2022-07-13 NOTE — HISTORY OF PRESENT ILLNESS
[de-identified] : 62 year old female presents for follow-up evaluation of left knee lateral tibial plateau fracture. On 5/14/22, she stepped down into a boat and landed hard on the left leg. She felt a cracking across the knee with pain and instability. She is attending PT and is feeling a lot better. She feels discomfort with lateral movements and stair climbing. She presents today for repeat x-rays. She understands that she will need a knee replacement at some point and needs to lose weight before that.\par Of note, she was diagnosed with psoriatic arthritis in November. She takes prednisone 10mg/day, methotrexate, and Humira for that. She takes Percocet PRN for pain.\par \par Left Knee MRI obtained on 6/2/2022:\par 1. Nondepressed Schatzker type III fracture of the lateral tibial plateau.\par 2. Moderate to severe osteoarthritis.\par 3. Severe degenerative tearing of the medial meniscus posterior horn.\par 4. Degeneration and fraying of the lateral meniscus.\par 5. Anterior cruciate ligament rupture that is suspected to be chronic.\par \par Radiology Results 6/1/2022:\par o Right Knee : Standing AP, lateral, tunnel, and skyline views of the knee were obtained and reveal severe tricompartmental osteoarthritis.

## 2022-07-19 ENCOUNTER — NON-APPOINTMENT (OUTPATIENT)
Age: 62
End: 2022-07-19

## 2022-07-24 ENCOUNTER — NON-APPOINTMENT (OUTPATIENT)
Age: 62
End: 2022-07-24

## 2022-08-08 ENCOUNTER — RX RENEWAL (OUTPATIENT)
Age: 62
End: 2022-08-08

## 2022-08-11 ENCOUNTER — OUTPATIENT (OUTPATIENT)
Dept: OUTPATIENT SERVICES | Facility: HOSPITAL | Age: 62
LOS: 1 days | Discharge: HOME | End: 2022-08-11

## 2022-08-11 DIAGNOSIS — Z90.13 ACQUIRED ABSENCE OF BILATERAL BREASTS AND NIPPLES: Chronic | ICD-10-CM

## 2022-08-11 DIAGNOSIS — Z98.890 OTHER SPECIFIED POSTPROCEDURAL STATES: Chronic | ICD-10-CM

## 2022-08-11 DIAGNOSIS — C50.911 MALIGNANT NEOPLASM OF UNSPECIFIED SITE OF RIGHT FEMALE BREAST: ICD-10-CM

## 2022-08-11 DIAGNOSIS — Z90.11 ACQUIRED ABSENCE OF RIGHT BREAST AND NIPPLE: Chronic | ICD-10-CM

## 2022-08-12 ENCOUNTER — RX RENEWAL (OUTPATIENT)
Age: 62
End: 2022-08-12

## 2022-08-17 ENCOUNTER — APPOINTMENT (OUTPATIENT)
Dept: INFUSION THERAPY | Facility: HOSPITAL | Age: 62
End: 2022-08-17

## 2022-08-17 ENCOUNTER — FORM ENCOUNTER (OUTPATIENT)
Age: 62
End: 2022-08-17

## 2022-08-17 ENCOUNTER — APPOINTMENT (OUTPATIENT)
Dept: ORTHOPEDIC SURGERY | Facility: CLINIC | Age: 62
End: 2022-08-17

## 2022-08-17 VITALS — HEIGHT: 67 IN | WEIGHT: 260 LBS | BODY MASS INDEX: 40.81 KG/M2

## 2022-08-17 DIAGNOSIS — S82.142D DISPLACED BICONDYLAR FRACTURE OF LEFT TIBIA, SUBSEQUENT ENCOUNTER FOR CLOSED FRACTURE WITH ROUTINE HEALING: ICD-10-CM

## 2022-08-17 PROCEDURE — 20611 DRAIN/INJ JOINT/BURSA W/US: CPT | Mod: LT

## 2022-08-17 PROCEDURE — 99214 OFFICE O/P EST MOD 30 MIN: CPT | Mod: 25

## 2022-08-17 PROCEDURE — 73564 X-RAY EXAM KNEE 4 OR MORE: CPT | Mod: LT

## 2022-08-17 NOTE — HISTORY OF PRESENT ILLNESS
[de-identified] : 62 year old female presents complaining of bilateral knee pain (R>L). She sustained a left lateral tibial plateau fracture on 5/14/22. She is attending PT and her left knee feels a lot better. She understands that she will need a knee replacement at some point and needs to lose weight before that. She is interested in trying gel shots and has been approved for a left knee Monovisc injection. Patient presents ambulating with a cane in the right hand. \par She notes that today is a particularly bad day for her psoriatic arthritis. She takes prednisone 10mg/day, methotrexate, and Humira for that. She's not sure if it helps. She takes Percocet PRN for pain.\par \par Left Knee MRI obtained on 6/2/2022:\par 1. Nondepressed Schatzker type III fracture of the lateral tibial plateau.\par 2. Moderate to severe osteoarthritis.\par 3. Severe degenerative tearing of the medial meniscus posterior horn.\par 4. Degeneration and fraying of the lateral meniscus.\par 5. Anterior cruciate ligament rupture that is suspected to be chronic.\par \par Radiology Results 6/1/2022:\par o Right Knee : Standing AP, lateral, tunnel, and skyline views of the knee were obtained and reveal severe tricompartmental osteoarthritis.

## 2022-08-17 NOTE — PHYSICAL EXAM
[de-identified] : Constitutional\par o Appearance : well-nourished, well developed, alert, in no acute distress \par Head and Face\par o Head :\par ¦ Inspection : atraumatic, normocephalic\par o Face :\par ¦ Inspection : no visible rash or discoloration\par Respiratory\par o Respiratory Effort: breathing unlabored \par Neurologic\par o Mental Status Examination :\par ¦ Orientation : alert and oriented X 3\par Psychiatric\par o Mood and Affect: mood normal, affect appropriate\par Cardiovascular\par o Observation/Palpation : - no swelling\par Lymphatic\par o Additional Nodes : No palpable lymph nodes present\par \par Right Lower Extremity\par o Buttock : no tenderness, swelling or deformities \par o Right Hip :\par ¦ Inspection/Palpation : no tenderness, swelling or deformities\par ¦ Range of Motion : full and painless in all planes, no crepitance\par ¦ Stability : joint stability intact\par ¦ Strength : extension, flexion, adduction, abduction, internal rotation and external rotation, 5/5\par \par o Right Knee :\par ¦ Inspection/Palpation : no tenderness to palpation, no swelling\par ¦ Range of Motion : 5-110°, pain with full flexion, patellofemoral crepitance\par ¦ Stability : no valgus or varus instability present on provocative testing\par ¦ Strength : flexion and extension 5/5\par ¦ Tests and Signs : negative Anterior Drawer, negative Lachman, negative Lien\par \par Left Lower Extremity\par o Buttock : no tenderness, swelling or deformities \par o Left Hip :\par ¦ Inspection/Palpation : no tenderness, no swelling or deformities\par ¦ Range of Motion : full and painless in all planes, no crepitance\par ¦ Stability : joint stability intact\par ¦ Strength : extension, flexion, adduction, abduction, internal rotation and external rotation, 4-/5\par \par o Left Knee :\par ¦ Inspection/Palpation : lateral compartment and lateral tibial plateau tenderness, no swelling, no Baker's cyst\par ¦ Range of Motion : 0-120° with pain on full flexion, mild patellofemoral crepitance, decreased patellofemoral glide \par ¦ Stability : no valgus or varus instability present on provocative testing\par ¦ Strength : flexion and extension 4-/5\par ¦ Tests and Signs : negative Anterior Drawer, negative Lachman, negative Lien\par \par Gait and Station:\par Gait: ambulating with a cane in the right hand, no significant extremity swelling or lymphedema, no foot drop\par \par Radiology Results \par o Left Knee : Standing AP, lateral, and obliques views were obtained and revealed full healing of the lateral tibial plateau fracture. Bone on bone in the lateral compartment with severe patellofemoral arthritis. \par \par o Left Knee injection : Indication- knee osteoarthritis, Anatomic location- left intra-articular joint space, Spray - area was sterilized with Betadine and alcohol and anesthetized with Ethyl Chloride, needle used-20G, Medications given- 4cc's Monovisc under Ultrasound guidance. The patient tolerated the procedure well.  oLot# [id number]   oExp: 2023-03-31

## 2022-08-17 NOTE — ADDENDUM
[FreeTextEntry1] : I, Juan Francisco Munoz, acted solely as a scribe for Dr. Fareed Coates on this date 08/17/2022.\par All medical record entries made by the Scribe were at my, Dr. Fareed Coates, direction and personally dictated by me on 08/17/2022. I have reviewed the chart and agree that the record accurately reflects my personal performance of the history, physical exam, assessment and plan. I have also personally directed, reviewed, and agreed with the chart.

## 2022-08-17 NOTE — DISCUSSION/SUMMARY
[de-identified] : I discussed the underlying pathophysiology of the patient's condition in great detail with the patient and her . I went over the patient's x-rays with them in great detail. The patient elected to receive a Monovisc injection into her left knee today and tolerated it well. I instructed the patient on ROM exercises and told them to take it easy. The use of ice and rest was reviewed with the patient. The patient may resume activities tomorrow. I reminded the patient that it takes 4 to 6 weeks after the injection to feel symptom relief. We are requesting authorization for a right knee Monovisc injection. All of their questions were answered. They understand and consent to the plan.\par \par FU for a right knee Monovisc injection when it is authorized.\par after a left knee Monovisc injection today (08/17/2022).

## 2022-08-23 ENCOUNTER — RX RENEWAL (OUTPATIENT)
Age: 62
End: 2022-08-23

## 2022-08-25 LAB
ALBUMIN SERPL ELPH-MCNC: 4.5 G/DL
ALP BLD-CCNC: 83 U/L
ALT SERPL-CCNC: 20 U/L
ANION GAP SERPL CALC-SCNC: 12 MMOL/L
AST SERPL-CCNC: 18 U/L
BASOPHILS # BLD AUTO: 0.03 K/UL
BASOPHILS NFR BLD AUTO: 0.3 %
BILIRUB SERPL-MCNC: 0.2 MG/DL
BUN SERPL-MCNC: 14 MG/DL
CALCIUM SERPL-MCNC: 9.9 MG/DL
CHLORIDE SERPL-SCNC: 103 MMOL/L
CO2 SERPL-SCNC: 26 MMOL/L
CREAT SERPL-MCNC: 0.74 MG/DL
CRP SERPL-MCNC: 7 MG/L
EGFR: 91 ML/MIN/1.73M2
EOSINOPHIL # BLD AUTO: 0.02 K/UL
EOSINOPHIL NFR BLD AUTO: 0.2 %
ERYTHROCYTE [SEDIMENTATION RATE] IN BLOOD BY WESTERGREN METHOD: 34 MM/HR
GLUCOSE SERPL-MCNC: 103 MG/DL
HCT VFR BLD CALC: 40.5 %
HGB BLD-MCNC: 12.9 G/DL
IMM GRANULOCYTES NFR BLD AUTO: 0.5 %
LYMPHOCYTES # BLD AUTO: 0.95 K/UL
LYMPHOCYTES NFR BLD AUTO: 10.8 %
MAN DIFF?: NORMAL
MCHC RBC-ENTMCNC: 31.3 PG
MCHC RBC-ENTMCNC: 31.9 GM/DL
MCV RBC AUTO: 98.3 FL
MONOCYTES # BLD AUTO: 0.33 K/UL
MONOCYTES NFR BLD AUTO: 3.8 %
NEUTROPHILS # BLD AUTO: 7.39 K/UL
NEUTROPHILS NFR BLD AUTO: 84.4 %
PLATELET # BLD AUTO: 317 K/UL
POTASSIUM SERPL-SCNC: 5.2 MMOL/L
PROT SERPL-MCNC: 6.6 G/DL
RBC # BLD: 4.12 M/UL
RBC # FLD: 14.9 %
SODIUM SERPL-SCNC: 140 MMOL/L
WBC # FLD AUTO: 8.76 K/UL

## 2022-08-26 ENCOUNTER — APPOINTMENT (OUTPATIENT)
Dept: RHEUMATOLOGY | Facility: CLINIC | Age: 62
End: 2022-08-26

## 2022-08-26 VITALS
HEART RATE: 81 BPM | TEMPERATURE: 97.2 F | BODY MASS INDEX: 40.81 KG/M2 | DIASTOLIC BLOOD PRESSURE: 81 MMHG | OXYGEN SATURATION: 95 % | HEIGHT: 67 IN | WEIGHT: 260 LBS | RESPIRATION RATE: 16 BRPM | SYSTOLIC BLOOD PRESSURE: 123 MMHG

## 2022-08-26 PROCEDURE — 99213 OFFICE O/P EST LOW 20 MIN: CPT

## 2022-09-09 NOTE — HISTORY OF PRESENT ILLNESS
[FreeTextEntry1] : FIDEL THOMAS is a 62 year old woman with PsA. 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 Some insomnia and sweats with prednisone, otherwise no SE, some GI upset but mild with MTX, otherwise tolerating it well. No infectious sx, rash improved, no extra-articular manifestations. \par \par 2/11/22 -- Ongoing improvement in joints but still with activity, L knee posterior fullness and pain is most active. She is amenable to trying to taper steroids regardless as is worried about long term use. No extra-articular manifestations, no infectious sx. \par \par 3/25/22 -- Worsening of symmetrical joint pain and warmth, some b/l SI joint and L hip pain with prolonged AM stiffness and improved with exercise and not typical of her DJD related pain. Rash has not emerged but increased sensitivity over sites of prior rash. Ongoing dry eye but no inflammation. No SE with MTX, no infectious sx, above is worse since tapering to prednisone 10mg/day.  \par \par 6/24/22 -- Recent L knee nondisplaced tibial plateau fracture/ ACL sprain, slowly improving. Improvement in psoriasis, ongoing small and large joint arthralgias tho less overt swelling/warmth. Some positional RUE tingling, mild C spine pain. No CP/SOB, GI/ sx, no infectious sx. \par \par 8/26/22 -- Rash is flaring again, returned to b/l feet, now on elbows, on scalp. Ongoing small symmetrical small joint arthralgias with all day stiffness. L knee fx is healed, now getting gel injections in L knee, considering R as well if L goes well. + C spine pain with RUE radiation, positional at present.

## 2022-09-09 NOTE — REVIEW OF SYSTEMS
[Dry Eyes] : dryness of the eyes [Arthralgias] : arthralgias [Joint Pain] : joint pain [Joint Stiffness] : joint stiffness [Negative] : Heme/Lymph [Joint Swelling] : no joint swelling [As Noted in HPI] : as noted in HPI

## 2022-09-09 NOTE — ASSESSMENT
[FreeTextEntry1] : FIDEL THOMAS is a 62 year old woman with PsA -- months of symmetrical small joint stiffness and pain, b/l Achilles tenderness, L knee warmth on exam, psoriatic plaque on R foot and some fissuring in palms which may be psoriasis vs eczema. Marked response to steroids but ongoing activity despite MTX initiation, new spinal sx which appear inflammatory necessitating need for biologic but minimal improvement and progression despite Humira. \par \par # PsA \par - d/c Humira, will get PA for Enbrel \par - reviewed labs in detail with patient, stable -- consistent with flare -- repeat 1 month after starting enbrel \par - c/w prednisone 5mg/day \par - c/w MTX 8 tabs. C/w FA 1 mg daily \par - optho exam negative for ocular inflammation \par \par # OA b/l knees \par - agree with gel injections \par \par RTC 3 months

## 2022-09-09 NOTE — PHYSICAL EXAM
[General Appearance - Alert] : alert [General Appearance - In No Acute Distress] : in no acute distress [General Appearance - Well Nourished] : well nourished [Sclera] : the sclera and conjunctiva were normal [PERRL With Normal Accommodation] : pupils were equal in size, round, and reactive to light [Extraocular Movements] : extraocular movements were intact [Outer Ear] : the ears and nose were normal in appearance [Oropharynx] : the oropharynx was normal [Neck Appearance] : the appearance of the neck was normal [] : no respiratory distress [Auscultation Breath Sounds / Voice Sounds] : lungs were clear to auscultation bilaterally [Heart Rate And Rhythm] : heart rate was normal and rhythm regular [Heart Sounds] : normal S1 and S2 [Edema] : there was no peripheral edema [No CVA Tenderness] : no ~M costovertebral angle tenderness [No Spinal Tenderness] : no spinal tenderness [Nail Clubbing] : no clubbing  or cyanosis of the fingernails [Musculoskeletal - Swelling] : no joint swelling seen [Motor Tone] : muscle strength and tone were normal [Skin Color & Pigmentation] : normal skin color and pigmentation [No Focal Deficits] : no focal deficits [Oriented To Time, Place, And Person] : oriented to person, place, and time [Impaired Insight] : insight and judgment were intact [Affect] : the affect was normal [FreeTextEntry1] : Rash recurred on feet, elbows, scalp

## 2022-09-16 RX ORDER — ETANERCEPT 50 MG/ML
50 SOLUTION SUBCUTANEOUS
Qty: 1 | Refills: 11 | Status: DISCONTINUED | COMMUNITY
Start: 2022-09-09 | End: 2022-09-16

## 2022-09-20 ENCOUNTER — NON-APPOINTMENT (OUTPATIENT)
Age: 62
End: 2022-09-20

## 2022-09-20 RX ORDER — ADALIMUMAB 40MG/0.4ML
40 KIT SUBCUTANEOUS
Qty: 1 | Refills: 11 | Status: DISCONTINUED | COMMUNITY
Start: 2022-03-25 | End: 2022-09-20

## 2022-09-28 ENCOUNTER — APPOINTMENT (OUTPATIENT)
Dept: HEMATOLOGY ONCOLOGY | Facility: CLINIC | Age: 62
End: 2022-09-28

## 2022-09-28 ENCOUNTER — APPOINTMENT (OUTPATIENT)
Dept: ORTHOPEDIC SURGERY | Facility: CLINIC | Age: 62
End: 2022-09-28

## 2022-09-28 VITALS
TEMPERATURE: 97.4 F | WEIGHT: 278.88 LBS | SYSTOLIC BLOOD PRESSURE: 119 MMHG | DIASTOLIC BLOOD PRESSURE: 79 MMHG | OXYGEN SATURATION: 97 % | BODY MASS INDEX: 43.77 KG/M2 | RESPIRATION RATE: 16 BRPM | HEART RATE: 74 BPM | HEIGHT: 67.01 IN

## 2022-09-28 DIAGNOSIS — M19.90 UNSPECIFIED OSTEOARTHRITIS, UNSPECIFIED SITE: ICD-10-CM

## 2022-09-28 PROCEDURE — 99214 OFFICE O/P EST MOD 30 MIN: CPT

## 2022-09-28 PROCEDURE — 20611 DRAIN/INJ JOINT/BURSA W/US: CPT | Mod: RT

## 2022-09-28 PROCEDURE — 99213 OFFICE O/P EST LOW 20 MIN: CPT | Mod: 25

## 2022-09-28 NOTE — PHYSICAL EXAM
[de-identified] : Constitutional\par o Appearance : well-nourished, well developed, alert, in no acute distress \par Head and Face\par o Head :\par ¦ Inspection : atraumatic, normocephalic\par o Face :\par ¦ Inspection : no visible rash or discoloration\par Respiratory\par o Respiratory Effort: breathing unlabored \par Neurologic\par o Mental Status Examination :\par ¦ Orientation : alert and oriented X 3\par Psychiatric\par o Mood and Affect: mood normal, affect appropriate\par Cardiovascular\par o Observation/Palpation : - no swelling\par Lymphatic\par o Additional Nodes : No palpable lymph nodes present\par \par Right Lower Extremity\par o Buttock : no tenderness, swelling or deformities \par o Right Hip :\par ¦ Inspection/Palpation : no tenderness, swelling or deformities\par ¦ Range of Motion : full and painless in all planes, no crepitance\par ¦ Stability : joint stability intact\par ¦ Strength : extension, flexion, adduction, abduction, internal rotation and external rotation, 5/5\par \par o Right Knee :\par ¦ Inspection/Palpation : no tenderness to palpation, no swelling\par ¦ Range of Motion : 5-110°, pain with flexion past that, patellofemoral crepitance\par ¦ Stability : no valgus or varus instability present on provocative testing\par ¦ Strength : flexion and extension 5/5\par ¦ Tests and Signs : negative Anterior Drawer, negative Lachman, negative Lien\par \par Left Lower Extremity\par o Buttock : no tenderness, swelling or deformities \par o Left Hip :\par ¦ Inspection/Palpation : no tenderness, no swelling or deformities\par ¦ Range of Motion : full and painless in all planes, no crepitance\par ¦ Stability : joint stability intact\par ¦ Strength : extension, flexion, adduction, abduction, internal rotation and external rotation, 5/5\par \par o Left Knee :\par ¦ Inspection/Palpation : medial and lateral compartment tenderness, no swelling, no Baker's cyst\par ¦ Range of Motion : 0-120° with pain on full flexion, mild patellofemoral crepitance, decreased patellofemoral glide \par ¦ Stability : no valgus or varus instability present on provocative testing\par ¦ Strength : flexion and extension 5/5\par ¦ Tests and Signs : negative Anterior Drawer, negative Lachman, negative Lien\par \par Gait and Station:\par Gait: ambulating with a cane in the right hand, no significant extremity swelling or lymphedema, no foot drop\par \par o Right Knee injection : Indication- knee osteoarthritis, Anatomic location- right intra-articular joint space, Spray - area was sterilized with Betadine and alcohol and anesthetized with Ethyl Chloride, needle used-20G, Medications given- 4cc's Monovisc under Ultrasound guidance. The patient tolerated the procedure well.  oLot# [id number]   oExp: 2023-03-31

## 2022-09-28 NOTE — HISTORY OF PRESENT ILLNESS
[de-identified] : 62 year old female presents with  bilateral knee pain, right always worse than left. She sustained a left lateral tibial plateau fracture on 5/14/22. She is attending PT and wants to keep going. She reports her left knee is feeling much better since her Monovisc injection on 8/17/22. Her right knee is still bothersome. Pain is rated a 6/10 on average. She presents for a right knee Monovisc injection today (09/28/2022). She understands that she will need a knee replacement at some point and needs to lose weight before that. Patient presents ambulating with a cane. \par PMHx of psoriatic arthritis. She takes prednisone 10mg/day. She failed Humira and is starting Stelara. \par \par Radiology Results 8/17/2022:\par o Left Knee : Standing AP, lateral, and obliques views were obtained and revealed full healing of the lateral tibial plateau fracture. Bone on bone in the lateral compartment with severe patellofemoral arthritis. \par \par Left Knee MRI obtained on 6/2/2022:\par 1. Nondepressed Schatzker type III fracture of the lateral tibial plateau.\par 2. Moderate to severe osteoarthritis.\par 3. Severe degenerative tearing of the medial meniscus posterior horn.\par 4. Degeneration and fraying of the lateral meniscus.\par 5. Anterior cruciate ligament rupture that is suspected to be chronic.\par \par Radiology Results 6/1/2022:\par o Right Knee : Standing AP, lateral, tunnel, and skyline views of the knee were obtained and reveal severe tricompartmental osteoarthritis. \par

## 2022-09-28 NOTE — ADDENDUM
[FreeTextEntry1] : I, Juan Francisco Munoz, acted solely as a scribe for Dr. Fareed Coates on this date 09/28/2022.\par \par All medical record entries made by the Scribe were at my, Dr. Fareed Coates, direction and personally dictated by me on 09/28/2022. I have reviewed the chart and agree that the record accurately reflects my personal performance of the history, physical exam, assessment and plan. I have also personally directed, reviewed, and agreed with the chart.

## 2022-09-28 NOTE — DISCUSSION/SUMMARY
[de-identified] : I went over the pathophysiology of the patient's symptoms in great detail with them. The patient wishes to continue with physical therapy. A script was given. The patient elected to receive a Monovisc injection into her right knee today and tolerated it well. I instructed the patient on ROM exercises and told them to take it easy. The use of ice and rest was reviewed with the patient. The patient may resume activities tomorrow. I reminded the patient that it takes 4 to 6 weeks after the injection to feel symptom relief. All of their questions were answered. They understand and consent to the plan.\par \par FU in 6 weeks.\par after continuing PT for her knees.\par after a right knee Monovisc injection today (09/28/2022). \par

## 2022-09-28 NOTE — REASON FOR VISIT
[Follow-Up Visit] : a follow-up visit for [Knee Pain] : knee pain [FreeTextEntry2] : left lateral tibial plateau fx

## 2022-09-29 NOTE — HISTORY OF PRESENT ILLNESS
[Disease: _____________________] : Disease: [unfilled] [T: ___] : T[unfilled] [N: ___] : N[unfilled] [AJCC Stage: ____] : AJCC Stage: [unfilled] [Treatment Protocol] : Treatment Protocol [de-identified] : The pt was seen for an initial visit on 9/29/21 in order to transfer her care to me from that of Dr Cox who left the [facility]. Her history below is as per review of the AEHR notes and confirmation with the pt. \par \par History of ER+TN-HER2+ locally advanced, inflammatory poorly differentiated ductal breast cancer.\par \par She noted summer 2018 she felt a mass near her nipple in her R breast. She went to her PMD who ordered a mammogram and sonogram, which revealed a R abnormal hypoechoic mass and abnormal appearing axillary LN.\par \par Image guided biopsy of mass and axilla revealed invasive poorly differentiated ductal carcinoma, +LVI, 1.2cm in specimen, [place] 8/9, DCIS, microcalcifications present, ER >95%, TN negative, HER2+ 3+ IHC, Lymph node biopsy with metastatic carcinoma.\par \par She then saw Dr. Camacho breast surgeon who ordered and MRI of the breasts. MRI showed: Biopsy proven mammary carcinoma in the R retroareolar region 5:00 manifested as multiple irregular masses. The tumor is extensive in nature and spanning an area of over 3 cm with non-mass enhancement extending posteriorly suggesting extensive intraductal component as well. Two other highly suspicious masses noted in the RUOQ at 11:00 measuring >3 cm and at 10:00 measuring 2.6 cm consistent with multicentric disease. Biopsy proven metastatic disease to an axillary LN with additional suspicious LNs (additional with replaced fatty hilum and abnormally enlarged cortices, some have irregular borders, some posterior to pectoralis muscle, also abnormal appearing node in soft tissue lateral to the R chest wall).\par \par Dr. Camacho recommended neoadjuvant chemotherapy and the option for mastectomy with possible axillary lymph node dissection pending response to therapy.\par \par She then saw Dr Cox in consultation. She underwent PETCT that found a peripancreatic lymph node that was enlarged and SUV avid, b/l laryngeal FDG avidity (likely due to speaking during exam), and SUV avid breast mass/axilla on R with breast skin noted to be thickened. She underwent EGD/EUS and biopsy of this peripancreatic LN (Dr. Giovanni Felton) which was consistent with benign/reactive lymphatic tissue. She had a mediport placed by IR on 9/26. She saw oncocardiology Dr. Khan for risk assessment prior to anthracycline/anti-HER2 monoclonal therapy and was started on ACEi for HTN and for cardioprotection. She saw dermatology for skin biopsy of R breast prior to treatment to evaluate/confirm inflammatory disease; biopsy results with skin involvement by poorly differentiated carcinoma. She started neoadjuvant chemotherapy with dose-dense AC on 9/26. dose-dense AC-->THP (weekly taxol 80mg/m2)\par \par 11/23 THP #1\par 11/30 Taxol 80mg/m2 held due to neuropathy\par 12/7 resumed Taxol weekly without issue\par 12/15 taxol completed x 12 weeks\par 3/8 Herceptin and Perjeta administered pre-operatively\par \par MRI 2/4/19 BIRADS 2, masses and adenopathy resolved. Some residual R breast skin thickening noted.\par \par 3/22/19 R mastectomy and ALND: 0/6 LN removed involved, no residual invasive carcinoma or DCIS identified. [id number] ([facility])\par \par 4/3 Herceptin and Perjeta continued post-operatively every 3 weeks, adjuvant radiation started 5/2019 and completed 6/2019 c/b grade 3 dermatitis now resolved.\par \par LH/FSH/estradiol levels May/June 2019 consistent with menopause.\par Arimidex started July 2019.\par \par She underwent ANTONIO reconstruction 10/2019 without event. Pathology negative.\par \par Herceptin/Perjeta C17 completed 11/7/19. She started extended adjuvant therapy with neratinib 12/2019 -2/2020 - self discontinued due to toxicity of diarrhea. She continues on arimidex.\par \par Patient lives in [place] with her  Gonzales (pretzel ). She has a daughter (an artist and ). She works full time as a . She notes a history of smoking in the distant past. Mother passed away in 70s from metastatic melanoma, and a maternal aunt had breast cancer; no other significant close family history of cancers. She notes overall migraines (which she has had for many years, seen neurologist, stable lytic medications help, associated with one sided facial numbness when they occur) have gotten better with menopause – her LMP was 2/2018 and it was infrequent prior to that. She has had some hot flashes. She used OCPs in the past for years particularly for endometriosis treatment. She has had issues with DJD of back and spine with pain, MRI demonstrating spinal stenosis, with epidural injections that have helped with this significantly as well as occasional NSAID use.\par \par She completed bilateral reconstruction with Dr. Antonio on 4/6/21 [de-identified] : ER+ND-HER2+ [FreeTextEntry1] : dose-dense AC (adriamycin 60mg/m2 and cytoxan 600mg/m2) x 4 cycles, followed by weekly taxol 80mg/m2 x 12 with herceptin/perjeta q3 weeks, arimidex [de-identified] : Patient presents for follow up.  \par \par She remains on anastrozole.\par \par She has baseline arthralgias from her psoriatic arthritis w/o change from pre AI baseline. \par \par She is tolerating it well with occasional hot flashes, which patient states are easily tolerated. \par \par She is on a steroid taper for her psoriatic arthritis. Had been on Humira but it is not working; planning to start Stellara soon. \par \par She stepped down into a boat recently and developed severe L leg pain - seen and found to have a tibial plateau fracture - no splinting - gradually healing on its own per pt.  \par \par She denies any other new complaints.  \par \par DEXA 8/20 WNL

## 2022-09-29 NOTE — REVIEW OF SYSTEMS
[Negative] : Psychiatric [FreeTextEntry2] : as above [FreeTextEntry9] : as above [de-identified] : as above

## 2022-09-29 NOTE — PHYSICAL EXAM
[Fully active, able to carry on all pre-disease performance without restriction] : Status 0 - Fully active, able to carry on all pre-disease performance without restriction [Obese] : obese [Normal] : affect appropriate [de-identified] : bilateral reconstructed breast with well healed scars and no discrete palpable masses, nipple reconstruction b/l well healed, no palpable axillary nodes.

## 2022-10-04 ENCOUNTER — RX RENEWAL (OUTPATIENT)
Age: 62
End: 2022-10-04

## 2022-10-06 ENCOUNTER — OUTPATIENT (OUTPATIENT)
Dept: OUTPATIENT SERVICES | Facility: HOSPITAL | Age: 62
LOS: 1 days | Discharge: HOME | End: 2022-10-06

## 2022-10-06 DIAGNOSIS — Z98.890 OTHER SPECIFIED POSTPROCEDURAL STATES: Chronic | ICD-10-CM

## 2022-10-06 DIAGNOSIS — Z90.13 ACQUIRED ABSENCE OF BILATERAL BREASTS AND NIPPLES: Chronic | ICD-10-CM

## 2022-10-06 DIAGNOSIS — C50.911 MALIGNANT NEOPLASM OF UNSPECIFIED SITE OF RIGHT FEMALE BREAST: ICD-10-CM

## 2022-10-06 DIAGNOSIS — Z90.11 ACQUIRED ABSENCE OF RIGHT BREAST AND NIPPLE: Chronic | ICD-10-CM

## 2022-10-12 ENCOUNTER — APPOINTMENT (OUTPATIENT)
Dept: INFUSION THERAPY | Facility: HOSPITAL | Age: 62
End: 2022-10-12

## 2022-10-13 ENCOUNTER — RX RENEWAL (OUTPATIENT)
Age: 62
End: 2022-10-13

## 2022-10-23 ENCOUNTER — RX RENEWAL (OUTPATIENT)
Age: 62
End: 2022-10-23

## 2022-10-28 ENCOUNTER — NON-APPOINTMENT (OUTPATIENT)
Age: 62
End: 2022-10-28

## 2022-11-07 ENCOUNTER — RX RENEWAL (OUTPATIENT)
Age: 62
End: 2022-11-07

## 2022-11-09 ENCOUNTER — APPOINTMENT (OUTPATIENT)
Dept: ORTHOPEDIC SURGERY | Facility: CLINIC | Age: 62
End: 2022-11-09

## 2022-11-09 PROCEDURE — 99214 OFFICE O/P EST MOD 30 MIN: CPT | Mod: 25

## 2022-11-09 PROCEDURE — 20610 DRAIN/INJ JOINT/BURSA W/O US: CPT | Mod: RT

## 2022-11-14 NOTE — PHYSICAL EXAM
[General Appearance - Alert] : alert [General Appearance - In No Acute Distress] : in no acute distress [Sclera] : the sclera and conjunctiva were normal [Normal Oral Mucosa] : normal oral mucosa [Neck Appearance] : the appearance of the neck was normal [Auscultation Breath Sounds / Voice Sounds] : lungs were clear to auscultation bilaterally [Heart Rate And Rhythm] : heart rate was normal and rhythm regular [Heart Sounds] : normal S1 and S2 [Abnormal Walk] : normal gait [Skin Color & Pigmentation] : normal skin color and pigmentation [No Focal Deficits] : no focal deficits [Oriented To Time, Place, And Person] : oriented to person, place, and time [Affect] : the affect was normal

## 2022-11-16 ENCOUNTER — APPOINTMENT (OUTPATIENT)
Dept: GERIATRICS | Facility: CLINIC | Age: 62
End: 2022-11-16

## 2022-11-16 VITALS
RESPIRATION RATE: 16 BRPM | WEIGHT: 280.85 LBS | HEART RATE: 87 BPM | OXYGEN SATURATION: 98 % | TEMPERATURE: 96.4 F | BODY MASS INDEX: 43.98 KG/M2 | SYSTOLIC BLOOD PRESSURE: 119 MMHG | DIASTOLIC BLOOD PRESSURE: 79 MMHG

## 2022-11-16 PROCEDURE — 99212 OFFICE O/P EST SF 10 MIN: CPT

## 2022-11-17 ENCOUNTER — RX RENEWAL (OUTPATIENT)
Age: 62
End: 2022-11-17

## 2022-11-18 ENCOUNTER — APPOINTMENT (OUTPATIENT)
Dept: RHEUMATOLOGY | Facility: CLINIC | Age: 62
End: 2022-11-18
Payer: COMMERCIAL

## 2022-11-18 VITALS
TEMPERATURE: 96.7 F | WEIGHT: 281.44 LBS | HEART RATE: 97 BPM | OXYGEN SATURATION: 97 % | BODY MASS INDEX: 44.17 KG/M2 | SYSTOLIC BLOOD PRESSURE: 116 MMHG | RESPIRATION RATE: 16 BRPM | HEIGHT: 67.01 IN | DIASTOLIC BLOOD PRESSURE: 82 MMHG

## 2022-11-18 DIAGNOSIS — G89.29 CERVICALGIA: ICD-10-CM

## 2022-11-18 DIAGNOSIS — G89.29 LOW BACK PAIN, UNSPECIFIED: ICD-10-CM

## 2022-11-18 DIAGNOSIS — M54.2 CERVICALGIA: ICD-10-CM

## 2022-11-18 DIAGNOSIS — M54.50 LOW BACK PAIN, UNSPECIFIED: ICD-10-CM

## 2022-11-18 PROCEDURE — 99213 OFFICE O/P EST LOW 20 MIN: CPT

## 2022-11-25 NOTE — HISTORY OF PRESENT ILLNESS
[FreeTextEntry1] : 61yoF with breast cancer presents for follow up visit.\par PMH also significant for spinal stenosis and chronic back pain. \par \par On Anastrazole. \par \par Patient initially diagnosed in 2018, underwent kendra-adjuvant treatment followed by mastectomy and RT. \par \par She recently underwent revision surgery (last week).  She has pain at the surgical site. \par She uses meloxicam once daily for her back pain. On occasion she is using tylenol during the day. \par Last month she had a cortisone shot in her great toe for pain due to an osteophyte. \par She had been on gabapentin in the past for her back pain, which she did not like due to it making her feel very groggy. \par \par Interval Hx (11/16/22):\par Patient presents today for medical cannabis recertification. She has found it to be helpful with pain, neuropathy and sleep. Tend to use the vape only once at night. She uses a 20:1 THC:CBD formulation. \par She continues to use Percocet 5/325 just once at night. \par Was diagnosed with psoriatic arthritis last year, is on prednisone 10mg QD, methotrexate weekly, stelara. \par \par ROS:\par +migraine headaches - uses prn fioricet and prn triptan\par Denies nausea, vomiting, \par \par Patient is , lives with her . She has an adult daughter who lives nearby. \par She works as a , currently is working part time and plans to go back full time soon. \par \par PMD: Dr. Rachel Ramirez\par \par I-Stop Ref#: [id number]

## 2022-11-30 ENCOUNTER — RX RENEWAL (OUTPATIENT)
Age: 62
End: 2022-11-30

## 2022-12-02 NOTE — PATIENT PROFILE ADULT - NSPROHMSYMPCOND_GEN_A_NUR
[facility] HISTORY AND PHYSICAL  Date: 2022   Patient Name: Nikhil Baldwin  : 1943  MRN: [id number]  Primary Care Physician:  Felix Navarro Jr., MD  Date of admission: 2022    Subjective   Subjective     Chief Complaint: Shortness of breath, cough    HPI:    Nikhil Baldwin is a 79 y.o. male with a past medical history of dementia, diabetes, hyperlipidemia, hypertension presented to the ED with complaints of shortness of breath, cough with occasional sputum, fevers, chills, myalgias nausea and one episode of vomiting since last 2 days.  Denies chest pain, weakness, numbness, tingling sensation, headache, diarrhea.    In the ED, temperature 99.2, pulse 109, respiratory to 18, blood pressure 152/103, saturating at 91% on 2 L of nasal cannula.  Normal troponin, unremarkable CBC and CMP except slightly elevated glucose, negative influenza, UA negative for nitrates, positive for glucose, ketones.  Blood cultures are pending.  CT chest with contrast showed negative for PE, no acute cardiopulmonary process.    As per the ED note, patient lives with a friend who had similar symptoms few days ago and has resolved.    Patient has been admitted under observation for hypoxia and acute viral syndrome.      Personal History     Past Medical History:   Diagnosis Date   • Aorta disorder (HCC)    • Arthritis    • Degenerative joint disease    • Dementia (HCC)     FRONTAL LOBE   • Depression    • Diabetes mellitus (HCC)    • Hyperlipidemia    • Hypertension    • Kidney stone    • Stroke (HCC)     MILD RIGHT SIDE DEFICITS         Past Surgical History:   Procedure Laterality Date   • CHOLECYSTECTOMY     • CYSTOSCOPY BLADDER STONE LITHOTRIPSY     • KIDNEY STONE SURGERY           History reviewed. No pertinent family history.      Social History     Socioeconomic History   • Marital status:    Tobacco Use   • Smoking status: Never   • Smokeless tobacco: Never   Vaping Use   • Vaping  Use: Never used   Substance and Sexual Activity   • Alcohol use: Never   • Drug use: Never   • Sexual activity: Defer         Home Medications:  Cholecalciferol, Lidocaine, SITagliptin, amLODIPine, atorvastatin, budesonide, carvedilol, citalopram, docusate sodium, empagliflozin, glucose blood, lisinopril, meclizine, melatonin, metFORMIN, naproxen, omeprazole, ondansetron ODT, and tamsulosin    Allergies:  Allergies   Allergen Reactions   • Celecoxib Rash   • Ibuprofen Rash   • Promethazine Hcl Rash   • Warfarin Rash       Review of Systems   All other systems reviewed and negative except those mentioned in the HPI    Objective   Objective     Vitals:   Temp:  [98.9 °F (37.2 °C)-99.2 °F (37.3 °C)] 98.9 °F (37.2 °C)  Heart Rate:  [] 80  Resp:  [18-23] 23  BP: (115-154)/() 115/75  Flow (L/min):  [2] 2    Physical Exam    Constitutional: Awake, alert, oriented to place and person   Eyes: Pupils equal, sclerae anicteric, no conjunctival injection   HENT: NCAT, mucous membranes moist   Neck: Supple, no thyromegaly, no lymphadenopathy, trachea midline   Respiratory: Bilateral air entry present, mild wheezing, nonlabored respirations    Cardiovascular: RRR, no murmurs, rubs, or gallops, palpable pedal pulses bilaterally   Gastrointestinal: Positive bowel sounds, soft, nontender, nondistended   Musculoskeletal: No bilateral ankle edema, no clubbing or cyanosis to extremities   Psychiatric: Appropriate affect, cooperative   Neurologic: Oriented x 2, able to move all extremities, cranial Nerves grossly intact to confrontation, speech clear      Result Review    Result Review:  I have personally reviewed the results from the time of this admission to 12/2/2022 03:46 EST and agree with these findings:  [x]  Laboratory  [x]  Microbiology  [x]  Radiology  [x]  EKG/Telemetry   []  Cardiology/Vascular   []  Pathology  []  Old records  []  Other:    Imaging Results (Last 24 Hours)     Procedure Component Value Units  Date/Time    CT Chest With Contrast Diagnostic [[id number]] Collected: 12/02/22 0214     Updated: 12/02/22 0217    Narrative:      PROCEDURE: CT CHEST W CONTRAST DIAGNOSTIC     COMPARISON:  [facility], CT, CT CHEST WO CONTRAST DIAGNOSTIC, 10/06/2021, 22:27.    [facility], CR, XR CHEST 1 VW, 12/01/2022, 20:41.  [facility], CT, CT   ABDOMEN PELVIS W CONTRAST, 5/09/2022, 23:30.  INDICATIONS: DYSPNEA, COUGH X 2 DAYS     TECHNIQUE: After obtaining the patient's consent, CT images were obtained with non-ionic   intravenous contrast material.       PROTOCOL:   Pulmonary embolism imaging protocol performed      RADIATION:   DLP: 367.9mGy*cm    Automated exposure control was utilized to minimize radiation dose.   CONTRAST: 70cc Isovue 370 I.V.     FINDINGS:   The central tracheobronchial tree is clear.  The lungs are clear.  There is no pleural effusion.     The heart size appears normal.  The great vessels are normal in caliber.  There is no evidence of   pulmonary embolus.  No abnormally enlarged lymph nodes are identified.     Partial evaluation of the upper abdomen demonstrates changes of cholecystectomy.     No aggressive osseous lesions are identified.       Impression:         1. Negative for pulmonary embolus.  2. No acute cardiopulmonary process.            JENNIFFER MAYERS MD         Electronically Signed and Approved By: JENNIFFER MAYERS MD on 12/02/2022 at 2:13                     XR Chest 1 View [[id number]] Collected: 12/01/22 2156     Updated: 12/01/22 2159    Narrative:      PROCEDURE: XR CHEST 1 VW     COMPARISON: [facility], CR, XR CHEST PA AND LATERAL, 2/01/2022, 14:03.     INDICATIONS: WEAKNESS, CHILLS, BODY ACHES     FINDINGS:   LUNGS: Normal.  No significant pulmonary parenchymal abnormalities.    VASCULATURE: Normal.  Unremarkable pulmonary vasculature.    CARDIAC: Normal.  No cardiac silhouette abnormality or cardiomegaly.     MEDIASTINUM: Normal.  No visible mass or adenopathy.    PLEURA: Normal.  No effusion or pleural thickening.    BONES: Normal.  No fracture or visible bony lesion.    OTHER: Negative.         Impression:       No acute cardiopulmonary process identified.                  JENNIFFER MAYERS MD         Electronically Signed and Approved By: JENNIFFER MAYERS MD on 12/01/2022 at 21:56                            Assessment & Plan   Assessment / Plan     Assessment/Plan:   Acute viral syndrome  Shortness of breath  Mild hypoxia requiring supplemental oxygen  Hypertension  Type 2 diabetes mellitus  Hyperlipidemia  Dementia    Plan  Place in observation  Wean oxygen as tolerated with his saturation goal of greater than 92%  Pulmicort, Dhara  Consistent carb diet, basal and correctional insulin  Continue home amlodipine, lisinopril, carvedilol, atorvastatin, melatonin, pantoprazole      DVT prophylaxis:  No DVT prophylaxis order currently exists.    CODE STATUS:    Level Of Support Discussed With: Patient  Code Status (Patient has no pulse and is not breathing): CPR (Attempt to Resuscitate)  Medical Interventions (Patient has pulse or is breathing): Full Support      Admission Status:  I believe this patient meets observation status.    Nesha Mckeon MD              none

## 2022-12-07 ENCOUNTER — APPOINTMENT (OUTPATIENT)
Dept: CARDIOLOGY | Facility: CLINIC | Age: 62
End: 2022-12-07
Payer: COMMERCIAL

## 2022-12-07 ENCOUNTER — NON-APPOINTMENT (OUTPATIENT)
Age: 62
End: 2022-12-07

## 2022-12-07 VITALS
HEART RATE: 95 BPM | WEIGHT: 280 LBS | SYSTOLIC BLOOD PRESSURE: 119 MMHG | BODY MASS INDEX: 43.95 KG/M2 | HEIGHT: 67 IN | OXYGEN SATURATION: 97 % | DIASTOLIC BLOOD PRESSURE: 81 MMHG

## 2022-12-07 DIAGNOSIS — Z92.21 PERSONAL HISTORY OF ANTINEOPLASTIC CHEMOTHERAPY: ICD-10-CM

## 2022-12-07 PROCEDURE — 93000 ELECTROCARDIOGRAM COMPLETE: CPT

## 2022-12-07 PROCEDURE — 99214 OFFICE O/P EST MOD 30 MIN: CPT | Mod: 25

## 2022-12-08 ENCOUNTER — NON-APPOINTMENT (OUTPATIENT)
Age: 62
End: 2022-12-08

## 2022-12-08 ENCOUNTER — OUTPATIENT (OUTPATIENT)
Dept: OUTPATIENT SERVICES | Facility: HOSPITAL | Age: 62
LOS: 1 days | Discharge: HOME | End: 2022-12-08

## 2022-12-08 DIAGNOSIS — Z90.13 ACQUIRED ABSENCE OF BILATERAL BREASTS AND NIPPLES: Chronic | ICD-10-CM

## 2022-12-08 DIAGNOSIS — C50.911 MALIGNANT NEOPLASM OF UNSPECIFIED SITE OF RIGHT FEMALE BREAST: ICD-10-CM

## 2022-12-08 DIAGNOSIS — Z90.11 ACQUIRED ABSENCE OF RIGHT BREAST AND NIPPLE: Chronic | ICD-10-CM

## 2022-12-08 DIAGNOSIS — Z98.890 OTHER SPECIFIED POSTPROCEDURAL STATES: Chronic | ICD-10-CM

## 2022-12-09 ENCOUNTER — NON-APPOINTMENT (OUTPATIENT)
Age: 62
End: 2022-12-09

## 2022-12-09 DIAGNOSIS — J01.90 ACUTE SINUSITIS, UNSPECIFIED: Chronic | ICD-10-CM

## 2022-12-13 NOTE — PHYSICAL EXAM
[General Appearance - Well Developed] : well developed [Normal Appearance] : normal appearance [Well Groomed] : well groomed [No Deformities] : no deformities [General Appearance - Well Nourished] : well nourished [General Appearance - In No Acute Distress] : no acute distress [Normal Conjunctiva] : the conjunctiva exhibited no abnormalities [Eyelids - No Xanthelasma] : the eyelids demonstrated no xanthelasmas [Normal Oral Mucosa] : normal oral mucosa [No Oral Pallor] : no oral pallor [No Oral Cyanosis] : no oral cyanosis [Normal Jugular Venous A Waves Present] : normal jugular venous A waves present [Normal Jugular Venous V Waves Present] : normal jugular venous V waves present [No Jugular Venous Jones A Waves] : no jugular venous jones A waves [Respiration, Rhythm And Depth] : normal respiratory rhythm and effort [Exaggerated Use Of Accessory Muscles For Inspiration] : no accessory muscle use [Auscultation Breath Sounds / Voice Sounds] : lungs were clear to auscultation bilaterally [Heart Rate And Rhythm] : heart rate and rhythm were normal [Heart Sounds] : normal S1 and S2 [Abdomen Soft] : soft [Abdomen Tenderness] : non-tender [] : no hepato-splenomegaly [Nail Clubbing] : no clubbing of the fingernails [Cyanosis, Localized] : no localized cyanosis [Oriented To Time, Place, And Person] : oriented to person, place, and time

## 2022-12-14 ENCOUNTER — APPOINTMENT (OUTPATIENT)
Dept: INFUSION THERAPY | Facility: HOSPITAL | Age: 62
End: 2022-12-14

## 2022-12-14 ENCOUNTER — APPOINTMENT (OUTPATIENT)
Dept: ORTHOPEDIC SURGERY | Facility: CLINIC | Age: 62
End: 2022-12-14

## 2022-12-14 DIAGNOSIS — M47.812 SPONDYLOSIS W/OUT MYELOPATHY OR RADICULOPATHY, CERVICAL REGION: ICD-10-CM

## 2022-12-14 DIAGNOSIS — M43.16 SPONDYLOLISTHESIS, LUMBAR REGION: ICD-10-CM

## 2022-12-14 DIAGNOSIS — M48.061 SPINAL STENOSIS, LUMBAR REGION WITHOUT NEUROGENIC CLAUDICATION: ICD-10-CM

## 2022-12-14 PROCEDURE — 72170 X-RAY EXAM OF PELVIS: CPT

## 2022-12-14 PROCEDURE — 72052 X-RAY EXAM NECK SPINE 6/>VWS: CPT

## 2022-12-14 PROCEDURE — 72100 X-RAY EXAM L-S SPINE 2/3 VWS: CPT

## 2022-12-14 PROCEDURE — 99214 OFFICE O/P EST MOD 30 MIN: CPT

## 2022-12-20 ENCOUNTER — RX RENEWAL (OUTPATIENT)
Age: 62
End: 2022-12-20

## 2022-12-21 LAB
ALBUMIN SERPL ELPH-MCNC: 4.3 G/DL
ALP BLD-CCNC: 76 U/L
ALT SERPL-CCNC: 19 U/L
ANION GAP SERPL CALC-SCNC: 15 MMOL/L
AST SERPL-CCNC: 16 U/L
BASOPHILS # BLD AUTO: 0.03 K/UL
BASOPHILS NFR BLD AUTO: 0.5 %
BILIRUB SERPL-MCNC: 0.4 MG/DL
BUN SERPL-MCNC: 13 MG/DL
CALCIUM SERPL-MCNC: 9.2 MG/DL
CHLORIDE SERPL-SCNC: 102 MMOL/L
CO2 SERPL-SCNC: 25 MMOL/L
CREAT SERPL-MCNC: 0.76 MG/DL
CRP SERPL-MCNC: <3 MG/L
EGFR: 89 ML/MIN/1.73M2
EOSINOPHIL # BLD AUTO: 0.06 K/UL
EOSINOPHIL NFR BLD AUTO: 0.9 %
ERYTHROCYTE [SEDIMENTATION RATE] IN BLOOD BY WESTERGREN METHOD: 20 MM/HR
GLUCOSE SERPL-MCNC: 69 MG/DL
HCT VFR BLD CALC: 39.4 %
HGB BLD-MCNC: 12.4 G/DL
IMM GRANULOCYTES NFR BLD AUTO: 0.2 %
LYMPHOCYTES # BLD AUTO: 2.44 K/UL
LYMPHOCYTES NFR BLD AUTO: 36.9 %
MAN DIFF?: NORMAL
MCHC RBC-ENTMCNC: 30.6 PG
MCHC RBC-ENTMCNC: 31.5 GM/DL
MCV RBC AUTO: 97.3 FL
MONOCYTES # BLD AUTO: 0.56 K/UL
MONOCYTES NFR BLD AUTO: 8.5 %
NEUTROPHILS # BLD AUTO: 3.51 K/UL
NEUTROPHILS NFR BLD AUTO: 53 %
PLATELET # BLD AUTO: 326 K/UL
POTASSIUM SERPL-SCNC: 4.6 MMOL/L
PROT SERPL-MCNC: 6.4 G/DL
RBC # BLD: 4.05 M/UL
RBC # FLD: 14.2 %
SODIUM SERPL-SCNC: 142 MMOL/L
TSH SERPL-ACNC: 0.65 UIU/ML
WBC # FLD AUTO: 6.61 K/UL

## 2022-12-21 NOTE — ADDENDUM
[FreeTextEntry1] : I, Juan Francisco Munoz, acted solely as a scribe for Dr. Fareed Coates on this date 07/13/2022.\par All medical record entries made by the Scribe were at my, Dr. Fareed Coates, direction and personally dictated by me on 07/13/2022. I have reviewed the chart and agree that the record accurately reflects my personal performance of the history, physical exam, assessment and plan. I have also personally directed, reviewed, and agreed with the chart.  NAUSEA/WEAKNESS

## 2022-12-26 ENCOUNTER — NON-APPOINTMENT (OUTPATIENT)
Age: 62
End: 2022-12-26

## 2022-12-28 ENCOUNTER — NON-APPOINTMENT (OUTPATIENT)
Age: 62
End: 2022-12-28

## 2022-12-31 PROBLEM — Z92.21 HISTORY OF ANTINEOPLASTIC CHEMOTHERAPY: Status: ACTIVE | Noted: 2019-04-23

## 2022-12-31 NOTE — ASSESSMENT
[FreeTextEntry1] : 63 yo with  Migraines, back pain and DLD s/p epidural injections, menopausal, anxiety and depression on medication, Obesity BMI 45. CHOL 218 , .  Baseline EF of 63%  with a GLS of -21.\par \par History of  Left Poorly differentiated ductal Breast cancer ER+, MT- HER+  multifocal with multiple masses and LNs involved.  neoadjuvant chemotherapy  THP.  Status post radiation therapy to the right chest wall and regional lymph nodes on June 2019 she is also had reconstruction of the breast.\par \par - At risk for CTRCD (Cancer Therapy Related Cardiac Dysfunction)\par - Chemotherapy exposure to:  THP\par - History of right  Chest radiation \par - Today's EKG unchanged compared to previous/ normal\par - Labs and echo  ordered.\par Exercise recommended 150 mins a week\par \par \par \par

## 2022-12-31 NOTE — HISTORY OF PRESENT ILLNESS
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 CHOL 218 ,  in 2016 She has no prior History of HTN. \par Labs:P \par \par Cancer Dx: Left Poorly differentiated ductal Breast cancer ER+, SC- HER+  multifocal with multiple masses and LNs involved, Diagnosed in august 2018. \par \par Under Corey and Dr. Cox she is planned for neoadjuvant chemotherapy  ACTHP.  She presents today for evaluation of HTN and prechemotherapy cardiac risk stratification.\par \par Denies: SOB, LE edema, palpitation, chest pain\par \par Echo 9/11/2018. \par EF 63%  Normal left ventricular internal dimensions and wall thicknesses. Normal left ventricular systolic function. No segmental wall motion abnormalities. Normal right ventricular size and function. Global longitudinal strain equals - 21.2%, which is normal.\par \par \par Oncologist: Dr. Noa Cox\par PCP: DR. CHRISTINA NELSON\par \par Interval follow up 4/23/19\par \par Echo at plain view with mass on aortic valve- reviewed by me. Not a mass- just calcification of the non cusp. EF reported as 55%\par \par \par ROS: Denies Chest pain, dyspnea, palpitations, dizziness or syncope.\par here with  \par \par Follow-up December 2, 2020\par Doing well offers no complaints.\par \par \par Interval follow up 12/7/22\par \par Here for routine follow up no complaints today.\par ROS: Denies Chest pain, dyspnea, palpitations, dizziness or syncope.\par \par

## 2023-01-16 ENCOUNTER — NON-APPOINTMENT (OUTPATIENT)
Age: 63
End: 2023-01-16

## 2023-01-17 NOTE — REVIEW OF SYSTEMS
[Dry Eyes] : dryness of the eyes [Arthralgias] : arthralgias [Joint Pain] : joint pain [Joint Stiffness] : joint stiffness [As Noted in HPI] : as noted in HPI [Negative] : Heme/Lymph [Joint Swelling] : no joint swelling

## 2023-01-17 NOTE — HISTORY OF PRESENT ILLNESS
[FreeTextEntry1] : FIDEL THOMAS is a 62 year old woman with PsA. 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 Some insomnia and sweats with prednisone, otherwise no SE, some GI upset but mild with MTX, otherwise tolerating it well. No infectious sx, rash improved, no extra-articular manifestations. \par \par 2/11/22 -- Ongoing improvement in joints but still with activity, L knee posterior fullness and pain is most active. She is amenable to trying to taper steroids regardless as is worried about long term use. No extra-articular manifestations, no infectious sx. \par \par 3/25/22 -- Worsening of symmetrical joint pain and warmth, some b/l SI joint and L hip pain with prolonged AM stiffness and improved with exercise and not typical of her DJD related pain. Rash has not emerged but increased sensitivity over sites of prior rash. Ongoing dry eye but no inflammation. No SE with MTX, no infectious sx, above is worse since tapering to prednisone 10mg/day.  \par \par 6/24/22 -- Recent L knee nondisplaced tibial plateau fracture/ ACL sprain, slowly improving. Improvement in psoriasis, ongoing small and large joint arthralgias tho less overt swelling/warmth. Some positional RUE tingling, mild C spine pain. No CP/SOB, GI/ sx, no infectious sx. \par \par 8/26/22 -- Rash is flaring again, returned to b/l feet, now on elbows, on scalp. Ongoing small symmetrical small joint arthralgias with all day stiffness. L knee fx is healed, now getting gel injections in L knee, considering R as well if L goes well. + C spine pain with RUE radiation, positional at present. \par \par 11/18/22 -- Recent worsening of arthralgias with weather change, spine is the worst. Areas of rash on R foot that was responsive to Humira has recurred on Stelara, scalp also more itchy. S/p gel injections to b/l knees with improvement. Ongoing, somewhat worse fatigue but still able to do ADLs. Remainder of extra-articular inflammatory ROS negative, no infectious sx.

## 2023-01-17 NOTE — ASSESSMENT
[FreeTextEntry1] : FIDEL THOMAS is a 62 year old woman with PsA -- months of symmetrical small joint stiffness and pain, b/l Achilles tenderness, L knee warmth on exam, psoriatic plaque on R foot and some fissuring in palms which may be psoriasis vs eczema. Marked response to steroids but ongoing activity despite MTX initiation, new spinal sx which appear inflammatory necessitating need for biologic but minimal improvement and progression despite Humira. \par \par # PsA \par - c/w Stelara, still early so will monitor rash and see if improves \par - repeat labs now, check TFTs 2/2 fatigue worsening \par - c/w prednisone 5mg/day \par - c/w MTX 8 tabs. C/w FA 1 mg daily \par - optho exam negative for ocular inflammation \par \par # OA b/l knees \par - c/w gel injections as needed with ortho\par \par # Chronic, progressive spinal pain\par - XR C spine/LS spine to better eval \par \par RTC 3 months

## 2023-01-17 NOTE — PHYSICAL EXAM
[General Appearance - Alert] : alert [General Appearance - In No Acute Distress] : in no acute distress [General Appearance - Well Nourished] : well nourished [Sclera] : the sclera and conjunctiva were normal [PERRL With Normal Accommodation] : pupils were equal in size, round, and reactive to light [Extraocular Movements] : extraocular movements were intact [Outer Ear] : the ears and nose were normal in appearance [Oropharynx] : the oropharynx was normal [Neck Appearance] : the appearance of the neck was normal [] : no respiratory distress [Auscultation Breath Sounds / Voice Sounds] : lungs were clear to auscultation bilaterally [Heart Rate And Rhythm] : heart rate was normal and rhythm regular [Heart Sounds] : normal S1 and S2 [Edema] : there was no peripheral edema [No CVA Tenderness] : no ~M costovertebral angle tenderness [No Spinal Tenderness] : no spinal tenderness [Nail Clubbing] : no clubbing  or cyanosis of the fingernails [Musculoskeletal - Swelling] : no joint swelling seen [Motor Tone] : muscle strength and tone were normal [Skin Color & Pigmentation] : normal skin color and pigmentation [No Focal Deficits] : no focal deficits [Oriented To Time, Place, And Person] : oriented to person, place, and time [Impaired Insight] : insight and judgment were intact [Affect] : the affect was normal [Murmurs] : no murmurs [FreeTextEntry1] : Rash recurred on feet, elbows, scalp

## 2023-01-20 ENCOUNTER — APPOINTMENT (OUTPATIENT)
Dept: RHEUMATOLOGY | Facility: CLINIC | Age: 63
End: 2023-01-20
Payer: COMMERCIAL

## 2023-01-20 VITALS
HEIGHT: 67 IN | HEART RATE: 77 BPM | TEMPERATURE: 96.6 F | BODY MASS INDEX: 43.95 KG/M2 | SYSTOLIC BLOOD PRESSURE: 140 MMHG | WEIGHT: 280 LBS | DIASTOLIC BLOOD PRESSURE: 84 MMHG

## 2023-01-20 DIAGNOSIS — M18.11 UNILATERAL PRIMARY OSTEOARTHRITIS OF FIRST CARPOMETACARPAL JOINT, RIGHT HAND: ICD-10-CM

## 2023-01-20 PROCEDURE — 99213 OFFICE O/P EST LOW 20 MIN: CPT

## 2023-01-31 ENCOUNTER — OUTPATIENT (OUTPATIENT)
Dept: OUTPATIENT SERVICES | Facility: HOSPITAL | Age: 63
LOS: 1 days | Discharge: HOME | End: 2023-01-31

## 2023-01-31 DIAGNOSIS — Z90.11 ACQUIRED ABSENCE OF RIGHT BREAST AND NIPPLE: Chronic | ICD-10-CM

## 2023-01-31 DIAGNOSIS — C50.911 MALIGNANT NEOPLASM OF UNSPECIFIED SITE OF RIGHT FEMALE BREAST: ICD-10-CM

## 2023-01-31 DIAGNOSIS — Z98.890 OTHER SPECIFIED POSTPROCEDURAL STATES: Chronic | ICD-10-CM

## 2023-02-01 ENCOUNTER — APPOINTMENT (OUTPATIENT)
Dept: ORTHOPEDIC SURGERY | Facility: CLINIC | Age: 63
End: 2023-02-01
Payer: COMMERCIAL

## 2023-02-01 DIAGNOSIS — M48.07 SPINAL STENOSIS, LUMBOSACRAL REGION: ICD-10-CM

## 2023-02-01 PROCEDURE — 99214 OFFICE O/P EST MOD 30 MIN: CPT

## 2023-02-01 RX ORDER — HYALURONATE SODIUM, STABILIZED 88 MG/4 ML
88 SYRINGE (ML) INTRAARTICULAR
Qty: 1 | Refills: 0 | Status: ACTIVE | COMMUNITY
Start: 2023-02-01

## 2023-02-02 ENCOUNTER — RX RENEWAL (OUTPATIENT)
Age: 63
End: 2023-02-02

## 2023-02-02 LAB
CHOLEST SERPL-MCNC: 237 MG/DL
ESTIMATED AVERAGE GLUCOSE: 120 MG/DL
HBA1C MFR BLD HPLC: 5.8 %
HDLC SERPL-MCNC: 69 MG/DL
LDLC SERPL CALC-MCNC: 138 MG/DL
NONHDLC SERPL-MCNC: 168 MG/DL
TRIGL SERPL-MCNC: 151 MG/DL

## 2023-02-05 ENCOUNTER — RX RENEWAL (OUTPATIENT)
Age: 63
End: 2023-02-05

## 2023-02-08 ENCOUNTER — APPOINTMENT (OUTPATIENT)
Dept: INFUSION THERAPY | Facility: HOSPITAL | Age: 63
End: 2023-02-08

## 2023-03-02 NOTE — ASSESSMENT
[FreeTextEntry1] : FIDEL THOMAS is a 62 year old woman with PsA -- months of symmetrical small joint stiffness and pain, b/l Achilles tenderness, L knee warmth on exam, psoriatic plaque on R foot and some fissuring in palms which may be psoriasis vs eczema. Marked response to steroids but ongoing activity despite MTX initiation, new spinal sx which appear inflammatory necessitating need for biologic but minimal improvement and progression despite Humira. \par \par # PsA and psoriasis -- appears to be better controlled with further use of Stelara \par - c/w Stelara as now showing benefit, advised can take as long as 6 months to see full benefit \par - recent labs reviewed with her, repeat prior to next visit \par - c/w prednisone 5mg/day, can use 10mg prn sparingly \par - c/w MTX 8 tabs. C/w FA 1 mg daily \par - optho exam negative for ocular inflammation \par \par # OA b/l knees \par - c/w gel injections as needed with ortho\par \par # R thumb CMC OA related pain - US guided injection to see if helps \par \par RTC 3 months

## 2023-03-02 NOTE — PHYSICAL EXAM
[General Appearance - Alert] : alert [General Appearance - In No Acute Distress] : in no acute distress [General Appearance - Well Nourished] : well nourished [Sclera] : the sclera and conjunctiva were normal [PERRL With Normal Accommodation] : pupils were equal in size, round, and reactive to light [Extraocular Movements] : extraocular movements were intact [Outer Ear] : the ears and nose were normal in appearance [Oropharynx] : the oropharynx was normal [Neck Appearance] : the appearance of the neck was normal [] : no respiratory distress [Auscultation Breath Sounds / Voice Sounds] : lungs were clear to auscultation bilaterally [Heart Rate And Rhythm] : heart rate was normal and rhythm regular [Heart Sounds] : normal S1 and S2 [Murmurs] : no murmurs [Edema] : there was no peripheral edema [No CVA Tenderness] : no ~M costovertebral angle tenderness [Nail Clubbing] : no clubbing  or cyanosis of the fingernails [Musculoskeletal - Swelling] : no joint swelling seen [Motor Tone] : muscle strength and tone were normal [Skin Color & Pigmentation] : normal skin color and pigmentation [No Focal Deficits] : no focal deficits [Impaired Insight] : insight and judgment were intact [Oriented To Time, Place, And Person] : oriented to person, place, and time [Affect] : the affect was normal [FreeTextEntry1] : No overtly inflammatory joint sx today tho still some TTP and in more areas, R thumb CMC TTP without synovitis, no TTP over b/l Achilles

## 2023-03-04 ENCOUNTER — NON-APPOINTMENT (OUTPATIENT)
Age: 63
End: 2023-03-04

## 2023-03-22 ENCOUNTER — APPOINTMENT (OUTPATIENT)
Dept: HEMATOLOGY ONCOLOGY | Facility: CLINIC | Age: 63
End: 2023-03-22
Payer: COMMERCIAL

## 2023-03-22 VITALS
HEIGHT: 67 IN | WEIGHT: 284.82 LBS | HEART RATE: 92 BPM | OXYGEN SATURATION: 98 % | DIASTOLIC BLOOD PRESSURE: 81 MMHG | TEMPERATURE: 97.2 F | SYSTOLIC BLOOD PRESSURE: 133 MMHG | RESPIRATION RATE: 20 BRPM | BODY MASS INDEX: 44.7 KG/M2

## 2023-03-22 DIAGNOSIS — Z00.00 ENCOUNTER FOR GENERAL ADULT MEDICAL EXAMINATION W/OUT ABNORMAL FINDINGS: ICD-10-CM

## 2023-03-22 PROCEDURE — 99214 OFFICE O/P EST MOD 30 MIN: CPT

## 2023-03-22 NOTE — HISTORY OF PRESENT ILLNESS
[Disease: _____________________] : Disease: [unfilled] [T: ___] : T[unfilled] [N: ___] : N[unfilled] [AJCC Stage: ____] : AJCC Stage: [unfilled] [Treatment Protocol] : Treatment Protocol [de-identified] : The pt was seen for an initial visit on 9/29/21 in order to transfer her care to me from that of Dr Cox who left the [facility]. Her history below is as per review of the AEHR notes and confirmation with the pt. \par \par History of ER+NH-HER2+ locally advanced, inflammatory poorly differentiated ductal breast cancer.\par \par She noted summer 2018 she felt a mass near her nipple in her R breast. She went to her PMD who ordered a mammogram and sonogram, which revealed a R abnormal hypoechoic mass and abnormal appearing axillary LN.\par \par Image guided biopsy of mass and axilla revealed invasive poorly differentiated ductal carcinoma, +LVI, 1.2cm in specimen, [place] 8/9, DCIS, microcalcifications present, ER >95%, NH negative, HER2+ 3+ IHC, Lymph node biopsy with metastatic carcinoma.\par \par She then saw Dr. Camacho breast surgeon who ordered and MRI of the breasts. MRI showed: Biopsy proven mammary carcinoma in the R retroareolar region 5:00 manifested as multiple irregular masses. The tumor is extensive in nature and spanning an area of over 3 cm with non-mass enhancement extending posteriorly suggesting extensive intraductal component as well. Two other highly suspicious masses noted in the RUOQ at 11:00 measuring >3 cm and at 10:00 measuring 2.6 cm consistent with multicentric disease. Biopsy proven metastatic disease to an axillary LN with additional suspicious LNs (additional with replaced fatty hilum and abnormally enlarged cortices, some have irregular borders, some posterior to pectoralis muscle, also abnormal appearing node in soft tissue lateral to the R chest wall).\par \par Dr. Camacho recommended neoadjuvant chemotherapy and the option for mastectomy with possible axillary lymph node dissection pending response to therapy.\par \par She then saw Dr Cox in consultation. She underwent PETCT that found a peripancreatic lymph node that was enlarged and SUV avid, b/l laryngeal FDG avidity (likely due to speaking during exam), and SUV avid breast mass/axilla on R with breast skin noted to be thickened. She underwent EGD/EUS and biopsy of this peripancreatic LN (Dr. Giovanni Felton) which was consistent with benign/reactive lymphatic tissue. She had a mediport placed by IR on 9/26. She saw oncocardiology Dr. Khan for risk assessment prior to anthracycline/anti-HER2 monoclonal therapy and was started on ACEi for HTN and for cardioprotection. She saw dermatology for skin biopsy of R breast prior to treatment to evaluate/confirm inflammatory disease; biopsy results with skin involvement by poorly differentiated carcinoma. She started neoadjuvant chemotherapy with dose-dense AC on 9/26. dose-dense AC-->THP (weekly taxol 80mg/m2)\par \par 11/23 THP #1\par 11/30 Taxol 80mg/m2 held due to neuropathy\par 12/7 resumed Taxol weekly without issue\par 12/15 taxol completed x 12 weeks\par 3/8 Herceptin and Perjeta administered pre-operatively\par \par MRI 2/4/19 BIRADS 2, masses and adenopathy resolved. Some residual R breast skin thickening noted.\par \par 3/22/19 R mastectomy and ALND: 0/6 LN removed involved, no residual invasive carcinoma or DCIS identified. [id number] ([facility])\par \par 4/3 Herceptin and Perjeta continued post-operatively every 3 weeks, adjuvant radiation started 5/2019 and completed 6/2019 c/b grade 3 dermatitis now resolved.\par \par LH/FSH/estradiol levels May/June 2019 consistent with menopause.\par Arimidex started July 2019.\par \par She underwent ANTONIO reconstruction 10/2019 without event. Pathology negative.\par \par Herceptin/Perjeta C17 completed 11/7/19. She started extended adjuvant therapy with neratinib 12/2019 -2/2020 - self discontinued due to toxicity of diarrhea. She continues on arimidex.\par \par Patient lives in [place] with her  Gonzales (pretzel ). She has a daughter (an artist and ). She works full time as a . She notes a history of smoking in the distant past. Mother passed away in 70s from metastatic melanoma, and a maternal aunt had breast cancer; no other significant close family history of cancers. She notes overall migraines (which she has had for many years, seen neurologist, stable lytic medications help, associated with one sided facial numbness when they occur) have gotten better with menopause – her LMP was 2/2018 and it was infrequent prior to that. She has had some hot flashes. She used OCPs in the past for years particularly for endometriosis treatment. She has had issues with DJD of back and spine with pain, MRI demonstrating spinal stenosis, with epidural injections that have helped with this significantly as well as occasional NSAID use.\par \par She completed bilateral reconstruction with Dr. Antonio on 4/6/21 [de-identified] : ER+SD-HER2+ [FreeTextEntry1] : dose-dense AC (adriamycin 60mg/m2 and cytoxan 600mg/m2) x 4 cycles, followed by weekly taxol 80mg/m2 x 12 with herceptin/perjeta q3 weeks, arimidex [de-identified] : Patient presents for follow up.  \par \par She remains on anastrozole.\par \par She has baseline arthralgias from her psoriatic arthritis w/o change from pre AI baseline. Now on Stellara.\par \par Has rare HFs.  \par \par Has fleeting R ax neuralgic pain. \par \par She denies any other new complaints.  \par \par DEXA 8/20 WNL

## 2023-03-22 NOTE — PHYSICAL EXAM
[Fully active, able to carry on all pre-disease performance without restriction] : Status 0 - Fully active, able to carry on all pre-disease performance without restriction [Obese] : obese [Normal] : affect appropriate [de-identified] : bilateral reconstructed breast with well healed scars and no discrete palpable masses, nipple reconstruction b/l well healed, no palpable axillary nodes.

## 2023-03-22 NOTE — REVIEW OF SYSTEMS
[Negative] : Psychiatric [FreeTextEntry2] : as above [FreeTextEntry9] : as above [de-identified] : as above

## 2023-03-29 ENCOUNTER — APPOINTMENT (OUTPATIENT)
Dept: INFUSION THERAPY | Facility: HOSPITAL | Age: 63
End: 2023-03-29

## 2023-03-29 NOTE — PRE-ANESTHESIA EVALUATION ADULT - NSANTHINDVINFOSD_GEN_ALL_CORE
abdominal pain, constipation, diarrhea, nausea and vomiting. Endocrine: Negative for polydipsia, polyphagia and polyuria. DM   Genitourinary:  Negative for dysuria and urgency. Total proctectomy in 2015  Follows with Dr. Radha Gomez   Allergic/Immunologic: Negative for immunocompromised state. NKA   Neurological:  Negative for syncope, light-headedness and headaches. Psychiatric/Behavioral:  Negative for decreased concentration, dysphoric mood, sleep disturbance and suicidal ideas. The patient is not nervous/anxious. Objective   Physical Exam  Vitals and nursing note reviewed. Constitutional:       General: He is not in acute distress. Appearance: He is not ill-appearing. HENT:      Head: Normocephalic. Nose: Nose normal.      Mouth/Throat:      Lips: Pink. Cardiovascular:      Rate and Rhythm: Normal rate and regular rhythm. Pulses:           Carotid pulses are 2+ on the right side and 2+ on the left side. Radial pulses are 2+ on the right side and 2+ on the left side. Heart sounds: Normal heart sounds. No murmur heard. Pulmonary:      Effort: Pulmonary effort is normal. No respiratory distress. Breath sounds: No decreased breath sounds, wheezing or rhonchi. Musculoskeletal:      Right lower leg: No edema. Left lower leg: No edema. Neurological:      Mental Status: He is alert and oriented to person, place, and time. Psychiatric:         Attention and Perception: Attention normal.         Speech: Speech normal.         Behavior: Behavior is cooperative. An electronic signature was used to authenticate this note.     --SUSHIL Bruner NP patient

## 2023-03-30 ENCOUNTER — APPOINTMENT (OUTPATIENT)
Dept: INFUSION THERAPY | Facility: HOSPITAL | Age: 63
End: 2023-03-30

## 2023-04-17 ENCOUNTER — NON-APPOINTMENT (OUTPATIENT)
Age: 63
End: 2023-04-17

## 2023-04-20 ENCOUNTER — RESULT REVIEW (OUTPATIENT)
Age: 63
End: 2023-04-20

## 2023-04-20 LAB
BASOPHILS # BLD AUTO: 0.04 K/UL
BASOPHILS NFR BLD AUTO: 0.4 %
EOSINOPHIL # BLD AUTO: 0.06 K/UL
EOSINOPHIL NFR BLD AUTO: 0.5 %
HCT VFR BLD CALC: 39.3 %
HGB BLD-MCNC: 12 G/DL
IMM GRANULOCYTES NFR BLD AUTO: 0.5 %
LYMPHOCYTES # BLD AUTO: 0.97 K/UL
LYMPHOCYTES NFR BLD AUTO: 8.8 %
MAN DIFF?: NORMAL
MCHC RBC-ENTMCNC: 30.5 GM/DL
MCHC RBC-ENTMCNC: 30.7 PG
MCV RBC AUTO: 100.5 FL
MONOCYTES # BLD AUTO: 0.52 K/UL
MONOCYTES NFR BLD AUTO: 4.7 %
NEUTROPHILS # BLD AUTO: 9.36 K/UL
NEUTROPHILS NFR BLD AUTO: 85.1 %
PLATELET # BLD AUTO: 323 K/UL
RBC # BLD: 3.91 M/UL
RBC # FLD: 14.6 %
WBC # FLD AUTO: 11 K/UL

## 2023-05-01 ENCOUNTER — OUTPATIENT (OUTPATIENT)
Dept: OUTPATIENT SERVICES | Facility: HOSPITAL | Age: 63
LOS: 1 days | Discharge: HOME | End: 2023-05-01

## 2023-05-01 DIAGNOSIS — Z90.13 ACQUIRED ABSENCE OF BILATERAL BREASTS AND NIPPLES: Chronic | ICD-10-CM

## 2023-05-01 DIAGNOSIS — C50.911 MALIGNANT NEOPLASM OF UNSPECIFIED SITE OF RIGHT FEMALE BREAST: ICD-10-CM

## 2023-05-01 DIAGNOSIS — Z90.11 ACQUIRED ABSENCE OF RIGHT BREAST AND NIPPLE: Chronic | ICD-10-CM

## 2023-05-01 DIAGNOSIS — Z98.890 OTHER SPECIFIED POSTPROCEDURAL STATES: Chronic | ICD-10-CM

## 2023-05-09 ENCOUNTER — NON-APPOINTMENT (OUTPATIENT)
Age: 63
End: 2023-05-09

## 2023-05-10 ENCOUNTER — APPOINTMENT (OUTPATIENT)
Dept: INFUSION THERAPY | Facility: HOSPITAL | Age: 63
End: 2023-05-10

## 2023-05-10 LAB
BASOPHILS # BLD AUTO: 0.03 K/UL
BASOPHILS NFR BLD AUTO: 0.3 %
EOSINOPHIL # BLD AUTO: 0.02 K/UL
EOSINOPHIL NFR BLD AUTO: 0.2 %
HCT VFR BLD CALC: 39.2 %
HGB BLD-MCNC: 12.2 G/DL
IMM GRANULOCYTES NFR BLD AUTO: 0.3 %
LYMPHOCYTES # BLD AUTO: 1.02 K/UL
LYMPHOCYTES NFR BLD AUTO: 10.7 %
MAN DIFF?: NORMAL
MCHC RBC-ENTMCNC: 30.3 PG
MCHC RBC-ENTMCNC: 31.1 GM/DL
MCV RBC AUTO: 97.5 FL
MONOCYTES # BLD AUTO: 0.32 K/UL
MONOCYTES NFR BLD AUTO: 3.4 %
NEUTROPHILS # BLD AUTO: 8.11 K/UL
NEUTROPHILS NFR BLD AUTO: 85.1 %
PLATELET # BLD AUTO: 334 K/UL
RBC # BLD: 4.02 M/UL
RBC # FLD: 14.2 %
WBC # FLD AUTO: 9.53 K/UL

## 2023-05-11 ENCOUNTER — NON-APPOINTMENT (OUTPATIENT)
Age: 63
End: 2023-05-11

## 2023-05-11 LAB
ALBUMIN SERPL ELPH-MCNC: 4.3 G/DL
ALP BLD-CCNC: 91 U/L
ALT SERPL-CCNC: 16 U/L
ANION GAP SERPL CALC-SCNC: 13 MMOL/L
AST SERPL-CCNC: 15 U/L
BILIRUB SERPL-MCNC: 0.2 MG/DL
BUN SERPL-MCNC: 10 MG/DL
CALCIUM SERPL-MCNC: 9.5 MG/DL
CHLORIDE SERPL-SCNC: 104 MMOL/L
CO2 SERPL-SCNC: 25 MMOL/L
CREAT SERPL-MCNC: 0.63 MG/DL
CRP SERPL-MCNC: 10 MG/L
EGFR: 100 ML/MIN/1.73M2
ERYTHROCYTE [SEDIMENTATION RATE] IN BLOOD BY WESTERGREN METHOD: 30 MM/HR
GLUCOSE SERPL-MCNC: 111 MG/DL
POTASSIUM SERPL-SCNC: 4.9 MMOL/L
PROT SERPL-MCNC: 6.8 G/DL
SODIUM SERPL-SCNC: 141 MMOL/L

## 2023-05-12 ENCOUNTER — APPOINTMENT (OUTPATIENT)
Dept: RHEUMATOLOGY | Facility: CLINIC | Age: 63
End: 2023-05-12
Payer: COMMERCIAL

## 2023-05-12 VITALS
WEIGHT: 284 LBS | BODY MASS INDEX: 44.57 KG/M2 | DIASTOLIC BLOOD PRESSURE: 80 MMHG | OXYGEN SATURATION: 95 % | HEART RATE: 115 BPM | HEIGHT: 67 IN | TEMPERATURE: 97.7 F | RESPIRATION RATE: 17 BRPM | SYSTOLIC BLOOD PRESSURE: 132 MMHG

## 2023-05-12 DIAGNOSIS — K04.7 PERIAPICAL ABSCESS W/OUT SINUS: ICD-10-CM

## 2023-05-12 PROCEDURE — 99214 OFFICE O/P EST MOD 30 MIN: CPT

## 2023-05-14 NOTE — REVIEW OF SYSTEMS
[Dry Eyes] : dryness of the eyes [Arthralgias] : arthralgias [Joint Pain] : joint pain [Joint Stiffness] : joint stiffness [Negative] : Heme/Lymph [As Noted in HPI] : as noted in HPI [Joint Swelling] : no joint swelling

## 2023-05-14 NOTE — HISTORY OF PRESENT ILLNESS
[FreeTextEntry1] : FIDEL THOMAS is a 62 year old woman with PsA. 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 Some insomnia and sweats with prednisone, otherwise no SE, some GI upset but mild with MTX, otherwise tolerating it well. No infectious sx, rash improved, no extra-articular manifestations. \par \par 2/11/22 -- Ongoing improvement in joints but still with activity, L knee posterior fullness and pain is most active. She is amenable to trying to taper steroids regardless as is worried about long term use. No extra-articular manifestations, no infectious sx. \par \par 3/25/22 -- Worsening of symmetrical joint pain and warmth, some b/l SI joint and L hip pain with prolonged AM stiffness and improved with exercise and not typical of her DJD related pain. Rash has not emerged but increased sensitivity over sites of prior rash. Ongoing dry eye but no inflammation. No SE with MTX, no infectious sx, above is worse since tapering to prednisone 10mg/day.  \par \par 6/24/22 -- Recent L knee nondisplaced tibial plateau fracture/ ACL sprain, slowly improving. Improvement in psoriasis, ongoing small and large joint arthralgias tho less overt swelling/warmth. Some positional RUE tingling, mild C spine pain. No CP/SOB, GI/ sx, no infectious sx. \par \par 8/26/22 -- Rash is flaring again, returned to b/l feet, now on elbows, on scalp. Ongoing small symmetrical small joint arthralgias with all day stiffness. L knee fx is healed, now getting gel injections in L knee, considering R as well if L goes well. + C spine pain with RUE radiation, positional at present. \par \par 11/18/22 -- Recent worsening of arthralgias with weather change, spine is the worst. Areas of rash on R foot that was responsive to Humira has recurred on Stelara, scalp also more itchy. S/p gel injections to b/l knees with improvement. Ongoing, somewhat worse fatigue but still able to do ADLs. Remainder of extra-articular inflammatory ROS negative, no infectious sx. \par \par 1/20/23 -- Rash not improved but stabilized, ongoing arthralgias/stiffness but no synovitis, no SE with Stelara. R thumb CMC OA related pain which is making use difficult. Fatigue may be somewhat improved, no extra-articular inflammatory sx. \par \par 5/12/23 -- Ongoing issues with ?tooth infection, s/p Abx course with improvement but mild sx have now recurred. No constitutional sx. Did hold meds x 1 week but now back on, remains on prednisone 10mg/day, some arthralgias but no worsening synovitis or rash, no extra-articular inflammatory sx.

## 2023-05-14 NOTE — PHYSICAL EXAM
[General Appearance - Alert] : alert [General Appearance - In No Acute Distress] : in no acute distress [General Appearance - Well Nourished] : well nourished [Sclera] : the sclera and conjunctiva were normal [PERRL With Normal Accommodation] : pupils were equal in size, round, and reactive to light [Extraocular Movements] : extraocular movements were intact [Neck Appearance] : the appearance of the neck was normal [Auscultation Breath Sounds / Voice Sounds] : lungs were clear to auscultation bilaterally [Heart Rate And Rhythm] : heart rate was normal and rhythm regular [Heart Sounds] : normal S1 and S2 [Murmurs] : no murmurs [Edema] : there was no peripheral edema [No CVA Tenderness] : no ~M costovertebral angle tenderness [Nail Clubbing] : no clubbing  or cyanosis of the fingernails [Musculoskeletal - Swelling] : no joint swelling seen [Motor Tone] : muscle strength and tone were normal [Skin Color & Pigmentation] : normal skin color and pigmentation [No Focal Deficits] : no focal deficits [Oriented To Time, Place, And Person] : oriented to person, place, and time [Impaired Insight] : insight and judgment were intact [Affect] : the affect was normal [Outer Ear] : the ears and nose were normal in appearance [Oropharynx] : the oropharynx was normal [] : the neck was supple [FreeTextEntry1] : Rash on feet, elbows, scalp --stable, ?slightly improved from prior

## 2023-05-14 NOTE — ASSESSMENT
[FreeTextEntry1] : FIDEL THOMAS is a 62 year old woman with PsA -- months of symmetrical small joint stiffness and pain, b/l Achilles tenderness, L knee warmth on exam, psoriatic plaque on R foot and some fissuring in palms which may be psoriasis vs eczema. Marked response to steroids but ongoing activity despite MTX initiation, new spinal sx which appear inflammatory necessitating need for biologic but minimal improvement and progression despite Humira. \par \par # PsA and psoriasis -- appears to be better controlled with further use of Stelara \par - c/w Stelara\par - c/w prednisone - hasn't been able to taper below 10mg/day as yet, will reattempt when acute issues improved \par - c/w MTX 8 tabs. C/w FA 1 mg daily \par - optho exam negative for ocular inflammation \par - recent labs reviewed with her, repeat prior to next visit \par \par # ?tooth infection, ongoing R sided facial pain\par - CT maxillofacial to eval for abscess \par \par # OA related pain - b/l knees, R thumb \par - c/w gel injections as needed with ortho \par - tylenol prn\par - if unable to taper off prednisone 2/2 moreso OA mediated pain, may need ?Cymbalta \par \par RTC 3 months

## 2023-05-16 NOTE — ASU PATIENT PROFILE, ADULT - DOES PATIENT HAVE ADVANCE DIRECTIVE
Outpatient Progress Note      CHIEF COMPLAINT:    Chief Complaint   Patient presents with   • Office Visit   • Chest Pain     Believes to be associated with asthma for the last couple of days        HISTORY OF PRESENT ILLNESS:    The patient is a 43 year old female who presents with complaints as below:    Asthma uncontrolled.  Crohn's Stable  Immunosuppressed but doing well.      Past Medical History:  Past Medical History:   Diagnosis Date   • Anemia 9/21/2010   • C. difficile enteritis 8/24/2012    Received treatment at [facility] [place], metronidazole 500 mg twice daily for 14 days, 7/26/2012.    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 Gong   • Colposcopy  08/07/2017    [facility], Dr. Calloway   • Colposcopy cervix & upper / adjacent vagina  10/22/98    Colposcopy - mild dysplasia;  uterine cervical biopsy: HGSIL (mod to severe dysplasia w/HPV changes)   • Colposcopy cervix & upper / adjacent vagina  3/11/99    Colposcopy: normal;   biopsy : condyloma;  [facility]: condyloma   • Colposcopy cervix & upper / adjacent vagina  02/29/12    Colposcopy   • Colposcopy,loop electrd cervix excis  11/17/98    LEEP - cervical tissue w/postbiopsy changes w/focal areas of squamous metaplasia & koilocytotic atypia w/out any dysplasia   • Colposcopy,loop electrd cervix excis   08/15/2018    Dr. Calloway   • Esophagogastroduodenoscopy  12/2010    f/u Crohn's   • Insert intrauterine device  12/2008    IUD Insertion   • Insert intrauterine device  12/18/2013    Mirena   • Remove intrauterine device  12/18/2013       Current Medications:  Current Outpatient Medications   Medication Sig Dispense Refill   • Stelara 90 MG/ML injection      • HYDROcodone-acetaminophen (NORCO) 5-325 MG per tablet Take 1 tablet by mouth 2 times daily as needed for Pain. 14 tablet 0   • pantoprazole (Protonix) 40 MG tablet Take 1 tablet by mouth daily. 90 tablet 0   • ondansetron (ZOFRAN ODT) 4 MG disintegrating tablet Place 1 tablet onto the tongue every 8 hours as needed for Nausea. 5 tablet 0   • fluticasone (FLONASE) 50 MCG/ACT nasal spray      • albuterol (VENTOLIN) (2.5 MG/3ML) 0.083% nebulizer solution Take 3 mLs by nebulization every 6 hours as needed for Wheezing. 375 mL 11   • montelukast (SINGULAIR) 10 MG tablet TAKE 1 TABLET BY MOUTH EVERY NIGHT 90 tablet 0   • USTEkinumab (Stelara) 130 MG/26ML injection Inject 130 mg into the vein 1 time.     • Asmanex, 30 Metered Doses, 110 MCG/INH inhaler Inhale 2 puffs into the lungs 2 times daily. 3 Inhaler 3   • linaclotide (LINZESS) 290 MCG Take 290 mcg by mouth 2 times daily. 180 capsule 3   • acetaminophen (TYLENOL) 325 MG tablet Take 650 mg by mouth every 4 hours as needed for Pain.     • Prenatal MV-Min-Fe Fum-FA-DHA ([employer] WOMENS PRENATAL+DHA) 28-0.975 & 200 MG Misc Take 1 capsule by mouth daily. 100 each 3   • ergocalciferol (DRISDOL) 66268 UNITS capsule Take 50,000 Units by mouth once a week. 4 times per week taking Dr montrell martinez     • Pocket Peak Flow Meter (PEAK FLOW METER) ANTONIO Check peak flow readings daily in the morning 1 Device 1     No current facility-administered medications for this visit.       Allergies:  ALLERGIES:   Allergen Reactions   • Aspirin HIVES     NO ASPIRIN   • Dye [Contrast Media] VOMITING   • Iohexol Nausea & Vomiting, HIVES and  PRURITUS   • Nsaids GI UPSET     Contradiction with Crohn's medications   • Shell Fish HIVES     all seafood and fish   • Shellfish Allergy   (Food Or Med) Nausea & Vomiting     Previously stated as iodine allergy. Pt has no allergy to contrast dye. Just allergy to all seafood.   • Soap RASH     All dove soap (bar, liquid, unscented, scented)       SOCIAL HISTORY:  Social History     Tobacco Use   • Smoking status: Never   • Smokeless tobacco: Never   Substance Use Topics   • Alcohol use: Yes     Comment: 1 drink per week   • Drug use: No         Drug Use:    No                FAMILY HISTORY:  Family History   Problem Relation Age of Onset   • Heart Maternal Grandmother    • Cancer Maternal Grandmother         bone cancer   • Heart Maternal Grandfather         heart attack   • Heart disease Maternal Grandfather         Stroke under 51 yo   • Stroke Mother         First stroke at 54, second stroke at 57   • Heart disease Paternal Grandmother         Stroke at 42 yo    • NEGATIVE FAMILY HX OF Other         No family history of breast, cervical or ovarian cancer.   • Cancer Paternal Uncle    • Cancer Maternal Aunt         unsure of type   • Diabetes Maternal Uncle    • Gastrointestinal Maternal Aunt         gallstones       REVIEW OF SYSTEMS:  CONSTITUTIONAL:  No Fevers/Chills  EYES:  No visual changes  HEAD/EARS/NOSE/THROAT/MOUTH:  No Headache  RESPIRATORY:  Positive for Wheezing/Cough/Sputum/SOB  CARDIOVASCULAR:  No Chest pain/Palpitations/LH  GASTROINTESTINAL:  No Abdominal Pain/ Nausea/Vomiting/Diarrhea/Melena/Bright red blood per rectum,   GENITOURINARY:  No Dysuria/Frequency  INTEGUMENTARY:  No Rashes/Lumps  MUSCULOSKELETAL:  Positive for Joint Pain  NEUROLOGICAL:  No Numbness/Tingling  PSYCHIATRIC:  No Depression/Anxiety/Panic attacks    PHYSICAL EXAM:  Visit Vitals  /78   Pulse 78   Ht 5' 6\" (1.676 m)   Wt 101.6 kg (224 lb)   SpO2 99%   BMI 36.15 kg/m²       CONSTITUTIONAL:  No acute distress, Non-toxic  appearing  EYES:  Pupils equal, round, reactive to light and accommodation, Extraocular movements intact, Conjunctivae are clear/pink- no signs of inflammation, Lids are normal, No scleral icterus  HEAD, EARS, NOSE, MOUTH, THROAT:  Normocephalic/Atraumatic, External exam of nose and ears is normal- No Lesions/Masses/Tenderness, External auditory canals normal bilaterally, Tympanic membranes are normal bilaterally with no erythema or bulging and no effusions or discharge- normal light reflex-  NECK:  Supple, Normal range of motion, Overall normal appearance  RESPIRATORY:  Symmetrical expansion with respiration.  No Accessory muscle use/Retractions  Bilateral WHeezes  CARDIOVASCULAR:  Regular rate and rhythm.  Normal S1/S2.  No S3/S4, No Murmurs/Rubs/Gallops.  No lower extremity edema or varicosities  GASTROINTESTINAL:  Scaphoid abdomen.  Soft,  Non tender, Non-distended, Bowel sounds present  MUSCULOSKELETAL: No Cyanosis/Clubbing/Edema, Capillary Refill<2 seconds  SKIN:  Warm, Dry, Inspection normal   PSYCHIATRIC:  The patient is well-nourished and well-groomed.  Alert & Oriented x 3.       DATA:  Labs:   Lab Results   Component Value Date    WBC 6.3 03/23/2023    HCT 36.1 03/23/2023    HGB 11.7 (L) 03/23/2023     03/23/2023     Hemoglobin A1C (%)   Date Value   11/16/2022 6.0 (H)     Sodium (mmol/L)   Date Value   03/23/2023 137     Potassium (mmol/L)   Date Value   03/23/2023 3.9     Chloride (mmol/L)   Date Value   03/23/2023 106     Carbon Dioxide (mmol/L)   Date Value   03/23/2023 29     BUN (mg/dL)   Date Value   03/23/2023 7     Creatinine (mg/dL)   Date Value   03/23/2023 0.56     Glucose (mg/dL)   Date Value   03/23/2023 105 (H)     No results found for: MUTP, URMIC, UCR, MALBCR  Cholesterol (mg/dL)   Date Value   11/16/2022 213 (H)     HDL (mg/dL)   Date Value   11/16/2022 97     LDL (mg/dL)   Date Value   11/16/2022 107     Triglycerides (mg/dL)   Date Value   11/16/2022 47     TSH (uIU/mL)   Date  Value   08/12/2003 0.49       ASSESSMENT AND PLAN:    Crohn's disease of large intestine without complication (CMD)  (primary encounter diagnosis)  Comment: Stable. Patient advised to continue present medication management and/or any lifestyle/diet modifications previously discussed.     Mild persistent asthma with acute exacerbation  Comment: Poorly Controlled- As ordered, see Orders Placed and Medication Changes.   Plan: montelukast (SINGULAIR) 10 MG tablet            Immunodeficiency due to treatment with immunosuppressive medication (CMD)  Plan: Stable. Patient advised to continue present medication management and/or any lifestyle/diet modifications previously discussed.         Jarrett Bernard MD    Yes

## 2023-05-24 ENCOUNTER — OUTPATIENT (OUTPATIENT)
Dept: OUTPATIENT SERVICES | Facility: HOSPITAL | Age: 63
LOS: 1 days | End: 2023-05-24
Payer: COMMERCIAL

## 2023-05-24 ENCOUNTER — APPOINTMENT (OUTPATIENT)
Dept: CT IMAGING | Facility: IMAGING CENTER | Age: 63
End: 2023-05-24
Payer: COMMERCIAL

## 2023-05-24 DIAGNOSIS — Z98.890 OTHER SPECIFIED POSTPROCEDURAL STATES: Chronic | ICD-10-CM

## 2023-05-24 DIAGNOSIS — Z90.13 ACQUIRED ABSENCE OF BILATERAL BREASTS AND NIPPLES: Chronic | ICD-10-CM

## 2023-05-24 DIAGNOSIS — Z90.11 ACQUIRED ABSENCE OF RIGHT BREAST AND NIPPLE: Chronic | ICD-10-CM

## 2023-05-24 DIAGNOSIS — K04.7 PERIAPICAL ABSCESS WITHOUT SINUS: ICD-10-CM

## 2023-05-24 PROCEDURE — 70486 CT MAXILLOFACIAL W/O DYE: CPT | Mod: 26

## 2023-05-24 PROCEDURE — 70486 CT MAXILLOFACIAL W/O DYE: CPT

## 2023-05-26 ENCOUNTER — APPOINTMENT (OUTPATIENT)
Dept: CT IMAGING | Facility: CLINIC | Age: 63
End: 2023-05-26
Payer: COMMERCIAL

## 2023-05-26 ENCOUNTER — OUTPATIENT (OUTPATIENT)
Dept: OUTPATIENT SERVICES | Facility: HOSPITAL | Age: 63
LOS: 1 days | End: 2023-05-26
Payer: COMMERCIAL

## 2023-05-26 DIAGNOSIS — Z90.13 ACQUIRED ABSENCE OF BILATERAL BREASTS AND NIPPLES: Chronic | ICD-10-CM

## 2023-05-26 DIAGNOSIS — E78.00 PURE HYPERCHOLESTEROLEMIA, UNSPECIFIED: ICD-10-CM

## 2023-05-26 DIAGNOSIS — Z98.890 OTHER SPECIFIED POSTPROCEDURAL STATES: Chronic | ICD-10-CM

## 2023-05-26 DIAGNOSIS — Z00.8 ENCOUNTER FOR OTHER GENERAL EXAMINATION: ICD-10-CM

## 2023-05-26 DIAGNOSIS — Z90.11 ACQUIRED ABSENCE OF RIGHT BREAST AND NIPPLE: Chronic | ICD-10-CM

## 2023-05-26 PROCEDURE — 75571 CT HRT W/O DYE W/CA TEST: CPT | Mod: 26

## 2023-05-26 PROCEDURE — 75571 CT HRT W/O DYE W/CA TEST: CPT

## 2023-05-30 ENCOUNTER — NON-APPOINTMENT (OUTPATIENT)
Age: 63
End: 2023-05-30

## 2023-05-30 DIAGNOSIS — R91.1 SOLITARY PULMONARY NODULE: ICD-10-CM

## 2023-05-30 DIAGNOSIS — E78.00 PURE HYPERCHOLESTEROLEMIA, UNSPECIFIED: ICD-10-CM

## 2023-06-01 ENCOUNTER — NON-APPOINTMENT (OUTPATIENT)
Age: 63
End: 2023-06-01

## 2023-06-08 ENCOUNTER — RX RENEWAL (OUTPATIENT)
Age: 63
End: 2023-06-08

## 2023-06-10 ENCOUNTER — RX RENEWAL (OUTPATIENT)
Age: 63
End: 2023-06-10

## 2023-06-12 ENCOUNTER — RX RENEWAL (OUTPATIENT)
Age: 63
End: 2023-06-12

## 2023-06-12 RX ORDER — LIDOCAINE AND PRILOCAINE 25; 25 MG/G; MG/G
2.5-2.5 CREAM TOPICAL
Qty: 1 | Refills: 0 | Status: ACTIVE | COMMUNITY
Start: 2019-09-20 | End: 1900-01-01

## 2023-06-13 ENCOUNTER — NON-APPOINTMENT (OUTPATIENT)
Age: 63
End: 2023-06-13

## 2023-06-14 ENCOUNTER — APPOINTMENT (OUTPATIENT)
Dept: RADIOLOGY | Facility: IMAGING CENTER | Age: 63
End: 2023-06-14
Payer: COMMERCIAL

## 2023-06-14 ENCOUNTER — OUTPATIENT (OUTPATIENT)
Dept: OUTPATIENT SERVICES | Facility: HOSPITAL | Age: 63
LOS: 1 days | End: 2023-06-14
Payer: COMMERCIAL

## 2023-06-14 DIAGNOSIS — Z90.11 ACQUIRED ABSENCE OF RIGHT BREAST AND NIPPLE: Chronic | ICD-10-CM

## 2023-06-14 DIAGNOSIS — Z98.890 OTHER SPECIFIED POSTPROCEDURAL STATES: Chronic | ICD-10-CM

## 2023-06-14 DIAGNOSIS — Z90.13 ACQUIRED ABSENCE OF BILATERAL BREASTS AND NIPPLES: Chronic | ICD-10-CM

## 2023-06-14 DIAGNOSIS — C50.911 MALIGNANT NEOPLASM OF UNSPECIFIED SITE OF RIGHT FEMALE BREAST: ICD-10-CM

## 2023-06-14 PROCEDURE — 77080 DXA BONE DENSITY AXIAL: CPT

## 2023-06-14 PROCEDURE — 77080 DXA BONE DENSITY AXIAL: CPT | Mod: 26

## 2023-06-21 ENCOUNTER — APPOINTMENT (OUTPATIENT)
Dept: INFUSION THERAPY | Facility: HOSPITAL | Age: 63
End: 2023-06-21

## 2023-06-23 ENCOUNTER — APPOINTMENT (OUTPATIENT)
Dept: CARDIOLOGY | Facility: CLINIC | Age: 63
End: 2023-06-23
Payer: COMMERCIAL

## 2023-06-23 ENCOUNTER — NON-APPOINTMENT (OUTPATIENT)
Age: 63
End: 2023-06-23

## 2023-06-23 PROCEDURE — 93356 MYOCRD STRAIN IMG SPCKL TRCK: CPT

## 2023-06-23 PROCEDURE — 93306 TTE W/DOPPLER COMPLETE: CPT

## 2023-06-30 NOTE — H&P PST ADULT - NSSTREETDRUGTY_GEN_ALL_CORE_SD
[Dear  ___] : Dear  [unfilled], [Consult Letter:] : I had the pleasure of evaluating your patient, [unfilled]. [Please see my note below.] : Please see my note below. [Consult Closing:] : Thank you very much for allowing me to participate in the care of this patient.  If you have any questions, please do not hesitate to contact me. [Sincerely,] : Sincerely, [FreeTextEntry3] : Erik Ham MD\par tel: [phone]\par fax: [phone]\par  marijuana

## 2023-07-09 ENCOUNTER — RX RENEWAL (OUTPATIENT)
Age: 63
End: 2023-07-09

## 2023-07-21 ENCOUNTER — APPOINTMENT (OUTPATIENT)
Dept: NUCLEAR MEDICINE | Facility: IMAGING CENTER | Age: 63
End: 2023-07-21
Payer: COMMERCIAL

## 2023-07-21 ENCOUNTER — OUTPATIENT (OUTPATIENT)
Dept: OUTPATIENT SERVICES | Facility: HOSPITAL | Age: 63
LOS: 1 days | End: 2023-07-21
Payer: COMMERCIAL

## 2023-07-21 DIAGNOSIS — Z98.890 OTHER SPECIFIED POSTPROCEDURAL STATES: Chronic | ICD-10-CM

## 2023-07-21 DIAGNOSIS — Z90.11 ACQUIRED ABSENCE OF RIGHT BREAST AND NIPPLE: Chronic | ICD-10-CM

## 2023-07-21 DIAGNOSIS — Z90.13 ACQUIRED ABSENCE OF BILATERAL BREASTS AND NIPPLES: Chronic | ICD-10-CM

## 2023-07-21 DIAGNOSIS — C50.911 MALIGNANT NEOPLASM OF UNSPECIFIED SITE OF RIGHT FEMALE BREAST: ICD-10-CM

## 2023-07-21 PROCEDURE — 78815 PET IMAGE W/CT SKULL-THIGH: CPT | Mod: 26,PI

## 2023-07-21 PROCEDURE — 78815 PET IMAGE W/CT SKULL-THIGH: CPT

## 2023-07-21 PROCEDURE — A9552: CPT

## 2023-07-24 ENCOUNTER — OUTPATIENT (OUTPATIENT)
Dept: OUTPATIENT SERVICES | Facility: HOSPITAL | Age: 63
LOS: 1 days | Discharge: HOME | End: 2023-07-24

## 2023-07-24 DIAGNOSIS — Z90.13 ACQUIRED ABSENCE OF BILATERAL BREASTS AND NIPPLES: Chronic | ICD-10-CM

## 2023-07-24 DIAGNOSIS — Z90.11 ACQUIRED ABSENCE OF RIGHT BREAST AND NIPPLE: Chronic | ICD-10-CM

## 2023-07-24 DIAGNOSIS — Z98.890 OTHER SPECIFIED POSTPROCEDURAL STATES: Chronic | ICD-10-CM

## 2023-07-24 DIAGNOSIS — C50.911 MALIGNANT NEOPLASM OF UNSPECIFIED SITE OF RIGHT FEMALE BREAST: ICD-10-CM

## 2023-07-28 ENCOUNTER — APPOINTMENT (OUTPATIENT)
Dept: RHEUMATOLOGY | Facility: CLINIC | Age: 63
End: 2023-07-28
Payer: COMMERCIAL

## 2023-07-28 VITALS
HEART RATE: 115 BPM | HEIGHT: 67 IN | OXYGEN SATURATION: 98 % | SYSTOLIC BLOOD PRESSURE: 130 MMHG | BODY MASS INDEX: 45.04 KG/M2 | WEIGHT: 287 LBS | DIASTOLIC BLOOD PRESSURE: 78 MMHG | RESPIRATION RATE: 17 BRPM | TEMPERATURE: 96.8 F

## 2023-07-28 PROCEDURE — 99214 OFFICE O/P EST MOD 30 MIN: CPT

## 2023-07-28 NOTE — PHYSICAL EXAM
[General Appearance - Alert] : alert [General Appearance - In No Acute Distress] : in no acute distress [General Appearance - Well Nourished] : well nourished [Sclera] : the sclera and conjunctiva were normal [PERRL With Normal Accommodation] : pupils were equal in size, round, and reactive to light [Extraocular Movements] : extraocular movements were intact [Outer Ear] : the ears and nose were normal in appearance [Oropharynx] : the oropharynx was normal [Neck Appearance] : the appearance of the neck was normal [] : no respiratory distress [Auscultation Breath Sounds / Voice Sounds] : lungs were clear to auscultation bilaterally [Heart Rate And Rhythm] : heart rate was normal and rhythm regular [Heart Sounds] : normal S1 and S2 [Murmurs] : no murmurs [Edema] : there was no peripheral edema [No CVA Tenderness] : no ~M costovertebral angle tenderness [Nail Clubbing] : no clubbing  or cyanosis of the fingernails [Musculoskeletal - Swelling] : no joint swelling seen [Motor Tone] : muscle strength and tone were normal [Skin Color & Pigmentation] : normal skin color and pigmentation [No Focal Deficits] : no focal deficits [Oriented To Time, Place, And Person] : oriented to person, place, and time [Impaired Insight] : insight and judgment were intact [Affect] : the affect was normal [FreeTextEntry1] : Rash on feet, elbows, scalp --stable, ?slightly improved from prior

## 2023-07-28 NOTE — HISTORY OF PRESENT ILLNESS
[FreeTextEntry1] : FIDEL THOMAS is a 62 year old woman with PsA. 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 Some insomnia and sweats with prednisone, otherwise no SE, some GI upset but mild with MTX, otherwise tolerating it well. No infectious sx, rash improved, no extra-articular manifestations. \par \par 2/11/22 -- Ongoing improvement in joints but still with activity, L knee posterior fullness and pain is most active. She is amenable to trying to taper steroids regardless as is worried about long term use. No extra-articular manifestations, no infectious sx. \par \par 3/25/22 -- Worsening of symmetrical joint pain and warmth, some b/l SI joint and L hip pain with prolonged AM stiffness and improved with exercise and not typical of her DJD related pain. Rash has not emerged but increased sensitivity over sites of prior rash. Ongoing dry eye but no inflammation. No SE with MTX, no infectious sx, above is worse since tapering to prednisone 10mg/day.  \par \par 6/24/22 -- Recent L knee nondisplaced tibial plateau fracture/ ACL sprain, slowly improving. Improvement in psoriasis, ongoing small and large joint arthralgias tho less overt swelling/warmth. Some positional RUE tingling, mild C spine pain. No CP/SOB, GI/ sx, no infectious sx. \par \par 8/26/22 -- Rash is flaring again, returned to b/l feet, now on elbows, on scalp. Ongoing small symmetrical small joint arthralgias with all day stiffness. L knee fx is healed, now getting gel injections in L knee, considering R as well if L goes well. + C spine pain with RUE radiation, positional at present. \par \par 11/18/22 -- Recent worsening of arthralgias with weather change, spine is the worst. Areas of rash on R foot that was responsive to Humira has recurred on Stelara, scalp also more itchy. S/p gel injections to b/l knees with improvement. Ongoing, somewhat worse fatigue but still able to do ADLs. Remainder of extra-articular inflammatory ROS negative, no infectious sx. \par \par 1/20/23 -- Rash not improved but stabilized, ongoing arthralgias/stiffness but no synovitis, no SE with Stelara. R thumb CMC OA related pain which is making use difficult. Fatigue may be somewhat improved, no extra-articular inflammatory sx. \par \par 5/12/23 -- Ongoing issues with ?tooth infection, s/p Abx course with improvement but mild sx have now recurred. No constitutional sx. Did hold meds x 1 week but now back on, remains on prednisone 10mg/day, some arthralgias but no worsening synovitis or rash, no extra-articular inflammatory sx. \par \par 7/28/23 -- Pulm nodules, PET negative for malignancy, skipped 1 Stelara dose.

## 2023-08-02 ENCOUNTER — APPOINTMENT (OUTPATIENT)
Dept: INFUSION THERAPY | Facility: HOSPITAL | Age: 63
End: 2023-08-02

## 2023-08-04 ENCOUNTER — APPOINTMENT (OUTPATIENT)
Dept: INFUSION THERAPY | Facility: HOSPITAL | Age: 63
End: 2023-08-04

## 2023-08-06 ENCOUNTER — RX RENEWAL (OUTPATIENT)
Age: 63
End: 2023-08-06

## 2023-08-07 ENCOUNTER — NON-APPOINTMENT (OUTPATIENT)
Age: 63
End: 2023-08-07

## 2023-08-08 DIAGNOSIS — U07.1 COVID-19: Chronic | ICD-10-CM

## 2023-08-23 ENCOUNTER — NON-APPOINTMENT (OUTPATIENT)
Age: 63
End: 2023-08-23

## 2023-08-23 DIAGNOSIS — J98.01 ACUTE BRONCHOSPASM: ICD-10-CM

## 2023-08-24 ENCOUNTER — RX RENEWAL (OUTPATIENT)
Age: 63
End: 2023-08-24

## 2023-09-05 ENCOUNTER — RX RENEWAL (OUTPATIENT)
Age: 63
End: 2023-09-05

## 2023-09-14 ENCOUNTER — NON-APPOINTMENT (OUTPATIENT)
Age: 63
End: 2023-09-14

## 2023-09-15 ENCOUNTER — APPOINTMENT (OUTPATIENT)
Dept: INFUSION THERAPY | Facility: HOSPITAL | Age: 63
End: 2023-09-15

## 2023-09-20 ENCOUNTER — RX RENEWAL (OUTPATIENT)
Age: 63
End: 2023-09-20

## 2023-09-22 ENCOUNTER — APPOINTMENT (OUTPATIENT)
Dept: HEMATOLOGY ONCOLOGY | Facility: CLINIC | Age: 63
End: 2023-09-22
Payer: COMMERCIAL

## 2023-09-22 ENCOUNTER — APPOINTMENT (OUTPATIENT)
Dept: INFUSION THERAPY | Facility: HOSPITAL | Age: 63
End: 2023-09-22

## 2023-09-22 VITALS
HEART RATE: 100 BPM | DIASTOLIC BLOOD PRESSURE: 81 MMHG | OXYGEN SATURATION: 96 % | WEIGHT: 284.37 LBS | SYSTOLIC BLOOD PRESSURE: 121 MMHG | TEMPERATURE: 97 F | RESPIRATION RATE: 17 BRPM | BODY MASS INDEX: 44.54 KG/M2

## 2023-09-22 DIAGNOSIS — Z79.811 LONG TERM (CURRENT) USE OF AROMATASE INHIBITORS: ICD-10-CM

## 2023-09-22 DIAGNOSIS — M94.0 CHONDROCOSTAL JUNCTION SYNDROME [TIETZE]: ICD-10-CM

## 2023-09-22 PROCEDURE — 99214 OFFICE O/P EST MOD 30 MIN: CPT

## 2023-09-22 RX ORDER — ANASTROZOLE TABLETS 1 MG/1
1 TABLET ORAL
Qty: 90 | Refills: 3 | Status: ACTIVE | COMMUNITY
Start: 2019-06-28 | End: 1900-01-01

## 2023-09-25 PROBLEM — M94.0 COSTOCHONDRITIS: Status: ACTIVE | Noted: 2023-09-25

## 2023-09-25 PROBLEM — Z79.811 USE OF AROMATASE INHIBITORS: Status: ACTIVE | Noted: 2021-10-01

## 2023-10-05 ENCOUNTER — RX RENEWAL (OUTPATIENT)
Age: 63
End: 2023-10-05

## 2023-10-18 ENCOUNTER — RX RENEWAL (OUTPATIENT)
Age: 63
End: 2023-10-18

## 2023-10-18 RX ORDER — ALBUTEROL SULFATE 90 UG/1
108 (90 BASE) INHALANT RESPIRATORY (INHALATION)
Qty: 1 | Refills: 2 | Status: ACTIVE | COMMUNITY
Start: 2023-08-23 | End: 1900-01-01

## 2023-11-08 ENCOUNTER — APPOINTMENT (OUTPATIENT)
Dept: GERIATRICS | Facility: CLINIC | Age: 63
End: 2023-11-08
Payer: COMMERCIAL

## 2023-11-08 VITALS
BODY MASS INDEX: 44.47 KG/M2 | DIASTOLIC BLOOD PRESSURE: 84 MMHG | TEMPERATURE: 96.4 F | OXYGEN SATURATION: 98 % | WEIGHT: 283.95 LBS | HEART RATE: 103 BPM | SYSTOLIC BLOOD PRESSURE: 148 MMHG | RESPIRATION RATE: 17 BRPM

## 2023-11-08 DIAGNOSIS — M25.50 PAIN IN UNSPECIFIED JOINT: ICD-10-CM

## 2023-11-08 PROCEDURE — 99213 OFFICE O/P EST LOW 20 MIN: CPT

## 2023-11-11 ENCOUNTER — RX RENEWAL (OUTPATIENT)
Age: 63
End: 2023-11-11

## 2023-11-11 RX ORDER — LISINOPRIL 5 MG/1
5 TABLET ORAL
Qty: 90 | Refills: 3 | Status: ACTIVE | COMMUNITY
Start: 2018-09-13 | End: 1900-01-01

## 2023-11-14 ENCOUNTER — INPATIENT (INPATIENT)
Facility: HOSPITAL | Age: 63
LOS: 2 days | Discharge: HOME | DRG: 603 | End: 2023-11-17
Attending: STUDENT IN AN ORGANIZED HEALTH CARE EDUCATION/TRAINING PROGRAM | Admitting: STUDENT IN AN ORGANIZED HEALTH CARE EDUCATION/TRAINING PROGRAM
Payer: COMMERCIAL

## 2023-11-14 ENCOUNTER — EMERGENCY (EMERGENCY)
Facility: HOSPITAL | Age: 63
LOS: 1 days | Discharge: ANOTHER HOSPITAL | End: 2023-11-14
Attending: STUDENT IN AN ORGANIZED HEALTH CARE EDUCATION/TRAINING PROGRAM | Admitting: EMERGENCY MEDICINE
Payer: COMMERCIAL

## 2023-11-14 ENCOUNTER — APPOINTMENT (OUTPATIENT)
Dept: FAMILY MEDICINE | Facility: CLINIC | Age: 63
End: 2023-11-14
Payer: COMMERCIAL

## 2023-11-14 VITALS
RESPIRATION RATE: 18 BRPM | OXYGEN SATURATION: 97 % | WEIGHT: 283.96 LBS | TEMPERATURE: 98 F | HEART RATE: 106 BPM | DIASTOLIC BLOOD PRESSURE: 73 MMHG | HEIGHT: 67 IN | SYSTOLIC BLOOD PRESSURE: 127 MMHG

## 2023-11-14 VITALS
RESPIRATION RATE: 20 BRPM | SYSTOLIC BLOOD PRESSURE: 145 MMHG | WEIGHT: 259.93 LBS | TEMPERATURE: 98 F | HEART RATE: 79 BPM | DIASTOLIC BLOOD PRESSURE: 82 MMHG | HEIGHT: 67 IN | OXYGEN SATURATION: 97 %

## 2023-11-14 VITALS
OXYGEN SATURATION: 96 % | HEIGHT: 67 IN | TEMPERATURE: 98.2 F | HEART RATE: 113 BPM | BODY MASS INDEX: 44.1 KG/M2 | DIASTOLIC BLOOD PRESSURE: 81 MMHG | WEIGHT: 281 LBS | RESPIRATION RATE: 16 BRPM | SYSTOLIC BLOOD PRESSURE: 149 MMHG

## 2023-11-14 VITALS
HEART RATE: 90 BPM | SYSTOLIC BLOOD PRESSURE: 133 MMHG | TEMPERATURE: 97 F | DIASTOLIC BLOOD PRESSURE: 54 MMHG | OXYGEN SATURATION: 95 % | RESPIRATION RATE: 18 BRPM

## 2023-11-14 VITALS — SYSTOLIC BLOOD PRESSURE: 137 MMHG | DIASTOLIC BLOOD PRESSURE: 80 MMHG

## 2023-11-14 DIAGNOSIS — Z90.13 ACQUIRED ABSENCE OF BILATERAL BREASTS AND NIPPLES: Chronic | ICD-10-CM

## 2023-11-14 DIAGNOSIS — Z90.11 ACQUIRED ABSENCE OF RIGHT BREAST AND NIPPLE: Chronic | ICD-10-CM

## 2023-11-14 DIAGNOSIS — Z98.890 OTHER SPECIFIED POSTPROCEDURAL STATES: Chronic | ICD-10-CM

## 2023-11-14 LAB
ALBUMIN SERPL ELPH-MCNC: 3.5 G/DL — SIGNIFICANT CHANGE UP (ref 3.3–5)
ALBUMIN SERPL ELPH-MCNC: 3.5 G/DL — SIGNIFICANT CHANGE UP (ref 3.3–5)
ALBUMIN SERPL ELPH-MCNC: 4 G/DL — SIGNIFICANT CHANGE UP (ref 3.3–5)
ALBUMIN SERPL ELPH-MCNC: 4 G/DL — SIGNIFICANT CHANGE UP (ref 3.3–5)
ALP SERPL-CCNC: 89 U/L — SIGNIFICANT CHANGE UP (ref 40–120)
ALP SERPL-CCNC: 89 U/L — SIGNIFICANT CHANGE UP (ref 40–120)
ALP SERPL-CCNC: 92 U/L — SIGNIFICANT CHANGE UP (ref 40–120)
ALP SERPL-CCNC: 92 U/L — SIGNIFICANT CHANGE UP (ref 40–120)
ALT FLD-CCNC: 18 U/L — SIGNIFICANT CHANGE UP (ref 10–45)
ALT FLD-CCNC: 18 U/L — SIGNIFICANT CHANGE UP (ref 10–45)
ALT FLD-CCNC: 22 U/L — SIGNIFICANT CHANGE UP (ref 10–45)
ALT FLD-CCNC: 22 U/L — SIGNIFICANT CHANGE UP (ref 10–45)
ANION GAP SERPL CALC-SCNC: 11 MMOL/L — SIGNIFICANT CHANGE UP (ref 5–17)
ANION GAP SERPL CALC-SCNC: 11 MMOL/L — SIGNIFICANT CHANGE UP (ref 5–17)
ANION GAP SERPL CALC-SCNC: 7 MMOL/L — SIGNIFICANT CHANGE UP (ref 5–17)
ANION GAP SERPL CALC-SCNC: 7 MMOL/L — SIGNIFICANT CHANGE UP (ref 5–17)
AST SERPL-CCNC: 13 U/L — SIGNIFICANT CHANGE UP (ref 10–40)
AST SERPL-CCNC: 13 U/L — SIGNIFICANT CHANGE UP (ref 10–40)
AST SERPL-CCNC: 16 U/L — SIGNIFICANT CHANGE UP (ref 10–40)
AST SERPL-CCNC: 16 U/L — SIGNIFICANT CHANGE UP (ref 10–40)
BASOPHILS # BLD AUTO: 0.03 K/UL — SIGNIFICANT CHANGE UP (ref 0–0.2)
BASOPHILS NFR BLD AUTO: 0.2 % — SIGNIFICANT CHANGE UP (ref 0–2)
BASOPHILS NFR BLD AUTO: 0.2 % — SIGNIFICANT CHANGE UP (ref 0–2)
BASOPHILS NFR BLD AUTO: 0.3 % — SIGNIFICANT CHANGE UP (ref 0–2)
BASOPHILS NFR BLD AUTO: 0.3 % — SIGNIFICANT CHANGE UP (ref 0–2)
BILIRUB SERPL-MCNC: 0.3 MG/DL — SIGNIFICANT CHANGE UP (ref 0.2–1.2)
BILIRUB SERPL-MCNC: 0.3 MG/DL — SIGNIFICANT CHANGE UP (ref 0.2–1.2)
BILIRUB SERPL-MCNC: 0.4 MG/DL — SIGNIFICANT CHANGE UP (ref 0.2–1.2)
BILIRUB SERPL-MCNC: 0.4 MG/DL — SIGNIFICANT CHANGE UP (ref 0.2–1.2)
BUN SERPL-MCNC: 7 MG/DL — SIGNIFICANT CHANGE UP (ref 7–23)
CALCIUM SERPL-MCNC: 9.3 MG/DL — SIGNIFICANT CHANGE UP (ref 8.4–10.5)
CALCIUM SERPL-MCNC: 9.3 MG/DL — SIGNIFICANT CHANGE UP (ref 8.4–10.5)
CALCIUM SERPL-MCNC: 9.4 MG/DL — SIGNIFICANT CHANGE UP (ref 8.4–10.5)
CALCIUM SERPL-MCNC: 9.4 MG/DL — SIGNIFICANT CHANGE UP (ref 8.4–10.5)
CHLORIDE SERPL-SCNC: 103 MMOL/L — SIGNIFICANT CHANGE UP (ref 96–108)
CO2 SERPL-SCNC: 26 MMOL/L — SIGNIFICANT CHANGE UP (ref 22–31)
CO2 SERPL-SCNC: 26 MMOL/L — SIGNIFICANT CHANGE UP (ref 22–31)
CO2 SERPL-SCNC: 27 MMOL/L — SIGNIFICANT CHANGE UP (ref 22–31)
CO2 SERPL-SCNC: 27 MMOL/L — SIGNIFICANT CHANGE UP (ref 22–31)
CREAT SERPL-MCNC: 0.59 MG/DL — SIGNIFICANT CHANGE UP (ref 0.5–1.3)
CREAT SERPL-MCNC: 0.59 MG/DL — SIGNIFICANT CHANGE UP (ref 0.5–1.3)
CREAT SERPL-MCNC: 0.65 MG/DL — SIGNIFICANT CHANGE UP (ref 0.5–1.3)
CREAT SERPL-MCNC: 0.65 MG/DL — SIGNIFICANT CHANGE UP (ref 0.5–1.3)
CRP SERPL-MCNC: 44 MG/L — HIGH (ref 0–4)
CRP SERPL-MCNC: 44 MG/L — HIGH (ref 0–4)
EGFR: 101 ML/MIN/1.73M2 — SIGNIFICANT CHANGE UP
EGFR: 101 ML/MIN/1.73M2 — SIGNIFICANT CHANGE UP
EGFR: 99 ML/MIN/1.73M2 — SIGNIFICANT CHANGE UP
EGFR: 99 ML/MIN/1.73M2 — SIGNIFICANT CHANGE UP
EOSINOPHIL # BLD AUTO: 0.02 K/UL — SIGNIFICANT CHANGE UP (ref 0–0.5)
EOSINOPHIL # BLD AUTO: 0.02 K/UL — SIGNIFICANT CHANGE UP (ref 0–0.5)
EOSINOPHIL # BLD AUTO: 0.08 K/UL — SIGNIFICANT CHANGE UP (ref 0–0.5)
EOSINOPHIL # BLD AUTO: 0.08 K/UL — SIGNIFICANT CHANGE UP (ref 0–0.5)
EOSINOPHIL NFR BLD AUTO: 0.1 % — SIGNIFICANT CHANGE UP (ref 0–6)
EOSINOPHIL NFR BLD AUTO: 0.1 % — SIGNIFICANT CHANGE UP (ref 0–6)
EOSINOPHIL NFR BLD AUTO: 0.7 % — SIGNIFICANT CHANGE UP (ref 0–6)
EOSINOPHIL NFR BLD AUTO: 0.7 % — SIGNIFICANT CHANGE UP (ref 0–6)
GLUCOSE SERPL-MCNC: 106 MG/DL — HIGH (ref 70–99)
GLUCOSE SERPL-MCNC: 106 MG/DL — HIGH (ref 70–99)
GLUCOSE SERPL-MCNC: 126 MG/DL — HIGH (ref 70–99)
GLUCOSE SERPL-MCNC: 126 MG/DL — HIGH (ref 70–99)
HCT VFR BLD CALC: 38.7 % — SIGNIFICANT CHANGE UP (ref 34.5–45)
HCT VFR BLD CALC: 38.7 % — SIGNIFICANT CHANGE UP (ref 34.5–45)
HCT VFR BLD CALC: 39.7 % — SIGNIFICANT CHANGE UP (ref 34.5–45)
HCT VFR BLD CALC: 39.7 % — SIGNIFICANT CHANGE UP (ref 34.5–45)
HGB BLD-MCNC: 12 G/DL — SIGNIFICANT CHANGE UP (ref 11.5–15.5)
HGB BLD-MCNC: 12 G/DL — SIGNIFICANT CHANGE UP (ref 11.5–15.5)
HGB BLD-MCNC: 12.6 G/DL — SIGNIFICANT CHANGE UP (ref 11.5–15.5)
HGB BLD-MCNC: 12.6 G/DL — SIGNIFICANT CHANGE UP (ref 11.5–15.5)
IMM GRANULOCYTES NFR BLD AUTO: 0.4 % — SIGNIFICANT CHANGE UP (ref 0–0.9)
IMM GRANULOCYTES NFR BLD AUTO: 0.4 % — SIGNIFICANT CHANGE UP (ref 0–0.9)
IMM GRANULOCYTES NFR BLD AUTO: 0.5 % — SIGNIFICANT CHANGE UP (ref 0–0.9)
IMM GRANULOCYTES NFR BLD AUTO: 0.5 % — SIGNIFICANT CHANGE UP (ref 0–0.9)
LYMPHOCYTES # BLD AUTO: 0.81 K/UL — LOW (ref 1–3.3)
LYMPHOCYTES # BLD AUTO: 0.81 K/UL — LOW (ref 1–3.3)
LYMPHOCYTES # BLD AUTO: 1.53 K/UL — SIGNIFICANT CHANGE UP (ref 1–3.3)
LYMPHOCYTES # BLD AUTO: 1.53 K/UL — SIGNIFICANT CHANGE UP (ref 1–3.3)
LYMPHOCYTES # BLD AUTO: 13.2 % — SIGNIFICANT CHANGE UP (ref 13–44)
LYMPHOCYTES # BLD AUTO: 13.2 % — SIGNIFICANT CHANGE UP (ref 13–44)
LYMPHOCYTES # BLD AUTO: 5.9 % — LOW (ref 13–44)
LYMPHOCYTES # BLD AUTO: 5.9 % — LOW (ref 13–44)
MCHC RBC-ENTMCNC: 29.9 PG — SIGNIFICANT CHANGE UP (ref 27–34)
MCHC RBC-ENTMCNC: 29.9 PG — SIGNIFICANT CHANGE UP (ref 27–34)
MCHC RBC-ENTMCNC: 30.2 PG — SIGNIFICANT CHANGE UP (ref 27–34)
MCHC RBC-ENTMCNC: 30.2 PG — SIGNIFICANT CHANGE UP (ref 27–34)
MCHC RBC-ENTMCNC: 31 GM/DL — LOW (ref 32–36)
MCHC RBC-ENTMCNC: 31 GM/DL — LOW (ref 32–36)
MCHC RBC-ENTMCNC: 31.7 GM/DL — LOW (ref 32–36)
MCHC RBC-ENTMCNC: 31.7 GM/DL — LOW (ref 32–36)
MCV RBC AUTO: 95.2 FL — SIGNIFICANT CHANGE UP (ref 80–100)
MCV RBC AUTO: 95.2 FL — SIGNIFICANT CHANGE UP (ref 80–100)
MCV RBC AUTO: 96.5 FL — SIGNIFICANT CHANGE UP (ref 80–100)
MCV RBC AUTO: 96.5 FL — SIGNIFICANT CHANGE UP (ref 80–100)
MONOCYTES # BLD AUTO: 0.41 K/UL — SIGNIFICANT CHANGE UP (ref 0–0.9)
MONOCYTES # BLD AUTO: 0.41 K/UL — SIGNIFICANT CHANGE UP (ref 0–0.9)
MONOCYTES # BLD AUTO: 0.77 K/UL — SIGNIFICANT CHANGE UP (ref 0–0.9)
MONOCYTES # BLD AUTO: 0.77 K/UL — SIGNIFICANT CHANGE UP (ref 0–0.9)
MONOCYTES NFR BLD AUTO: 3 % — SIGNIFICANT CHANGE UP (ref 2–14)
MONOCYTES NFR BLD AUTO: 3 % — SIGNIFICANT CHANGE UP (ref 2–14)
MONOCYTES NFR BLD AUTO: 6.6 % — SIGNIFICANT CHANGE UP (ref 2–14)
MONOCYTES NFR BLD AUTO: 6.6 % — SIGNIFICANT CHANGE UP (ref 2–14)
NEUTROPHILS # BLD AUTO: 12.39 K/UL — HIGH (ref 1.8–7.4)
NEUTROPHILS # BLD AUTO: 12.39 K/UL — HIGH (ref 1.8–7.4)
NEUTROPHILS # BLD AUTO: 9.15 K/UL — HIGH (ref 1.8–7.4)
NEUTROPHILS # BLD AUTO: 9.15 K/UL — HIGH (ref 1.8–7.4)
NEUTROPHILS NFR BLD AUTO: 78.7 % — HIGH (ref 43–77)
NEUTROPHILS NFR BLD AUTO: 78.7 % — HIGH (ref 43–77)
NEUTROPHILS NFR BLD AUTO: 90.4 % — HIGH (ref 43–77)
NEUTROPHILS NFR BLD AUTO: 90.4 % — HIGH (ref 43–77)
NRBC # BLD: 0 /100 WBCS — SIGNIFICANT CHANGE UP (ref 0–0)
PLATELET # BLD AUTO: 312 K/UL — SIGNIFICANT CHANGE UP (ref 150–400)
PLATELET # BLD AUTO: 312 K/UL — SIGNIFICANT CHANGE UP (ref 150–400)
PLATELET # BLD AUTO: 336 K/UL — SIGNIFICANT CHANGE UP (ref 150–400)
PLATELET # BLD AUTO: 336 K/UL — SIGNIFICANT CHANGE UP (ref 150–400)
POTASSIUM SERPL-MCNC: 3.9 MMOL/L — SIGNIFICANT CHANGE UP (ref 3.5–5.3)
POTASSIUM SERPL-MCNC: 3.9 MMOL/L — SIGNIFICANT CHANGE UP (ref 3.5–5.3)
POTASSIUM SERPL-MCNC: 4.2 MMOL/L — SIGNIFICANT CHANGE UP (ref 3.5–5.3)
POTASSIUM SERPL-MCNC: 4.2 MMOL/L — SIGNIFICANT CHANGE UP (ref 3.5–5.3)
POTASSIUM SERPL-SCNC: 3.9 MMOL/L — SIGNIFICANT CHANGE UP (ref 3.5–5.3)
POTASSIUM SERPL-SCNC: 3.9 MMOL/L — SIGNIFICANT CHANGE UP (ref 3.5–5.3)
POTASSIUM SERPL-SCNC: 4.2 MMOL/L — SIGNIFICANT CHANGE UP (ref 3.5–5.3)
POTASSIUM SERPL-SCNC: 4.2 MMOL/L — SIGNIFICANT CHANGE UP (ref 3.5–5.3)
PROT SERPL-MCNC: 7 G/DL — SIGNIFICANT CHANGE UP (ref 6–8.3)
PROT SERPL-MCNC: 7 G/DL — SIGNIFICANT CHANGE UP (ref 6–8.3)
PROT SERPL-MCNC: 7.3 G/DL — SIGNIFICANT CHANGE UP (ref 6–8.3)
PROT SERPL-MCNC: 7.3 G/DL — SIGNIFICANT CHANGE UP (ref 6–8.3)
RBC # BLD: 4.01 M/UL — SIGNIFICANT CHANGE UP (ref 3.8–5.2)
RBC # BLD: 4.01 M/UL — SIGNIFICANT CHANGE UP (ref 3.8–5.2)
RBC # BLD: 4.17 M/UL — SIGNIFICANT CHANGE UP (ref 3.8–5.2)
RBC # BLD: 4.17 M/UL — SIGNIFICANT CHANGE UP (ref 3.8–5.2)
RBC # FLD: 14.7 % — HIGH (ref 10.3–14.5)
RBC # FLD: 14.7 % — HIGH (ref 10.3–14.5)
RBC # FLD: 14.8 % — HIGH (ref 10.3–14.5)
RBC # FLD: 14.8 % — HIGH (ref 10.3–14.5)
SODIUM SERPL-SCNC: 137 MMOL/L — SIGNIFICANT CHANGE UP (ref 135–145)
SODIUM SERPL-SCNC: 137 MMOL/L — SIGNIFICANT CHANGE UP (ref 135–145)
SODIUM SERPL-SCNC: 140 MMOL/L — SIGNIFICANT CHANGE UP (ref 135–145)
SODIUM SERPL-SCNC: 140 MMOL/L — SIGNIFICANT CHANGE UP (ref 135–145)
URATE SERPL-MCNC: 3.4 MG/DL — SIGNIFICANT CHANGE UP (ref 2.5–7)
URATE SERPL-MCNC: 3.4 MG/DL — SIGNIFICANT CHANGE UP (ref 2.5–7)
WBC # BLD: 11.62 K/UL — HIGH (ref 3.8–10.5)
WBC # BLD: 11.62 K/UL — HIGH (ref 3.8–10.5)
WBC # BLD: 13.71 K/UL — HIGH (ref 3.8–10.5)
WBC # BLD: 13.71 K/UL — HIGH (ref 3.8–10.5)
WBC # FLD AUTO: 11.62 K/UL — HIGH (ref 3.8–10.5)
WBC # FLD AUTO: 11.62 K/UL — HIGH (ref 3.8–10.5)
WBC # FLD AUTO: 13.71 K/UL — HIGH (ref 3.8–10.5)
WBC # FLD AUTO: 13.71 K/UL — HIGH (ref 3.8–10.5)

## 2023-11-14 PROCEDURE — 96367 TX/PROPH/DG ADDL SEQ IV INF: CPT

## 2023-11-14 PROCEDURE — 85025 COMPLETE CBC W/AUTO DIFF WBC: CPT

## 2023-11-14 PROCEDURE — 70481 CT ORBIT/EAR/FOSSA W/DYE: CPT | Mod: MA

## 2023-11-14 PROCEDURE — 36415 COLL VENOUS BLD VENIPUNCTURE: CPT

## 2023-11-14 PROCEDURE — 96361 HYDRATE IV INFUSION ADD-ON: CPT

## 2023-11-14 PROCEDURE — 99214 OFFICE O/P EST MOD 30 MIN: CPT

## 2023-11-14 PROCEDURE — L9997: CPT

## 2023-11-14 PROCEDURE — 86140 C-REACTIVE PROTEIN: CPT

## 2023-11-14 PROCEDURE — 70481 CT ORBIT/EAR/FOSSA W/DYE: CPT | Mod: 26,MA

## 2023-11-14 PROCEDURE — 99285 EMERGENCY DEPT VISIT HI MDM: CPT | Mod: 25

## 2023-11-14 PROCEDURE — 85652 RBC SED RATE AUTOMATED: CPT

## 2023-11-14 PROCEDURE — 99291 CRITICAL CARE FIRST HOUR: CPT

## 2023-11-14 PROCEDURE — 96365 THER/PROPH/DIAG IV INF INIT: CPT | Mod: XU

## 2023-11-14 PROCEDURE — 80053 COMPREHEN METABOLIC PANEL: CPT

## 2023-11-14 RX ORDER — HYALURONATE SODIUM, STABILIZED 88 MG/4 ML
88 SYRINGE (ML) INTRAARTICULAR
Qty: 1 | Refills: 0 | Status: DISCONTINUED | COMMUNITY
Start: 2022-07-13 | End: 2023-11-14

## 2023-11-14 RX ORDER — VANCOMYCIN HCL 1 G
1000 VIAL (EA) INTRAVENOUS ONCE
Refills: 0 | Status: COMPLETED | OUTPATIENT
Start: 2023-11-14 | End: 2023-11-14

## 2023-11-14 RX ORDER — MORPHINE SULFATE 50 MG/1
4 CAPSULE, EXTENDED RELEASE ORAL ONCE
Refills: 0 | Status: DISCONTINUED | OUTPATIENT
Start: 2023-11-14 | End: 2023-11-14

## 2023-11-14 RX ORDER — OXYCODONE HYDROCHLORIDE 5 MG/1
5 TABLET ORAL ONCE
Refills: 0 | Status: DISCONTINUED | OUTPATIENT
Start: 2023-11-14 | End: 2023-11-14

## 2023-11-14 RX ORDER — SODIUM CHLORIDE 9 MG/ML
1000 INJECTION INTRAMUSCULAR; INTRAVENOUS; SUBCUTANEOUS ONCE
Refills: 0 | Status: COMPLETED | OUTPATIENT
Start: 2023-11-14 | End: 2023-11-14

## 2023-11-14 RX ORDER — CEFTRIAXONE 500 MG/1
2000 INJECTION, POWDER, FOR SOLUTION INTRAMUSCULAR; INTRAVENOUS ONCE
Refills: 0 | Status: COMPLETED | OUTPATIENT
Start: 2023-11-14 | End: 2023-11-15

## 2023-11-14 RX ORDER — CEFTRIAXONE 500 MG/1
2000 INJECTION, POWDER, FOR SOLUTION INTRAMUSCULAR; INTRAVENOUS ONCE
Refills: 0 | Status: COMPLETED | OUTPATIENT
Start: 2023-11-14 | End: 2023-11-14

## 2023-11-14 RX ORDER — DOXYCYCLINE 100 MG/1
100 TABLET, FILM COATED ORAL
Qty: 14 | Refills: 0 | Status: COMPLETED | COMMUNITY
Start: 2022-12-09 | End: 2023-11-14

## 2023-11-14 RX ORDER — NIRMATRELVIR AND RITONAVIR 300-100 MG
20 X 150 MG & KIT ORAL
Qty: 1 | Refills: 0 | Status: COMPLETED | COMMUNITY
Start: 2023-08-08 | End: 2023-11-14

## 2023-11-14 RX ORDER — HYALURONATE SODIUM, STABILIZED 88 MG/4 ML
88 SYRINGE (ML) INTRAARTICULAR
Qty: 1 | Refills: 0 | Status: DISCONTINUED | COMMUNITY
Start: 2022-08-17 | End: 2023-11-14

## 2023-11-14 RX ORDER — MELOXICAM 15 MG/1
15 TABLET ORAL
Qty: 90 | Refills: 1 | Status: DISCONTINUED | COMMUNITY
Start: 2020-05-20 | End: 2023-11-14

## 2023-11-14 RX ADMIN — CEFTRIAXONE 100 MILLIGRAM(S): 500 INJECTION, POWDER, FOR SOLUTION INTRAMUSCULAR; INTRAVENOUS at 17:19

## 2023-11-14 RX ADMIN — OXYCODONE HYDROCHLORIDE 5 MILLIGRAM(S): 5 TABLET ORAL at 18:05

## 2023-11-14 RX ADMIN — Medication 1000 MILLIGRAM(S): at 16:45

## 2023-11-14 RX ADMIN — OXYCODONE HYDROCHLORIDE 5 MILLIGRAM(S): 5 TABLET ORAL at 21:42

## 2023-11-14 RX ADMIN — SODIUM CHLORIDE 1000 MILLILITER(S): 9 INJECTION INTRAMUSCULAR; INTRAVENOUS; SUBCUTANEOUS at 15:02

## 2023-11-14 RX ADMIN — SODIUM CHLORIDE 1000 MILLILITER(S): 9 INJECTION INTRAMUSCULAR; INTRAVENOUS; SUBCUTANEOUS at 16:02

## 2023-11-14 RX ADMIN — Medication 250 MILLIGRAM(S): at 15:45

## 2023-11-14 RX ADMIN — CEFTRIAXONE 2000 MILLIGRAM(S): 500 INJECTION, POWDER, FOR SOLUTION INTRAMUSCULAR; INTRAVENOUS at 17:49

## 2023-11-14 RX ADMIN — OXYCODONE HYDROCHLORIDE 5 MILLIGRAM(S): 5 TABLET ORAL at 17:40

## 2023-11-14 NOTE — ED ADULT NURSE NOTE - OBJECTIVE STATEMENT
Patient came from home with complaint of L eye redness that started about 24 hours ago. Patient complaint of swelling and redness to the eye. Denies any nausea, vomit, chest pain or SOB.

## 2023-11-14 NOTE — ED PROVIDER NOTE - PHYSICAL EXAMINATION
PHYSICAL EXAM:  CONSTITUTIONAL: Well appearing, awake, alert, oriented to person, place, time/situation and in no apparent distress.  HEAD: Atraumatic  EYES:  periorbital edema and erythema around the left eye, ecchymosis to the left eye.  Extraocular motion is intact and without pain.    ENMT: Airway patent, Nasal mucosa clear. Mouth with normal mucosa. Uvula is midline.   CARDIAC: Normal rate, regular rhythm. +S1/S2. No murmurs, rubs or gallops.  RESPIRATORY: Breathing unlabored. Breath sounds clear and equal bilaterally.  ABDOMEN:  Soft, nontender, nondistended. No rebound tenderness or guarding.  NEUROLOGICAL: Alert and oriented, no focal deficits, no motor or sensory deficits. CN2-12 intact. Sensation intact x4 extremities.  SKIN: Skin warm and dry. No evidence of rashes or lesions.

## 2023-11-14 NOTE — ED PROVIDER NOTE - CRITICAL CARE ATTENDING CONTRIBUTION TO CARE
Upon my evaluation, this patient had a high probability of imminent or lifethreatening deterioration due to left periorbital vs orbital cellulitis in immunocompromised patient, scleritis, possible optic neuritis, which required my direct attention, intervention, and personal management.     I have personally provided 31 minutes of critical care time exclusive of time spent on separately billable procedures. Time includes review of laboratory data, radiology results, discussion with consultants, and monitoring for potential decompensation. Interventions were performed as documented above.    - Walter Ortega MD, Emergency Medicine and Medical Toxicology Attending.

## 2023-11-14 NOTE — ED ADULT NURSE NOTE - NSICDXPASTMEDICALHX_GEN_ALL_CORE_FT
PAST MEDICAL HISTORY:  Acquired deviated nasal septum     Anemia related to post chemo    Anxiety     Breast cancer right HER2 positive and estrogen positive. 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

## 2023-11-14 NOTE — ED ADULT TRIAGE NOTE - MEANS OF ARRIVAL
ambulatory
What Is The Reason For Today's Visit?: Full Body Skin Examination
What Is The Reason For Today's Visit? (Being Monitored For X): concerning skin lesions on an annual basis

## 2023-11-14 NOTE — ED ADULT NURSE NOTE - NSFALLUNIVINTERV_ED_ALL_ED
Bed/Stretcher in lowest position, wheels locked, appropriate side rails in place/Call bell, personal items and telephone in reach/Instruct patient to call for assistance before getting out of bed/chair/stretcher/Non-slip footwear applied when patient is off stretcher/[place] to call system/Physically safe environment - no spills, clutter or unnecessary equipment/Purposeful proactive rounding/Room/bathroom lighting operational, light cord in reach

## 2023-11-14 NOTE — CONSULT NOTE ADULT - ASSESSMENT
Assessment and Recommendations:  63y female with a past medical history/ocular history of psoriatic arthritis on methotrexate/Skyrizi, breast cancer s/p bilateral mastectomy, retinal scarring OS, high myopia consulted for left eye swelling, redness, and pain, found to have chemosis, temporal injection, and eye pain, with periorbital swelling, and findings on CT concerning for anterior scleritis and preseptal cellulitis.    #Preseptal cellulitis, OS  #Anterior scleritis, OS  - patient with cold symptoms prior to presentation, but denies fevers, new medications, rashes, trauma, no recent vaccinations  - swelling has progressively gotten worse and patient complaining of left eye pain  - VA 20/40 PHNI OS (has had chronic poor vision due to retinal scarring)  - IOP wnl, EOMI but mild abduction defecit 2/2 chemosis inferotemporal, CVF full, color vision full, no rAPD  - anterior exam OD wnl  - anterior exam OS with temporal chemosis, significant injection and mild subconj hemorrhages  - anterior chamber OS quiet with no flare or cell  - dilated exam appears consistent with description 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, no vitritis, no hemorrhages  - CT scan as above  - normal appearing nerve for high myopia, no obvious nerve edema, pupils normally reactive and color vision full, low suspicion for ocular nerve involvement  - B-scan performed OS as well with no obvious T-sign of posterior scleritis  - differential diagnosis includes preseptal cellulitis, nodular vs diffuse anterior scleritis, may be inflammatory or infectious etiologies  - admit for IV antibiotics given immunocompromised status  - start oral NSAID (flurbiprofen 100mg TID or naproxen 500 mg BID) with PPI (omeprazole 20mg qdaily)  - start topical maxitrol QID OS  - obtain CBC w/ diff, creatinine, ESR/CRP, uric acid, syphilis screen, RF, ANCA, ACE, CH50, lyme antibody  - will be seen in AM with attending    #Forniceal pigmentation, OS  - may require biopsy as pigmentation in the fornix can be concerning   - presentation not able to fully appreciate if pigmentation as patient has many small hemorrhages and petechiae  - should have outpatient follow up    Outpatient Follow-up: Patient should follow-up with his/her ophthalmologist or with [facility] Department of Ophthalmology within 1 week of after discharge at:    [address] [facility] [facility]  [place] [address]  [phone]    Lalit Valverde MD, PGY3  Also available on [employer] Teams

## 2023-11-14 NOTE — ED PROVIDER NOTE - OBJECTIVE STATEMENT
63-year-old female with a history of psoriatic arthritis on methotrexate and Skyrizi, history of breast cancer status post bilateral mastectomy presenting with left eye swelling and pain.   Patient states that on Sunday she noticed some swelling around her eye which progressively worsened.  Yesterday noticed redness and puffiness to the eye itself and decided to present to the emergency department.  Denies vision changes in the affected eye but does endorse severe pain to the face extending from teeth to temple surrounding the left eye. 63-year-old female with a history of psoriatic arthritis on methotrexate and Skyrizi, history of breast cancer status post bilateral mastectomy presenting with left eye swelling and pain.   Patient states that on Sunday she noticed some swelling around her eye which progressively worsened.  Yesterday noticed redness and puffiness to the eye itself and decided to present to the emergency department.  Denies vision changes in the affected eye but does endorse severe pain to the face extending from teeth to temple surrounding the left eye. Denies fever chills chest pain shortness of breath nausea vomiting 63-year-old female with a history of psoriatic arthritis on methotrexate and Skyrizi, history of breast cancer status post bilateral mastectomy presenting with left eye swelling and pain.   Patient states that on Sunday she noticed some swelling around her eye which progressively worsened.  Yesterday noticed redness and puffiness to the eye itself and decided to present to the emergency department. Findings of periorbital cellulitis, conjunctivitis/scleritis on CT at [place], abx given, transferred for ophtho. Denies vision changes in the affected eye but does endorse severe pain to the face extending from teeth to temple surrounding the left eye. Denies fever chills chest pain shortness of breath nausea vomiting 63-year-old female with a history of psoriatic arthritis on methotrexate and Skyrizi, history of breast cancer status post bilateral mastectomy presenting with left eye swelling and pain.   Patient states that on Sunday she noticed some swelling around her eye which progressively worsened.  Yesterday noticed redness and puffiness to the eye itself and decided to present to the emergency department. Findings of periorbital cellulitis, conjunctivitis/scleritis on CT at [place], abx given, transferred for ophtho. Denies vision changes in the affected eye but does endorse severe pain to the face extending from teeth to temple surrounding the left eye. Denies fever chills chest pain shortness of breath nausea vomiting 63-year-old female with a history of psoriatic arthritis on methotrexate and Skyrizi, history of breast cancer status post bilateral mastectomy presenting with left eye swelling and pain.   Patient states that on Sunday she noticed some swelling around her eye which progressively worsened.  Yesterday noticed redness and puffiness to the eye itself and decided to present to the emergency department. Findings of periorbital cellulitis, conjunctivitis/scleritis on CT at [place], abx given, transferred for ophtho. Denies vision changes in the affected eye but does endorse severe pain to the face extending from teeth to temple surrounding the left eye. Denies fever chills chest pain shortness of breath nausea vomiting

## 2023-11-14 NOTE — ED PROVIDER NOTE - CLINICAL SUMMARY MEDICAL DECISION MAKING FREE TEXT BOX
63F PMHx of psoriatic arthritis (methotrexate, prednisone, humira), htn presenting with left eye swelling x 1-2 days. Describes onset of mild erythema to periorbital left eye, with swelling and worsening today. A/w mild blurry vision and tearing of the left eye. Denies any chest pain, abdominal pain, shortness of breath, nausea/vomiting, headaches, fevers, chills, diarrhea ,constipation, weakness, syncope, hematuria, dysuria, urinary symptoms, subjective neurological deficits.  DDx: periorbital cellulitis, orbital cellulitis  ED course, interpretation of imaging studies, and consults:   - CT orbits IVC - showing left periorbital cellulitis w/ scleritis and possible optic neuritis.   - [facility] Ophthalmology consulted, recommend transfer  Disposition: TRSF 63F PMHx of psoriatic arthritis (methotrexate, prednisone, humira), htn presenting with left eye swelling x 1-2 days. Describes onset of mild erythema to periorbital left eye, with swelling and worsening today. A/w mild blurry vision and tearing of the left eye. Denies any chest pain, abdominal pain, shortness of breath, nausea/vomiting,  fevers, chills,  weakness, syncope, hematuria, dysuria, urinary symptoms, subjective neurological deficits, other visual complaints.   DDx: periorbital cellulitis, orbital cellulitis  ED course, interpretation of imaging studies, and consults:   - CT orbits IVC - showing left periorbital cellulitis w/ scleritis and possible optic neuritis.   - s/p vanco/ceftriaxone, IVF  - Likely will admit since immunocompromised but may need ophthalmology.  - [facility] Ophthalmology consulted, case discussed, recommend transfer  Disposition: TRSF

## 2023-11-14 NOTE — ED PROVIDER NOTE - OBJECTIVE STATEMENT
63F PMHx of psoriatic arthritis (methotrexate, prednisone, humira), htn presenting with left eye swelling 63F PMHx of psoriatic arthritis (methotrexate, prednisone, humira), htn presenting with left eye swelling x 1-2 days. Describes onset of mild erythema to periorbital left eye, with swelling and worsening today. A/w mild blurry vision and tearing of the left eye. Denies any chest pain, abdominal pain, shortness of breath, nausea/vomiting, headaches, fevers, chills, diarrhea ,constipation, weakness, syncope, hematuria, dysuria, urinary symptoms, subjective neurological deficits.

## 2023-11-14 NOTE — ED PROVIDER NOTE - PHYSICAL EXAMINATION
VITAL SIGNS: I have reviewed nursing notes and confirm.   GEN: Well-developed; well-nourished; in no acute distress. Speaking full sentences.  SKIN: Warm, pink, no rash, no diaphoresis, no cyanosis, well perfused.   HEAD: Normocephalic; atraumatic. No scalp lacerations, no abrasions.  NECK: Supple; non tender.   EYES: Pupils 3mm equal and reactive sluggish on left side, (+) scleritis/chemosis of left eye, (+) periorbital erythema to left eye and left maxilla. No red streaking. Mild swelling. Mild ttp. No proptosis   ENT: No nasal discharge; airway clear. Trachea is midline. Normal dentition.  CV: RRR. S1, S2 normal; no murmurs, gallops, or rubs. Capillary refill < 2 seconds throughout. Distal pulses intact 2+ throughout.  RESP: CTA bilaterally. No wheezes, rales, or rhonchi.   ABD: Normal bowel sounds, soft, non-distended, non-tender, no rebound, no guarding, no rigidity, no hepatosplenomegaly.  MSK: Normal range of motion and movement of all 4 extremities. No apparent joint or muscular pain throughout.    BACK: No thoracolumbar midline or paravertebral tenderness. No step-offs or obvious deformities.  NEURO: Alert & oriented x 3, Grossly unremarkable. Sensory and motor intact throughout. No focal deficits. Normal speech and coordination.

## 2023-11-14 NOTE — ED PROVIDER NOTE - CLINICAL SUMMARY MEDICAL DECISION MAKING FREE TEXT BOX
63-year-old female with a history of psoriatic arthritis on methotrexate and Skyrizi, history of breast cancer status post bilateral mastectomy presenting with left eye swelling and pain.   Vital signs nonactionable.  Exam with periorbital swelling and erythema to the left eye which was tender to palpation.  Patient with extraocular motions intact without pain.  Patient has chemosis to the affected eye.  will consult optho to eval orbital cellulitis vs optic neuritis vs other acute pathology 63-year-old female with a history of psoriatic arthritis on methotrexate and Skyrizi, history of breast cancer status post bilateral mastectomy presenting with left eye swelling and pain.  Findings of periorbital cellulitis, conjunctivitis/scleritis on CT at [place], abx given, transferred for ophtho. Vital signs nonactionable.  Exam with periorbital swelling and erythema to the left eye which was tender to palpation.  Patient with extraocular motions intact without pain.  Patient has chemosis to the affected eye.  Visual acuity intact bilat. Plan to continue abx. Will discuss with optho to eval orbital cellulitis vs rheum etiology/optic neuritis vs other acute pathology. 63-year-old female with a history of psoriatic arthritis on methotrexate and Skyrizi, history of breast cancer status post bilateral mastectomy presenting with left eye swelling and pain.  Findings of periorbital cellulitis, conjunctivitis/scleritis on CT at [place], abx given, transferred for ophtho. Vital signs nonactionable.  Exam with periorbital swelling and erythema to the left eye which was tender to palpation.  Patient with extraocular motions intact without pain.  Patient has chemosis to the affected eye.  Visual acuity intact bilat. Plan to continue abx. Will discuss with optho to eval orbital cellulitis vs rheum etiology/optic neuritis vs other acute pathology. 63-year-old female with a history of psoriatic arthritis on methotrexate and Skyrizi, history of breast cancer status post bilateral mastectomy presenting with left eye swelling and pain.  Findings of periorbital cellulitis, conjunctivitis/scleritis on CT at [place], abx given, transferred for ophtho. Vital signs nonactionable.  Exam with periorbital swelling and erythema to the left eye which was tender to palpation.  Patient with extraocular motions intact without pain.  Patient has chemosis to the affected eye.  Visual acuity intact bilat. Plan to continue abx. Will discuss with optho to eval orbital cellulitis vs rheum etiology/optic neuritis vs other acute pathology.

## 2023-11-14 NOTE — ED PROVIDER NOTE - ATTENDING CONTRIBUTION TO CARE
I, Deangelo Truong, have performed a history and physical exam on this patient, and discussed their management with the resident. I have fully participated in the care of this patient. I agree with the history, physical exam, and plan as documented by the resident.

## 2023-11-14 NOTE — CONSULT NOTE ADULT - SUBJECTIVE AND OBJECTIVE BOX
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   Patient states that on Sunday she noticed some swelling around her eye which progressively worsened.  Yesterday noticed redness and puffiness to the eye itself and decided to present to the emergency department.  Denies vision changes in the affected eye but does endorse severe pain to the face extending from teeth to temple surrounding the left eye. Denies fever chills chest pain shortness of breath nausea vomiting.     Interval History: patient denies any flashes, floaters, no changes in vision, no diplopia. Mild eye pain on left gaze, but generalized pain around the eye. Patient follows with retina outpatient but cannot remember the doctor's name. Denies any ocular surgery prior.    Past Medical History: psoriatic arthritis on methotrexate/Skyrizi; breast cancer s/p bilateral mastectomy  Past Ocular History: retinal scarring OS, high myopia  Drops: denies  Medications: methotrexate, Skyrizi, prednisone 10 mg (outpatient rheum)  Allergies: amoxicillin, aspirin, Lorabid, penicillin  Family History: denies  Surgical History: denies  Outpatient Ophthalmologist: non-[facility]    Review of Systems:  Constitutional: +recent illness, No fever, chills  Eyes: +eye redness OS, +pain OS, +periorbital swelling OS; No blurry vision, flashes, floaters, FBS, erythema, discharge, double vision, OU  Neuro: No tremors  Cardiovascular: No chest pain, palpitations  Respiratory: No SOB, no cough  GI: No nausea, vomiting, abdominal pain    Vital Signs: T(C): 36.7 (11-14-23 @ 21:47)  T(F): 98 (11-14-23 @ 21:47), Max: 98.2 (11-14-23 @ 14:15)  HR: 63 (11-14-23 @ 21:47) (63 - 106)  BP: 144/81 (11-14-23 @ 21:47) (127/73 - 145/82)  RR:  (16 - 20)  SpO2:  (95% - 99%)  Wt(kg): --  AAOx4    Ophthalmology Exam:  Visual acuity (cc): 20/20 OD. 20/40 OS PHNI.   Pupils: PERRL OU, no APD, normal reactivity, brisk  Intraocular Pressure:  GAT 15 OD, 16 OS  Extraocular movements (EOMs): Full OU, no diplopia. trace limitation on abduction OS 2/2 chemosis, pain on left gaze  Confrontational Visual Field (CVF): Full OD. Full OS  Color Plates: 12/12 OU.    Slit Lamp Exam  External: Normal OD. mild periorbital edema, moderate periorbital erythema and tenderness to palpation  Lids/Lashes/Lacrimal Ducts: Flat OD. mild edema and erythema OS, inferior forniceal pigmentation? OS  Sclera/Conjunctiva: White and quiet OD. 2+ injection primarily inferotemporal, 2+ chemosis OS  Cornea: DTBUT OU  Anterior Chamber: Deep and formed OU. no cell, no flare, AC quiet OS  Iris: Flat OU.  Lens: Clear OU.    Fundus Exam: dilated with 1% tropicamide and 2.5% phenylephrine  Approval obtained from primary team for dilation  Patient aware that pupils can remained dilated for at least 4-6 hours.  Exam performed with 20 D lens    Vitreous: wnl OU  Disc, cup/disc: sharp and pink, 0.4 OU, PPA, staphyloma vs myopic nerve appearance, no edema  Macula: scarring OS > OD  Vessels: wnl OU  Periphery: wnl OD. several old CRS OS    Labs/Imaging:  < from: CT Orbit w/ IV Cont (11.14.23 @ 17:01) >    There is hyperenhancement and thickening of the left orbital conjunctiva   and mild periorbital cellulitic fat stranding. Thin lower density may   represent subconjunctival edema rather than abscess. Subtle asymmetric   enhancementinvolves the lateral sclera of the globe which may be   contiguous scleritis. There is no postseptal fat stranding or mass   effect. The globe appears preserved and symmetric in contour without   gross intravitreous finding.    On sagittal images, when corrected for angle, subtle asymmetric   enhancement perceived in the anterior optic nerve (see key image, and   also visible on coronal image 69). This is questionable for optic   neuritis which may be infectious and potentially viral in the   immunocompromised state. MRI advised, particularly if there is vision   loss. Extraocular muscles and lacrimal glands are grossly symmetrical.    The bony orbits are preserved as is the skull base and included facial   bones. Paranasal sinuses and mastoids are free of acute disease, clear   aside from mild mucosal thickening in the left ethmoids. No fluid level.    Included intracranial compartment is unremarkable.      IMPRESSION:    Left periorbital cellulitis with conjunctivitis and mild scleritis  suggested. No drainable abscess.    Subtle asymmetry detected at the left anterior optic nerve, query   infectious optic neuritis in this immunocompromised patient and orbit MRI   advised for better tissue contrast resolution.    < end of copied text >

## 2023-11-14 NOTE — ED PROVIDER NOTE - NSICDXPASTSURGICALHX_GEN_ALL_CORE_FT
PAST SURGICAL HISTORY:  H/O bilateral mastectomy preop chemo 08/2018 right breast 03/2019, left [id number] with reconstruction deisy flap,    H/O mastectomy, right with expander placement 3/2019    History of laparoscopy 1980s for endometriosis    S/P FESS (functional endoscopic sinus surgery) turbinectomy 1997     PAST SURGICAL HISTORY:  H/O bilateral mastectomy preop chemo 08/2018 right breast 03/2019, left [id number] with reconstruction deisy flap,    H/O mastectomy, right with expander placement 3/2019    History of laparoscopy 1980s for endometriosis    S/P FESS (functional endoscopic sinus surgery) turbinectomy 1997     PAST SURGICAL HISTORY:  H/O bilateral mastectomy preop chemo 08/2018 right breast 03/2019, left [id number] with reconstruction deisy flap,    H/O mastectomy, right with expander placement 3/2019    History of laparoscopy 1980s for endometriosis    S/P FESS (functional endoscopic sinus surgery) turbinectomy 1997

## 2023-11-14 NOTE — ED PROVIDER NOTE - NSICDXPASTSURGICALHX_GEN_ALL_CORE_FT
PAST SURGICAL HISTORY:  H/O bilateral mastectomy preop chemo 08/2018 right breast 03/2019, left [id number] with reconstruction deisy flap,    H/O mastectomy, right with expander placement 3/2019    History of laparoscopy 1980s for endometriosis    S/P FESS (functional endoscopic sinus surgery) turbinectomy 1997

## 2023-11-14 NOTE — ED ADULT NURSE NOTE - OBJECTIVE STATEMENT
patient is a 64 y/o F with hx of breast CA s/p b/l mastectomy, OA, neuropathy, obesity, anemia, HTN transfer from [place] for optho. patient states that she has been experiencing left eye swelling on sunday which has progressively worsened becoming more painful and red. patient diagnosed with left periorbital cellulitis with scleritis and was transferred for optho consult. patient with red, swollen left eye with red sclera. patient denies vision changes/fevers. patient received 2g ceftriaxone and 1g vancomycin prior to transfer. patient resting in NAD. respirations even and unlabored. abdomen soft, nondistended. do not use bracelet in place on RUE. optho at bedside to assess

## 2023-11-14 NOTE — ED ADULT NURSE NOTE - NSICDXPASTSURGICALHX_GEN_ALL_CORE_FT
PAST SURGICAL HISTORY:  H/O bilateral mastectomy preop chemo 08/2018 right breast 03/2019, left [id number] with reconstruction deisy flap,    H/O mastectomy, right with expander placement 3/2019    History of laparoscopy 1980s for endometriosis    S/P FESS (functional endoscopic sinus surgery) turbinectomy 1997

## 2023-11-14 NOTE — ED ADULT NURSE NOTE - NSICDXPASTSURGICALHX_GEN_ALL_CORE_FT
PAST SURGICAL HISTORY:  H/O bilateral mastectomy preop chemo 08/2018 right breast 03/2019, left [id number] with reconstruction deisy flap,    H/O mastectomy, right with expander placement 3/2019    History of laparoscopy 1980s for endometriosis    S/P FESS (functional endoscopic sinus surgery) turbinectomy 1997

## 2023-11-14 NOTE — ED ADULT TRIAGE NOTE - BP NONINVASIVE DIASTOLIC (MM HG)
per ems, patient had sudden onset of ams due to seizure, patient on Keppra, patient stated "im fine don't listen to anybody" alert and oriented. 82

## 2023-11-15 DIAGNOSIS — L03.213 PERIORBITAL CELLULITIS: ICD-10-CM

## 2023-11-15 DIAGNOSIS — G43.909 MIGRAINE, UNSPECIFIED, NOT INTRACTABLE, WITHOUT STATUS MIGRAINOSUS: ICD-10-CM

## 2023-11-15 DIAGNOSIS — L40.50 ARTHROPATHIC PSORIASIS, UNSPECIFIED: ICD-10-CM

## 2023-11-15 DIAGNOSIS — Z29.9 ENCOUNTER FOR PROPHYLACTIC MEASURES, UNSPECIFIED: ICD-10-CM

## 2023-11-15 DIAGNOSIS — I10 ESSENTIAL (PRIMARY) HYPERTENSION: ICD-10-CM

## 2023-11-15 DIAGNOSIS — Z85.3 PERSONAL HISTORY OF MALIGNANT NEOPLASM OF BREAST: ICD-10-CM

## 2023-11-15 DIAGNOSIS — H15.019 ANTERIOR SCLERITIS, UNSPECIFIED EYE: ICD-10-CM

## 2023-11-15 LAB
ALBUMIN SERPL ELPH-MCNC: 4.1 G/DL — SIGNIFICANT CHANGE UP (ref 3.3–5)
ALBUMIN SERPL ELPH-MCNC: 4.1 G/DL — SIGNIFICANT CHANGE UP (ref 3.3–5)
ALP SERPL-CCNC: 88 U/L — SIGNIFICANT CHANGE UP (ref 40–120)
ALP SERPL-CCNC: 88 U/L — SIGNIFICANT CHANGE UP (ref 40–120)
ALT FLD-CCNC: 16 U/L — SIGNIFICANT CHANGE UP (ref 10–45)
ALT FLD-CCNC: 16 U/L — SIGNIFICANT CHANGE UP (ref 10–45)
ANION GAP SERPL CALC-SCNC: 13 MMOL/L — SIGNIFICANT CHANGE UP (ref 5–17)
ANION GAP SERPL CALC-SCNC: 13 MMOL/L — SIGNIFICANT CHANGE UP (ref 5–17)
AST SERPL-CCNC: 15 U/L — SIGNIFICANT CHANGE UP (ref 10–40)
AST SERPL-CCNC: 15 U/L — SIGNIFICANT CHANGE UP (ref 10–40)
AUTO DIFF PNL BLD: NEGATIVE — SIGNIFICANT CHANGE UP
AUTO DIFF PNL BLD: NEGATIVE — SIGNIFICANT CHANGE UP
B BURGDOR C6 AB SER-ACNC: NEGATIVE — SIGNIFICANT CHANGE UP
B BURGDOR IGG+IGM SER-ACNC: 0.06 INDEX — SIGNIFICANT CHANGE UP (ref 0.01–0.89)
BASOPHILS # BLD AUTO: 0.03 K/UL — SIGNIFICANT CHANGE UP (ref 0–0.2)
BASOPHILS # BLD AUTO: 0.03 K/UL — SIGNIFICANT CHANGE UP (ref 0–0.2)
BASOPHILS NFR BLD AUTO: 0.3 % — SIGNIFICANT CHANGE UP (ref 0–2)
BASOPHILS NFR BLD AUTO: 0.3 % — SIGNIFICANT CHANGE UP (ref 0–2)
BILIRUB SERPL-MCNC: 0.4 MG/DL — SIGNIFICANT CHANGE UP (ref 0.2–1.2)
BILIRUB SERPL-MCNC: 0.4 MG/DL — SIGNIFICANT CHANGE UP (ref 0.2–1.2)
BUN SERPL-MCNC: 8 MG/DL — SIGNIFICANT CHANGE UP (ref 7–23)
BUN SERPL-MCNC: 8 MG/DL — SIGNIFICANT CHANGE UP (ref 7–23)
C-ANCA SER-ACNC: NEGATIVE — SIGNIFICANT CHANGE UP
C-ANCA SER-ACNC: NEGATIVE — SIGNIFICANT CHANGE UP
CALCIUM SERPL-MCNC: 9.6 MG/DL — SIGNIFICANT CHANGE UP (ref 8.4–10.5)
CALCIUM SERPL-MCNC: 9.6 MG/DL — SIGNIFICANT CHANGE UP (ref 8.4–10.5)
CHLORIDE SERPL-SCNC: 101 MMOL/L — SIGNIFICANT CHANGE UP (ref 96–108)
CHLORIDE SERPL-SCNC: 101 MMOL/L — SIGNIFICANT CHANGE UP (ref 96–108)
CO2 SERPL-SCNC: 25 MMOL/L — SIGNIFICANT CHANGE UP (ref 22–31)
CO2 SERPL-SCNC: 25 MMOL/L — SIGNIFICANT CHANGE UP (ref 22–31)
CREAT SERPL-MCNC: 0.55 MG/DL — SIGNIFICANT CHANGE UP (ref 0.5–1.3)
CREAT SERPL-MCNC: 0.55 MG/DL — SIGNIFICANT CHANGE UP (ref 0.5–1.3)
CRP SERPL-MCNC: 35 MG/L — HIGH
CRP SERPL-MCNC: 35 MG/L — HIGH
CRP SERPL-MCNC: 43 MG/L — HIGH (ref 0–4)
CRP SERPL-MCNC: 43 MG/L — HIGH (ref 0–4)
EGFR: 103 ML/MIN/1.73M2 — SIGNIFICANT CHANGE UP
EGFR: 103 ML/MIN/1.73M2 — SIGNIFICANT CHANGE UP
EOSINOPHIL # BLD AUTO: 0.08 K/UL — SIGNIFICANT CHANGE UP (ref 0–0.5)
EOSINOPHIL # BLD AUTO: 0.08 K/UL — SIGNIFICANT CHANGE UP (ref 0–0.5)
EOSINOPHIL NFR BLD AUTO: 0.7 % — SIGNIFICANT CHANGE UP (ref 0–6)
EOSINOPHIL NFR BLD AUTO: 0.7 % — SIGNIFICANT CHANGE UP (ref 0–6)
ERYTHROCYTE [SEDIMENTATION RATE] IN BLOOD: 62 MM/HR — HIGH (ref 0–20)
ERYTHROCYTE [SEDIMENTATION RATE] IN BLOOD: 62 MM/HR — HIGH (ref 0–20)
ERYTHROCYTE [SEDIMENTATION RATE] IN BLOOD: 65 MM/HR — HIGH (ref 0–20)
ERYTHROCYTE [SEDIMENTATION RATE] IN BLOOD: 65 MM/HR — HIGH (ref 0–20)
GLUCOSE SERPL-MCNC: 92 MG/DL — SIGNIFICANT CHANGE UP (ref 70–99)
GLUCOSE SERPL-MCNC: 92 MG/DL — SIGNIFICANT CHANGE UP (ref 70–99)
HCT VFR BLD CALC: 38.6 % — SIGNIFICANT CHANGE UP (ref 34.5–45)
HCT VFR BLD CALC: 38.6 % — SIGNIFICANT CHANGE UP (ref 34.5–45)
HGB BLD-MCNC: 11.9 G/DL — SIGNIFICANT CHANGE UP (ref 11.5–15.5)
HGB BLD-MCNC: 11.9 G/DL — SIGNIFICANT CHANGE UP (ref 11.5–15.5)
IMM GRANULOCYTES NFR BLD AUTO: 0.4 % — SIGNIFICANT CHANGE UP (ref 0–0.9)
IMM GRANULOCYTES NFR BLD AUTO: 0.4 % — SIGNIFICANT CHANGE UP (ref 0–0.9)
LYMPHOCYTES # BLD AUTO: 1.5 K/UL — SIGNIFICANT CHANGE UP (ref 1–3.3)
LYMPHOCYTES # BLD AUTO: 1.5 K/UL — SIGNIFICANT CHANGE UP (ref 1–3.3)
LYMPHOCYTES # BLD AUTO: 13.3 % — SIGNIFICANT CHANGE UP (ref 13–44)
LYMPHOCYTES # BLD AUTO: 13.3 % — SIGNIFICANT CHANGE UP (ref 13–44)
MAGNESIUM SERPL-MCNC: 2.2 MG/DL — SIGNIFICANT CHANGE UP (ref 1.6–2.6)
MAGNESIUM SERPL-MCNC: 2.2 MG/DL — SIGNIFICANT CHANGE UP (ref 1.6–2.6)
MCHC RBC-ENTMCNC: 29.8 PG — SIGNIFICANT CHANGE UP (ref 27–34)
MCHC RBC-ENTMCNC: 29.8 PG — SIGNIFICANT CHANGE UP (ref 27–34)
MCHC RBC-ENTMCNC: 30.8 GM/DL — LOW (ref 32–36)
MCHC RBC-ENTMCNC: 30.8 GM/DL — LOW (ref 32–36)
MCV RBC AUTO: 96.7 FL — SIGNIFICANT CHANGE UP (ref 80–100)
MCV RBC AUTO: 96.7 FL — SIGNIFICANT CHANGE UP (ref 80–100)
MONOCYTES # BLD AUTO: 0.76 K/UL — SIGNIFICANT CHANGE UP (ref 0–0.9)
MONOCYTES # BLD AUTO: 0.76 K/UL — SIGNIFICANT CHANGE UP (ref 0–0.9)
MONOCYTES NFR BLD AUTO: 6.7 % — SIGNIFICANT CHANGE UP (ref 2–14)
MONOCYTES NFR BLD AUTO: 6.7 % — SIGNIFICANT CHANGE UP (ref 2–14)
NEUTROPHILS # BLD AUTO: 8.9 K/UL — HIGH (ref 1.8–7.4)
NEUTROPHILS # BLD AUTO: 8.9 K/UL — HIGH (ref 1.8–7.4)
NEUTROPHILS NFR BLD AUTO: 78.6 % — HIGH (ref 43–77)
NEUTROPHILS NFR BLD AUTO: 78.6 % — HIGH (ref 43–77)
NRBC # BLD: 0 /100 WBCS — SIGNIFICANT CHANGE UP (ref 0–0)
NRBC # BLD: 0 /100 WBCS — SIGNIFICANT CHANGE UP (ref 0–0)
P-ANCA SER-ACNC: NEGATIVE — SIGNIFICANT CHANGE UP
P-ANCA SER-ACNC: NEGATIVE — SIGNIFICANT CHANGE UP
PHOSPHATE SERPL-MCNC: 3.7 MG/DL — SIGNIFICANT CHANGE UP (ref 2.5–4.5)
PHOSPHATE SERPL-MCNC: 3.7 MG/DL — SIGNIFICANT CHANGE UP (ref 2.5–4.5)
PLATELET # BLD AUTO: 318 K/UL — SIGNIFICANT CHANGE UP (ref 150–400)
PLATELET # BLD AUTO: 318 K/UL — SIGNIFICANT CHANGE UP (ref 150–400)
POTASSIUM SERPL-MCNC: 3.9 MMOL/L — SIGNIFICANT CHANGE UP (ref 3.5–5.3)
POTASSIUM SERPL-MCNC: 3.9 MMOL/L — SIGNIFICANT CHANGE UP (ref 3.5–5.3)
POTASSIUM SERPL-SCNC: 3.9 MMOL/L — SIGNIFICANT CHANGE UP (ref 3.5–5.3)
POTASSIUM SERPL-SCNC: 3.9 MMOL/L — SIGNIFICANT CHANGE UP (ref 3.5–5.3)
PROT SERPL-MCNC: 6.9 G/DL — SIGNIFICANT CHANGE UP (ref 6–8.3)
PROT SERPL-MCNC: 6.9 G/DL — SIGNIFICANT CHANGE UP (ref 6–8.3)
RBC # BLD: 3.99 M/UL — SIGNIFICANT CHANGE UP (ref 3.8–5.2)
RBC # BLD: 3.99 M/UL — SIGNIFICANT CHANGE UP (ref 3.8–5.2)
RBC # FLD: 14.7 % — HIGH (ref 10.3–14.5)
RBC # FLD: 14.7 % — HIGH (ref 10.3–14.5)
RHEUMATOID FACT SERPL-ACNC: <10 IU/ML — SIGNIFICANT CHANGE UP (ref 0–13)
SODIUM SERPL-SCNC: 139 MMOL/L — SIGNIFICANT CHANGE UP (ref 135–145)
SODIUM SERPL-SCNC: 139 MMOL/L — SIGNIFICANT CHANGE UP (ref 135–145)
T PALLIDUM AB TITR SER: NEGATIVE — SIGNIFICANT CHANGE UP
TOTAL HEM COMP BLD-ACNC: 99 U/ML — HIGH (ref 42–95)
TOTAL HEM COMP BLD-ACNC: 99 U/ML — HIGH (ref 42–95)
URATE SERPL-MCNC: 3.5 MG/DL — SIGNIFICANT CHANGE UP (ref 2.5–7)
URATE SERPL-MCNC: 3.5 MG/DL — SIGNIFICANT CHANGE UP (ref 2.5–7)
WBC # BLD: 11.32 K/UL — HIGH (ref 3.8–10.5)
WBC # BLD: 11.32 K/UL — HIGH (ref 3.8–10.5)
WBC # FLD AUTO: 11.32 K/UL — HIGH (ref 3.8–10.5)
WBC # FLD AUTO: 11.32 K/UL — HIGH (ref 3.8–10.5)

## 2023-11-15 PROCEDURE — 99223 1ST HOSP IP/OBS HIGH 75: CPT | Mod: GC

## 2023-11-15 RX ORDER — SUMATRIPTAN SUCCINATE 4 MG/.5ML
50 INJECTION, SOLUTION SUBCUTANEOUS DAILY
Refills: 0 | Status: DISCONTINUED | OUTPATIENT
Start: 2023-11-15 | End: 2023-11-17

## 2023-11-15 RX ORDER — INFLUENZA VIRUS VACCINE 15; 15; 15; 15 UG/.5ML; UG/.5ML; UG/.5ML; UG/.5ML
0.5 SUSPENSION INTRAMUSCULAR ONCE
Refills: 0 | Status: DISCONTINUED | OUTPATIENT
Start: 2023-11-15 | End: 2023-11-17

## 2023-11-15 RX ORDER — LISINOPRIL 2.5 MG/1
1 TABLET ORAL
Qty: 0 | Refills: 0 | DISCHARGE

## 2023-11-15 RX ORDER — FOLIC ACID 0.8 MG
1 TABLET ORAL
Refills: 0 | DISCHARGE

## 2023-11-15 RX ORDER — NEOMYCIN/POLYMYXIN B/DEXAMETHA 0.1 %
1 SUSPENSION, DROPS(FINAL DOSAGE FORM)(ML) OPHTHALMIC (EYE) EVERY 6 HOURS
Refills: 0 | Status: DISCONTINUED | OUTPATIENT
Start: 2023-11-15 | End: 2023-11-15

## 2023-11-15 RX ORDER — DULOXETINE HYDROCHLORIDE 30 MG/1
20 CAPSULE, DELAYED RELEASE ORAL DAILY
Refills: 0 | Status: DISCONTINUED | OUTPATIENT
Start: 2023-11-15 | End: 2023-11-17

## 2023-11-15 RX ORDER — DULOXETINE HYDROCHLORIDE 30 MG/1
1 CAPSULE, DELAYED RELEASE ORAL
Qty: 0 | Refills: 0 | DISCHARGE

## 2023-11-15 RX ORDER — NEOMYCIN/POLYMYXIN B/DEXAMETHA 0.1 %
1 SUSPENSION, DROPS(FINAL DOSAGE FORM)(ML) OPHTHALMIC (EYE)
Refills: 0 | Status: DISCONTINUED | OUTPATIENT
Start: 2023-11-15 | End: 2023-11-16

## 2023-11-15 RX ORDER — LORATADINE 10 MG/1
10 TABLET ORAL DAILY
Refills: 0 | Status: DISCONTINUED | OUTPATIENT
Start: 2023-11-15 | End: 2023-11-17

## 2023-11-15 RX ORDER — PREDNISOLONE 5 MG
1 TABLET ORAL
Refills: 0 | DISCHARGE

## 2023-11-15 RX ORDER — FOLIC ACID 0.8 MG
1 TABLET ORAL DAILY
Refills: 0 | Status: DISCONTINUED | OUTPATIENT
Start: 2023-11-15 | End: 2023-11-17

## 2023-11-15 RX ORDER — LISINOPRIL 2.5 MG/1
5 TABLET ORAL DAILY
Refills: 0 | Status: DISCONTINUED | OUTPATIENT
Start: 2023-11-15 | End: 2023-11-17

## 2023-11-15 RX ORDER — MORPHINE SULFATE 50 MG/1
4 CAPSULE, EXTENDED RELEASE ORAL ONCE
Refills: 0 | Status: DISCONTINUED | OUTPATIENT
Start: 2023-11-15 | End: 2023-11-15

## 2023-11-15 RX ORDER — LANOLIN ALCOHOL/MO/W.PET/CERES
1 CREAM (GRAM) TOPICAL
Qty: 0 | Refills: 0 | DISCHARGE

## 2023-11-15 RX ORDER — ACETAMINOPHEN 500 MG
650 TABLET ORAL EVERY 6 HOURS
Refills: 0 | Status: DISCONTINUED | OUTPATIENT
Start: 2023-11-15 | End: 2023-11-17

## 2023-11-15 RX ORDER — METHOTREXATE 2.5 MG/1
0 TABLET ORAL
Refills: 0 | DISCHARGE

## 2023-11-15 RX ORDER — LORATADINE 10 MG/1
1 TABLET ORAL
Refills: 0 | DISCHARGE

## 2023-11-15 RX ORDER — OXYCODONE AND ACETAMINOPHEN 5; 325 MG/1; MG/1
1 TABLET ORAL
Qty: 0 | Refills: 0 | DISCHARGE

## 2023-11-15 RX ORDER — PANTOPRAZOLE SODIUM 20 MG/1
40 TABLET, DELAYED RELEASE ORAL
Refills: 0 | Status: DISCONTINUED | OUTPATIENT
Start: 2023-11-15 | End: 2023-11-17

## 2023-11-15 RX ORDER — OMEPRAZOLE AND SODIUM BICARBONATE 40; 1100 MG/1; MG/1
1 CAPSULE, GELATIN COATED ORAL
Qty: 0 | Refills: 0 | DISCHARGE

## 2023-11-15 RX ORDER — ENOXAPARIN SODIUM 100 MG/ML
40 INJECTION SUBCUTANEOUS EVERY 24 HOURS
Refills: 0 | Status: DISCONTINUED | OUTPATIENT
Start: 2023-11-15 | End: 2023-11-17

## 2023-11-15 RX ADMIN — Medication 500 MILLIGRAM(S): at 05:50

## 2023-11-15 RX ADMIN — Medication 1 DROP(S): at 22:44

## 2023-11-15 RX ADMIN — CEFTRIAXONE 100 MILLIGRAM(S): 500 INJECTION, POWDER, FOR SOLUTION INTRAMUSCULAR; INTRAVENOUS at 15:10

## 2023-11-15 RX ADMIN — PANTOPRAZOLE SODIUM 40 MILLIGRAM(S): 20 TABLET, DELAYED RELEASE ORAL at 06:02

## 2023-11-15 RX ADMIN — Medication 500 MILLIGRAM(S): at 17:31

## 2023-11-15 RX ADMIN — MORPHINE SULFATE 4 MILLIGRAM(S): 50 CAPSULE, EXTENDED RELEASE ORAL at 01:37

## 2023-11-15 RX ADMIN — Medication 1 DROP(S): at 21:05

## 2023-11-15 RX ADMIN — DULOXETINE HYDROCHLORIDE 20 MILLIGRAM(S): 30 CAPSULE, DELAYED RELEASE ORAL at 15:09

## 2023-11-15 RX ADMIN — SUMATRIPTAN SUCCINATE 50 MILLIGRAM(S): 4 INJECTION, SOLUTION SUBCUTANEOUS at 18:26

## 2023-11-15 RX ADMIN — Medication 1 APPLICATION(S): at 05:49

## 2023-11-15 NOTE — H&P ADULT - PROBLEM SELECTOR PLAN 2
On Oral pred, Stelara infusions and methotrexate at home  - Home meds held ISO of infection Home doses of Prednisone 10mg QD, Folic Acid 1mg Qd, Methotrexate 2.5mg once a week, Ustekinumab every 12 weeks, last dose 10/25/23. No acute signs of flair, risk of infection greater than autoimmune flair at this moment.     Hold prednisone given acute infection   - c/w folic acid QD

## 2023-11-15 NOTE — H&P ADULT - TIME BILLING
- Ordering, reviewing, and/or interpreting labs, testing, and/or imaging  - Independently obtaining a review of systems and performing a physical exam  - Reviewing consultant documentation/recommendations where applicable  - Counselling and educating patient and/or family regarding interpretation of aforementioned items and plan of care

## 2023-11-15 NOTE — H&P ADULT - HISTORY OF PRESENT ILLNESS
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  Patient states that on Monday morning she woke up with some swelling around her eye which progressively worsened with pain and redness throughout the day.  Yesterday noticed redness and puffiness to the eye itself, went to her PCP who recommended going to the hospital for IV antibiotic treatment. She originally presented to the emergency department at [place].  She does endorse severe pain to the face extending from teeth to temple surrounding the left eye, which has improved. The area around her left eye continues to feel swollen and tender to palpation, but no pain with eye movement. She reports having a mild left sided headache, which is milder compared to her typical migraines. Pt does report having some purulent drainage from the eye while she was home, however not since she has been in the hospital. She denies vision changes, contact use, flashes, floaters, diplopia, trauma in the affected eye. Mild URI symptoms reported for the past week. She denies feeling feverish, chills, chest pain, shortness of breath, nausea, or vomiting. Pt reports having recurrent nose bleeds, including a minor one while in the [facility] ED. She denies any recent dental procedures (last one in spring 2023). Most recent infection was August 2023 when she had COVID, none since then. Pt reports no sick contacts or recent travel. She had a CT orbit with IV contrast done at [place] and transferred to [facility] for ophtho consult. Prior to transfer she was given 2 g of ceftriaxone and 1 g of vancomycin.     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 Her flare ups are typically bilateral with worse inflammation and pain on the left side, typically involving joints in hands, knees, feet, and most recently in the anterior rib spaces (“costochondritis”) On 10/25 she had her most recent dose of Sterlera and 3 weeks she went to her podiatrist (non-[facility]) who gave her steroid injection for inflamed left foot tendons. Pt reports that she currently feels 75% recovered, but not quite at baseline.     [facility] ED Course:   In the ED, pt was given 5mg of oxycodone PO 9pm 11/14, 4mg IV push of morphine at 1am 11/15, and 500mg naproxen 5am 11/15 for pain. Pt was seen by ophthalmology who examined that patient under slit lamp and dilation, diagnosing with pre-septal cellulitis and anterior scleritis.

## 2023-11-15 NOTE — H&P ADULT - PROBLEM SELECTOR PLAN 7
- Fluids: None  - Electrolytes: Will replete to maintain K>4, Phos>3, and Mag>2  - Nutrition: Regular diet   - Activity: As tolerated   - DVT Prophylaxis: Lovenox   - Stress Ulcer/GI Prophylaxis: n/a   - Disposition: Home

## 2023-11-15 NOTE — H&P ADULT - ATTENDING COMMENTS
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    CT orbits c/f for preseptal cellulits / scleritis  c/w ceftriaxone  f/u ophtho recs  Consult derm    Dispo: pending clinical improvement  Further plan per resident note above

## 2023-11-15 NOTE — H&P ADULT - NSHPPHYSICALEXAM_GEN_ALL_CORE
GENERAL: NAD, lying in bed comfortably  HEAD: Atraumatic, normocephalic, No maxillary or frontal sinus tenderness, Swelling around left eye, tender   EYES: EOMI, PERRLA, conjunctiva and sclera clear  ENT: Moist mucous membranes  NECK: Supple, no JVD, no thyroidmegaly   HEART: S1, S2, Regular rate and rhythm, no murmurs, rubs, or gallops  LUNGS: Unlabored respirations, clear to auscultation bilaterally, no crackles, wheezing, or rhonchi  ABDOMEN: Soft, nontender, nondistended, +BS, no hepatosplenomegaly, no CVA tenderness   EXTREMITIES: 2+ peripheral pulses bilaterally. No clubbing, cyanosis, or edema, no inflamed joints, non-tender to palpation, intact proprioception, palpable pulses 2+   NERVOUS SYSTEM:  A&Ox3, no focal deficits, CNII-XII intact, EOMi with no pain or diplopia, pupillary reflex intact.   SKIN: No rashes or lesions

## 2023-11-15 NOTE — PROGRESS NOTE ADULT - ASSESSMENT
Assessment and Recommendations:  63y female with a past medical history/ocular history of psoriatic arthritis on methotrexate/Skyrizi, breast cancer s/p bilateral mastectomy, retinal scarring OS, high myopia consulted for left eye swelling, redness, and pain, found to have chemosis, temporal injection, and eye pain, with periorbital swelling, and findings on CT concerning for anterior scleritis and preseptal cellulitis.    #Conjunctivitis  vs scleritis OS  - patient with cold symptoms prior to presentation, but denies fevers, new medications, rashes, trauma, no recent vaccinations, no new face creams  - swelling has progressively gotten worse and patient complaining of left eye pain. Says periorbital swelling and redness occurred before eye became red.  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, no vitritis, no hemorrhages  - CT scan as above  - normal appearing nerve for high myopia, no obvious nerve edema, pupils normally reactive and color vision full, low suspicion for ocular nerve involvement  - B-scan performed OS as well with no obvious T-sign of posterior scleritis  - differential diagnosis includes preseptal cellulitis, nodular vs diffuse anterior scleritis, may be inflammatory or infectious etiologies  - Continue IV antibiotics per primary team  - start oral NSAID (flurbiprofen 100mg TID or naproxen 500 mg BID) with PPI (omeprazole 20mg qdaily)  - switch topical maxitrol ointment to solution, QID OS  - Inflam workup: ANCA, uric acid, RF negative,   - ESR and CRP elevated  - creatinine, syphilis screen, ACE, CH50, lyme antibody pending  - Start preservative free artificial tears 6 times a day both eyes  - Recommend dermatology consult given skin findings appeared prior to eye findings.    #Forniceal pigmentation, OS  - may require biopsy as pigmentation in the fornix can be concerning   - presentation not able to fully appreciate if pigmentation as patient has many small hemorrhages and petechiae  - should have outpatient follow up    SDW Dr. Santiago, attending.     Outpatient Follow-up: Patient should follow-up with his/her ophthalmologist or with [facility] Department of Ophthalmology within 1 week of after discharge at:    [address]  [place] [address]  [phone]    Silvana Zhu MD, PGY3  Also available on [employer] Teams     Assessment and Recommendations:  63y female with a past medical history/ocular history of psoriatic arthritis on methotrexate/Skyrizi, breast cancer s/p bilateral mastectomy, retinal scarring OS, high myopia consulted for left eye swelling, redness, and pain, found to have chemosis, temporal injection, and eye pain, with periorbital swelling, and findings on CT concerning for anterior scleritis and preseptal cellulitis.    #Conjunctivitis  vs scleritis OS  - patient with cold symptoms prior to presentation, but denies fevers, new medications, rashes, trauma, no recent vaccinations, no new face creams  - swelling has progressively gotten worse and patient complaining of left eye pain. Says periorbital swelling and redness occurred before eye became red.  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, no vitritis, no hemorrhages  - CT scan as above  - normal appearing nerve for high myopia, no obvious nerve edema, pupils normally reactive and color vision full, low suspicion for ocular nerve involvement  - B-scan performed OS as well with no obvious T-sign of posterior scleritis  - differential diagnosis includes preseptal cellulitis, nodular vs diffuse anterior scleritis, may be inflammatory or infectious etiologies  - Continue IV antibiotics per primary team  - start oral NSAID (flurbiprofen 100mg TID or naproxen 500 mg BID) with PPI (omeprazole 20mg qdaily)  - switch topical maxitrol ointment to solution, QID OS  - Inflam workup: ANCA, uric acid, RF negative,   - ESR and CRP elevated  - creatinine, syphilis screen, ACE, CH50, lyme antibody pending  - Start preservative free artificial tears 6 times a day both eyes  - Recommend dermatology consult given skin findings appeared prior to eye findings.    #Forniceal pigmentation, OS  - may require biopsy as pigmentation in the fornix can be concerning   - presentation not able to fully appreciate if pigmentation as patient has many small hemorrhages and petechiae  - should have outpatient follow up    SDW Dr. Santiago, attending.     Outpatient Follow-up: Patient should follow-up with his/her ophthalmologist or with [facility] Department of Ophthalmology within 1 week of after discharge at:    [address]  [place] [address]  [phone]    Silvana Zhu MD, PGY3  Also available on [employer] Teams     Assessment and Recommendations:  63y female with a past medical history/ocular history of psoriatic arthritis on methotrexate/Skyrizi, breast cancer s/p bilateral mastectomy, retinal scarring OS, high myopia consulted for left eye swelling, redness, and pain, found to have chemosis, temporal injection, and eye pain, with periorbital swelling, and findings on CT concerning for anterior scleritis and preseptal cellulitis.    #Conjunctivitis  vs scleritis OS  - patient with cold symptoms prior to presentation, but denies fevers, new medications, rashes, trauma, no recent vaccinations, no new face creams  - swelling has progressively gotten worse and patient complaining of left eye pain. Says periorbital swelling and redness occurred before eye became red.  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, no vitritis, no hemorrhages  - CT scan as above  - normal appearing nerve for high myopia, no obvious nerve edema, pupils normally reactive and color vision full, low suspicion for ocular nerve involvement  - B-scan performed OS as well with no obvious T-sign of posterior scleritis  - differential diagnosis includes preseptal cellulitis, nodular vs diffuse anterior scleritis, may be inflammatory or infectious etiologies  - Continue IV antibiotics per primary team  - start oral NSAID (flurbiprofen 100mg TID or naproxen 500 mg BID) with PPI (omeprazole 20mg qdaily)  - switch topical maxitrol ointment to solution, QID OS  - Inflam workup: ANCA, uric acid, RF negative,   - ESR and CRP elevated  - creatinine, syphilis screen, ACE, CH50, lyme antibody pending  - Start preservative free artificial tears 6 times a day both eyes  - Recommend dermatology consult given skin findings appeared prior to eye findings.    #Forniceal pigmentation, OS  - may require biopsy as pigmentation in the fornix can be concerning   - presentation not able to fully appreciate if pigmentation as patient has many small hemorrhages and petechiae  - should have outpatient follow up    SDW Dr. Santiago, attending.     Outpatient Follow-up: Patient should follow-up with his/her ophthalmologist or with [facility] Department of Ophthalmology within 1 week of after discharge at:    [address]  [place] [address]  [phone]    Silvana Zhu MD, PGY3  Also available on [employer] Teams     Assessment and Recommendations:  63y female with a past medical history/ocular history of psoriatic arthritis on methotrexate/Skyrizi, breast cancer s/p bilateral mastectomy, retinal scarring OS, high myopia consulted for left eye swelling, redness, and pain, found to have chemosis, temporal injection, and eye pain, with periorbital swelling, and findings on CT concerning for anterior scleritis and preseptal cellulitis.    #Conjunctivitis  vs scleritis OS  - patient with cold symptoms prior to presentation, but denies fevers, new medications, rashes, trauma, no recent vaccinations, no new face creams  - swelling has progressively gotten worse and patient complaining of left eye pain. Says periorbital swelling and redness occurred before eye became red.  - VA 20/40 PHNI OS (has had chronic poor vision due to retinal scarring)  - IOP wnl, EOMI but mild abduction deficit 2/2 chemosis inferotemporal, CVF full, color vision full, no rAPD  - anterior exam with trace injetion OD today. Increasing suspicion for possible conjunctivitis.   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, no vitritis, no hemorrhages  - CT scan as above  - normal appearing nerve for high myopia, no obvious nerve edema, pupils normally reactive and color vision full, low suspicion for ocular nerve involvement  - B-scan performed OS as well with no obvious T-sign of posterior scleritis  - differential diagnosis includes preseptal cellulitis, nodular vs diffuse anterior scleritis, may be inflammatory or infectious etiologies  - Continue IV antibiotics per primary team  - start oral NSAID (flurbiprofen 100mg TID or naproxen 500 mg BID) with PPI (omeprazole 20mg qdaily)  - switch topical maxitrol ointment to solution, QID OS  - Inflam workup: ANCA, uric acid, RF negative,   - ESR and CRP elevated  - creatinine, syphilis screen, ACE, CH50, lyme antibody pending  - Start preservative free artificial tears 6 times a day both eyes  - Recommend dermatology consult given skin findings appeared prior to eye findings.    #Forniceal pigmentation, OS  - may require biopsy as pigmentation in the fornix can be concerning   - presentation not able to fully appreciate if pigmentation as patient has many small hemorrhages and petechiae  - should have outpatient follow up    SDW Dr. Santiago, attending.     Outpatient Follow-up: Patient should follow-up with his/her ophthalmologist or with [facility] Department of Ophthalmology within 1 week of after discharge at:    [address]  [place] [address]  [phone]    Silvana Zhu MD, PGY3  Also available on [employer] Teams     Assessment and Recommendations:  63y female with a past medical history/ocular history of psoriatic arthritis on methotrexate/Skyrizi, breast cancer s/p bilateral mastectomy, retinal scarring OS, high myopia consulted for left eye swelling, redness, and pain, found to have chemosis, temporal injection, and eye pain, with periorbital swelling, and findings on CT concerning for anterior scleritis and preseptal cellulitis.    #Conjunctivitis  vs scleritis OS  - patient with cold symptoms prior to presentation, but denies fevers, new medications, rashes, trauma, no recent vaccinations, no new face creams  - swelling has progressively gotten worse and patient complaining of left eye pain. Says periorbital swelling and redness occurred before eye became red.  - VA 20/40 PHNI OS (has had chronic poor vision due to retinal scarring)  - IOP wnl, EOMI but mild abduction deficit 2/2 chemosis inferotemporal, CVF full, color vision full, no rAPD  - anterior exam with trace injetion OD today. Increasing suspicion for possible conjunctivitis.   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, no vitritis, no hemorrhages  - CT scan as above  - normal appearing nerve for high myopia, no obvious nerve edema, pupils normally reactive and color vision full, low suspicion for ocular nerve involvement  - B-scan performed OS as well with no obvious T-sign of posterior scleritis  - differential diagnosis includes preseptal cellulitis, nodular vs diffuse anterior scleritis, may be inflammatory or infectious etiologies  - Continue IV antibiotics per primary team  - start oral NSAID (flurbiprofen 100mg TID or naproxen 500 mg BID) with PPI (omeprazole 20mg qdaily)  - switch topical maxitrol ointment to solution, QID OS  - Inflam workup: ANCA, uric acid, RF negative,   - ESR and CRP elevated  - creatinine, syphilis screen, ACE, CH50, lyme antibody pending  - Start preservative free artificial tears 6 times a day both eyes  - Recommend dermatology consult given skin findings appeared prior to eye findings.    #Forniceal pigmentation, OS  - may require biopsy as pigmentation in the fornix can be concerning   - presentation not able to fully appreciate if pigmentation as patient has many small hemorrhages and petechiae  - should have outpatient follow up    SDW Dr. Santiago, attending.     Outpatient Follow-up: Patient should follow-up with his/her ophthalmologist or with [facility] Department of Ophthalmology within 1 week of after discharge at:    [address]  [place] [address]  [phone]    Silvana Zhu MD, PGY3  Also available on [employer] Teams     Assessment and Recommendations:  63y female with a past medical history/ocular history of psoriatic arthritis on methotrexate/Skyrizi, breast cancer s/p bilateral mastectomy, retinal scarring OS, high myopia consulted for left eye swelling, redness, and pain, found to have chemosis, temporal injection, and eye pain, with periorbital swelling, and findings on CT concerning for anterior scleritis and preseptal cellulitis.    #Conjunctivitis  vs scleritis OS  - patient with cold symptoms prior to presentation, but denies fevers, new medications, rashes, trauma, no recent vaccinations, no new face creams  - swelling has progressively gotten worse and patient complaining of left eye pain. Says periorbital swelling and redness occurred before eye became red.  - VA 20/40 PHNI OS (has had chronic poor vision due to retinal scarring)  - IOP wnl, EOMI but mild abduction deficit 2/2 chemosis inferotemporal, CVF full, color vision full, no rAPD  - anterior exam with trace injetion OD today. Increasing suspicion for possible conjunctivitis.   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, no vitritis, no hemorrhages  - CT scan as above  - normal appearing nerve for high myopia, no obvious nerve edema, pupils normally reactive and color vision full, low suspicion for ocular nerve involvement  - B-scan performed OS as well with no obvious T-sign of posterior scleritis  - differential diagnosis includes preseptal cellulitis, nodular vs diffuse anterior scleritis, may be inflammatory or infectious etiologies  - Continue IV antibiotics per primary team  - start oral NSAID (flurbiprofen 100mg TID or naproxen 500 mg BID) with PPI (omeprazole 20mg qdaily)  - switch topical maxitrol ointment to solution, QID OS  - Inflam workup: ANCA, uric acid, RF negative,   - ESR and CRP elevated  - creatinine, syphilis screen, ACE, CH50, lyme antibody pending  - Start preservative free artificial tears 6 times a day both eyes  - Recommend dermatology consult given skin findings appeared prior to eye findings.    #Forniceal pigmentation, OS  - may require biopsy as pigmentation in the fornix can be concerning   - presentation not able to fully appreciate if pigmentation as patient has many small hemorrhages and petechiae  - should have outpatient follow up    SDW Dr. Santiago, attending.     Outpatient Follow-up: Patient should follow-up with his/her ophthalmologist or with [facility] Department of Ophthalmology within 1 week of after discharge at:    [address]  [place] [address]  [phone]    Silvana Zhu MD, PGY3  Also available on [employer] Teams

## 2023-11-15 NOTE — H&P ADULT - NSICDXPASTSURGICALHX_GEN_ALL_CORE_FT
PAST SURGICAL HISTORY:  H/O bilateral mastectomy preop chemo 08/2018 right breast 03/2019, left [id number] with reconstruction deisy flap,    H/O mastectomy, right with expander placement 3/2019    History of laparoscopy 1980s for endometriosis    S/P FESS (functional endoscopic sinus surgery) turbinectomy 1997

## 2023-11-15 NOTE — H&P ADULT - PROBLEM SELECTOR PLAN 6
- Fluids: None  - Electrolytes: Will replete to maintain K>4, Phos>3, and Mag>2  - Nutrition:   - Activity: As tolerated   - DVT Prophylaxis: Lovenox   - Stress Ulcer/GI Prophylaxis:   - Disposition: Home s/p   - Hold Anastrozole for now

## 2023-11-15 NOTE — H&P ADULT - PROBLEM SELECTOR PLAN 5
s/p -- Pt reports acute migraine management at home with sumatriptan, fiorcet, percocet.   - Acetaminophen for pain   - Sumatriptan (home med) PRN for moderate / severe pain

## 2023-11-15 NOTE — H&P ADULT - NSHPSOCIALHISTORY_GEN_ALL_CORE
Never smoke   Drinks 1-2 glasses of wine on weekends   Medical marijuana, vaping for neuropathy, hasn’t used since her daughter recently had a baby   Preventive: colonoscopy summer 2023, normal; COVID vaccines, needs booster but was deferred given COVID infection in 8/23, hasn’t received flu shot this year, hasn’t received shingles vaccine.

## 2023-11-15 NOTE — H&P ADULT - PROBLEM SELECTOR PLAN 1
Edematous R eye for 4 days, associated erythema, without visual changes / diplopia. CT orbits c/f preseptal cellulitis with scleritis.   Immunocompromised state..   ddx   - c/w with CTX Edematous R eye for 4 days, associated erythema, without visual changes / diplopia. CT orbits c/f preseptal cellulitis with scleritis - immunocompromised state due to immunosuppression.   - c/w with CTX   - Optho evaluation - appreciate recs - f/u ESR/CRP, uric acid, syphilis, ANCA, ACE, CH50, lyme. Edematous R eye for 4 days, associated erythema, without visual changes / diplopia. CT orbits c/f preseptal cellulitis with scleritis - immunocompromised state due to immunosuppression.   - c/w with CTX   c/w topical maxitrol QID OS  IV ceftriaxone as opposed to PO antibiotics given immunocompromised state   - f/u 1 week after discharge with ophtho   - If drainage present, collect and send for culture   - No need for blood cultures given lack of systemic symptoms

## 2023-11-15 NOTE — H&P ADULT - NSHPLABSRESULTS_GEN_ALL_CORE
.  LABS:                         11.9   11.32 )-----------( 318      ( 15 Nov 2023 07:34 )             38.6     11-15    139  |  101  |  8   ----------------------------<  92  3.9   |  25  |  0.55    Ca    9.6      15 Nov 2023 07:34  Phos  3.7     11-15  Mg     2.2     11-15    TPro  6.9  /  Alb  4.1  /  TBili  0.4  /  DBili  x   /  AST  15  /  ALT  16  /  AlkPhos  88  11-15      Urinalysis Basic - ( 15 Nov 2023 07:34 )    Color: x / Appearance: x / SG: x / pH: x  Gluc: 92 mg/dL / Ketone: x  / Bili: x / Urobili: x   Blood: x / Protein: x / Nitrite: x   Leuk Esterase: x / RBC: x / WBC x   Sq Epi: x / Non Sq Epi: x / Bacteria: x            RADIOLOGY, EKG & ADDITIONAL TESTS: Reviewed.     < from: CT Orbit w/ IV Cont (11.14.23 @ 17:01) >    There is hyperenhancement and thickening of the left orbital conjunctiva   and mild periorbital cellulitic fat stranding. Thin lower density may   represent subconjunctival edema rather than abscess. Subtle asymmetric   enhancementinvolves the lateral sclera of the globe which may be   contiguous scleritis. There is no postseptal fat stranding or mass   effect. The globe appears preserved and symmetric in contour without   gross intravitreous finding.    On sagittal images, when corrected for angle, subtle asymmetric   enhancement perceived in the anterior optic nerve (see key image, and   also visible on coronal image 69). This is questionable for optic   neuritis which may be infectious and potentially viral in the   immunocompromised state. MRI advised, particularly if there is vision   loss. Extraocular muscles and lacrimal glands are grossly symmetrical.    The bony orbits are preserved as is the skull base and included facial   bones. Paranasal sinuses and mastoids are free of acute disease, clear   aside from mild mucosal thickening in the left ethmoids. No fluid level.    Included intracranial compartment is unremarkable.      IMPRESSION:    Left periorbital cellulitis with conjunctivitis and mild scleritis  suggested. No drainable abscess.    Subtle asymmetry detected at the left anterior optic nerve, query   infectious optic neuritis in this immunocompromised patient and orbit MRI   advised for better tissue contrast resolution.      < end of copied text > .  LABS:                         11.9   11.32 )-----------( 318      ( 15 Nov 2023 07:34 )             38.6     11-15    139  |  101  |  8   ----------------------------<  92  3.9   |  25  |  0.55    Ca    9.6      15 Nov 2023 07:34  Phos  3.7     11-15  Mg     2.2     11-15    TPro  6.9  /  Alb  4.1  /  TBili  0.4  /  DBili  x   /  AST  15  /  ALT  16  /  AlkPhos  88  11-15      Urinalysis Basic - ( 15 Nov 2023 07:34 )    Color: x / Appearance: x / SG: x / pH: x  Gluc: 92 mg/dL / Ketone: x  / Bili: x / Urobili: x   Blood: x / Protein: x / Nitrite: x   Leuk Esterase: x / RBC: x / WBC x   Sq Epi: x / Non Sq Epi: x / Bacteria: x            RADIOLOGY, EKG & ADDITIONAL TESTS: Reviewed.     < from: CT Orbit w/ IV Cont (11.14.23 @ 17:01) >    There is hyperenhancement and thickening of the left orbital conjunctiva   and mild periorbital cellulitic fat stranding. Thin lower density may   represent subconjunctival edema rather than abscess. Subtle asymmetric   enhancementinvolves the lateral sclera of the globe which may be   contiguous scleritis. There is no postseptal fat stranding or mass   effect. The globe appears preserved and symmetric in contour without   gross intravitreous finding.    On sagittal images, when corrected for angle, subtle asymmetric   enhancement perceived in the anterior optic nerve (see key image, and   also visible on coronal image 69). This is questionable for optic   neuritis which may be infectious and potentially viral in the   immunocompromised state. MRI advised, particularly if there is vision   loss. Extraocular muscles and lacrimal glands are grossly symmetrical.    The bony orbits are preserved as is the skull base and included facial   bones. Paranasal sinuses and mastoids are free of acute disease, clear   aside from mild mucosal thickening in the left ethmoids. No fluid level.    Included intracranial compartment is unremarkable.      IMPRESSION:    Left periorbital cellulitis with conjunctivitis and mild scleritis  suggested. No drainable abscess.    Subtle asymmetry detected at the left anterior optic nerve, query   infectious optic neuritis in this immunocompromised patient and orbit MRI   advised for better tissue contrast resolution.    < end of copied text >

## 2023-11-15 NOTE — H&P ADULT - ASSESSMENT
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 VSS, WBC 11K, ESR 43, CT orbits c/f for preseptal cellulits / scleritis, on CTX .

## 2023-11-15 NOTE — H&P ADULT - NSHPREVIEWOFSYSTEMS_GEN_ALL_CORE
REVIEW OF SYSTEMS:  CONSTITUTIONAL: No weakness, fevers or chills  EYES/ENT: No visual changes;  No vertigo or throat pain + Eye Pain, + Headache   NECK: No pain or stiffness,   RESPIRATORY: No cough, wheezing, hemoptysis; No shortness of breath  CARDIOVASCULAR: No chest pain or palpitations  GASTROINTESTINAL: No abdominal or epigastric pain. No nausea, vomiting, or hematemesis; No diarrhea or constipation. No melena or hematochezia.  GENITOURINARY: No dysuria, frequency or hematuria  NEUROLOGICAL: No numbness or weakness  SKIN: No itching, rashes

## 2023-11-16 ENCOUNTER — TRANSCRIPTION ENCOUNTER (OUTPATIENT)
Age: 63
End: 2023-11-16

## 2023-11-16 LAB
ACE SERPL-CCNC: 16 U/L — SIGNIFICANT CHANGE UP (ref 14–82)
ALBUMIN SERPL ELPH-MCNC: 3.9 G/DL — SIGNIFICANT CHANGE UP (ref 3.3–5)
ALBUMIN SERPL ELPH-MCNC: 3.9 G/DL — SIGNIFICANT CHANGE UP (ref 3.3–5)
ALP SERPL-CCNC: 86 U/L — SIGNIFICANT CHANGE UP (ref 40–120)
ALP SERPL-CCNC: 86 U/L — SIGNIFICANT CHANGE UP (ref 40–120)
ALT FLD-CCNC: 19 U/L — SIGNIFICANT CHANGE UP (ref 10–45)
ALT FLD-CCNC: 19 U/L — SIGNIFICANT CHANGE UP (ref 10–45)
ANION GAP SERPL CALC-SCNC: 10 MMOL/L — SIGNIFICANT CHANGE UP (ref 5–17)
ANION GAP SERPL CALC-SCNC: 10 MMOL/L — SIGNIFICANT CHANGE UP (ref 5–17)
AST SERPL-CCNC: 18 U/L — SIGNIFICANT CHANGE UP (ref 10–40)
AST SERPL-CCNC: 18 U/L — SIGNIFICANT CHANGE UP (ref 10–40)
AUTO DIFF PNL BLD: NEGATIVE — SIGNIFICANT CHANGE UP
AUTO DIFF PNL BLD: NEGATIVE — SIGNIFICANT CHANGE UP
BASOPHILS # BLD AUTO: 0.03 K/UL — SIGNIFICANT CHANGE UP (ref 0–0.2)
BASOPHILS # BLD AUTO: 0.03 K/UL — SIGNIFICANT CHANGE UP (ref 0–0.2)
BASOPHILS NFR BLD AUTO: 0.4 % — SIGNIFICANT CHANGE UP (ref 0–2)
BASOPHILS NFR BLD AUTO: 0.4 % — SIGNIFICANT CHANGE UP (ref 0–2)
BILIRUB SERPL-MCNC: 0.2 MG/DL — SIGNIFICANT CHANGE UP (ref 0.2–1.2)
BILIRUB SERPL-MCNC: 0.2 MG/DL — SIGNIFICANT CHANGE UP (ref 0.2–1.2)
BUN SERPL-MCNC: 11 MG/DL — SIGNIFICANT CHANGE UP (ref 7–23)
BUN SERPL-MCNC: 11 MG/DL — SIGNIFICANT CHANGE UP (ref 7–23)
C-ANCA SER-ACNC: NEGATIVE — SIGNIFICANT CHANGE UP
C-ANCA SER-ACNC: NEGATIVE — SIGNIFICANT CHANGE UP
CALCIUM SERPL-MCNC: 8.8 MG/DL — SIGNIFICANT CHANGE UP (ref 8.4–10.5)
CALCIUM SERPL-MCNC: 8.8 MG/DL — SIGNIFICANT CHANGE UP (ref 8.4–10.5)
CHLORIDE SERPL-SCNC: 104 MMOL/L — SIGNIFICANT CHANGE UP (ref 96–108)
CHLORIDE SERPL-SCNC: 104 MMOL/L — SIGNIFICANT CHANGE UP (ref 96–108)
CO2 SERPL-SCNC: 26 MMOL/L — SIGNIFICANT CHANGE UP (ref 22–31)
CO2 SERPL-SCNC: 26 MMOL/L — SIGNIFICANT CHANGE UP (ref 22–31)
CREAT SERPL-MCNC: 0.64 MG/DL — SIGNIFICANT CHANGE UP (ref 0.5–1.3)
CREAT SERPL-MCNC: 0.64 MG/DL — SIGNIFICANT CHANGE UP (ref 0.5–1.3)
EGFR: 99 ML/MIN/1.73M2 — SIGNIFICANT CHANGE UP
EGFR: 99 ML/MIN/1.73M2 — SIGNIFICANT CHANGE UP
EOSINOPHIL # BLD AUTO: 0.09 K/UL — SIGNIFICANT CHANGE UP (ref 0–0.5)
EOSINOPHIL # BLD AUTO: 0.09 K/UL — SIGNIFICANT CHANGE UP (ref 0–0.5)
EOSINOPHIL NFR BLD AUTO: 1.3 % — SIGNIFICANT CHANGE UP (ref 0–6)
EOSINOPHIL NFR BLD AUTO: 1.3 % — SIGNIFICANT CHANGE UP (ref 0–6)
GLUCOSE SERPL-MCNC: 89 MG/DL — SIGNIFICANT CHANGE UP (ref 70–99)
GLUCOSE SERPL-MCNC: 89 MG/DL — SIGNIFICANT CHANGE UP (ref 70–99)
HCT VFR BLD CALC: 37.3 % — SIGNIFICANT CHANGE UP (ref 34.5–45)
HCT VFR BLD CALC: 37.3 % — SIGNIFICANT CHANGE UP (ref 34.5–45)
HCV AB S/CO SERPL IA: 0.05 S/CO — SIGNIFICANT CHANGE UP (ref 0–0.99)
HCV AB S/CO SERPL IA: 0.05 S/CO — SIGNIFICANT CHANGE UP (ref 0–0.99)
HCV AB SERPL-IMP: SIGNIFICANT CHANGE UP
HCV AB SERPL-IMP: SIGNIFICANT CHANGE UP
HGB BLD-MCNC: 11.7 G/DL — SIGNIFICANT CHANGE UP (ref 11.5–15.5)
HGB BLD-MCNC: 11.7 G/DL — SIGNIFICANT CHANGE UP (ref 11.5–15.5)
IMM GRANULOCYTES NFR BLD AUTO: 0.3 % — SIGNIFICANT CHANGE UP (ref 0–0.9)
IMM GRANULOCYTES NFR BLD AUTO: 0.3 % — SIGNIFICANT CHANGE UP (ref 0–0.9)
LYMPHOCYTES # BLD AUTO: 0.66 K/UL — LOW (ref 1–3.3)
LYMPHOCYTES # BLD AUTO: 0.66 K/UL — LOW (ref 1–3.3)
LYMPHOCYTES # BLD AUTO: 9.7 % — LOW (ref 13–44)
LYMPHOCYTES # BLD AUTO: 9.7 % — LOW (ref 13–44)
MAGNESIUM SERPL-MCNC: 2.2 MG/DL — SIGNIFICANT CHANGE UP (ref 1.6–2.6)
MAGNESIUM SERPL-MCNC: 2.2 MG/DL — SIGNIFICANT CHANGE UP (ref 1.6–2.6)
MCHC RBC-ENTMCNC: 30.3 PG — SIGNIFICANT CHANGE UP (ref 27–34)
MCHC RBC-ENTMCNC: 30.3 PG — SIGNIFICANT CHANGE UP (ref 27–34)
MCHC RBC-ENTMCNC: 31.4 GM/DL — LOW (ref 32–36)
MCHC RBC-ENTMCNC: 31.4 GM/DL — LOW (ref 32–36)
MCV RBC AUTO: 96.6 FL — SIGNIFICANT CHANGE UP (ref 80–100)
MCV RBC AUTO: 96.6 FL — SIGNIFICANT CHANGE UP (ref 80–100)
MONOCYTES # BLD AUTO: 0.65 K/UL — SIGNIFICANT CHANGE UP (ref 0–0.9)
MONOCYTES # BLD AUTO: 0.65 K/UL — SIGNIFICANT CHANGE UP (ref 0–0.9)
MONOCYTES NFR BLD AUTO: 9.6 % — SIGNIFICANT CHANGE UP (ref 2–14)
MONOCYTES NFR BLD AUTO: 9.6 % — SIGNIFICANT CHANGE UP (ref 2–14)
NEUTROPHILS # BLD AUTO: 5.33 K/UL — SIGNIFICANT CHANGE UP (ref 1.8–7.4)
NEUTROPHILS # BLD AUTO: 5.33 K/UL — SIGNIFICANT CHANGE UP (ref 1.8–7.4)
NEUTROPHILS NFR BLD AUTO: 78.7 % — HIGH (ref 43–77)
NEUTROPHILS NFR BLD AUTO: 78.7 % — HIGH (ref 43–77)
NRBC # BLD: 0 /100 WBCS — SIGNIFICANT CHANGE UP (ref 0–0)
NRBC # BLD: 0 /100 WBCS — SIGNIFICANT CHANGE UP (ref 0–0)
P-ANCA SER-ACNC: NEGATIVE — SIGNIFICANT CHANGE UP
P-ANCA SER-ACNC: NEGATIVE — SIGNIFICANT CHANGE UP
PHOSPHATE SERPL-MCNC: 4.5 MG/DL — SIGNIFICANT CHANGE UP (ref 2.5–4.5)
PHOSPHATE SERPL-MCNC: 4.5 MG/DL — SIGNIFICANT CHANGE UP (ref 2.5–4.5)
PLATELET # BLD AUTO: 274 K/UL — SIGNIFICANT CHANGE UP (ref 150–400)
PLATELET # BLD AUTO: 274 K/UL — SIGNIFICANT CHANGE UP (ref 150–400)
POTASSIUM SERPL-MCNC: 4 MMOL/L — SIGNIFICANT CHANGE UP (ref 3.5–5.3)
POTASSIUM SERPL-MCNC: 4 MMOL/L — SIGNIFICANT CHANGE UP (ref 3.5–5.3)
POTASSIUM SERPL-SCNC: 4 MMOL/L — SIGNIFICANT CHANGE UP (ref 3.5–5.3)
POTASSIUM SERPL-SCNC: 4 MMOL/L — SIGNIFICANT CHANGE UP (ref 3.5–5.3)
PROT SERPL-MCNC: 6.6 G/DL — SIGNIFICANT CHANGE UP (ref 6–8.3)
PROT SERPL-MCNC: 6.6 G/DL — SIGNIFICANT CHANGE UP (ref 6–8.3)
RBC # BLD: 3.86 M/UL — SIGNIFICANT CHANGE UP (ref 3.8–5.2)
RBC # BLD: 3.86 M/UL — SIGNIFICANT CHANGE UP (ref 3.8–5.2)
RBC # FLD: 14.9 % — HIGH (ref 10.3–14.5)
RBC # FLD: 14.9 % — HIGH (ref 10.3–14.5)
SODIUM SERPL-SCNC: 140 MMOL/L — SIGNIFICANT CHANGE UP (ref 135–145)
SODIUM SERPL-SCNC: 140 MMOL/L — SIGNIFICANT CHANGE UP (ref 135–145)
TOTAL HEM COMP BLD-ACNC: 56 U/ML — SIGNIFICANT CHANGE UP (ref 42–95)
TOTAL HEM COMP BLD-ACNC: 56 U/ML — SIGNIFICANT CHANGE UP (ref 42–95)
WBC # BLD: 6.78 K/UL — SIGNIFICANT CHANGE UP (ref 3.8–10.5)
WBC # BLD: 6.78 K/UL — SIGNIFICANT CHANGE UP (ref 3.8–10.5)
WBC # FLD AUTO: 6.78 K/UL — SIGNIFICANT CHANGE UP (ref 3.8–10.5)
WBC # FLD AUTO: 6.78 K/UL — SIGNIFICANT CHANGE UP (ref 3.8–10.5)

## 2023-11-16 PROCEDURE — 99233 SBSQ HOSP IP/OBS HIGH 50: CPT | Mod: GC

## 2023-11-16 PROCEDURE — 99221 1ST HOSP IP/OBS SF/LOW 40: CPT

## 2023-11-16 RX ORDER — CEFTRIAXONE 500 MG/1
2000 INJECTION, POWDER, FOR SOLUTION INTRAMUSCULAR; INTRAVENOUS EVERY 24 HOURS
Refills: 0 | Status: DISCONTINUED | OUTPATIENT
Start: 2023-11-16 | End: 2023-11-17

## 2023-11-16 RX ORDER — NEOMYCIN/POLYMYXIN B/DEXAMETHA 0.1 %
1 SUSPENSION, DROPS(FINAL DOSAGE FORM)(ML) OPHTHALMIC (EYE) DAILY
Refills: 0 | Status: DISCONTINUED | OUTPATIENT
Start: 2023-11-16 | End: 2023-11-17

## 2023-11-16 RX ORDER — ACETYLCYSTEINE 200 MG/ML
4 VIAL (ML) MISCELLANEOUS ONCE
Refills: 0 | Status: COMPLETED | OUTPATIENT
Start: 2023-11-16 | End: 2023-11-16

## 2023-11-16 RX ORDER — PETROLATUM,WHITE
1 JELLY (GRAM) TOPICAL
Refills: 0 | Status: DISCONTINUED | OUTPATIENT
Start: 2023-11-16 | End: 2023-11-17

## 2023-11-16 RX ADMIN — Medication 500 MILLIGRAM(S): at 05:36

## 2023-11-16 RX ADMIN — Medication 100 MILLIGRAM(S): at 21:46

## 2023-11-16 RX ADMIN — Medication 1 DROP(S): at 08:16

## 2023-11-16 RX ADMIN — Medication 500 MILLIGRAM(S): at 17:28

## 2023-11-16 RX ADMIN — Medication 4 MILLILITER(S): at 21:47

## 2023-11-16 RX ADMIN — ENOXAPARIN SODIUM 40 MILLIGRAM(S): 100 INJECTION SUBCUTANEOUS at 05:35

## 2023-11-16 RX ADMIN — LISINOPRIL 5 MILLIGRAM(S): 2.5 TABLET ORAL at 05:37

## 2023-11-16 RX ADMIN — Medication 1 DROP(S): at 00:11

## 2023-11-16 RX ADMIN — Medication 1 DROP(S): at 23:53

## 2023-11-16 RX ADMIN — Medication 1 DROP(S): at 11:38

## 2023-11-16 RX ADMIN — Medication 1 DROP(S): at 17:27

## 2023-11-16 RX ADMIN — Medication 1 APPLICATION(S): at 17:26

## 2023-11-16 RX ADMIN — Medication 1 DROP(S): at 19:10

## 2023-11-16 RX ADMIN — PANTOPRAZOLE SODIUM 40 MILLIGRAM(S): 20 TABLET, DELAYED RELEASE ORAL at 05:38

## 2023-11-16 RX ADMIN — Medication 1 DROP(S): at 11:39

## 2023-11-16 RX ADMIN — Medication 1 DROP(S): at 05:35

## 2023-11-16 RX ADMIN — CEFTRIAXONE 100 MILLIGRAM(S): 500 INJECTION, POWDER, FOR SOLUTION INTRAMUSCULAR; INTRAVENOUS at 12:52

## 2023-11-16 NOTE — PROGRESS NOTE ADULT - ASSESSMENT
Assessment and Recommendations:  63y female with a past medical history/ocular history of psoriatic arthritis on methotrexate/Skyrizi, breast cancer s/p bilateral mastectomy, retinal scarring OS, high myopia consulted for left eye swelling, redness, and pain, found to have chemosis, temporal injection, and eye pain, with periorbital swelling, and findings on CT concerning for anterior scleritis and preseptal cellulitis.    #Likely preseptal cellulitis OS  - patient with cold symptoms prior to presentation, but denies fevers, new medications, rashes, trauma, no recent vaccinations, no new face creams  - swelling has progressively gotten worse and patient complaining of left eye pain. Says periorbital swelling and redness occurred before eye became red.  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, no vitritis, no hemorrhages  - CT scan showing preseptal inflammation   - DFE prior with normal appearing nerve for high myopia, no obvious nerve edema, pupils normally reactive and color vision full, low suspicion for ocular nerve involvement  - B-scan performed OS prior  as well with no obvious T-sign of posterior scleritis  - differential diagnosis includes preseptal cellulitis, nodular vs diffuse anterior scleritis, conjunctivitis  - Continue IV antibiotics for one more day; will likely be able to transition to PO on 11/17  - Continue oral NSAID (flurbiprofen 100mg TID or naproxen 500 mg BID) with PPI (omeprazole 20mg qdaily)  - Decrease maxitrol to once daily OS  - Inflam workup: ANCA, uric acid, RF negative,   - ESR and CRP elevated  - creatinine, syphilis screen, ACE, CH50, lyme antibody pending  - Continue preservative free artificial tears 6 times a day both eyes    #Forniceal pigmentation, OS  - may require biopsy as pigmentation in the fornix can be concerning   - presentation not able to fully appreciate if pigmentation as patient has many small hemorrhages and petechiae  - should have outpatient follow up    SDW Dr. Layne, attending.     Outpatient Follow-up: Patient should follow-up with his/her ophthalmologist or with [facility] Department of Ophthalmology within 1 week of after discharge at:    [address]  [place] [address]  [phone]    Silvana Zhu MD, PGY3  Also available on [employer] Teams

## 2023-11-16 NOTE — DISCHARGE NOTE PROVIDER - NSDCCPCAREPLAN_GEN_ALL_CORE_FT
PRINCIPAL DISCHARGE DIAGNOSIS  Diagnosis: Preseptal cellulitis of left eye  Assessment and Plan of Treatment: You came into the hospital for swelling of your left eye. You had a CT scan of the eyes which showed cellulits of your eye. Cellulitis is a inflammation of the soft tissue nearby the eye. Preseptal cellulitis typically does not involved the eye itself but rather the tissue surronding it. Ophthalmology was consulted and recommended continued IV antibiotics with topical solution. Your home medications for arthritis were held in the hospital in the setting of active infection. In the hospital you were given IV antibiotics which was converted to oral by the time of discharge.   If you experience worsening swelling, fevers, pain in the eye, double vision, blurry vision, severe headcahes even with the antibiotics, please return to the Emergency room for evaluation for worsening infection.   Please continue to take your prescribed antibiotics for the full course, as listed in your discharge paper work.   Please follow up with your primary care provider in 1-2 weeks.   Please follow up with the attached Ophthalmology clinic in 1-2 weeks for evaluation of your eye.     PRINCIPAL DISCHARGE DIAGNOSIS  Diagnosis: Preseptal cellulitis of left eye  Assessment and Plan of Treatment: You came into the hospital for swelling of your left eye. You had a CT scan of the eyes which showed cellulits of your eye. Cellulitis is a inflammation of the soft tissue nearby the eye. Preseptal cellulitis typically does not involved the eye itself but rather the tissues surronding it. Ophthalmology was consulted and recommended continued IV antibiotics with topical solution. Your home medications for arthritis were held in the hospital in the setting of active infection. In the hospital you were given IV antibiotics which was converted to oral by the time of discharge.   If you experience worsening swelling, fevers, pain in the eye, double vision, blurry vision, severe headaches even with the antibiotics, please return to the Emergency room for evaluation for worsening infection.   Please continue to take your prescribed antibiotics (Cefpodoxime) for the full course, as listed in your discharge paper work.   Hold prednisone until Monday 11/20 in the setting of active infection.   Please follow up with your primary care provider in 1-2 weeks.   Please follow up with the attached Ophthalmology clinic in 1-2 weeks for evaluation of your eye.     PRINCIPAL DISCHARGE DIAGNOSIS  Diagnosis: Preseptal cellulitis of left eye  Assessment and Plan of Treatment: You came into the hospital for swelling of your left eye. You had a CT scan of the eyes which showed cellulits of your eye. Cellulitis is a inflammation of the soft tissue nearby the eye. Preseptal cellulitis typically does not involved the eye itself but rather the tissues surronding it. Ophthalmology was consulted and recommended continued IV antibiotics with topical solution. Your home medications for arthritis were held in the hospital in the setting of active infection. In the hospital you were given IV antibiotics which was converted to oral by the time of discharge.   If you experience worsening swelling, fevers, pain in the eye, double vision, blurry vision, severe headaches even with the antibiotics, please return to the Emergency room for evaluation for worsening infection.   Please continue to take your prescribed antibiotics (Cefpodoxime) for the full course, as listed in your discharge paper work.   Please apply maxitrol suspension to affected eye as printed in the discharge paper work.   Hold prednisone until Monday 11/20 in the setting of active infection.   Please follow up with your primary care provider in 1-2 weeks.   Please follow up with the attached Ophthalmology clinic in 1-2 weeks for evaluation of your eye.

## 2023-11-16 NOTE — PROGRESS NOTE ADULT - PROBLEM SELECTOR PLAN 3
- Oral NSAIDS (Naproxen 500 mg BID)   - Optho evaluation - appreciate recs - f/u ESR/CRP, uric acid, syphilis, ANCA, ACE, CH50, lyme. c/w Oral NSAIDS (Naproxen 500 mg BID)   - Elevated CRP / ESR - autoimmune hx   - Syphilis , RF, c/P ANCA , Lyme, uric acid WNL

## 2023-11-16 NOTE — DISCHARGE NOTE PROVIDER - ATTENDING DISCHARGE PHYSICAL EXAMINATION:
Vital Signs Last 24 Hrs  T(C): 36.9 (17 Nov 2023 08:29), Max: 37 (16 Nov 2023 16:30)  T(F): 98.5 (17 Nov 2023 08:29), Max: 98.6 (16 Nov 2023 16:30)  HR: 86 (17 Nov 2023 08:29) (84 - 88)  BP: 129/77 (17 Nov 2023 08:29) (125/76 - 144/75)  BP(mean): --  RR: 18 (17 Nov 2023 08:29) (18 - 18)  SpO2: 98% (17 Nov 2023 08:29) (96% - 100%)    Parameters below as of 17 Nov 2023 08:29  Patient On (Oxygen Delivery Method): room air      CONSTITUTIONAL: Well-groomed, in no apparent distress  EYES: L conjunctival and scleral injection but improved from prior, erythema around eye lids markedly improved  ENMT: No external nasal lesions; Normal outer ears  NECK: Supple; Trachea midline  RESPIRATORY: Normal respiratory effort; lungs are clear to auscultation bilaterally without wheeze/rhonchi/rales  CARDIOVASCULAR: Regular rate and rhythm, normal S1 and S2, no murmur/rub/gallop; No lower extremity edema  GASTROINTESTINAL: Non-distended; No palpable masses; No tenderness; No rebound/guarding  MUSCULOSKELETAL:  Normal gait;  NEUROLOGY: A+O to person, place, and time; no gross motor deficits   PSYCHIATRY: Mood and Affect appropriate

## 2023-11-16 NOTE — PROGRESS NOTE ADULT - ATTENDING COMMENTS
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    CT orbits c/f for preseptal cellulits / scleritis  c/w ceftriaxone. transition Eder from Saturday  f/u ophtho recs    Dispo: Likely d/c after IV abx dose tomorrow if cellulitis continues to improve  Further plan per resident note above

## 2023-11-16 NOTE — DISCHARGE NOTE PROVIDER - NSFOLLOWUPCLINICS_GEN_ALL_ED_FT
[facility]  Ophthalmology  [address] [place] [address], [facility] [address]  [place] [address]  Phone: [phone]  Fax:

## 2023-11-16 NOTE — SBIRT NOTE ADULT - NSSBIRTUNABLESCROTHER_GEN_A_CORE
Met with patient due to positive SBIRT screen. Patient states no concerns with substance use, is a social drinker. Patient is within healthy guildeines.

## 2023-11-16 NOTE — DISCHARGE NOTE PROVIDER - CARE PROVIDER_API CALL
Rachel Ramirez  Family Medicine [address] [place] [address]  Phone: [phone]  Fax: [phone]  Established Patient  Follow Up Time:

## 2023-11-16 NOTE — MEDICAL STUDENT PROGRESS NOTE(EDUCATION) - PLAN 5
- Pt reports acute migraine management at home with sumatriptan, fiorcet, percocet.   - Acetaminophen for pain   - Sumatriptan (home med) PRN for moderate / severe pain.

## 2023-11-16 NOTE — PROGRESS NOTE ADULT - ASSESSMENT
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 VSS, WBC 11K, ESR 43, CT orbits c/f for preseptal cellulits / scleritis, on CTX . 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 VSS, WBC 11K, ESR 43, CT orbits c/f for preseptal cellulits / scleritis, on CTX .  Clinically improving, with no WBC continuing with IV CTX,

## 2023-11-16 NOTE — DISCHARGE NOTE PROVIDER - NSDCMRMEDTOKEN_GEN_ALL_CORE_FT
anastrozole 1 mg oral tablet: 1 tab(s) orally once a day  folic acid 1 mg oral tablet: 1 tab(s) orally once a day  lisinopril 5 mg oral tablet: 1 tab(s) orally once a day  loratadine 10 mg oral capsule: 1 cap(s) orally once a day  lysine 500 mg oral tablet: 1 tab(s) orally once a day  methotrexate 2.5 mg oral tablet: orally once a week  Multiple Vitamins oral tablet: 1 tab(s) orally once a day  Percocet 5/325 oral tablet: 1 tab(s) orally every 6 hours, As Needed  predniSONE 10 mg oral tablet: 1 tab(s) orally once a day  SUMAtriptan 50 mg oral tablet: 1 tab(s) orally once, As Needed - for headache   anastrozole 1 mg oral tablet: 1 tab(s) orally once a day  benzonatate 100 mg oral capsule: 1 cap(s) orally 3 times a day as needed for Cough  cefpodoxime 200 mg oral tablet: 2 tab(s) orally 2 times a day Start taking Saturday 11/18/23  dexamethasone/neomycin/polymyxin B 1 mg-3.5 mg-10,000 units/mL ophthalmic suspension: 1 drop(s) to each affected eye once a day  DULoxetine 20 mg oral delayed release capsule: 1 cap(s) orally once a day  folic acid 1 mg oral tablet: 1 tab(s) orally once a day  lisinopril 5 mg oral tablet: 1 tab(s) orally once a day  loratadine 10 mg oral capsule: 1 cap(s) orally once a day  lysine 500 mg oral tablet: 1 tab(s) orally once a day  methotrexate 2.5 mg oral tablet: orally once a week  Multiple Vitamins oral tablet: 1 tab(s) orally once a day  naproxen 500 mg oral tablet: 1 tab(s) orally 2 times a day Take with meals/food  ocular lubricant ophthalmic solution: 1 drop(s) to each affected eye 5 times a day  pantoprazole 40 mg oral delayed release tablet: 1 tab(s) orally once a day (before a meal) Take for 5 days while taking naproxen  Percocet 5/325 oral tablet: 1 tab(s) orally every 6 hours, As Needed  predniSONE 10 mg oral tablet: 1 tab(s) orally once a day Resume prednisone monday  SUMAtriptan 50 mg oral tablet: 1 tab(s) orally once, As Needed - for headache

## 2023-11-16 NOTE — PROGRESS NOTE ADULT - PROBLEM SELECTOR PLAN 1
Edematous R eye for 4 days, associated erythema, without visual changes / diplopia. CT orbits c/f preseptal cellulitis with scleritis - immunocompromised state due to immunosuppression.   - c/w with CTX   c/w topical maxitrol QID OS  IV ceftriaxone as opposed to PO antibiotics given immunocompromised state   - f/u 1 week after discharge with ophtho   - If drainage present, collect and send for culture   - No need for blood cultures given lack of systemic symptoms Edematous R eye for 4 days, associated erythema, without visual changes / diplopia. CT orbits c/f preseptal cellulitis with scleritis - immunocompromised state due to immunosuppression.   - c/w with CTX, plan to transition to PO 11/17 if continued clinical improvement  c/w topical maxitrol QID OS  IV ceftriaxone as opposed to PO antibiotics given immunocompromised state   - f/u 1 week after discharge with ophtho   - If drainage present, collect and send for culture   - No need for blood cultures given lack of systemic symptoms

## 2023-11-16 NOTE — DISCHARGE NOTE PROVIDER - HOSPITAL COURSE
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  Patient states that on Monday morning she woke up with some swelling around her eye which progressively worsened with pain and redness throughout the day.  Yesterday noticed redness and puffiness to the eye itself, went to her PCP who recommended going to the hospital for IV antibiotic treatment. She originally presented to the emergency department at [place].  She does endorse severe pain to the face extending from teeth to temple surrounding the left eye, which has improved. The area around her left eye continues to feel swollen and tender to palpation, but no pain with eye movement. She reports having a mild left sided headache, which is milder compared to her typical migraines. Pt does report having some purulent drainage from the eye while she was home, however not since she has been in the hospital. She denies vision changes, contact use, flashes, floaters, diplopia, trauma in the affected eye. Mild URI symptoms reported for the past week. She denies feeling feverish, chills, chest pain, shortness of breath, nausea, or vomiting. Pt reports having recurrent nose bleeds, including a minor one while in the [facility] ED. She denies any recent dental procedures (last one in spring 2023). Most recent infection was August 2023 when she had COVID, none since then. Pt reports no sick contacts or recent travel. She had a CT orbit with IV contrast done at [place] and transferred to [facility] for ophtho consult. Prior to transfer she was given 2 g of ceftriaxone and 1 g of vancomycin.     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 Her flare ups are typically bilateral with worse inflammation and pain on the left side, typically involving joints in hands, knees, feet, and most recently in the anterior rib spaces (“costochondritis”) On 10/25 she had her most recent dose of Sterlera and 3 weeks she went to her podiatrist (non-[facility]) who gave her steroid injection for inflamed left foot tendons. Pt reports that she currently feels 75% recovered, but not quite at baseline.     [facility] ED Course:   In the ED, pt was given 5mg of oxycodone PO 9pm 11/14, 4mg IV push of morphine at 1am 11/15, and 500mg naproxen 5am 11/15 for pain. Pt was seen by ophthalmology who examined that patient under slit lamp and dilation, diagnosing with pre-septal cellulitis and anterior scleritis.  (15 Nov 2023 09:22)    Hospital Course:  Patient was admitted to the medicine service for preseptal cellulitis in the setting of immunosuppression. Patient was started on IV antibiotics (Ceftriaxone). While inpatient, patient's symptoms of swelling and redness improved. Optho was consulted for evaluation of the eye. They recommended NSAIDs for scleritis, and continued antibiotics.   Maxitrol was started for the scleritis. There was an inflammatory work up for the scleritis, including ANCA, uric acid, RF, syphilis, ACE, CH50. lyme all within normal limits.   Immunosuppressant from home were held in the setting of active infection. There was no active psoriasis flair.   Upon discharge, patient tolerate PO medications had improved WBC and clinically improving.     Important Medication Changes and Reason: Addition of Augmentin for a total of 7-10 days.     Active or Pending Issues Requiring Follow-up: Follow up with opthalmology outpatient.     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  Patient states that on Monday morning she woke up with some swelling around her eye which progressively worsened with pain and redness throughout the day.  Yesterday noticed redness and puffiness to the eye itself, went to her PCP who recommended going to the hospital for IV antibiotic treatment. She originally presented to the emergency department at [place].  She does endorse severe pain to the face extending from teeth to temple surrounding the left eye, which has improved. The area around her left eye continues to feel swollen and tender to palpation, but no pain with eye movement. She reports having a mild left sided headache, which is milder compared to her typical migraines. Pt does report having some purulent drainage from the eye while she was home, however not since she has been in the hospital. She denies vision changes, contact use, flashes, floaters, diplopia, trauma in the affected eye. Mild URI symptoms reported for the past week. She denies feeling feverish, chills, chest pain, shortness of breath, nausea, or vomiting. Pt reports having recurrent nose bleeds, including a minor one while in the [facility] ED. She denies any recent dental procedures (last one in spring 2023). Most recent infection was August 2023 when she had COVID, none since then. Pt reports no sick contacts or recent travel. She had a CT orbit with IV contrast done at [place] and transferred to [facility] for ophtho consult. Prior to transfer she was given 2 g of ceftriaxone and 1 g of vancomycin.     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 Her flare ups are typically bilateral with worse inflammation and pain on the left side, typically involving joints in hands, knees, feet, and most recently in the anterior rib spaces (“costochondritis”) On 10/25 she had her most recent dose of Sterlera and 3 weeks she went to her podiatrist (non-[facility]) who gave her steroid injection for inflamed left foot tendons. Pt reports that she currently feels 75% recovered, but not quite at baseline.     [facility] ED Course:   In the ED, pt was given 5mg of oxycodone PO 9pm 11/14, 4mg IV push of morphine at 1am 11/15, and 500mg naproxen 5am 11/15 for pain. Pt was seen by ophthalmology who examined that patient under slit lamp and dilation, diagnosing with pre-septal cellulitis and anterior scleritis.  (15 Nov 2023 09:22)    Hospital Course:  Patient was admitted to the medicine service for preseptal cellulitis in the setting of immunosuppression. Patient was started on IV antibiotics (Ceftriaxone). While inpatient, patient's symptoms of swelling and redness improved. Optho was consulted for evaluation of the eye. They recommended NSAIDs for scleritis, and continued antibiotics.   Maxitrol was started for the scleritis. There was an inflammatory work up for the scleritis, including ANCA, uric acid, RF, syphilis, ACE, CH50. lyme all within normal limits.   Immunosuppressant from home were held in the setting of active infection. There was no active psoriasis flair.   Upon discharge, patient tolerate PO medications had improved WBC and clinically improving.     Important Medication Changes and Reason: Addition of Cefpodoxime for a total of 6 days.     Active or Pending Issues Requiring Follow-up: Follow up with opthalmology outpatient.     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  Patient states that on Monday morning she woke up with some swelling around her eye which progressively worsened with pain and redness throughout the day.  Yesterday noticed redness and puffiness to the eye itself, went to her PCP who recommended going to the hospital for IV antibiotic treatment. She originally presented to the emergency department at [place].  She does endorse severe pain to the face extending from teeth to temple surrounding the left eye, which has improved. The area around her left eye continues to feel swollen and tender to palpation, but no pain with eye movement. She reports having a mild left sided headache, which is milder compared to her typical migraines. Pt does report having some purulent drainage from the eye while she was home, however not since she has been in the hospital. She denies vision changes, contact use, flashes, floaters, diplopia, trauma in the affected eye. Mild URI symptoms reported for the past week. She denies feeling feverish, chills, chest pain, shortness of breath, nausea, or vomiting. Pt reports having recurrent nose bleeds, including a minor one while in the [facility] ED. She denies any recent dental procedures (last one in spring 2023). Most recent infection was August 2023 when she had COVID, none since then. Pt reports no sick contacts or recent travel. She had a CT orbit with IV contrast done at [place] and transferred to [facility] for ophtho consult. Prior to transfer she was given 2 g of ceftriaxone and 1 g of vancomycin.     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 Her flare ups are typically bilateral with worse inflammation and pain on the left side, typically involving joints in hands, knees, feet, and most recently in the anterior rib spaces (“costochondritis”) On 10/25 she had her most recent dose of Sterlera and 3 weeks she went to her podiatrist (non-[facility]) who gave her steroid injection for inflamed left foot tendons. Pt reports that she currently feels 75% recovered, but not quite at baseline.     [facility] ED Course:   In the ED, pt was given 5mg of oxycodone PO 9pm 11/14, 4mg IV push of morphine at 1am 11/15, and 500mg naproxen 5am 11/15 for pain. Pt was seen by ophthalmology who examined that patient under slit lamp and dilation, diagnosing with pre-septal cellulitis and anterior scleritis.  (15 Nov 2023 09:22)    Hospital Course:  Patient was admitted to the medicine service for preseptal cellulitis in the setting of immunosuppression. Patient was started on IV antibiotics (Ceftriaxone). While inpatient, patient's symptoms of swelling and redness improved. Optho was consulted for evaluation of the eye. They recommended NSAIDs for scleritis, and continued antibiotics.   Maxitrol was started for the scleritis. There was an inflammatory work up for the scleritis, including ANCA, uric acid, RF, syphilis, ACE, CH50. lyme all within normal limits.   Immunosuppressant from home were held in the setting of active infection. There was no active psoriasis flair.   Upon discharge, patient tolerate PO medications had improved WBC and clinically improving.     Important Medication Changes and Reason: Addition of Cefpodoxime for a total of 6 days.   Maxitrol suspension   Naproxen and Pantoprazole for 5d    Active or Pending Issues Requiring Follow-up: Follow up with opthalmology outpatient.     Advanced Directives:   [X] Full code  [ ] DNR  [ ] Hospice    Discharge Diagnoses: Preseptal Cellulitis

## 2023-11-16 NOTE — DISCHARGE NOTE PROVIDER - NSDCFUSCHEDAPPT_GEN_ALL_CORE_FT
Rachel Ramirez  [facility]  [facility] [address]  Scheduled Appointment: 11/21/2023    Felicia Khan  [facility]  CARDIOLOGY [address]  Scheduled Appointment: 12/13/2023    Gabbi Will  [facility] [address] [facility]  Scheduled Appointment: 02/02/2024

## 2023-11-16 NOTE — DISCHARGE NOTE PROVIDER - NSDCCPTREATMENT_GEN_ALL_CORE_FT
PRINCIPAL PROCEDURE  Procedure: CT orbits w contrast  Findings and Treatment: IMPRESSION:  Left periorbital cellulitis with conjunctivitis and mild scleritis  suggested. No drainable abscess.  Subtle asymmetry detected at the left anterior optic nerve, query   infectious optic neuritis in this immunocompromised patient and orbit MRI   advised for better tissue contrast resolution.

## 2023-11-16 NOTE — MEDICAL STUDENT PROGRESS NOTE(EDUCATION) - ASSESSMENT
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 VSS, WBC 11K, ESR 43, CT orbits c/f for preseptal cellulits / scleritis, on CTX .    - improved pain and swelling in L eye overnight  - appreciate ophtho recs: switched maxitrol ointment to solution QID OS, added artifical tears five times a day b/l eyes.                 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 VSS, WBC 11K, ESR 43, CT orbits c/f for preseptal cellulits / scleritis, on CTX .    - improved pain and swelling in L eye overnight  - WBC imrpoved, CH50 down to WNL  - CMP stable  - appreciate ophtho recs: switched maxitrol ointment to solution QID OS, added artifical tears five times a day b/l eyes.  -c/w IV abx

## 2023-11-16 NOTE — MEDICAL STUDENT PROGRESS NOTE(EDUCATION) - PLAN 3
- Oral NSAIDS (Naproxen 500 mg BID)   - Optho evaluation - appreciate recs -   - treponema, lyme, RF, ANCAs, uric acid neg   - elevated CRP and ESR  - elevated CH50 - Oral NSAIDS (Naproxen 500 mg BID)   - Optho evaluation - appreciate recs -   - treponema, lyme, RF, ANCAs, uric acid neg   - elevated CRP and ESR  - elevated CH50 to 99, WNL on repeat to 56

## 2023-11-16 NOTE — PROGRESS NOTE ADULT - SUBJECTIVE AND OBJECTIVE BOX
[facility] DEPARTMENT OF OPHTHALMOLOGY  ------------------------------------------------------------------------------  Silvana Zhu MD, PGY-3  Contact: TEAMS  ------------------------------------------------------------------------------    Interval History: No acute events overnight. Today patient states left eye is less swollen and red.     MEDICATIONS  (STANDING):  artificial tears (preservative free) Ophthalmic Solution 1 Drop(s) Both EYES five times a day  cefTRIAXone   IVPB 2000 milliGRAM(s) IV Intermittent every 24 hours  dexamethasone/neomycin/polymyxin Suspension 1 Drop(s) Left EYE four times a day  DULoxetine 20 milliGRAM(s) Oral daily  enoxaparin Injectable 40 milliGRAM(s) SubCutaneous every 24 hours  folic acid 1 milliGRAM(s) Oral daily  influenza   Vaccine 0.5 milliLiter(s) IntraMuscular once  lisinopril 5 milliGRAM(s) Oral daily  loratadine 10 milliGRAM(s) Oral daily  multivitamin 1 Tablet(s) Oral daily  naproxen 500 milliGRAM(s) Oral two times a day  pantoprazole    Tablet 40 milliGRAM(s) Oral before breakfast    MEDICATIONS  (PRN):  acetaminophen     Tablet .. 650 milliGRAM(s) Oral every 6 hours PRN Mild Pain (1 - 3)  SUMAtriptan 50 milliGRAM(s) Oral daily PRN Headache      VITALS: T(C): 36.4 (11-16-23 @ 08:51)  T(F): 97.5 (11-16-23 @ 08:51), Max: 97.7 (11-15-23 @ 17:38)  HR: 78 (11-16-23 @ 08:51) (78 - 107)  BP: 125/66 (11-16-23 @ 08:51) (121/81 - 137/86)  RR:  (18 - 18)  SpO2:  (95% - 100%)  Wt(kg): --  General: AAO x 3, appropriate mood and affect      Ophthalmology Exam:  Visual acuity (cc): 20/20 OD. 20/50+ OS PHNI.   Pupils: PERRL OU, no APD, normal reactivity, brisk  Intraocular Pressure:  STP OU  Extraocular movements (EOMs): Full OU, no diplopia, no pain with EOM  Confrontational Visual Field (CVF): Full OD. Full OS    Pen Light Exam:  External: Normal OD. mild periorbital edema, and erythema  Lids/Lashes/Lacrimal Ducts: Flat OD. mild edema and erythema OS, improving. inferior forniceal pigmentation? OS  Sclera/Conjunctiva: white and quiet OD. 2+ injection and subconj hemorrahge primarily inferotemporal, no chemosis OS.  Cornea: DTBUT OU  Anterior Chamber: Deep and formed OU.  Iris: Flat OU.  Lens: Clear OU.

## 2023-11-17 ENCOUNTER — NON-APPOINTMENT (OUTPATIENT)
Age: 63
End: 2023-11-17

## 2023-11-17 ENCOUNTER — RX RENEWAL (OUTPATIENT)
Age: 63
End: 2023-11-17

## 2023-11-17 ENCOUNTER — TRANSCRIPTION ENCOUNTER (OUTPATIENT)
Age: 63
End: 2023-11-17

## 2023-11-17 VITALS
SYSTOLIC BLOOD PRESSURE: 129 MMHG | RESPIRATION RATE: 18 BRPM | OXYGEN SATURATION: 98 % | TEMPERATURE: 98 F | HEART RATE: 86 BPM | DIASTOLIC BLOOD PRESSURE: 77 MMHG

## 2023-11-17 LAB
HCT VFR BLD CALC: 37.2 % — SIGNIFICANT CHANGE UP (ref 34.5–45)
HCT VFR BLD CALC: 37.2 % — SIGNIFICANT CHANGE UP (ref 34.5–45)
HGB BLD-MCNC: 11.4 G/DL — LOW (ref 11.5–15.5)
HGB BLD-MCNC: 11.4 G/DL — LOW (ref 11.5–15.5)
MCHC RBC-ENTMCNC: 29.6 PG — SIGNIFICANT CHANGE UP (ref 27–34)
MCHC RBC-ENTMCNC: 29.6 PG — SIGNIFICANT CHANGE UP (ref 27–34)
MCHC RBC-ENTMCNC: 30.6 GM/DL — LOW (ref 32–36)
MCHC RBC-ENTMCNC: 30.6 GM/DL — LOW (ref 32–36)
MCV RBC AUTO: 96.6 FL — SIGNIFICANT CHANGE UP (ref 80–100)
MCV RBC AUTO: 96.6 FL — SIGNIFICANT CHANGE UP (ref 80–100)
NRBC # BLD: 0 /100 WBCS — SIGNIFICANT CHANGE UP (ref 0–0)
NRBC # BLD: 0 /100 WBCS — SIGNIFICANT CHANGE UP (ref 0–0)
PLATELET # BLD AUTO: 270 K/UL — SIGNIFICANT CHANGE UP (ref 150–400)
PLATELET # BLD AUTO: 270 K/UL — SIGNIFICANT CHANGE UP (ref 150–400)
RBC # BLD: 3.85 M/UL — SIGNIFICANT CHANGE UP (ref 3.8–5.2)
RBC # BLD: 3.85 M/UL — SIGNIFICANT CHANGE UP (ref 3.8–5.2)
RBC # FLD: 14.8 % — HIGH (ref 10.3–14.5)
RBC # FLD: 14.8 % — HIGH (ref 10.3–14.5)
WBC # BLD: 5.75 K/UL — SIGNIFICANT CHANGE UP (ref 3.8–10.5)
WBC # BLD: 5.75 K/UL — SIGNIFICANT CHANGE UP (ref 3.8–10.5)
WBC # FLD AUTO: 5.75 K/UL — SIGNIFICANT CHANGE UP (ref 3.8–10.5)
WBC # FLD AUTO: 5.75 K/UL — SIGNIFICANT CHANGE UP (ref 3.8–10.5)

## 2023-11-17 PROCEDURE — 85025 COMPLETE CBC W/AUTO DIFF WBC: CPT

## 2023-11-17 PROCEDURE — 85027 COMPLETE CBC AUTOMATED: CPT

## 2023-11-17 PROCEDURE — 85652 RBC SED RATE AUTOMATED: CPT

## 2023-11-17 PROCEDURE — 86431 RHEUMATOID FACTOR QUANT: CPT

## 2023-11-17 PROCEDURE — 94640 AIRWAY INHALATION TREATMENT: CPT

## 2023-11-17 PROCEDURE — 86780 TREPONEMA PALLIDUM: CPT

## 2023-11-17 PROCEDURE — 86162 COMPLEMENT TOTAL (CH50): CPT

## 2023-11-17 PROCEDURE — 84100 ASSAY OF PHOSPHORUS: CPT

## 2023-11-17 PROCEDURE — 82164 ANGIOTENSIN I ENZYME TEST: CPT

## 2023-11-17 PROCEDURE — 80053 COMPREHEN METABOLIC PANEL: CPT

## 2023-11-17 PROCEDURE — 99285 EMERGENCY DEPT VISIT HI MDM: CPT

## 2023-11-17 PROCEDURE — 99239 HOSP IP/OBS DSCHRG MGMT >30: CPT | Mod: GC

## 2023-11-17 PROCEDURE — 84550 ASSAY OF BLOOD/URIC ACID: CPT

## 2023-11-17 PROCEDURE — 36415 COLL VENOUS BLD VENIPUNCTURE: CPT

## 2023-11-17 PROCEDURE — 86803 HEPATITIS C AB TEST: CPT

## 2023-11-17 PROCEDURE — 86036 ANCA SCREEN EACH ANTIBODY: CPT

## 2023-11-17 PROCEDURE — 86618 LYME DISEASE ANTIBODY: CPT

## 2023-11-17 PROCEDURE — 83735 ASSAY OF MAGNESIUM: CPT

## 2023-11-17 PROCEDURE — 86140 C-REACTIVE PROTEIN: CPT

## 2023-11-17 PROCEDURE — 87476 LYME DIS DNA AMP PROBE: CPT

## 2023-11-17 RX ORDER — NEOMYCIN/POLYMYXIN B/DEXAMETHA 0.1 %
1 SUSPENSION, DROPS(FINAL DOSAGE FORM)(ML) OPHTHALMIC (EYE)
Qty: 1 | Refills: 0
Start: 2023-11-17 | End: 2023-11-19

## 2023-11-17 RX ORDER — DULOXETINE HYDROCHLORIDE 30 MG/1
1 CAPSULE, DELAYED RELEASE ORAL
Qty: 0 | Refills: 0 | DISCHARGE
Start: 2023-11-17

## 2023-11-17 RX ORDER — PANTOPRAZOLE SODIUM 20 MG/1
1 TABLET, DELAYED RELEASE ORAL
Qty: 5 | Refills: 0
Start: 2023-11-17 | End: 2023-11-21

## 2023-11-17 RX ORDER — FOLIC ACID 1 MG/1
1 TABLET ORAL
Qty: 90 | Refills: 3 | Status: ACTIVE | COMMUNITY
Start: 2021-11-24 | End: 1900-01-01

## 2023-11-17 RX ADMIN — DULOXETINE HYDROCHLORIDE 20 MILLIGRAM(S): 30 CAPSULE, DELAYED RELEASE ORAL at 12:09

## 2023-11-17 RX ADMIN — LISINOPRIL 5 MILLIGRAM(S): 2.5 TABLET ORAL at 06:15

## 2023-11-17 RX ADMIN — Medication 1 APPLICATION(S): at 06:16

## 2023-11-17 RX ADMIN — Medication 1 TABLET(S): at 12:10

## 2023-11-17 RX ADMIN — Medication 1 DROP(S): at 14:24

## 2023-11-17 RX ADMIN — Medication 1 DROP(S): at 08:34

## 2023-11-17 RX ADMIN — Medication 1 MILLIGRAM(S): at 12:10

## 2023-11-17 RX ADMIN — Medication 500 MILLIGRAM(S): at 06:15

## 2023-11-17 RX ADMIN — LORATADINE 10 MILLIGRAM(S): 10 TABLET ORAL at 12:09

## 2023-11-17 RX ADMIN — CEFTRIAXONE 100 MILLIGRAM(S): 500 INJECTION, POWDER, FOR SOLUTION INTRAMUSCULAR; INTRAVENOUS at 12:10

## 2023-11-17 RX ADMIN — Medication 1 DROP(S): at 12:10

## 2023-11-17 RX ADMIN — PANTOPRAZOLE SODIUM 40 MILLIGRAM(S): 20 TABLET, DELAYED RELEASE ORAL at 06:15

## 2023-11-17 RX ADMIN — ENOXAPARIN SODIUM 40 MILLIGRAM(S): 100 INJECTION SUBCUTANEOUS at 06:15

## 2023-11-17 NOTE — PROGRESS NOTE ADULT - PROBLEM SELECTOR PLAN 7
- Fluids: None  - Electrolytes: Will replete to maintain K>4, Phos>3, and Mag>2  - Nutrition: Regular diet   - Activity: As tolerated   - DVT Prophylaxis: Lovenox   - Stress Ulcer/GI Prophylaxis: n/a   - Disposition: Home
- Fluids: None  - Electrolytes: Will replete to maintain K>4, Phos>3, and Mag>2  - Nutrition: Regular diet   - Activity: As tolerated   - DVT Prophylaxis: Lovenox   - Stress Ulcer/GI Prophylaxis: n/a   - Disposition: Home

## 2023-11-17 NOTE — MEDICAL STUDENT PROGRESS NOTE(EDUCATION) - PLAN 2
Home doses of Prednisone 10mg QD, Folic Acid 1mg Qd, Methotrexate 2.5mg once a week, Ustekinumab every 12 weeks, last dose 10/25/23. No acute signs of flair, risk of infection greater than autoimmune flair at this moment.  - Hold prednisone given acute infection   - c/w folic acid QD
Home doses of Prednisone 10mg QD, Folic Acid 1mg Qd, Methotrexate 2.5mg once a week, Ustekinumab every 12 weeks, last dose 10/25/23. No acute signs of flair, risk of infection greater than autoimmune flair at this moment.  - Hold prednisone given acute infection   - c/w folic acid QD

## 2023-11-17 NOTE — PROGRESS NOTE ADULT - PROBLEM SELECTOR PLAN 4
Continue Home medications  - continue home dose of lisinopril 5mg PO QD
Continue Home medications  - continue home dose of lisinopril 5mg PO QD

## 2023-11-17 NOTE — PROGRESS NOTE ADULT - PROBLEM SELECTOR PLAN 3
c/w Oral NSAIDS (Naproxen 500 mg BID)   - Elevated CRP / ESR - autoimmune hx   - Syphilis , RF, c/P ANCA , Lyme, uric acid WNL

## 2023-11-17 NOTE — PROGRESS NOTE ADULT - ASSESSMENT
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 VSS, WBC 11K, ESR 43, CT orbits c/f for preseptal cellulits / scleritis, on CTX .  Clinically improving, with no WBC continuing with IV CTX,

## 2023-11-17 NOTE — MEDICAL STUDENT PROGRESS NOTE(EDUCATION) - ASSESSMENT
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 VSS, WBC 11K, ESR 43, CT orbits c/f for preseptal cellulits / scleritis, on CTX .    - improved pain and swelling in L eye overnight  - WBC imrpoved, CH50 down to WNL  - CMP stable  - appreciate ophtho recs: switched maxitrol ointment to solution QID OS, added artifical tears five times a day b/l eyes.  -c/w IV abx

## 2023-11-17 NOTE — PROGRESS NOTE ADULT - PROBLEM SELECTOR PLAN 1
Edematous R eye for 4 days, associated erythema, without visual changes / diplopia. CT orbits c/f preseptal cellulitis with scleritis - immunocompromised state due to immunosuppression.   - c/w with CTX, plan to transition to PO 11/17 if continued clinical improvement  c/w topical maxitrol QID OS  IV ceftriaxone as opposed to PO antibiotics given immunocompromised state   - f/u 1 week after discharge with ophtho   - If drainage present, collect and send for culture   - No need for blood cultures given lack of systemic symptoms

## 2023-11-17 NOTE — PROGRESS NOTE ADULT - SUBJECTIVE AND OBJECTIVE BOX
[facility] DEPARTMENT OF OPHTHALMOLOGY  ------------------------------------------------------------------------------  Silvana Zhu MD, PGY-3  Contact: TEAMS  ------------------------------------------------------------------------------    Interval History: No acute events overnight. Today patient denying any new changes in vision. States swelling and redness are significantly improved.      MEDICATIONS  (STANDING):  AQUAPHOR (petrolatum Ointment) 1 Application(s) Topical two times a day  artificial tears (preservative free) Ophthalmic Solution 1 Drop(s) Both EYES five times a day  cefTRIAXone   IVPB 2000 milliGRAM(s) IV Intermittent every 24 hours  dexamethasone/neomycin/polymyxin Suspension 1 Drop(s) Left EYE daily  DULoxetine 20 milliGRAM(s) Oral daily  enoxaparin Injectable 40 milliGRAM(s) SubCutaneous every 24 hours  folic acid 1 milliGRAM(s) Oral daily  influenza   Vaccine 0.5 milliLiter(s) IntraMuscular once  lisinopril 5 milliGRAM(s) Oral daily  loratadine 10 milliGRAM(s) Oral daily  multivitamin 1 Tablet(s) Oral daily  naproxen 500 milliGRAM(s) Oral two times a day  pantoprazole    Tablet 40 milliGRAM(s) Oral before breakfast    MEDICATIONS  (PRN):  acetaminophen     Tablet .. 650 milliGRAM(s) Oral every 6 hours PRN Mild Pain (1 - 3)  benzonatate 100 milliGRAM(s) Oral once PRN Cough  SUMAtriptan 50 milliGRAM(s) Oral daily PRN Headache      VITALS: T(C): 36.9 (11-17-23 @ 08:29)  T(F): 98.5 (11-17-23 @ 08:29), Max: 98.6 (11-16-23 @ 16:30)  HR: 86 (11-17-23 @ 08:29) (84 - 88)  BP: 129/77 (11-17-23 @ 08:29) (125/76 - 144/75)  RR:  (18 - 18)  SpO2:  (96% - 100%)  Wt(kg): --  General: AAO x 3, appropriate mood and affect    Ophthalmology Exam:  Visual acuity (cc): 20/40 OS  Pupils: PERRL OU, no APD, normal reactivity, brisk  Intraocular Pressure:  STP OU  Extraocular movements (EOMs): Full OU, no diplopia, no pain with EOM  Confrontational Visual Field (CVF): Full OD. Full OS    Pen Light Exam:  External: Normal OD. mild periorbital edema, and erythema  Lids/Lashes/Lacrimal Ducts: Flat OD. trace edema and erythema OS, improving. inferior forniceal pigmentation? OS  Sclera/Conjunctiva: white and quiet OD. 1+ injection and resolving subconj hemorrahge primarily inferotemporal, no chemosis OS.  Cornea: DTBUT OU  Anterior Chamber: Deep and formed OU.  Iris: Flat OU.  Lens: Clear OU.

## 2023-11-17 NOTE — MEDICAL STUDENT PROGRESS NOTE(EDUCATION) - PLAN 4
Continue Home medications  - continue home dose of lisinopril 5mg PO QD.
Continue Home medications  - continue home dose of lisinopril 5mg PO QD.

## 2023-11-17 NOTE — MEDICAL STUDENT PROGRESS NOTE(EDUCATION) - SUBJECTIVE AND OBJECTIVE BOX
Andrea Mojica, MS3  Internal Medicine Team 1    SUBJECTIVE / OVERNIGHT EVENTS:  - Pt seen and examined at bedside, resting comfortably on RA  - NAEON  - Pt reports improved pain, improved swelling around L eye.  - Pt noted that her left hand swelling has improved, and noted some joint pain in left fingers.    MEDICATIONS  (STANDING):  artificial tears (preservative free) Ophthalmic Solution 1 Drop(s) Both EYES five times a day  dexamethasone/neomycin/polymyxin Suspension 1 Drop(s) Left EYE four times a day  DULoxetine 20 milliGRAM(s) Oral daily  enoxaparin Injectable 40 milliGRAM(s) SubCutaneous every 24 hours  folic acid 1 milliGRAM(s) Oral daily  influenza   Vaccine 0.5 milliLiter(s) IntraMuscular once  lisinopril 5 milliGRAM(s) Oral daily  loratadine 10 milliGRAM(s) Oral daily  multivitamin 1 Tablet(s) Oral daily  naproxen 500 milliGRAM(s) Oral two times a day  pantoprazole    Tablet 40 milliGRAM(s) Oral before breakfast    MEDICATIONS  (PRN):  acetaminophen     Tablet .. 650 milliGRAM(s) Oral every 6 hours PRN Mild Pain (1 - 3)  SUMAtriptan 50 milliGRAM(s) Oral daily PRN Headache    REVIEW OF SYSTEMS:  CONSTITUTIONAL: No fever, chills, night sweats  EYES: improved swelling around the eyes, no visual changes  ENMT: No jaw pain, difficulty hearing, tinnitus, vertigo; No sinus or throat pain  NECK: No pain or stiffness  RESPIRATORY: No cough, wheezing, or shortness of breath  CARDIOVASCULAR: No chest pain, dizziness  NEUROLOGICAL: improved headache,  SKIN: improved skin swelling and erythema around L eye, no rashes or skin lesions elsewhere  ENDOCRINE: No heat or cold intolerance  MUSCULOSKELETAL: mild joint pain , improved left hand swelling, no redness or warmth  PSYCHIATRIC: No difficulty sleeping    OBJECTIVE    VITALS:   Vital Signs Last 24 Hrs  T(C): 36.9 (17 Nov 2023 08:29), Max: 37 (16 Nov 2023 16:30)  T(F): 98.5 (17 Nov 2023 08:29), Max: 98.6 (16 Nov 2023 16:30)  HR: 86 (17 Nov 2023 08:29) (84 - 88)  BP: 129/77 (17 Nov 2023 08:29) (125/76 - 144/75)  BP(mean): --  RR: 18 (17 Nov 2023 08:29) (18 - 18)  SpO2: 98% (17 Nov 2023 08:29) (96% - 100%)    Parameters below as of 17 Nov 2023 08:29  Patient On (Oxygen Delivery Method): room air      CAPILLARY BLOOD GLUCOSE    I&O's Summary    PHYSICAL EXAM:   CONSTITUTIONAL: NAD  HEENT: no frontal/maxillary tenderness over sinuses, improved tenderness around L eye swollen skin, improved redness in L eye  RESPIRATORY: Normal respiratory effort; lungs are clear to auscultation bilaterally  CARDIOVASCULAR: Regular rate and rhythm, normal S1 and S2, no murmur/rub/gallop  MUSCLOSKELETAL: mild joint tenderness, improved left hand swelling, no redness or warmth  EXTREMITIES: no peripheral edema, distal pulses intact   NEURO: PERRLA, EOMi with no pain or diplopia    LABS:                        11.7   6.78  )-----------( 274      ( 16 Nov 2023 07:09 )             37.3     11-16    140  |  104  |  11  ----------------------------<  89  4.0   |  26  |  0.64    Ca    8.8      16 Nov 2023 07:08  Phos  4.5     11-16  Mg     2.2     11-16    TPro  6.6  /  Alb  3.9  /  TBili  0.2  /  DBili  x   /  AST  18  /  ALT  19  /  AlkPhos  86  11-16        LIVER FUNCTIONS - ( 16 Nov 2023 07:08 )  Alb: 3.9 g/dL / Pro: 6.6 g/dL / ALK PHOS: 86 U/L / ALT: 19 U/L / AST: 18 U/L / GGT: x               Urinalysis Basic - ( 16 Nov 2023 07:08 )    Color: x / Appearance: x / SG: x / pH: x  Gluc: 89 mg/dL / Ketone: x  / Bili: x / Urobili: x   Blood: x / Protein: x / Nitrite: x   Leuk Esterase: x / RBC: x / WBC x   Sq Epi: x / Non Sq Epi: x / Bacteria: x      ACC: [id number] EXAM:  CT ORBITS IC   ORDERED BY: LIZABETH LEDEZMA     PROCEDURE DATE:  11/14/2023          INTERPRETATION:  PROCEDURE: CT orbits with contrast    INDICATION: Left orbital cellulitis with chemosis. Psoriatic arthritis,   immunocompromised.    TECHNIQUE: Multiple axial, coronal, sagittal images of the orbits were   obtained at 2 mm intervals following the intravenous administration of   Optiray-350. The images were reviewed in soft tissue and bone windows.    COMPARISON: Face CT 05/24/2023    FINDINGS:    There is hyperenhancement and thickening of the left orbital conjunctiva   and mild periorbital cellulitic fat stranding. Thin lower density may   represent subconjunctival edema rather than abscess. Subtle asymmetric   enhancementinvolves the lateral sclera of the globe which may be   contiguous scleritis. There is no postseptal fat stranding or mass   effect. The globe appears preserved and symmetric in contour without   gross intravitreous finding.    On sagittal images, when corrected for angle, subtle asymmetric   enhancement perceived in the anterior optic nerve (see key image, and   also visible on coronal image 69). This is questionable for optic   neuritis which may be infectious and potentially viral in the   immunocompromised state. MRI advised, particularly if there is vision   loss. Extraocular muscles and lacrimal glands are grossly symmetrical.    The bony orbits are preserved as is the skull base and included facial   bones. Paranasal sinuses and mastoids are free of acute disease, clear   aside from mild mucosal thickening in the left ethmoids. No fluid level.    Included intracranial compartment is unremarkable.      IMPRESSION:    Left periorbital cellulitis with conjunctivitis and mild scleritis  suggested. No drainable abscess.    Subtle asymmetry detected at the left anterior optic nerve, query   infectious optic neuritis in this immunocompromised patient and orbit MRI   advised for better tissue contrast resolution.    --- End of Report ---          IMAGING:    [X] All pertinent imaging reviewed by me
Andrea Mojica, MS3  Internal Medicine Team 1    SUBJECTIVE / OVERNIGHT EVENTS:  - Pt seen and examined at bedside, resting comfortably on RA  - NAEON  - Pt reports improved pain, improved swelling around L eye.  - Pt noted that her left hand and left fingers are more swollen compared to the right. IV also on L.     MEDICATIONS  (STANDING):  artificial tears (preservative free) Ophthalmic Solution 1 Drop(s) Both EYES five times a day  dexamethasone/neomycin/polymyxin Suspension 1 Drop(s) Left EYE four times a day  DULoxetine 20 milliGRAM(s) Oral daily  enoxaparin Injectable 40 milliGRAM(s) SubCutaneous every 24 hours  folic acid 1 milliGRAM(s) Oral daily  influenza   Vaccine 0.5 milliLiter(s) IntraMuscular once  lisinopril 5 milliGRAM(s) Oral daily  loratadine 10 milliGRAM(s) Oral daily  multivitamin 1 Tablet(s) Oral daily  naproxen 500 milliGRAM(s) Oral two times a day  pantoprazole    Tablet 40 milliGRAM(s) Oral before breakfast    MEDICATIONS  (PRN):  acetaminophen     Tablet .. 650 milliGRAM(s) Oral every 6 hours PRN Mild Pain (1 - 3)  SUMAtriptan 50 milliGRAM(s) Oral daily PRN Headache    REVIEW OF SYSTEMS:  CONSTITUTIONAL: No fever, chills, night sweats, or fatigue  EYES: improved swelling around the eyes, no visual changes  ENMT: She had pain yesterday in L upper jaw, but since her swelling has improved ON the pain has resolved.  No difficulty hearing, tinnitus, vertigo; No sinus or throat pain  NECK: No pain or stiffness  RESPIRATORY: No cough, wheezing, or shortness of breath  CARDIOVASCULAR: No chest pain,  dizziness  NEUROLOGICAL: improved headache,  SKIN: improved skin swelling and erythema around L eye, no rashes or skin lesions elsewhere  ENDOCRINE: No heat or cold intolerance  MUSCULOSKELETAL: L hand (dorsal and palmar surfaces) and all 5 fingers are swollen but no pain or redness. No joint pain  PSYCHIATRIC: No difficulty sleeping        PHYSICAL EXAM:  Vital Signs Last 24 Hrs  T(C): 36.4 (16 Nov 2023 08:51), Max: 36.9 (15 Nov 2023 10:44)  T(F): 97.5 (16 Nov 2023 08:51), Max: 98.4 (15 Nov 2023 10:44)  HR: 78 (16 Nov 2023 08:51) (77 - 107)  BP: 125/66 (16 Nov 2023 08:51) (121/78 - 137/86)  BP(mean): --  RR: 18 (16 Nov 2023 08:51) (18 - 18)  SpO2: 95% (16 Nov 2023 08:51) (95% - 100%)    Parameters below as of 16 Nov 2023 08:51  Patient On (Oxygen Delivery Method): room air        CAPILLARY BLOOD GLUCOSE        I&O's Summary      CONSTITUTIONAL: NAD  HEENT: no frontal/maxillary tenderness over sinuses, improved tenderness around L eye swollen skin, improved redness in L eye  RESPIRATORY: Normal respiratory effort; lungs are clear to auscultation bilaterally  CARDIOVASCULAR: Regular rate and rhythm, normal S1 and S2, no murmur/rub/gallop  MUSCLOSKELETAL: L hand and all 5 fingers are swollen compared to R, no erythema, tenderness, or warmth. No joint swelling or tenderness to palpation in hands/feet  EXTREMITIES: no peripheral edema, distal pulses intact   NEURO: PERRLA, EOMi with no pain or diplopia    LABS:                        11.7   6.78  )-----------( 274      ( 16 Nov 2023 07:09 )             37.3     11-16    140  |  104  |  11  ----------------------------<  89  4.0   |  26  |  0.64    Ca    8.8      16 Nov 2023 07:08  Phos  4.5     11-16  Mg     2.2     11-16    TPro  6.6  /  Alb  3.9  /  TBili  0.2  /  DBili  x   /  AST  18  /  ALT  19  /  AlkPhos  86  11-16        LIVER FUNCTIONS - ( 16 Nov 2023 07:08 )  Alb: 3.9 g/dL / Pro: 6.6 g/dL / ALK PHOS: 86 U/L / ALT: 19 U/L / AST: 18 U/L / GGT: x               Urinalysis Basic - ( 16 Nov 2023 07:08 )    Color: x / Appearance: x / SG: x / pH: x  Gluc: 89 mg/dL / Ketone: x  / Bili: x / Urobili: x   Blood: x / Protein: x / Nitrite: x   Leuk Esterase: x / RBC: x / WBC x   Sq Epi: x / Non Sq Epi: x / Bacteria: x      ACC: [id number] EXAM:  CT ORBITS IC   ORDERED BY: LIZABETH LEDEZMA     PROCEDURE DATE:  11/14/2023          INTERPRETATION:  PROCEDURE: CT orbits with contrast    INDICATION: Left orbital cellulitis with chemosis. Psoriatic arthritis,   immunocompromised.    TECHNIQUE: Multiple axial, coronal, sagittal images of the orbits were   obtained at 2 mm intervals following the intravenous administration of   Optiray-350. The images were reviewed in soft tissue and bone windows.    COMPARISON: Face CT 05/24/2023    FINDINGS:    There is hyperenhancement and thickening of the left orbital conjunctiva   and mild periorbital cellulitic fat stranding. Thin lower density may   represent subconjunctival edema rather than abscess. Subtle asymmetric   enhancementinvolves the lateral sclera of the globe which may be   contiguous scleritis. There is no postseptal fat stranding or mass   effect. The globe appears preserved and symmetric in contour without   gross intravitreous finding.    On sagittal images, when corrected for angle, subtle asymmetric   enhancement perceived in the anterior optic nerve (see key image, and   also visible on coronal image 69). This is questionable for optic   neuritis which may be infectious and potentially viral in the   immunocompromised state. MRI advised, particularly if there is vision   loss. Extraocular muscles and lacrimal glands are grossly symmetrical.    The bony orbits are preserved as is the skull base and included facial   bones. Paranasal sinuses and mastoids are free of acute disease, clear   aside from mild mucosal thickening in the left ethmoids. No fluid level.    Included intracranial compartment is unremarkable.      IMPRESSION:    Left periorbital cellulitis with conjunctivitis and mild scleritis  suggested. No drainable abscess.    Subtle asymmetry detected at the left anterior optic nerve, query   infectious optic neuritis in this immunocompromised patient and orbit MRI   advised for better tissue contrast resolution.    --- End of Report ---          IMAGING:    [X] All pertinent imaging reviewed by me

## 2023-11-17 NOTE — PROGRESS NOTE ADULT - PROBLEM SELECTOR PLAN 2
Home doses of Prednisone 10mg QD, Folic Acid 1mg Qd, Methotrexate 2.5mg once a week, Ustekinumab every 12 weeks, last dose 10/25/23. No acute signs of flair, risk of infection greater than autoimmune flair at this moment.     Hold prednisone given acute infection   - c/w folic acid QD
Home doses of Prednisone 10mg QD, Folic Acid 1mg Qd, Methotrexate 2.5mg once a week, Ustekinumab every 12 weeks, last dose 10/25/23. No acute signs of flair, risk of infection greater than autoimmune flair at this moment.     Hold prednisone given acute infection   - c/w folic acid QD

## 2023-11-17 NOTE — PROGRESS NOTE ADULT - SUBJECTIVE AND OBJECTIVE BOX
***************************************************************  Feliciano Gil  (PGY1) Internal Medicine  On TEAMS  ***************************************************************    PROGRESS NOTE:     Patient is a 63y old  Female who presents with a chief complaint of Eye Swelling (16 Nov 2023 14:20)      SUBJECTIVE / OVERNIGHT EVENTS:      MEDICATIONS  (STANDING):  AQUAPHOR (petrolatum Ointment) 1 Application(s) Topical two times a day  artificial tears (preservative free) Ophthalmic Solution 1 Drop(s) Both EYES five times a day  cefTRIAXone   IVPB 2000 milliGRAM(s) IV Intermittent every 24 hours  dexamethasone/neomycin/polymyxin Suspension 1 Drop(s) Left EYE daily  DULoxetine 20 milliGRAM(s) Oral daily  enoxaparin Injectable 40 milliGRAM(s) SubCutaneous every 24 hours  folic acid 1 milliGRAM(s) Oral daily  influenza   Vaccine 0.5 milliLiter(s) IntraMuscular once  lisinopril 5 milliGRAM(s) Oral daily  loratadine 10 milliGRAM(s) Oral daily  multivitamin 1 Tablet(s) Oral daily  naproxen 500 milliGRAM(s) Oral two times a day  pantoprazole    Tablet 40 milliGRAM(s) Oral before breakfast    MEDICATIONS  (PRN):  acetaminophen     Tablet .. 650 milliGRAM(s) Oral every 6 hours PRN Mild Pain (1 - 3)  benzonatate 100 milliGRAM(s) Oral once PRN Cough  SUMAtriptan 50 milliGRAM(s) Oral daily PRN Headache      CAPILLARY BLOOD GLUCOSE        I&O's Summary      PHYSICAL EXAM:  Vital Signs Last 24 Hrs  T(C): 36.6 (17 Nov 2023 00:00), Max: 37 (16 Nov 2023 16:30)  T(F): 97.8 (17 Nov 2023 00:00), Max: 98.6 (16 Nov 2023 16:30)  HR: 84 (17 Nov 2023 00:00) (78 - 88)  BP: 144/75 (17 Nov 2023 00:00) (125/66 - 144/75)  BP(mean): --  RR: 18 (17 Nov 2023 00:00) (18 - 18)  SpO2: 100% (17 Nov 2023 00:00) (95% - 100%)    Parameters below as of 17 Nov 2023 00:00  Patient On (Oxygen Delivery Method): room air    HEENT: no frontal/maxillary tenderness over sinuses, improved tenderness around L eye swollen skin, improved redness in L eye  RESPIRATORY: Normal respiratory effort; lungs are clear to auscultation bilaterally  CARDIOVASCULAR: Regular rate and rhythm, normal S1 and S2, no murmur/rub/gallop  MUSCLOSKELETAL: L hand and all 5 fingers are swollen compared to R, no erythema, tenderness, or warmth. No joint swelling or tenderness to palpation in hands/feet  EXTREMITIES: no peripheral edema, distal pulses intact   NEURO: PERRLA, EOMi with no pain or diplopia      LABS:                        11.7   6.78  )-----------( 274      ( 16 Nov 2023 07:09 )             37.3     11-16    140  |  104  |  11  ----------------------------<  89  4.0   |  26  |  0.64    Ca    8.8      16 Nov 2023 07:08  Phos  4.5     11-16  Mg     2.2     11-16    TPro  6.6  /  Alb  3.9  /  TBili  0.2  /  DBili  x   /  AST  18  /  ALT  19  /  AlkPhos  86  11-16          Urinalysis Basic - ( 16 Nov 2023 07:08 )    Color: x / Appearance: x / SG: x / pH: x  Gluc: 89 mg/dL / Ketone: x  / Bili: x / Urobili: x   Blood: x / Protein: x / Nitrite: x   Leuk Esterase: x / RBC: x / WBC x   Sq Epi: x / Non Sq Epi: x / Bacteria: x          Medications (Standing)  MEDICATIONS  (STANDING):  AQUAPHOR (petrolatum Ointment) 1 Application(s) Topical two times a day  artificial tears (preservative free) Ophthalmic Solution 1 Drop(s) Both EYES five times a day  cefTRIAXone   IVPB 2000 milliGRAM(s) IV Intermittent every 24 hours  dexamethasone/neomycin/polymyxin Suspension 1 Drop(s) Left EYE daily  DULoxetine 20 milliGRAM(s) Oral daily  enoxaparin Injectable 40 milliGRAM(s) SubCutaneous every 24 hours  folic acid 1 milliGRAM(s) Oral daily  influenza   Vaccine 0.5 milliLiter(s) IntraMuscular once  lisinopril 5 milliGRAM(s) Oral daily  loratadine 10 milliGRAM(s) Oral daily  multivitamin 1 Tablet(s) Oral daily  naproxen 500 milliGRAM(s) Oral two times a day  pantoprazole    Tablet 40 milliGRAM(s) Oral before breakfast        COORDINATION OF CARE:  Care Discussed with Consultants/Other Providers [Y/N]:  Prior or Outpatient Records Reviewed [Y/N]:   ***************************************************************  Feliciano Gil  (PGY1) Internal Medicine  On TEAMS  ***************************************************************    PROGRESS NOTE:     Patient is a 63y old  Female who presents with a chief complaint of Eye Swelling (16 Nov 2023 14:20)      SUBJECTIVE / OVERNIGHT EVENTS: Improving eye pain, minimal swelling, no headaches, blurry vision or diplopia.   Patient denies fever, chills, nausea, vomitting, chest pain, shortness of breath, abdominal pain, diarrhea or constipation.   No overnight events      MEDICATIONS  (STANDING):  AQUAPHOR (petrolatum Ointment) 1 Application(s) Topical two times a day  artificial tears (preservative free) Ophthalmic Solution 1 Drop(s) Both EYES five times a day  cefTRIAXone   IVPB 2000 milliGRAM(s) IV Intermittent every 24 hours  dexamethasone/neomycin/polymyxin Suspension 1 Drop(s) Left EYE daily  DULoxetine 20 milliGRAM(s) Oral daily  enoxaparin Injectable 40 milliGRAM(s) SubCutaneous every 24 hours  folic acid 1 milliGRAM(s) Oral daily  influenza   Vaccine 0.5 milliLiter(s) IntraMuscular once  lisinopril 5 milliGRAM(s) Oral daily  loratadine 10 milliGRAM(s) Oral daily  multivitamin 1 Tablet(s) Oral daily  naproxen 500 milliGRAM(s) Oral two times a day  pantoprazole    Tablet 40 milliGRAM(s) Oral before breakfast    MEDICATIONS  (PRN):  acetaminophen     Tablet .. 650 milliGRAM(s) Oral every 6 hours PRN Mild Pain (1 - 3)  benzonatate 100 milliGRAM(s) Oral once PRN Cough  SUMAtriptan 50 milliGRAM(s) Oral daily PRN Headache      CAPILLARY BLOOD GLUCOSE        I&O's Summary      PHYSICAL EXAM:  Vital Signs Last 24 Hrs  T(C): 36.6 (17 Nov 2023 00:00), Max: 37 (16 Nov 2023 16:30)  T(F): 97.8 (17 Nov 2023 00:00), Max: 98.6 (16 Nov 2023 16:30)  HR: 84 (17 Nov 2023 00:00) (78 - 88)  BP: 144/75 (17 Nov 2023 00:00) (125/66 - 144/75)  BP(mean): --  RR: 18 (17 Nov 2023 00:00) (18 - 18)  SpO2: 100% (17 Nov 2023 00:00) (95% - 100%)    Parameters below as of 17 Nov 2023 00:00  Patient On (Oxygen Delivery Method): room air    HEENT: no frontal/maxillary tenderness over sinuses, improved tenderness around L eye swollen skin, improved redness in L eye  RESPIRATORY: Normal respiratory effort; lungs are clear to auscultation bilaterally  CARDIOVASCULAR: Regular rate and rhythm, normal S1 and S2, no murmur/rub/gallop  MUSCLOSKELETAL: L hand and all 5 fingers are swollen compared to R, no erythema, tenderness, or warmth. No joint swelling or tenderness to palpation in hands/feet  EXTREMITIES: no peripheral edema, distal pulses intact   NEURO: PERRLA, EOMi with no pain or diplopia      LABS:                        11.7   6.78  )-----------( 274      ( 16 Nov 2023 07:09 )             37.3     11-16    140  |  104  |  11  ----------------------------<  89  4.0   |  26  |  0.64    Ca    8.8      16 Nov 2023 07:08  Phos  4.5     11-16  Mg     2.2     11-16    TPro  6.6  /  Alb  3.9  /  TBili  0.2  /  DBili  x   /  AST  18  /  ALT  19  /  AlkPhos  86  11-16          Urinalysis Basic - ( 16 Nov 2023 07:08 )    Color: x / Appearance: x / SG: x / pH: x  Gluc: 89 mg/dL / Ketone: x  / Bili: x / Urobili: x   Blood: x / Protein: x / Nitrite: x   Leuk Esterase: x / RBC: x / WBC x   Sq Epi: x / Non Sq Epi: x / Bacteria: x          Medications (Standing)  MEDICATIONS  (STANDING):  AQUAPHOR (petrolatum Ointment) 1 Application(s) Topical two times a day  artificial tears (preservative free) Ophthalmic Solution 1 Drop(s) Both EYES five times a day  cefTRIAXone   IVPB 2000 milliGRAM(s) IV Intermittent every 24 hours  dexamethasone/neomycin/polymyxin Suspension 1 Drop(s) Left EYE daily  DULoxetine 20 milliGRAM(s) Oral daily  enoxaparin Injectable 40 milliGRAM(s) SubCutaneous every 24 hours  folic acid 1 milliGRAM(s) Oral daily  influenza   Vaccine 0.5 milliLiter(s) IntraMuscular once  lisinopril 5 milliGRAM(s) Oral daily  loratadine 10 milliGRAM(s) Oral daily  multivitamin 1 Tablet(s) Oral daily  naproxen 500 milliGRAM(s) Oral two times a day  pantoprazole    Tablet 40 milliGRAM(s) Oral before breakfast        COORDINATION OF CARE:  Care Discussed with Consultants/Other Providers [Y/N]:  Prior or Outpatient Records Reviewed [Y/N]:

## 2023-11-17 NOTE — DISCHARGE NOTE NURSING/CASE MANAGEMENT/SOCIAL WORK - NSDCPEFALRISK_GEN_ALL_CORE
For information on Fall & Injury Prevention, visit: [email or web address] OR  [email or web address] OR  https://www.cdc.gov/steadi/patient.html

## 2023-11-17 NOTE — MEDICAL STUDENT PROGRESS NOTE(EDUCATION) - PLAN 1
Edematous R eye for 4 days, associated erythema, without visual changes / diplopia. CT orbits c/f preseptal cellulitis with scleritis - immunocompromised state due to immunosuppression.   - 1 more IV dose of ceftriaxone and pt will be ready for d/c  - going home with 6 days of cefpodoxine 400mg BID  - can resume home prednisone Monday  - F/u ophtho outpt in 1 week
Edematous R eye for 4 days, associated erythema, without visual changes / diplopia. CT orbits c/f preseptal cellulitis with scleritis - immunocompromised state due to immunosuppression.   - c/w with CTX   c/w topical maxitrol QID OS  - f/u 1 week after discharge with ophtho   - If drainage present, collect and send for culture   - No need for blood cultures given lack of systemic symptoms.

## 2023-11-17 NOTE — PROGRESS NOTE ADULT - PROBLEM SELECTOR PLAN 5
Pt reports acute migraine management at home with sumatriptan, fiorcet, percocet.   - Acetaminophen for pain   - Sumatriptan (home med) PRN for moderate / severe pain
Pt reports acute migraine management at home with sumatriptan, fiorcet, percocet.   - Acetaminophen for pain   - Sumatriptan (home med) PRN for moderate / severe pain

## 2023-11-17 NOTE — DISCHARGE NOTE NURSING/CASE MANAGEMENT/SOCIAL WORK - PATIENT PORTAL LINK FT
You can access the FollowMyHealth Patient Portal offered by [facility] by registering at the following website: [email or web address]. By joining [facility]’s FollowMyHealth portal, you will also be able to view your health information using other applications (apps) compatible with our system.

## 2023-11-17 NOTE — DISCHARGE NOTE NURSING/CASE MANAGEMENT/SOCIAL WORK - NSDCVIVACCINE_GEN_ALL_CORE_FT
Influenza, injectable,quadrivalent, preservative free, pediatric; 26-Oct-2018 10:43; Boris Smith (RN); [employer]; VIS (VIS Presented: 26-Oct-2018);

## 2023-11-17 NOTE — MEDICAL STUDENT PROGRESS NOTE(EDUCATION) - PLAN 3
- Oral NSAIDS (Naproxen 500 mg BID)   - Optho evaluation - appreciate recs -   - treponema, lyme, RF, ANCAs, uric acid neg   - elevated CRP and ESR  - elevated CH50 to 99, WNL on repeat to 56

## 2023-11-18 RX ORDER — CEFPODOXIME PROXETIL 100 MG
2 TABLET ORAL
Qty: 24 | Refills: 0
Start: 2023-11-18 | End: 2023-11-23

## 2023-11-20 LAB
B BURGDOR DNA SPEC QL NAA+PROBE: NEGATIVE — SIGNIFICANT CHANGE UP
B BURGDOR DNA SPEC QL NAA+PROBE: NEGATIVE — SIGNIFICANT CHANGE UP

## 2023-11-21 ENCOUNTER — APPOINTMENT (OUTPATIENT)
Dept: FAMILY MEDICINE | Facility: CLINIC | Age: 63
End: 2023-11-21
Payer: COMMERCIAL

## 2023-11-21 ENCOUNTER — NON-APPOINTMENT (OUTPATIENT)
Age: 63
End: 2023-11-21

## 2023-11-21 ENCOUNTER — APPOINTMENT (OUTPATIENT)
Dept: OPHTHALMOLOGY | Facility: CLINIC | Age: 63
End: 2023-11-21
Payer: COMMERCIAL

## 2023-11-21 VITALS
DIASTOLIC BLOOD PRESSURE: 82 MMHG | BODY MASS INDEX: 44.32 KG/M2 | WEIGHT: 283 LBS | OXYGEN SATURATION: 97 % | TEMPERATURE: 98.7 F | SYSTOLIC BLOOD PRESSURE: 133 MMHG | HEART RATE: 63 BPM | RESPIRATION RATE: 16 BRPM

## 2023-11-21 PROCEDURE — 99495 TRANSJ CARE MGMT MOD F2F 14D: CPT

## 2023-11-21 PROCEDURE — 92002 INTRM OPH EXAM NEW PATIENT: CPT

## 2023-11-28 NOTE — H&P PST ADULT - HEIGHT IN CM
Refill request: furosemide (LASIX) 40 MG tablet  Last refill: 8/1/23, #90 Refills - 0.     Last office visit/physical:  03/7/23  Last follow up/disposition: F/U PRN  Appointment scheduled (FP)?  No     BP Readings from Last 3 Encounters:   11/21/23 122/72   11/07/23 136/64   08/29/23 138/80       Routed to Dr. Hay per protocol.   
170.18

## 2023-11-30 ENCOUNTER — OUTPATIENT (OUTPATIENT)
Dept: OUTPATIENT SERVICES | Facility: HOSPITAL | Age: 63
LOS: 1 days | Discharge: HOME | End: 2023-11-30

## 2023-11-30 DIAGNOSIS — Z90.13 ACQUIRED ABSENCE OF BILATERAL BREASTS AND NIPPLES: Chronic | ICD-10-CM

## 2023-11-30 DIAGNOSIS — Z98.890 OTHER SPECIFIED POSTPROCEDURAL STATES: Chronic | ICD-10-CM

## 2023-11-30 DIAGNOSIS — Z90.11 ACQUIRED ABSENCE OF RIGHT BREAST AND NIPPLE: Chronic | ICD-10-CM

## 2023-11-30 DIAGNOSIS — C50.911 MALIGNANT NEOPLASM OF UNSPECIFIED SITE OF RIGHT FEMALE BREAST: ICD-10-CM

## 2023-12-01 ENCOUNTER — APPOINTMENT (OUTPATIENT)
Dept: RHEUMATOLOGY | Facility: CLINIC | Age: 63
End: 2023-12-01
Payer: COMMERCIAL

## 2023-12-01 VITALS
TEMPERATURE: 96.3 F | SYSTOLIC BLOOD PRESSURE: 132 MMHG | BODY MASS INDEX: 44.26 KG/M2 | OXYGEN SATURATION: 98 % | RESPIRATION RATE: 16 BRPM | HEART RATE: 91 BPM | HEIGHT: 67 IN | DIASTOLIC BLOOD PRESSURE: 86 MMHG | WEIGHT: 282 LBS

## 2023-12-01 PROCEDURE — 99214 OFFICE O/P EST MOD 30 MIN: CPT

## 2023-12-01 RX ORDER — GOLIMUMAB 50 MG/.5ML
50 INJECTION, SOLUTION SUBCUTANEOUS
Qty: 3 | Refills: 4 | Status: ACTIVE | COMMUNITY
Start: 2023-12-01 | End: 1900-01-01

## 2023-12-01 RX ORDER — USTEKINUMAB 90 MG/ML
90 INJECTION, SOLUTION SUBCUTANEOUS
Qty: 1 | Refills: 3 | Status: DISCONTINUED | COMMUNITY
Start: 2022-09-16 | End: 2023-12-01

## 2023-12-05 NOTE — ASU PREOP CHECKLIST - IV STARTED
PATIENT:  Cal Avila  1939    ASSESSMENT:  1. Type 2 diabetes mellitus with diabetic neuropathy, unspecified whether long term insulin use ([address])  Diabetic Shoe      2. Onychomycosis        3. Pre-ulcerative calluses  Diabetic Shoe            Orders Placed This Encounter   Procedures    Diabetic Shoe        PLAN:  Disease prevention and related risk factors of diabetes were identified and discussed. The patient was educated in proper foot wear for diabetics. Educated in daily foot assessment and routine diabetic foot care. Discussed the importance of controlling BS through diet and exercise. The patient will follow up in 10 weeks for further diabetic foot exam and care. Dispensed padding for right foot and advised patient to monitor that lesion closely for ulceration/breakdown. Rx diabetic shoes with multidensity orthotics to accommodate patient's clinical deformity which causes his foot to breakdown and ulcerate. Procedures: 27915, 18718  All mycotic toenails were reduced and debrided in length, width, and girth using a nail nipper and electric dremel. All hyperkeratotic skin lesion(s) if present were sharply pared with a #10 scalpel with no evidence of ulceration/abscess. Patient tolerated procedure(s) well without complications. Procedures     HPI:  Cal Avila is a 80 y. o.year old male seen for diabetic foot exam.  The patient has class findings with diabetes. BS is under control. The patient complained of thick toenails and painful lesions which have previously ulcerated. Patient reports he had to file down his right foot callus because it was starting to hurt. The patient denied any acute pedal disorder, redness, acute swelling, or recent injury.       The following portions of the patient's history were reviewed and updated as appropriate: allergies, current medications, past family history, past medical history, past social history, past surgical history and problem list.    REVIEW OF SYSTEMS:  GENERAL: No fever or chills  HEART: No chest pain, or palpitation  RESPIRATORY:  No acute SOB or cough  GI: No Nausea, vomit or diarrhea  NEUROLOGIC: No syncope or acute weakness    PHYSICAL EXAM:    /72   Ht 5' 11" (1.803 m)   Wt 112 kg (247 lb)   BMI 34.45 kg/m²     VASCULAR EXAM  Posterior tibial artery absent bilateral  Dorsalis pedis artery +1 bilateral  The patient has class findings with skin atrophy, lack of digital hair, and nail dystrophy. There is +1 lower extremity edema bilaterally. Venous stasis skin changes noted BLE. No    NEUROLOGIC EXAM  Sensation is intact to light touch. Yes   Sensation is intact to 10gm monofilament. Vibratory sensation moderately diminished. Tingling numb paresthesias noted bilateral.      No focal neurologic deficit. DERMATOLOGIC EXAM:   Texture, Tone and Turgor are diminished bilateral.  The patient has dystrophic/hypertrophic toenails with yellow/white discoloration, onycholysis, and subungal debris. Fungal odor noted. Brittle nature noted. Right foot nails severely dystrophic x3 with 0.4 cm ave thickness (1 2 and 4)  Left foot nails severely dystrophic x3 with 0.4 cm ave thickness (1 2 and 3)  Patient has hyperkeratotic lesions noted:  Right foot at subsecond MPJ approximately 1.5 cm² in size, preulcerative, hemorrhage and subtle maceration within callus noted. Left foot at subsecond MPJ, similar to contralateral side in presentation. Ulcer or cellulitis noted. No  No notable suspicious skin lesions. MUSCULOSKELETAL EXAM:   No acute joint pain, edema, or redness. No acute musculoskeletal problem. Patient has deformity including hallux valgus with contracted hammertoes, and plantarflexed second metatarsal bilateral. Patient has minimal ambulation limitations. Patient wears DM shoes?  Yes    Risk Category/Class Findings: Q8 (B1, B2 ABC)  0 = No loss of protective sensation    A1)  Has the patient had a previous amputation of the foot or integral skeletal portion thereof? No  B1)  Does the patient have absent posterior tibial pulse? Yes  B3)  Does the patient have absent dorsalis pedis? No  B2)  Does the patient have three of the following? Yes           1. Hair growth (increased or decreased), 2.  Nail changes (thickening) and 3. Pigmentary changes (discoloring)  C)  Does the patient have two of the following and one above? Yes            3.  Edema and 4.   Paraesthesias (abnormal spontaneous sensations in the feet) yes

## 2023-12-06 ENCOUNTER — APPOINTMENT (OUTPATIENT)
Dept: INFUSION THERAPY | Facility: HOSPITAL | Age: 63
End: 2023-12-06

## 2023-12-06 ENCOUNTER — RX RENEWAL (OUTPATIENT)
Age: 63
End: 2023-12-06

## 2023-12-13 ENCOUNTER — NON-APPOINTMENT (OUTPATIENT)
Age: 63
End: 2023-12-13

## 2023-12-13 ENCOUNTER — APPOINTMENT (OUTPATIENT)
Dept: CARDIOLOGY | Facility: CLINIC | Age: 63
End: 2023-12-13
Payer: COMMERCIAL

## 2023-12-13 VITALS
WEIGHT: 282 LBS | BODY MASS INDEX: 44.26 KG/M2 | OXYGEN SATURATION: 95 % | HEIGHT: 67 IN | SYSTOLIC BLOOD PRESSURE: 143 MMHG | DIASTOLIC BLOOD PRESSURE: 78 MMHG | HEART RATE: 78 BPM

## 2023-12-13 DIAGNOSIS — Z91.89 OTHER SPECIFIED PERSONAL RISK FACTORS, NOT ELSEWHERE CLASSIFIED: ICD-10-CM

## 2023-12-13 PROCEDURE — 93000 ELECTROCARDIOGRAM COMPLETE: CPT

## 2023-12-13 PROCEDURE — 99213 OFFICE O/P EST LOW 20 MIN: CPT | Mod: 25

## 2023-12-13 NOTE — HISTORY OF PRESENT ILLNESS
[FreeTextEntry1] : 60yo woman who is a  She has 1 adult daughter (25) and is  to Gonzales Harrison who own a [employer]  PMH: Migraines, back pain and DLD s/p epidural injections, menopausal, anxiety and depression on medication, Obesity BMI 45. CHOL 218 ,  in 2016 She has no prior History of HTN.  Labs:P   Cancer Dx: Left Poorly differentiated ductal Breast cancer ER+, MI- HER+  multifocal with multiple masses and LNs involved, Diagnosed in august 2018.   Under [facility] and Dr. Cox she is planned for neoadjuvant chemotherapy  ACTHP.  She presents today for evaluation of HTN and prechemotherapy cardiac risk stratification.  Denies: SOB, LE edema, palpitation, chest pain  Echo 9/11/2018.  EF 63%  Normal left ventricular internal dimensions and wall thicknesses. Normal left ventricular systolic function. No segmental wall motion abnormalities. Normal right ventricular size and function. Global longitudinal strain equals - 21.2%, which is normal.   Oncologist: Dr. Noa Cox PCP: DR. CHRISTINA NELSON  Interval follow up 4/23/19  Echo at plain view with mass on aortic valve- reviewed by me. Not a mass- just calcification of the non cusp. EF reported as 55%   ROS: Denies Chest pain, dyspnea, palpitations, dizziness or syncope. here with    Follow-up December 2, 2020 Doing well offers no complaints.   Interval follow up 12/7/22  Here for routine follow up no complaints today. ROS: Denies Chest pain, dyspnea, palpitations, dizziness or syncope.  Interval Note December 13, 2023  Ms. Harrison is ow 63 years old and returns for a cardiovascular follow up. She carries a medical history as outlined below:  -Hx of left sided breast cancer  -Hx of psoriatic arthritis-> s/p Biologic Stelara; now is planning on being titrated with Simponi starting next week -Hx Migraine headaches  Most recently had a COVID-19 infection in Agst of 2023 Followed by a left occular perioribtal cellulitis-> hospitalzied at [facility] and treatediwth IV and oral antibiotics X 4 days Now patiemt reports that ifnection has resolved.  During active infection, patient had stopped taking Methotrexate. Currently taking Prednisone 10 mg QD  Blood pressure today: 143/ 78 EKG-   Most recent echocardiogram June 23, 2023

## 2023-12-18 ENCOUNTER — NON-APPOINTMENT (OUTPATIENT)
Age: 63
End: 2023-12-18

## 2024-01-02 ENCOUNTER — RX RENEWAL (OUTPATIENT)
Age: 64
End: 2024-01-02

## 2024-01-26 ENCOUNTER — OUTPATIENT (OUTPATIENT)
Dept: OUTPATIENT SERVICES | Facility: HOSPITAL | Age: 64
LOS: 1 days | Discharge: HOME | End: 2024-01-26

## 2024-01-26 DIAGNOSIS — Z90.11 ACQUIRED ABSENCE OF RIGHT BREAST AND NIPPLE: Chronic | ICD-10-CM

## 2024-01-26 DIAGNOSIS — Z90.13 ACQUIRED ABSENCE OF BILATERAL BREASTS AND NIPPLES: Chronic | ICD-10-CM

## 2024-01-26 DIAGNOSIS — Z98.890 OTHER SPECIFIED POSTPROCEDURAL STATES: Chronic | ICD-10-CM

## 2024-01-26 DIAGNOSIS — C50.911 MALIGNANT NEOPLASM OF UNSPECIFIED SITE OF RIGHT FEMALE BREAST: ICD-10-CM

## 2024-01-29 ENCOUNTER — NON-APPOINTMENT (OUTPATIENT)
Age: 64
End: 2024-01-29

## 2024-01-31 ENCOUNTER — RX RENEWAL (OUTPATIENT)
Age: 64
End: 2024-01-31

## 2024-01-31 ENCOUNTER — APPOINTMENT (OUTPATIENT)
Dept: INFUSION THERAPY | Facility: HOSPITAL | Age: 64
End: 2024-01-31

## 2024-02-01 LAB
ALBUMIN SERPL ELPH-MCNC: 4 G/DL
ALP BLD-CCNC: 77 U/L
ALT SERPL-CCNC: 14 U/L
ANION GAP SERPL CALC-SCNC: 9 MMOL/L
AST SERPL-CCNC: 13 U/L
BASOPHILS # BLD AUTO: 0.03 K/UL
BASOPHILS NFR BLD AUTO: 0.6 %
BILIRUB SERPL-MCNC: 0.2 MG/DL
BUN SERPL-MCNC: 10 MG/DL
CALCIUM SERPL-MCNC: 8.8 MG/DL
CHLORIDE SERPL-SCNC: 106 MMOL/L
CO2 SERPL-SCNC: 28 MMOL/L
CREAT SERPL-MCNC: 0.71 MG/DL
CRP SERPL-MCNC: 6 MG/L
EGFR: 95 ML/MIN/1.73M2
EOSINOPHIL # BLD AUTO: 0.11 K/UL
EOSINOPHIL NFR BLD AUTO: 2 %
ERYTHROCYTE [SEDIMENTATION RATE] IN BLOOD BY WESTERGREN METHOD: 18 MM/HR
GLUCOSE SERPL-MCNC: 104 MG/DL
HBV CORE IGG+IGM SER QL: NONREACTIVE
HBV SURFACE AB SER QL: NONREACTIVE
HBV SURFACE AG SER QL: NONREACTIVE
HCT VFR BLD CALC: 39 %
HCV AB SER QL: NONREACTIVE
HCV S/CO RATIO: 0.08 S/CO
HGB BLD-MCNC: 12.2 G/DL
IMM GRANULOCYTES NFR BLD AUTO: 0.4 %
LYMPHOCYTES # BLD AUTO: 1.81 K/UL
LYMPHOCYTES NFR BLD AUTO: 33.2 %
MAN DIFF?: NORMAL
MCHC RBC-ENTMCNC: 30 PG
MCHC RBC-ENTMCNC: 31.3 GM/DL
MCV RBC AUTO: 96.1 FL
MONOCYTES # BLD AUTO: 0.37 K/UL
MONOCYTES NFR BLD AUTO: 6.8 %
NEUTROPHILS # BLD AUTO: 3.11 K/UL
NEUTROPHILS NFR BLD AUTO: 57 %
PLATELET # BLD AUTO: 277 K/UL
POTASSIUM SERPL-SCNC: 4 MMOL/L
PROT SERPL-MCNC: 6.1 G/DL
RBC # BLD: 4.06 M/UL
RBC # FLD: 15.2 %
SODIUM SERPL-SCNC: 143 MMOL/L
WBC # FLD AUTO: 5.45 K/UL

## 2024-02-02 ENCOUNTER — APPOINTMENT (OUTPATIENT)
Dept: RHEUMATOLOGY | Facility: CLINIC | Age: 64
End: 2024-02-02
Payer: COMMERCIAL

## 2024-02-02 VITALS
BODY MASS INDEX: 44.26 KG/M2 | HEART RATE: 81 BPM | OXYGEN SATURATION: 97 % | WEIGHT: 282 LBS | DIASTOLIC BLOOD PRESSURE: 88 MMHG | RESPIRATION RATE: 16 BRPM | TEMPERATURE: 97.5 F | SYSTOLIC BLOOD PRESSURE: 138 MMHG | HEIGHT: 67 IN

## 2024-02-02 DIAGNOSIS — Z79.52 LONG TERM (CURRENT) USE OF SYSTEMIC STEROIDS: ICD-10-CM

## 2024-02-02 DIAGNOSIS — G89.29 PAIN IN RIGHT KNEE: ICD-10-CM

## 2024-02-02 DIAGNOSIS — M25.561 PAIN IN RIGHT KNEE: ICD-10-CM

## 2024-02-02 DIAGNOSIS — R53.83 OTHER FATIGUE: ICD-10-CM

## 2024-02-02 PROCEDURE — 99214 OFFICE O/P EST MOD 30 MIN: CPT

## 2024-02-02 PROCEDURE — G2211 COMPLEX E/M VISIT ADD ON: CPT | Mod: NC,1L

## 2024-02-02 NOTE — ASSESSMENT
[FreeTextEntry1] : FIDEL THOMAS is a 63 year old woman with PsA -- months of symmetrical small joint stiffness and pain, b/l Achilles tenderness, L knee warmth on exam, psoriatic plaque on R foot and some fissuring in palms which may be psoriasis vs eczema. Marked response to steroids but ongoing activity despite MTX initiation, new spinal sx which appear inflammatory necessitating need for biologic. Biologics trialled - Humira, Stelara.   # PsA and psoriasis -- improved but still active joints and skin  - c/w Simponi qmonthly SQ until next visit to better assess at full strength  - c/w prednisone - hasn't been able to taper below 10mg/day as yet, ok to use 15mg sparingly PRN  - c/w MTX 8 tabs. C/w FA 1 mg daily  - recent labs reviewed and improved - repeat prior to next visit   # immunosuppressed status 2/2 steroids and above meds - pt counseled to call if febrile or unwell, counseled to c/w steroids but hold other rheum medication while on Abx  # OA related pain - b/l knees, R thumb - c/w gel injections PRN with ortho - Tylenol prn - c/w low dose Cymbalta for now, but no issues with switching with PMD if needed as she is having some SE - insomnia, hot flashes   RTC 3 months

## 2024-02-02 NOTE — REVIEW OF SYSTEMS
[Feeling Poorly] : feeling poorly [Feeling Tired] : feeling tired [Dry Eyes] : dryness of the eyes [Arthralgias] : arthralgias [Joint Pain] : joint pain [Joint Stiffness] : joint stiffness [As Noted in HPI] : as noted in HPI [Negative] : Heme/Lymph [Joint Swelling] : joint swelling [Difficulty Walking] : difficulty walking

## 2024-02-02 NOTE — HISTORY OF PRESENT ILLNESS
[FreeTextEntry1] : FIDEL THOMAS is a 62 year old woman with PsA. Over the past few months she has developed pain and stiffness in --- + b/l hands -- chronic R thumb pain, remainder worsening  + b/l shoulders  + b/l heels, ankles  + L knee > R knee pain  + L sided lumbar radicular sx, chronic LBP with R sided radicular sx prior which has not worsened and ROM intact  + R 1st MTP OA, s/p local injection with benefit  Meloxicam not helping, Advil prn -- some mild GI upset  + gelling  + dry eyes, using AT q12, can't wear contacts  + R eye posterior edema, improved with local atavastin  + psoriasis on R foot, and some worsening of fissuring rashes on hands -- previously dx as eczema  Inflammatory arthritis ROS negative for dactylitis, eye inflammation, inflammatory low back pain, IBD.  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 Some insomnia and sweats with prednisone, otherwise no SE, some GI upset but mild with MTX, otherwise tolerating it well. No infectious sx, rash improved, no extra-articular manifestations.   2/11/22 -- Ongoing improvement in joints but still with activity, L knee posterior fullness and pain is most active. She is amenable to trying to taper steroids regardless as is worried about long term use. No extra-articular manifestations, no infectious sx.   3/25/22 -- Worsening of symmetrical joint pain and warmth, some b/l SI joint and L hip pain with prolonged AM stiffness and improved with exercise and not typical of her DJD related pain. Rash has not emerged but increased sensitivity over sites of prior rash. Ongoing dry eye but no inflammation. No SE with MTX, no infectious sx, above is worse since tapering to prednisone 10mg/day.    6/24/22 -- Recent L knee nondisplaced tibial plateau fracture/ ACL sprain, slowly improving. Improvement in psoriasis, ongoing small and large joint arthralgias tho less overt swelling/warmth. Some positional RUE tingling, mild C spine pain. No CP/SOB, GI/ sx, no infectious sx.   8/26/22 -- Rash is flaring again, returned to b/l feet, now on elbows, on scalp. Ongoing small symmetrical small joint arthralgias with all day stiffness. L knee fx is healed, now getting gel injections in L knee, considering R as well if L goes well. + C spine pain with RUE radiation, positional at present.   11/18/22 -- Recent worsening of arthralgias with weather change, spine is the worst. Areas of rash on R foot that was responsive to Humira has recurred on Stelara, scalp also more itchy. S/p gel injections to b/l knees with improvement. Ongoing, somewhat worse fatigue but still able to do ADLs. Remainder of extra-articular inflammatory ROS negative, no infectious sx.   1/20/23 -- Rash not improved but stabilized, ongoing arthralgias/stiffness but no synovitis, no SE with Stelara. R thumb CMC OA related pain which is making use difficult. Fatigue may be somewhat improved, no extra-articular inflammatory sx.   5/12/23 -- Ongoing issues with ?tooth infection, s/p Abx course with improvement but mild sx have now recurred. No constitutional sx. Did hold meds x 1 week but now back on, remains on prednisone 10mg/day, some arthralgias but no worsening synovitis or rash, no extra-articular inflammatory sx.   7/28/23 -- Pulm nodules, PET negative for malignancy, skipped 1 Stelara dose, prior tooth issue resolved. Some arthralgias in setting of skipping dose but no synovitis, worsening rash, no extra-articular inflammatory sx. Knee pain is most active.   12/1/23 -- L periorbital cellulitis rapidly progressive requiring admission, improved with IV and then PO Abx which she has now completed, optho exam stable since discharge tho still some focal sinus sx, never had fever. Last Stelara dose made her very fatigued and achy, would like to try alternative med. Has skipped MTX since infection. Currently achy all over, tendonitis in L foot which hasn't been responsive to local injections with podiatry. Some areas of rash, no other extra-articular inflammatory sx.   2/2/24 -- Tolerating Simponi with only a little HA, has mildly improved fatigue but joints active tho not worsening except R knee where she has known OA and is due for gel injections with ortho upcoming. Scalp psoriasis has been worse, no other active areas, notes her shampoo does help and recently wasn't using as often 2/2 travel. No infectious sx currently.

## 2024-02-02 NOTE — PHYSICAL EXAM
[General Appearance - Alert] : alert [General Appearance - In No Acute Distress] : in no acute distress [General Appearance - Well Nourished] : well nourished [Sclera] : the sclera and conjunctiva were normal [PERRL With Normal Accommodation] : pupils were equal in size, round, and reactive to light [Extraocular Movements] : extraocular movements were intact [Outer Ear] : the ears and nose were normal in appearance [Oropharynx] : the oropharynx was normal [Neck Appearance] : the appearance of the neck was normal [] : no respiratory distress [Auscultation Breath Sounds / Voice Sounds] : lungs were clear to auscultation bilaterally [Heart Rate And Rhythm] : heart rate was normal and rhythm regular [Heart Sounds] : normal S1 and S2 [Murmurs] : no murmurs [Edema] : there was no peripheral edema [No CVA Tenderness] : no ~M costovertebral angle tenderness [Nail Clubbing] : no clubbing  or cyanosis of the fingernails [Musculoskeletal - Swelling] : no joint swelling seen [Motor Tone] : muscle strength and tone were normal [Skin Color & Pigmentation] : normal skin color and pigmentation [No Focal Deficits] : no focal deficits [Oriented To Time, Place, And Person] : oriented to person, place, and time [Impaired Insight] : insight and judgment were intact [Affect] : the affect was normal [FreeTextEntry1] : Rash on feet, elbows--stable from prior, scalp worse

## 2024-02-04 LAB
M TB IFN-G BLD-IMP: ABNORMAL
QUANTIFERON TB PLUS MITOGEN MINUS NIL: 0.46 IU/ML
QUANTIFERON TB PLUS NIL: 0.01 IU/ML
QUANTIFERON TB PLUS TB1 MINUS NIL: 0 IU/ML
QUANTIFERON TB PLUS TB2 MINUS NIL: 0 IU/ML

## 2024-02-06 ENCOUNTER — RX RENEWAL (OUTPATIENT)
Age: 64
End: 2024-02-06

## 2024-02-06 RX ORDER — PREDNISONE 5 MG/1
5 TABLET ORAL
Qty: 180 | Refills: 1 | Status: ACTIVE | COMMUNITY
Start: 2022-02-11 | End: 1900-01-01

## 2024-02-09 ENCOUNTER — LABORATORY RESULT (OUTPATIENT)
Age: 64
End: 2024-02-09

## 2024-02-12 LAB
ALBUMIN SERPL ELPH-MCNC: 4 G/DL
ALP BLD-CCNC: 78 U/L
ALT SERPL-CCNC: 12 U/L
ANION GAP SERPL CALC-SCNC: 9 MMOL/L
AST SERPL-CCNC: 13 U/L
BASOPHILS # BLD AUTO: 0.04 K/UL
BASOPHILS NFR BLD AUTO: 0.5 %
BILIRUB SERPL-MCNC: 0.2 MG/DL
BUN SERPL-MCNC: 10 MG/DL
CALCIUM SERPL-MCNC: 9 MG/DL
CHLORIDE SERPL-SCNC: 104 MMOL/L
CO2 SERPL-SCNC: 28 MMOL/L
CREAT SERPL-MCNC: 0.67 MG/DL
CRP SERPL-MCNC: 8 MG/L
EGFR: 98 ML/MIN/1.73M2
EOSINOPHIL # BLD AUTO: 0.09 K/UL
EOSINOPHIL NFR BLD AUTO: 1.1 %
ERYTHROCYTE [SEDIMENTATION RATE] IN BLOOD BY WESTERGREN METHOD: 14 MM/HR
GLUCOSE SERPL-MCNC: 98 MG/DL
HCT VFR BLD CALC: 39 %
HGB BLD-MCNC: 11.8 G/DL
IMM GRANULOCYTES NFR BLD AUTO: 0.3 %
LYMPHOCYTES # BLD AUTO: 1.15 K/UL
LYMPHOCYTES NFR BLD AUTO: 14.4 %
MAN DIFF?: NORMAL
MCHC RBC-ENTMCNC: 29.5 PG
MCHC RBC-ENTMCNC: 30.3 GM/DL
MCV RBC AUTO: 97.5 FL
MONOCYTES # BLD AUTO: 0.54 K/UL
MONOCYTES NFR BLD AUTO: 6.8 %
NEUTROPHILS # BLD AUTO: 6.16 K/UL
NEUTROPHILS NFR BLD AUTO: 76.9 %
PLATELET # BLD AUTO: 288 K/UL
POTASSIUM SERPL-SCNC: 4.5 MMOL/L
PROT SERPL-MCNC: 6.3 G/DL
RBC # BLD: 4 M/UL
RBC # FLD: 15.1 %
SODIUM SERPL-SCNC: 141 MMOL/L
WBC # FLD AUTO: 8 K/UL

## 2024-02-14 ENCOUNTER — APPOINTMENT (OUTPATIENT)
Dept: RADIOLOGY | Facility: IMAGING CENTER | Age: 64
End: 2024-02-14
Payer: COMMERCIAL

## 2024-02-14 ENCOUNTER — APPOINTMENT (OUTPATIENT)
Dept: CT IMAGING | Facility: IMAGING CENTER | Age: 64
End: 2024-02-14
Payer: COMMERCIAL

## 2024-02-14 ENCOUNTER — OUTPATIENT (OUTPATIENT)
Dept: OUTPATIENT SERVICES | Facility: HOSPITAL | Age: 64
LOS: 1 days | End: 2024-02-14
Payer: COMMERCIAL

## 2024-02-14 DIAGNOSIS — Z00.8 ENCOUNTER FOR OTHER GENERAL EXAMINATION: ICD-10-CM

## 2024-02-14 DIAGNOSIS — Z90.13 ACQUIRED ABSENCE OF BILATERAL BREASTS AND NIPPLES: Chronic | ICD-10-CM

## 2024-02-14 DIAGNOSIS — Z90.11 ACQUIRED ABSENCE OF RIGHT BREAST AND NIPPLE: Chronic | ICD-10-CM

## 2024-02-14 DIAGNOSIS — Z98.890 OTHER SPECIFIED POSTPROCEDURAL STATES: Chronic | ICD-10-CM

## 2024-02-14 PROCEDURE — 71046 X-RAY EXAM CHEST 2 VIEWS: CPT

## 2024-02-14 PROCEDURE — 71046 X-RAY EXAM CHEST 2 VIEWS: CPT | Mod: 26

## 2024-02-14 PROCEDURE — 71260 CT THORAX DX C+: CPT | Mod: 26

## 2024-02-14 PROCEDURE — 71260 CT THORAX DX C+: CPT

## 2024-02-17 ENCOUNTER — NON-APPOINTMENT (OUTPATIENT)
Age: 64
End: 2024-02-17

## 2024-02-18 ENCOUNTER — NON-APPOINTMENT (OUTPATIENT)
Age: 64
End: 2024-02-18

## 2024-02-18 RX ORDER — CEFPODOXIME PROXETIL 200 MG/1
200 TABLET, FILM COATED ORAL
Qty: 20 | Refills: 0 | Status: COMPLETED | COMMUNITY
Start: 2024-02-18 | End: 2024-02-28

## 2024-02-18 RX ORDER — NEOMYCIN SULFATE, POLYMYXIN B SULFATE AND DEXAMETHASONE 3.5; 10000; 1 MG/ML; [USP'U]/ML; MG/ML
3.5-10000-0.1 SUSPENSION OPHTHALMIC 4 TIMES DAILY
Qty: 1 | Refills: 0 | Status: ACTIVE | COMMUNITY
Start: 2024-02-18 | End: 1900-01-01

## 2024-02-20 ENCOUNTER — NON-APPOINTMENT (OUTPATIENT)
Age: 64
End: 2024-02-20

## 2024-02-20 ENCOUNTER — APPOINTMENT (OUTPATIENT)
Dept: OPHTHALMOLOGY | Facility: CLINIC | Age: 64
End: 2024-02-20
Payer: COMMERCIAL

## 2024-02-20 PROCEDURE — 92014 COMPRE OPH EXAM EST PT 1/>: CPT

## 2024-02-20 PROCEDURE — 76514 ECHO EXAM OF EYE THICKNESS: CPT

## 2024-02-20 PROCEDURE — 92020 GONIOSCOPY: CPT

## 2024-02-20 PROCEDURE — 92133 CPTRZD OPH DX IMG PST SGM ON: CPT

## 2024-02-21 ENCOUNTER — APPOINTMENT (OUTPATIENT)
Dept: ORTHOPEDIC SURGERY | Facility: CLINIC | Age: 64
End: 2024-02-21
Payer: COMMERCIAL

## 2024-02-21 PROCEDURE — 73564 X-RAY EXAM KNEE 4 OR MORE: CPT | Mod: 50

## 2024-02-21 PROCEDURE — 99214 OFFICE O/P EST MOD 30 MIN: CPT

## 2024-02-21 RX ORDER — HYALURONATE SODIUM, STABILIZED 88 MG/4 ML
88 SYRINGE (ML) INTRAARTICULAR
Qty: 2 | Refills: 0 | Status: ACTIVE | COMMUNITY
Start: 2024-02-21

## 2024-02-21 NOTE — DISCUSSION/SUMMARY
[de-identified] : I went over the pathophysiology of the patient's symptoms in great detail with the patient. I discussed the underlying pathophysiology of the patient's condition in great detail with the patient. I went over the patient's x-rays with them in great detail.  Viscosupplementation was discussed as a solution to the patient's symptoms, and we are requesting Monovisc for both knees. I stressed the importance of weight loss and its benefits to the patients joints and overall health.   All of their questions were answered. They understand and consent to the plan.   FU after authorization.   The patient is an 62 year old female with bone on bone arthritis of their right knee. Based upon the patient's continued symptoms and failure to respond to conservative treatment I have recommended a total knee replacement for this patient. A long discussion took place with the patient describing what a total joint replacement is and what the procedure would entail. A total knee model, similar to the implant that will be used during the operation was utilized to demonstrate and to discuss the various bearing surfaces of the implants. The hospitalization and post-operative care and rehabilitation were also discussed. The use of perioperative antibiotics and DVT prophylaxis were discussed. The risk, benefits and alternatives to a surgical intervention were discussed at length with the patient. The patient was also advised of risks related to the medical comorbidities and elevated body mass index (BMI). A lengthy discussion took place to review the most common complications including but not limited to: deep vein thrombosis, pulmonary embolus, heart attack, stroke, infection, wound breakdown, numbness, damage to nerves, tendon, muscles, arteries or other blood vessels, death and other possible complications from anesthesia. The patient was told that we will take steps to minimize these risks by using sterile technique, antibiotics and DVT prophylaxis when appropriate and follow the patient postoperatively in the office setting to monitor progress. The possibility of recurrent pain, no improvement in pain and actual worsening of pain were also discussed with the patient.  The discharge plan of care focused on the patient going home following surgery. I encouraged the patient to make the necessary arrangements to have someone stay with them when they are discharged home. Following discharge, a home care nurse will visit the patient. They will open your home care case and request home physical therapy services. Home physical therapy will commence following discharge provided it is appropriate and covered by her health insurance benefit plan.  The risks, benefits and alternative treatment options of total joint replacement were reviewed with the patient at great length. All questions were answered to the satisfaction of the patient. The patient participated in an interactive discussion of the total knee replacement implant planned for their surgery with questions answered. The patient agreed with the treatment plan, and has decided to move forward with the elective total knee replacement as planned.  FIDEL THOMAS was seen face to face and needs a commode for bedside use as the patient has no ability to acces the bathroom in their home. The patient also requires a rolling walker as this is needed for activities of daily living within their home secondary to the diagnosis of osteoarthritis.

## 2024-02-21 NOTE — ADDENDUM
[FreeTextEntry1] : I, HARSHIL BENAVIDEZ, acted solely as a scribe for Dr. Fareed Coates on this date 02/21/2024.  All medical record entries made by the Scribe were at my, Dr. Fareed Coates, direction and personally dictated by me on 02/21/2024. I have reviewed the chart and agree that the record accurately reflects my personal performance of the history, physical exam, assessment and plan. I have also personally directed, reviewed, and agreed with the chart.

## 2024-02-21 NOTE — PHYSICAL EXAM
[de-identified] : Constitutional o Appearance : well-nourished, well developed, alert, in no acute distress  Head and Face o Head :  Inspection : atraumatic, normocephalic o Face :  Inspection : no visible rash or discoloration Respiratory o Respiratory Effort: breathing unlabored  Neurologic o Mental Status Examination :  Orientation : alert and oriented X 3 Psychiatric o Mood and Affect: mood normal, affect appropriate Cardiovascular o Observation/Palpation : - no swelling Lymphatic o Additional Nodes : No palpable lymph nodes present  Lumbosacral Spine o Inspection : no visible rash or discoloration o Palpation : no paraspinal musculature tenderness, right SI joint tenderness o Range of Motion : extension and sidebending cause no discomfort, rotation to the right causes discomfort  o Muscle Strength : paraspinal muscle strength and tone within normal limits o Muscle Tone : paraspinal muscle strength and tone within normal limits o Tests: straight leg test negative and BASIL test negative bilaterally   Right Lower Extremity o Buttock : no tenderness, swelling or deformities  o Right Hip :  Inspection/Palpation : no tenderness, swelling or deformities  Range of Motion : full and painless in all planes, no crepitance  Stability : joint stability intact  Strength : extension, flexion, adduction, abduction, internal rotation and external rotation, 5/5  o Right Knee :  Inspection/Palpation : medial and lateral compartment tenderness to palpation, no swelling, no warmth, valgus deformity   Range of Motion : 0-120 decreased patellofemoral glide with crepitance  Stability : no valgus or varus instability present on provocative testing  Strength : flexion and extension 5/5  Tests and Signs : negative Anterior Drawer, negative Lachman, negative Lien  Left Lower Extremity o Buttock : no tenderness, swelling or deformities  o Left Hip :  Inspection/Palpation : no tenderness, no swelling or deformities  Range of Motion : full and painless in all planes, no crepitance  Stability : joint stability intact  Strength : extension, flexion, adduction, abduction, internal rotation and external rotation, 5/5  o Left Knee :  Inspection/Palpation : medial and lateral compartment tenderness, no swelling, no warmth, no Baker's cyst, valgus deformity   Range of Motion : 0-110, decreased patellofemoral glide with crepitance  Stability : no valgus or varus instability present on provocative testing  Strength : flexion and extension 5/5  Tests and Signs : negative Anterior Drawer, negative Lachman, negative Lien  Gait and Station: Gait: ambulating with a cane at this time in the right hand, no significant extremity swelling or lymphedema, no foot drop  Radiology Results 2/21/2024 o Right Knee : Standing AP, lateral and tunnel views of the knee were obtained and revealed bone on bone in the medial compartment with severe patellofemoral arthritis  o Left Knee : Standing AP, lateral and tunnel views of the knee were obtained and revealed bone on bone in the medial compartment with severe patellofemoral arthritis

## 2024-02-21 NOTE — HISTORY OF PRESENT ILLNESS
[de-identified] : 62 year old female presents for follow-up evaluation of her knees. She states she is going to [place] and would like to be able to walk. She states she is taking Prednisone which is providing relief. She states she had a cellulitis and mouth infections recently and is on anti-biotics at this time. She is s/p L lateral tibial plateau fracture on 5/14/22. She understands that she will need a knee replacement at some point and needs to lose weight before that. She presents with her .   Radiology Results from 12/14/2022: o Cervical Spine : AP, lateral, and flexion/extension views were obtained and revealed DDD at C5/C6 and C6/C7 with diffuse facet arthropathy and no evident instability. o Lumbosacral Spine : AP, lateral, and flexion/extension views were obtained and revealed grade 1 spondylolisthesis of L4 on L5 with diffuse facet arthropathy and minimal instability reduced in extension.  o Pelvis : AP pelvis was obtained and revealed mild to moderate degenerative arthritis of both hips and SI joints.  Radiology Results from 8/17/2022: o Left Knee : Standing AP, lateral, and obliques views were obtained and revealed full healing of the lateral tibial plateau fracture. Bone on bone in the lateral compartment with severe patellofemoral arthritis.   Left Knee MRI obtained at [facility] on 6/2/2022 revealed: 1. Nondepressed Schatzker type III fracture of the lateral tibial plateau. 2. Moderate to severe osteoarthritis. 3. Severe degenerative tearing of the medial meniscus posterior horn. 4. Degeneration and fraying of the lateral meniscus. 5. Anterior cruciate ligament rupture that is suspected to be chronic.  Radiology Results from 6/1/2022: o Right Knee : Standing AP, lateral, tunnel, and skyline views of the knee were obtained and reveal severe tricompartmental osteoarthritis.

## 2024-02-27 ENCOUNTER — NON-APPOINTMENT (OUTPATIENT)
Age: 64
End: 2024-02-27

## 2024-03-13 ENCOUNTER — APPOINTMENT (OUTPATIENT)
Dept: ORTHOPEDIC SURGERY | Facility: CLINIC | Age: 64
End: 2024-03-13
Payer: COMMERCIAL

## 2024-03-13 ENCOUNTER — RX RENEWAL (OUTPATIENT)
Age: 64
End: 2024-03-13

## 2024-03-13 VITALS — BODY MASS INDEX: 44.26 KG/M2 | HEIGHT: 67 IN | WEIGHT: 282 LBS

## 2024-03-13 PROCEDURE — 99214 OFFICE O/P EST MOD 30 MIN: CPT | Mod: 25

## 2024-03-13 PROCEDURE — 20610 DRAIN/INJ JOINT/BURSA W/O US: CPT | Mod: RT

## 2024-03-13 NOTE — DISCUSSION/SUMMARY
[de-identified] : I went over the pathophysiology of the patient's symptoms in great detail with the patient. The patient elected to receive a cortisone injection into her right knee today, and tolerated it well. I instructed the patient on ROM exercises, and told them to take it easy. The use of ice and rest was reviewed with the patient. The patient may resume activities tomorrow. We had a discussion about a bakers cyst.   All of their questions were answered. They understand and consent to the plan.   FU in 2 weeks for a right knee gel.   The patient is an 62 year old female with bone on bone arthritis of their right knee. Based upon the patient's continued symptoms and failure to respond to conservative treatment I have recommended a total knee replacement for this patient. A long discussion took place with the patient describing what a total joint replacement is and what the procedure would entail. A total knee model, similar to the implant that will be used during the operation was utilized to demonstrate and to discuss the various bearing surfaces of the implants. The hospitalization and post-operative care and rehabilitation were also discussed. The use of perioperative antibiotics and DVT prophylaxis were discussed. The risk, benefits and alternatives to a surgical intervention were discussed at length with the patient. The patient was also advised of risks related to the medical comorbidities and elevated body mass index (BMI). A lengthy discussion took place to review the most common complications including but not limited to: deep vein thrombosis, pulmonary embolus, heart attack, stroke, infection, wound breakdown, numbness, damage to nerves, tendon, muscles, arteries or other blood vessels, death and other possible complications from anesthesia. The patient was told that we will take steps to minimize these risks by using sterile technique, antibiotics and DVT prophylaxis when appropriate and follow the patient postoperatively in the office setting to monitor progress. The possibility of recurrent pain, no improvement in pain and actual worsening of pain were also discussed with the patient.  The discharge plan of care focused on the patient going home following surgery. I encouraged the patient to make the necessary arrangements to have someone stay with them when they are discharged home. Following discharge, a home care nurse will visit the patient. They will open your home care case and request home physical therapy services. Home physical therapy will commence following discharge provided it is appropriate and covered by her health insurance benefit plan.  The risks, benefits and alternative treatment options of total joint replacement were reviewed with the patient at great length. All questions were answered to the satisfaction of the patient. The patient participated in an interactive discussion of the total knee replacement implant planned for their surgery with questions answered. The patient agreed with the treatment plan, and has decided to move forward with the elective total knee replacement as planned.  FIDEL THOMAS was seen face to face and needs a commode for bedside use as the patient has no ability to acces the bathroom in their home. The patient also requires a rolling walker as this is needed for activities of daily living within their home secondary to the diagnosis of osteoarthritis.

## 2024-03-13 NOTE — ADDENDUM
[FreeTextEntry1] : I, HARSHIL BENAVIDEZ, acted solely as a scribe for Dr. Fareed Coates on this date 03/13/2024.  All medical record entries made by the Scribe were at my, Dr. Fareed Coates, direction and personally dictated by me on 03/13/2024. I have reviewed the chart and agree that the record accurately reflects my personal performance of the history, physical exam, assessment and plan. I have also personally directed, reviewed, and agreed with the chart.

## 2024-03-13 NOTE — HISTORY OF PRESENT ILLNESS
[de-identified] : 62 year old female presents for a right knee Monovisc injection today. She states her right knee is painful today. She states she is having a psoriatic arthritis flare up at this time. She states she is taking Prednisone. She is not a diabetic. She has never had a cortisone injection. She states the severity of the pain is the same for both knees. R<L. The patient elected a cortisone injection and will return for a gel shot. She has been approved for both knees. She is s/p L lateral tibial plateau fracture on 5/14/22. She understands that she will need a knee replacement at some point and needs to lose weight before that. She presents with her .  She is chronically on prednisone and claims that it has not helped her knees.  Both knees are bad she has never had a steroid shot and is interested in viscosupplementation.  Radiology Results 2/21/2024 o Right Knee : Standing AP, lateral and tunnel views of the knee were obtained and revealed bone on bone in the medial compartment with severe patellofemoral arthritis  o Left Knee : Standing AP, lateral and tunnel views of the knee were obtained and revealed bone on bone in the medial compartment with severe patellofemoral arthritis   Radiology Results from 12/14/2022: o Cervical Spine : AP, lateral, and flexion/extension views were obtained and revealed DDD at C5/C6 and C6/C7 with diffuse facet arthropathy and no evident instability. o Lumbosacral Spine : AP, lateral, and flexion/extension views were obtained and revealed grade 1 spondylolisthesis of L4 on L5 with diffuse facet arthropathy and minimal instability reduced in extension.  o Pelvis : AP pelvis was obtained and revealed mild to moderate degenerative arthritis of both hips and SI joints.  Radiology Results from 8/17/2022: o Left Knee : Standing AP, lateral, and obliques views were obtained and revealed full healing of the lateral tibial plateau fracture. Bone on bone in the lateral compartment with severe patellofemoral arthritis.   Left Knee MRI obtained at [facility] on 6/2/2022 revealed: 1. Nondepressed Schatzker type III fracture of the lateral tibial plateau. 2. Moderate to severe osteoarthritis. 3. Severe degenerative tearing of the medial meniscus posterior horn. 4. Degeneration and fraying of the lateral meniscus. 5. Anterior cruciate ligament rupture that is suspected to be chronic.  Radiology Results from 6/1/2022: o Right Knee : Standing AP, lateral, tunnel, and skyline views of the knee were obtained and reveal severe tricompartmental osteoarthritis.

## 2024-03-13 NOTE — PHYSICAL EXAM
[de-identified] : Constitutional o Appearance : well-nourished, well developed, alert, in no acute distress  Head and Face o Head :  Inspection : atraumatic, normocephalic o Face :  Inspection : no visible rash or discoloration Respiratory o Respiratory Effort: breathing unlabored  Neurologic o Mental Status Examination :  Orientation : alert and oriented X 3 Psychiatric o Mood and Affect: mood normal, affect appropriate Cardiovascular o Observation/Palpation : - no swelling Lymphatic o Additional Nodes : No palpable lymph nodes present  Lumbosacral Spine o Inspection : no visible rash or discoloration o Palpation : no paraspinal musculature tenderness, right SI joint tenderness o Range of Motion : extension and sidebending cause no discomfort, rotation to the right causes discomfort  o Muscle Strength : paraspinal muscle strength and tone within normal limits o Muscle Tone : paraspinal muscle strength and tone within normal limits o Tests: straight leg test negative and BASIL test negative bilaterally   Right Lower Extremity o Buttock : no tenderness, swelling or deformities  o Right Hip :  Inspection/Palpation : no tenderness, swelling or deformities  Range of Motion : full and painless in all planes, no crepitance  Stability : joint stability intact  Strength : extension, flexion, adduction, abduction, internal rotation and external rotation, 5/5  o Right Knee :  Inspection/Palpation : medial and lateral compartment tenderness to palpation, medial facet tenderness and lateral facet tenderness no swelling, no warmth, valgus deformity , no effusion or warmth    Range of Motion : 0-120 decreased patellofemoral glide with crepitance  Stability : no valgus or varus instability present on provocative testing  Strength : flexion and extension 5/5  Tests and Signs : negative Anterior Drawer, negative Lachman, negative Lien  Left Lower Extremity o Buttock : no tenderness, swelling or deformities  o Left Hip :  Inspection/Palpation : no tenderness, no swelling or deformities  Range of Motion : full and painless in all planes, no crepitance  Stability : joint stability intact  Strength : extension, flexion, adduction, abduction, internal rotation and external rotation, 5/5  o Left Knee :  Inspection/Palpation : medial and lateral compartment tenderness, no swelling, no warmth, no Baker's cyst, valgus deformity   Range of Motion : 0-110, decreased patellofemoral glide with crepitance  Stability : no valgus or varus instability present on provocative testing  Strength : flexion and extension 5/5  Tests and Signs : negative Anterior Drawer, negative Lachman, negative Lien  Gait and Station: Gait: ambulating with a cane at this time in the right hand, no significant extremity swelling or lymphedema, no foot drop  o Knee injection : Indication- right knee osteoarthritis, Anatomic location- right intra-articular joint space, Spray - area was sterilized with Betadine and alcohol and anesthetized with Ethyl Chloride , needle used-20G, Medications given- 5cc's lidocaine, 0.5cc's kenalog, 0.5 cc's dexamethasone. The patient tolerated the procedure well.

## 2024-03-14 ENCOUNTER — RX RENEWAL (OUTPATIENT)
Age: 64
End: 2024-03-14

## 2024-03-19 ENCOUNTER — OUTPATIENT (OUTPATIENT)
Dept: OUTPATIENT SERVICES | Facility: HOSPITAL | Age: 64
LOS: 1 days | Discharge: HOME | End: 2024-03-19

## 2024-03-19 DIAGNOSIS — C50.911 MALIGNANT NEOPLASM OF UNSPECIFIED SITE OF RIGHT FEMALE BREAST: ICD-10-CM

## 2024-03-19 DIAGNOSIS — Z90.11 ACQUIRED ABSENCE OF RIGHT BREAST AND NIPPLE: Chronic | ICD-10-CM

## 2024-03-19 DIAGNOSIS — Z98.890 OTHER SPECIFIED POSTPROCEDURAL STATES: Chronic | ICD-10-CM

## 2024-03-19 DIAGNOSIS — Z90.13 ACQUIRED ABSENCE OF BILATERAL BREASTS AND NIPPLES: Chronic | ICD-10-CM

## 2024-03-19 DIAGNOSIS — C77.3 SECONDARY AND UNSPECIFIED MALIGNANT NEOPLASM OF AXILLA AND UPPER LIMB LYMPH NODES: ICD-10-CM

## 2024-03-27 ENCOUNTER — APPOINTMENT (OUTPATIENT)
Dept: ORTHOPEDIC SURGERY | Facility: CLINIC | Age: 64
End: 2024-03-27
Payer: COMMERCIAL

## 2024-03-27 VITALS — WEIGHT: 282 LBS | HEIGHT: 67 IN | BODY MASS INDEX: 44.26 KG/M2

## 2024-03-27 PROCEDURE — 20611 DRAIN/INJ JOINT/BURSA W/US: CPT | Mod: RT

## 2024-03-27 PROCEDURE — 99213 OFFICE O/P EST LOW 20 MIN: CPT | Mod: 25

## 2024-03-27 NOTE — PHYSICAL EXAM
Pain
[de-identified] : Constitutional o Appearance : well-nourished, well developed, alert, in no acute distress  Head and Face o Head :  Inspection : atraumatic, normocephalic o Face :  Inspection : no visible rash or discoloration Respiratory o Respiratory Effort: breathing unlabored  Neurologic o Mental Status Examination :  Orientation : alert and oriented X 3 Psychiatric o Mood and Affect: mood normal, affect appropriate Cardiovascular o Observation/Palpation : - no swelling Lymphatic o Additional Nodes : No palpable lymph nodes present  Lumbosacral Spine o Inspection : no visible rash or discoloration o Palpation : no paraspinal musculature tenderness, right SI joint tenderness o Range of Motion : extension and sidebending cause no discomfort, rotation to the right causes discomfort  o Muscle Strength : paraspinal muscle strength and tone within normal limits o Muscle Tone : paraspinal muscle strength and tone within normal limits o Tests: straight leg test negative and BASIL test negative bilaterally   Right Lower Extremity o Buttock : no tenderness, swelling or deformities  o Right Hip :  Inspection/Palpation : no tenderness, swelling or deformities  Range of Motion : full and painless in all planes, no crepitance  Stability : joint stability intact  Strength : extension, flexion, adduction, abduction, internal rotation and external rotation, 5/5  o Right Knee :  Inspection/Palpation : medial compartment tenderness to palpation, medial facet tenderness and lateral facet tenderness no swelling, no warmth, valgus deformity , no effusion or warmth    Range of Motion : 0-115 good patellofemoral glide with crepitance  Stability : no valgus or varus instability present on provocative testing  Strength : flexion and extension 5/5  Tests and Signs : negative Anterior Drawer, negative Lachman, negative Lien  Left Lower Extremity o Buttock : no tenderness, swelling or deformities  o Left Hip :  Inspection/Palpation : no tenderness, no swelling or deformities  Range of Motion : full and painless in all planes, no crepitance  Stability : joint stability intact  Strength : extension, flexion, adduction, abduction, internal rotation and external rotation, 5/5  o Left Knee :  Inspection/Palpation : medial and lateral compartment tenderness, no swelling, no warmth, no Baker's cyst, valgus deformity   Range of Motion : 0-110, decreased patellofemoral glide with crepitance  Stability : no valgus or varus instability present on provocative testing  Strength : flexion and extension 5/5  Tests and Signs : negative Anterior Drawer, negative Lachman, negative Lien  Gait and Station: Gait: ambulating with a cane at this time in the right hand, no significant extremity swelling or lymphedema, no foot drop  o Knee injection : Indication- right knee osteoarthritis, Anatomic location- right intra-articular joint space, Spray - area was sterilized with Betadine and alcohol and anesthetized with Ethyl Chloride , needle used-20G, Medications given- 4cc's Monovisc under Ultrasound guidance. The patient tolerated the procedure well. Lot#[id number] Exp#[id number]

## 2024-03-27 NOTE — DISCUSSION/SUMMARY
[de-identified] : I went over the pathophysiology of the patient's symptoms in great detail with the patient. The patient elected to receive a Monovisc injection into her right knee today, and tolerated it well. I instructed the patient on ROM exercises, and told them to take it easy. The use of ice and rest was reviewed with the patient. The patient may resume activities tomorrow. I reminded the patient that it takes 4 to 6 weeks after the final injection to feel symptom relief. I stressed the importance of weight loss and its benefits to the patients joints and overall health. The series of 3 Viscosupplementation was discussed as a solution to the patient's symptoms. Proper cane walking protocol was discussed and demonstrated with the patient.   All of their questions were answered. They understand and consent to the plan.   FU in 2 weeks for a left knee gel injection.   The patient is an 62 year old female with bone on bone arthritis of their right knee. Based upon the patient's continued symptoms and failure to respond to conservative treatment I have recommended a total knee replacement for this patient. A long discussion took place with the patient describing what a total joint replacement is and what the procedure would entail. A total knee model, similar to the implant that will be used during the operation was utilized to demonstrate and to discuss the various bearing surfaces of the implants. The hospitalization and post-operative care and rehabilitation were also discussed. The use of perioperative antibiotics and DVT prophylaxis were discussed. The risk, benefits and alternatives to a surgical intervention were discussed at length with the patient. The patient was also advised of risks related to the medical comorbidities and elevated body mass index (BMI). A lengthy discussion took place to review the most common complications including but not limited to: deep vein thrombosis, pulmonary embolus, heart attack, stroke, infection, wound breakdown, numbness, damage to nerves, tendon, muscles, arteries or other blood vessels, death and other possible complications from anesthesia. The patient was told that we will take steps to minimize these risks by using sterile technique, antibiotics and DVT prophylaxis when appropriate and follow the patient postoperatively in the office setting to monitor progress. The possibility of recurrent pain, no improvement in pain and actual worsening of pain were also discussed with the patient.  The discharge plan of care focused on the patient going home following surgery. I encouraged the patient to make the necessary arrangements to have someone stay with them when they are discharged home. Following discharge, a home care nurse will visit the patient. They will open your home care case and request home physical therapy services. Home physical therapy will commence following discharge provided it is appropriate and covered by her health insurance benefit plan.  The risks, benefits and alternative treatment options of total joint replacement were reviewed with the patient at great length. All questions were answered to the satisfaction of the patient. The patient participated in an interactive discussion of the total knee replacement implant planned for their surgery with questions answered. The patient agreed with the treatment plan, and has decided to move forward with the elective total knee replacement as planned.  FIDEL THOMAS was seen face to face and needs a commode for bedside use as the patient has no ability to acces the bathroom in their home. The patient also requires a rolling walker as this is needed for activities of daily living within their home secondary to the diagnosis of osteoarthritis.

## 2024-03-27 NOTE — HISTORY OF PRESENT ILLNESS
[de-identified] : 62 year old female presents for a right knee Monovisc injection today 3/27/2024. She had a right knee cortisone injection on 3/13/2024 and reports significant improvement. She denies any swelling or buckling. She states the left knee is aggravated if she does not have the right shoes on. She had a psoriatic arthritis flare up at her previous visit. She states she is taking Prednisone. She is not a diabetic. She states the severity of the pain is the same for both knees. R<L.  She is s/p L lateral tibial plateau fracture on 5/14/22. She understands that she will need a knee replacement at some point and needs to lose weight before that. She presents with her . She states she wants to be able to walk when she is in [place].  She has a history of psoriatic arthritis is being and is being managed by a rheumatologist.  She is currently on Simponi.  And has had good results with it.  Radiology Results 2/21/2024 o Right Knee : Standing AP, lateral and tunnel views of the knee were obtained and revealed bone on bone in the medial compartment with severe patellofemoral arthritis  o Left Knee : Standing AP, lateral and tunnel views of the knee were obtained and revealed bone on bone in the medial compartment with severe patellofemoral arthritis   Radiology Results from 12/14/2022: o Cervical Spine : AP, lateral, and flexion/extension views were obtained and revealed DDD at C5/C6 and C6/C7 with diffuse facet arthropathy and no evident instability. o Lumbosacral Spine : AP, lateral, and flexion/extension views were obtained and revealed grade 1 spondylolisthesis of L4 on L5 with diffuse facet arthropathy and minimal instability reduced in extension.  o Pelvis : AP pelvis was obtained and revealed mild to moderate degenerative arthritis of both hips and SI joints.  Radiology Results from 8/17/2022: o Left Knee : Standing AP, lateral, and obliques views were obtained and revealed full healing of the lateral tibial plateau fracture. Bone on bone in the lateral compartment with severe patellofemoral arthritis.   Left Knee MRI obtained at [facility] on 6/2/2022 revealed: 1. Nondepressed Schatzker type III fracture of the lateral tibial plateau. 2. Moderate to severe osteoarthritis. 3. Severe degenerative tearing of the medial meniscus posterior horn. 4. Degeneration and fraying of the lateral meniscus. 5. Anterior cruciate ligament rupture that is suspected to be chronic.  Radiology Results from 6/1/2022: o Right Knee : Standing AP, lateral, tunnel, and skyline views of the knee were obtained and reveal severe tricompartmental osteoarthritis.

## 2024-03-27 NOTE — ADDENDUM
[FreeTextEntry1] : I, HARSHIL BENAVIDEZ, acted solely as a scribe for Dr. Fareed Coates on this date 03/27/2024.  All medical record entries made by the Scribe were at my, Dr. Fareed Coates, direction and personally dictated by me on 03/27/2024. I have reviewed the chart and agree that the record accurately reflects my personal performance of the history, physical exam, assessment and plan. I have also personally directed, reviewed, and agreed with the chart.

## 2024-04-03 ENCOUNTER — APPOINTMENT (OUTPATIENT)
Dept: OTOLARYNGOLOGY | Facility: CLINIC | Age: 64
End: 2024-04-03
Payer: COMMERCIAL

## 2024-04-03 VITALS — HEIGHT: 67 IN | BODY MASS INDEX: 43.95 KG/M2 | OXYGEN SATURATION: 94 % | HEART RATE: 91 BPM | WEIGHT: 280 LBS

## 2024-04-03 VITALS — DIASTOLIC BLOOD PRESSURE: 81 MMHG | SYSTOLIC BLOOD PRESSURE: 135 MMHG

## 2024-04-03 PROCEDURE — 99204 OFFICE O/P NEW MOD 45 MIN: CPT | Mod: 25

## 2024-04-03 PROCEDURE — 31231 NASAL ENDOSCOPY DX: CPT | Mod: NC

## 2024-04-03 RX ORDER — FLUTICASONE PROPIONATE 50 UG/1
50 SPRAY, METERED NASAL
Qty: 1 | Refills: 3 | Status: ACTIVE | COMMUNITY
Start: 2024-04-03 | End: 1900-01-01

## 2024-04-03 NOTE — CONSULT LETTER
[Dear  ___] : Dear  [unfilled], [( Thank you for referring [unfilled] for consultation for _____ )] : Thank you for referring [unfilled] for consultation for [unfilled] [Please see my note below.] : Please see my note below. [Consult Closing:] : Thank you very much for allowing me to participate in the care of this patient.  If you have any questions, please do not hesitate to contact me. [FreeTextEntry3] : Patrice Lewis MD Sinus & Endoscopic Skull Base Surgery Department of Otolaryngology- Head & Neck Surgery [facility]  Phone: [phone] Fax: [phone] [Sincerely,] : Sincerely,

## 2024-04-03 NOTE — REASON FOR VISIT
[Initial Consultation] : an initial consultation for [FreeTextEntry2] : Referred by Dr Rachel Ramirez PCP for chronic sinusitis

## 2024-04-03 NOTE — HISTORY OF PRESENT ILLNESS
[FreeTextEntry1] : Referring Provider: Self Chief Complaint: Frequent UTI versus asymptomatic bacteriuria Date of first visit: 2024   DEYANIRA PRUITT is a 63 year old  woman with a PMHx of HTN, HLD, DM2 who presents for evaluation of frequent UTI versus asymptomatic bacteriuria.  Patient states that she has approximately 2 UTIs a year though unsure if there are culture proven.  She states that she is asymptomatic and usually does not note that she has UTI other than the fact that she correlates an uncontrolled blood sugar with UTI.  She denies gross hematuria urinary frequency urgency foul-smelling or discolored urine.  No history of kidney stones.  Overall happy with her voiding symptoms.  Denies constipation.   Patient provided notes from PCP though no evidence of positive culture seen in the records provided.  She was noted to have an elevated serum creatinine in 2023.  PVR (2024) = 0 cc    PMHx: HLD, HTN, DM2 SxHx: Left breast and unilateral ovarian cystectomy in her 20s,  (), cervical spine surgery () FHx: No  related malignancies.  Mother w/ thyroid cancer father w/ skin cancer SocHx: Never smoker, no alcohol use,  with 1 child Allergies: Cinnamon, peanuts and tree nuts   The patient denies fevers, chills, nausea and or vomiting and no unexplained weight loss. All pertinent laboratory, films and physician notes were reviewed.  Questionnaire results were discussed with patient.
[de-identified] : 63 year old female presents for chronic sinusitis  Referred by Dr Rachel Ramirez PCP Hx limited L ESS in the 90s for chronic sinus sx 11/2023-- onset of left eye swelling and pain- diagnosed with periorbital cellulitis admitted and treated with IV abx with improvement  CT Head 11/14/23-- reviewed personally-- IMPRESSION: Left periorbital cellulitis with conjunctivitis and mild scleritis suggested. No drainable abscess. Subtle asymmetry detected at the left anterior optic nerve, query infectious optic neuritis in this immunocompromised patient and orbit MRI advised for better tissue contrast resolution. Reports that she follows with general ophtho and retina specialist  02/2024- states left eye swelling returned but not as severe as prior episode- no imaging done at that time Recently with Increased sinus headaches, left maxillary and frontal pressure, and dental pain Intermittent nasal congestion, clear anterior rhinorrhea occasionally blood tinged, PND Denies recurrent epistaxis, reduced sense of smell  hx recurrent sinus infections about 30 years ago  Used saline spray during initial hospitalization in Nov No recent use of nasal sprays or rinses  hx persistent right sided dental infection over a year ago after dental procedure Ct maxillofacial 5/24/23 Impression No significant soft tissue swelling or mass identified. Detection of an abscess is limited due to lack of intravenous contrast.  hx Osteoarthritis, psoriasis currently on prednisone and Biologic Simponi for 2 months  hx Breast CA s/p surgery, chemo  currently on anastrozole

## 2024-04-05 ENCOUNTER — APPOINTMENT (OUTPATIENT)
Dept: HEMATOLOGY ONCOLOGY | Facility: CLINIC | Age: 64
End: 2024-04-05
Payer: COMMERCIAL

## 2024-04-05 ENCOUNTER — APPOINTMENT (OUTPATIENT)
Dept: INFUSION THERAPY | Facility: HOSPITAL | Age: 64
End: 2024-04-05

## 2024-04-05 VITALS
RESPIRATION RATE: 16 BRPM | SYSTOLIC BLOOD PRESSURE: 138 MMHG | DIASTOLIC BLOOD PRESSURE: 83 MMHG | BODY MASS INDEX: 43.89 KG/M2 | TEMPERATURE: 97 F | WEIGHT: 280.21 LBS | OXYGEN SATURATION: 97 % | HEART RATE: 86 BPM

## 2024-04-05 DIAGNOSIS — R91.1 SOLITARY PULMONARY NODULE: ICD-10-CM

## 2024-04-05 PROCEDURE — 99215 OFFICE O/P EST HI 40 MIN: CPT

## 2024-04-05 PROCEDURE — G2211 COMPLEX E/M VISIT ADD ON: CPT | Mod: NC,1L

## 2024-04-06 PROBLEM — R91.1 NODULE OF RIGHT LUNG: Status: ACTIVE | Noted: 2023-07-27

## 2024-04-06 NOTE — REVIEW OF SYSTEMS
[Diarrhea: Grade 0] : Diarrhea: Grade 0 [de-identified] : as above [Negative] : Allergic/Immunologic [FreeTextEntry9] : as above

## 2024-04-06 NOTE — ASSESSMENT
[FreeTextEntry1] : 62 yo woman diagnosed in 2018 with inflammatory right breast cancer ([id number], ER+KS-Her2+) s/p neoadjuvant chemotherapy with ddAC-THP with no residual disease found at the time of surgery; continued for 52 weeks of trastuzumab/pertuzumab, s/p radiation to the right chest wall, started on endocrine therapy 7/2019. Also had a trial of neratinib 12/2019-2/2020 but stopped due to severe diarrhea.  - to continue anastrozole for total of 7-10 years ([id number] vs. 2029); given no untoward side effects, would opt to extend to 10 years - last BMD in 6/2023 normal; to repeat in 6/2025 - will refer for evaluation by Cancer [facility] program given persistent right shoulder/chest wall pain and tightness - reviewed CT scan that incidentally showed 5mm apical groundglass nodule; will repeat short term follow-up in May 2024. - discussed removal of infusaport which has not happened as of yet since she was supposed to have it removed during the height of the COVID panmic and it was delayed; will continue to have flushed - Patient had the opportunity to have all their questions answered to their satisfaction  - HEALTH MAINTENANCE SCREENING RECOMMENDATIONS PMD at least annually with lipid checks/bloodwork,  GYN at least annually and PAP test screening per their recommendations,  GI for colon cancer screening/colonoscopy at least every 10 years  dermatology for annual skin checks   ophthalmology for annual eye exams  - f/u in 6 months or sooner if needed; return every 6-8 weeks to have port flushes done

## 2024-04-06 NOTE — PHYSICAL EXAM
[Fully active, able to carry on all pre-disease performance without restriction] : Status 0 - Fully active, able to carry on all pre-disease performance without restriction [Normal] : affect appropriate [de-identified] : infusaport in place in left chest [de-identified] : bilateral reconstructed breast with well healed scars and no discrete palpable masses, nipple reconstruction b/l well healed, no palpable axillary nodes.   [de-identified] : Pain on palp of lower medial anterior rib; also noted to have reduced range of motion in the right arm

## 2024-04-06 NOTE — HISTORY OF PRESENT ILLNESS
[Disease: _____________________] : Disease: [unfilled] [T: ___] : T[unfilled] [N: ___] : N[unfilled] [M: ___] : M[unfilled] [AJCC Stage: ____] : AJCC Stage: [unfilled] [90: Able to carry normal activity; minor signs or symptoms of disease.] : 90: Able to carry normal activity; minor signs or symptoms of disease.  [ECOG Performance Status: 1 - Restricted in physically strenuous activity but ambulatory and able to carry out work of a light or sedentary nature] : Performance Status: 1 - Restricted in physically strenuous activity but ambulatory and able to carry out work of a light or sedentary nature, e.g., light house work, office work [de-identified] : The pt was seen by me for an initial visit 4/5/24 in order to transfer her care to me from that of Dr Jones who left the [facility].  She was initially seen by Dr. Cox and transferred care from Dr. Cox to Dr. Jones in 2021. Her history below is as per review of the AEHR notes and confirmation with the pt.   She noted summer 2018 she felt a mass near her nipple in her R breast. She went to her PMD who ordered a mammogram and sonogram, which revealed a R abnormal hypoechoic mass and abnormal appearing axillary LN.  Image guided biopsy of mass and axilla revealed invasive poorly differentiated ductal carcinoma, +LVI, 1.2cm in specimen, Laura 8/9, DCIS, microcalcifications present, ER >95%, TN negative, HER2+ 3+ IHC, Lymph node biopsy with metastatic carcinoma.  She then saw Dr. Obdulia Camacho (breast surgeon breast surgeon) for evaluation.   8/20/2018 - MRI Breast  showed: Biopsy proven mammary carcinoma in the R retroareolar region 5:00 manifested as multiple irregular masses. The tumor is extensive in nature and spanning an area of over 3 cm with non-mass enhancement extending posteriorly suggesting extensive intraductal component as well. Two other highly suspicious masses noted in the RUOQ at 11:00 measuring >3 cm and at 10:00 measuring 2.6 cm consistent with multicentric disease. Biopsy proven metastatic disease to an axillary LN with additional suspicious LNs (additional with replaced fatty hilum and abnormally enlarged cortices, some have irregular borders, some posterior to pectoralis muscle, also abnormal appearing node in soft tissue lateral to the R chest wall).  Dr. Camacho referred patient for neoadjuvant chemotherapy and the option for mastectomy with possible axillary lymph node dissection pending response to therapy.  She then saw Dr Cox in consultation. She underwent PETCT that found a peripancreatic lymph node that was enlarged and SUV avid, b/l laryngeal FDG avidity (likely due to speaking during exam), and SUV avid breast mass/axilla on R with breast skin noted to be thickened. She underwent EGD/EUS and biopsy of this peripancreatic LN (Dr. Giovanni Felton) which was consistent with benign/reactive lymphatic tissue. She started neoadjuvant chemotherapy with dose-dense AC on 9/26/2018 with dose-dense AC-->THP (weekly taxol 80mg/m2) requiring intermittent holds of taxol due to neuropathy  Post treatment MRI 2/4/19 BIRADS 2, masses and adenopathy resolved. Some residual R breast skin thickening noted.  3/22/19 R mastectomy and ALND: 0/6 LN removed involved, no residual invasive carcinoma or DCIS identified. [id number] ([facility]); Reconstruction with Dr. Antonio with completion of revision in 2021  4/3/2019 Herceptin and Perjeta continued post-operatively every 3 weeks for total of 17 cycles, adjuvant radiation started 5/2019 and completed 6/2019 c/b grade 3 dermatitis now resolved.  LH/FSH/estradiol levels May/June 2019 consistent with menopause and started aromatase inhibitor in 7/2019 without incdident. She started extended adjuvant therapy with neratinib 12/2019 -2/2020 - self discontinued due to toxicity of diarrhea. She continues on anastrazole  Risk assessment: She notes a history of smoking in the distant past. Mother passed away in 70s from metastatic melanoma, and a maternal aunt had breast cancer; no other significant close family history of cancers. She used OCPs in the past for years particularly for endometriosis treatment.  [de-identified] : ER+KY-, HER2+ [de-identified] : 4/5/24 Transferring care from Dr. Jones to me today All of the patient's prior records including radiology, pathology and prior notes reviewed; Past Medical History, Past Surgical History, Family History and Social history reviewed and updated in the patient's chart. Patient returns today to rule out progression or recurrence of breast cancer and to assess treatment toxicity. She has baseline arthralgias from her psoriatic arthritis with no change from pre AI baseline; has been on anastrozole since  7/2019 with 7-10 years of therapy planned She underwent CT chest for new onset pain in the ribs in 2/2024 which showed no lesions in the ribs but non-specific nodules i the lungs with recommendation for short term follow-up. The pain persists but has not changed; She also reports right shoulder tightness which has been slightly worse; previously did PT  Most Recent Imaging ================= CT chest 2/14/24 -  Stable punctate left lower lobe nodule since May 2023. 5 mm left apical groundglass nodule was not well seen on prior PET/CTs likely due to differences in technique, can be reassessed on follow-up imaging.  DEXA 3/23 WNL.

## 2024-04-10 ENCOUNTER — RX RENEWAL (OUTPATIENT)
Age: 64
End: 2024-04-10

## 2024-04-17 ENCOUNTER — APPOINTMENT (OUTPATIENT)
Dept: ORTHOPEDIC SURGERY | Facility: CLINIC | Age: 64
End: 2024-04-17

## 2024-04-24 ENCOUNTER — NON-APPOINTMENT (OUTPATIENT)
Age: 64
End: 2024-04-24

## 2024-04-26 ENCOUNTER — APPOINTMENT (OUTPATIENT)
Dept: FAMILY MEDICINE | Facility: CLINIC | Age: 64
End: 2024-04-26
Payer: COMMERCIAL

## 2024-04-26 VITALS
WEIGHT: 281 LBS | DIASTOLIC BLOOD PRESSURE: 82 MMHG | HEIGHT: 67 IN | TEMPERATURE: 97.9 F | OXYGEN SATURATION: 95 % | HEART RATE: 93 BPM | SYSTOLIC BLOOD PRESSURE: 132 MMHG | BODY MASS INDEX: 44.1 KG/M2 | RESPIRATION RATE: 16 BRPM

## 2024-04-26 DIAGNOSIS — C50.911 MALIGNANT NEOPLASM OF UNSPECIFIED SITE OF RIGHT FEMALE BREAST: ICD-10-CM

## 2024-04-26 DIAGNOSIS — C77.3 MALIGNANT NEOPLASM OF UNSPECIFIED SITE OF RIGHT FEMALE BREAST: ICD-10-CM

## 2024-04-26 DIAGNOSIS — R51.9 HEADACHE, UNSPECIFIED: ICD-10-CM

## 2024-04-26 DIAGNOSIS — I10 ESSENTIAL (PRIMARY) HYPERTENSION: ICD-10-CM

## 2024-04-26 PROCEDURE — 99214 OFFICE O/P EST MOD 30 MIN: CPT

## 2024-04-26 PROCEDURE — G2211 COMPLEX E/M VISIT ADD ON: CPT | Mod: NC,1L

## 2024-04-27 NOTE — HEALTH RISK ASSESSMENT
[Former] : Former [15-19] : 15-19 [> 15 Years] : > 15 Years [Never (0 pts)] : Never (0 points) [No] : In the past 12 months have you used drugs other than those required for medical reasons? No [de-identified] : oncology, rheumatology [Audit-CScore] : 0

## 2024-04-27 NOTE — HISTORY OF PRESENT ILLNESS
[FreeTextEntry8] : CC: headache  Patient has h/o migraine headaches but currently for the past 2 months or so has been having a different type of headache which she describes as coming from the top of her head down the sides of the head, a pressure sensation from within the head not something like her usual migraines. Recently with ongoing tx for psoriatic arthritis with immunosuppressive medications and h/o breast cancer - saw oncology recently but prior to this new complaint. Her typical migraine responds quickly to her medication and these headaches have not. Concern for metastatic disease to the skull or brain given her hx.  Patient complaining of sore throat for 1 week worsening.  Patient relates she had seen the doctor at her school 3 days ago had a negative strep test, was given amoxicillin anyway which she has been taking without improvement.  Patient relates pain is also that she is unable to tolerate any p.o. intake.  Patient reports occasional nonproductive cough.  No fevers chills headache chest pain short of breath abdominal pain nausea vomiting  Pediatrician sawyer    Plan Labs including CBC CMP strep flu COVID PCR's CT soft tissue neck IV fluid  Differential includes but not limited to strep flu COVID RSV electrolyte abnormality Ambrocio's angina retropharyngeal abscess epiglottitis

## 2024-04-27 NOTE — PHYSICAL EXAM
[No Acute Distress] : no acute distress [Normal Sclera/Conjunctiva] : normal sclera/conjunctiva [PERRL] : pupils equal round and reactive to light [EOMI] : extraocular movements intact [Fundoscopic Exam Performed] : fundoscopic ~T exam ~C was performed [Normal Outer Ear/Nose] : the outer ears and nose were normal in appearance [Normal Oropharynx] : the oropharynx was normal [Normal TMs] : both tympanic membranes were normal [No JVD] : no jugular venous distention [No Respiratory Distress] : no respiratory distress  [No Accessory Muscle Use] : no accessory muscle use [Clear to Auscultation] : lungs were clear to auscultation bilaterally [Normal Rate] : normal rate  [Regular Rhythm] : with a regular rhythm [Normal S1, S2] : normal S1 and S2 [No Murmur] : no murmur heard [No Edema] : there was no peripheral edema [Declined Breast Exam] : declined breast exam  [No Spinal Tenderness] : no spinal tenderness [No Rash] : no rash [No Focal Deficits] : no focal deficits [Normal Gait] : normal gait [Normal Affect] : the affect was normal [Alert and Oriented x3] : oriented to person, place, and time [Normal Insight/Judgement] : insight and judgment were intact [de-identified] : denies current headache sx

## 2024-04-27 NOTE — REVIEW OF SYSTEMS
[Fatigue] : fatigue [Joint Pain] : joint pain [Back Pain] : back pain [Nail Changes] : nail changes [Hair Changes] : hair changes [Headache] : headache [Anxiety] : anxiety [Negative] : Heme/Lymph [Fever] : no fever [Chills] : no chills [Diarrhea] : diarrhea [Hematuria] : no hematuria [Skin Rash] : no skin rash [Dizziness] : no dizziness [Fainting] : no fainting [Confusion] : no confusion [Memory Loss] : no memory loss [Unsteady Walking] : no ataxia [Suicidal] : not suicidal [Depression] : no depression [FreeTextEntry9] : see hpi [de-identified] : peripheral neuropathy- seems a bit worse during weather changes,  migraines stable post chemotherapy

## 2024-04-27 NOTE — ASSESSMENT
[FreeTextEntry1] : high risk patient for significant headache etiology- discussion with patient about work up and what may be causing the headaches may be caused by metastatic disease, vs other vascular issue- will begin work up with CT head with IV contrast- ordered  review of very recent labs from rheumatology  follow up with oncology is due in about 10 days as well and then follow up with me in about 2 weeks' time pending those results.  moderate level MDM and continuity of care

## 2024-05-01 ENCOUNTER — LABORATORY RESULT (OUTPATIENT)
Age: 64
End: 2024-05-01

## 2024-05-03 ENCOUNTER — APPOINTMENT (OUTPATIENT)
Dept: RHEUMATOLOGY | Facility: CLINIC | Age: 64
End: 2024-05-03
Payer: COMMERCIAL

## 2024-05-03 VITALS
HEIGHT: 67 IN | HEART RATE: 84 BPM | OXYGEN SATURATION: 97 % | SYSTOLIC BLOOD PRESSURE: 136 MMHG | DIASTOLIC BLOOD PRESSURE: 88 MMHG | BODY MASS INDEX: 44.42 KG/M2 | RESPIRATION RATE: 16 BRPM | WEIGHT: 283 LBS | TEMPERATURE: 97.6 F

## 2024-05-03 DIAGNOSIS — L40.9 PSORIASIS, UNSPECIFIED: ICD-10-CM

## 2024-05-03 DIAGNOSIS — D84.9 IMMUNODEFICIENCY, UNSPECIFIED: ICD-10-CM

## 2024-05-03 PROCEDURE — 99214 OFFICE O/P EST MOD 30 MIN: CPT

## 2024-05-03 PROCEDURE — G2211 COMPLEX E/M VISIT ADD ON: CPT | Mod: NC,1L

## 2024-05-08 ENCOUNTER — APPOINTMENT (OUTPATIENT)
Dept: ORTHOPEDIC SURGERY | Facility: CLINIC | Age: 64
End: 2024-05-08
Payer: COMMERCIAL

## 2024-05-08 VITALS — WEIGHT: 283 LBS | BODY MASS INDEX: 44.42 KG/M2 | HEIGHT: 67 IN

## 2024-05-08 PROCEDURE — 99213 OFFICE O/P EST LOW 20 MIN: CPT | Mod: 25

## 2024-05-08 PROCEDURE — 20611 DRAIN/INJ JOINT/BURSA W/US: CPT | Mod: LT

## 2024-05-08 NOTE — HISTORY OF PRESENT ILLNESS
Please see my chart message with patient response before refilling. Refill requests sent from 2 different pharmacies.  
[de-identified] : 62 year old female presents for a left knee Monovisc injection today 5/8/2024. She had a right knee Monovisc injection on 3/27/2024. She had a right knee cortisone injection on 3/13/2024 and reports significant improvement. She denies any swelling or buckling. She is s/p L lateral tibial plateau fracture on 5/14/22. She understands that she will need a knee replacement at some point and needs to lose weight before that. She presents with her . She has a history of psoriatic arthritis is being and is being managed by a rheumatologist. She is currently on Simponi and has had good results with it.  Radiology Results 2/21/2024 o Right Knee : Standing AP, lateral and tunnel views of the knee were obtained and revealed bone on bone in the medial compartment with severe patellofemoral arthritis  o Left Knee : Standing AP, lateral and tunnel views of the knee were obtained and revealed bone on bone in the medial compartment with severe patellofemoral arthritis   Radiology Results from 12/14/2022: o Cervical Spine : AP, lateral, and flexion/extension views were obtained and revealed DDD at C5/C6 and C6/C7 with diffuse facet arthropathy and no evident instability. o Lumbosacral Spine : AP, lateral, and flexion/extension views were obtained and revealed grade 1 spondylolisthesis of L4 on L5 with diffuse facet arthropathy and minimal instability reduced in extension.  o Pelvis : AP pelvis was obtained and revealed mild to moderate degenerative arthritis of both hips and SI joints.  Radiology Results from 8/17/2022: o Left Knee : Standing AP, lateral, and obliques views were obtained and revealed full healing of the lateral tibial plateau fracture. Bone on bone in the lateral compartment with severe patellofemoral arthritis.   Left Knee MRI obtained at [facility] on 6/2/2022 revealed: 1. Nondepressed Schatzker type III fracture of the lateral tibial plateau. 2. Moderate to severe osteoarthritis. 3. Severe degenerative tearing of the medial meniscus posterior horn. 4. Degeneration and fraying of the lateral meniscus. 5. Anterior cruciate ligament rupture that is suspected to be chronic.  Radiology Results from 6/1/2022: o Right Knee : Standing AP, lateral, tunnel, and skyline views of the knee were obtained and reveal severe tricompartmental osteoarthritis.

## 2024-05-08 NOTE — ADDENDUM
[FreeTextEntry1] : I, HARSHIL BENAVIDEZ, acted solely as a scribe for Dr. Fareed Coates on this date 05/08/2024.  All medical record entries made by the Scribe were at my, Dr. Fareed Coates, direction and personally dictated by me on 05/08/2024. I have reviewed the chart and agree that the record accurately reflects my personal performance of the history, physical exam, assessment and plan. I have also personally directed, reviewed, and agreed with the chart.

## 2024-05-08 NOTE — PHYSICAL EXAM
[de-identified] : Constitutional o Appearance : well-nourished, well developed, alert, in no acute distress  Head and Face o Head :  Inspection : atraumatic, normocephalic o Face :  Inspection : no visible rash or discoloration Respiratory o Respiratory Effort: breathing unlabored  Neurologic o Mental Status Examination :  Orientation : alert and oriented X 3 Psychiatric o Mood and Affect: mood normal, affect appropriate Cardiovascular o Observation/Palpation : - no swelling Lymphatic o Additional Nodes : No palpable lymph nodes present  Lumbosacral Spine o Inspection : no visible rash or discoloration o Palpation : no paraspinal musculature tenderness, right SI joint tenderness o Range of Motion : extension and sidebending cause no discomfort, rotation to the right causes discomfort  o Muscle Strength : paraspinal muscle strength and tone within normal limits o Muscle Tone : paraspinal muscle strength and tone within normal limits o Tests: straight leg test negative and BASIL test negative bilaterally   Right Lower Extremity o Buttock : no tenderness, swelling or deformities  o Right Hip :  Inspection/Palpation : no tenderness, swelling or deformities  Range of Motion : full and painless in all planes, no crepitance  Stability : joint stability intact  Strength : extension, flexion, adduction, abduction, internal rotation and external rotation, 5/5  o Right Knee :  Inspection/Palpation : medial compartment tenderness to palpation, medial facet tenderness and lateral facet tenderness no swelling, no warmth, valgus deformity , no effusion or warmth    Range of Motion : 0-115 good patellofemoral glide with crepitance  Stability : no valgus or varus instability present on provocative testing  Strength : flexion and extension 5/5  Tests and Signs : negative Anterior Drawer, negative Lachman, negative Lien  Left Lower Extremity o Buttock : no tenderness, swelling or deformities  o Left Hip :  Inspection/Palpation : no tenderness, no swelling or deformities  Range of Motion : full and painless in all planes, no crepitance  Stability : joint stability intact  Strength : extension, flexion, adduction, abduction, internal rotation and external rotation, 5/5  o Left Knee :  Inspection/Palpation : medial and lateral compartment tenderness, no swelling, no warmth, no Baker's cyst, valgus deformity   Range of Motion : 0-110, decreased patellofemoral glide with crepitance  Stability : no valgus or varus instability present on provocative testing  Strength : flexion and extension 5/5  Tests and Signs : negative Anterior Drawer, negative Lachman, negative Lien  Gait and Station: Gait: ambulating with a cane at this time in the right hand, no significant extremity swelling or lymphedema, no foot drop  o Knee injection : Indication- left knee osteoarthritis, Anatomic location- left intra-articular joint space, Spray - area was sterilized with Betadine and alcohol and anesthetized with Ethyl Chloride , needle used-20G, Medications given- 4cc's Monovisc under Ultrasound guidance. The patient tolerated the procedure well. Lot#[id number] Exp#[id number]

## 2024-05-08 NOTE — DISCUSSION/SUMMARY
[de-identified] : I went over the pathophysiology of the patient's symptoms in great detail with the patient. The patient elected to receive a Monovisc injection into her left knee today, and tolerated it well. I instructed the patient on ROM exercises, and told them to take it easy. The use of ice and rest was reviewed with the patient. The patient may resume activities tomorrow. I reminded the patient that it takes 4 to 6 weeks after the final injection to feel symptom relief. I stressed the importance of weight loss and its benefits to the patients joints and overall health.   All of their questions were answered. They understand and consent to the plan.   FU in 6 weeks.   The patient is an 62 year old female with bone on bone arthritis of their right knee. Based upon the patient's continued symptoms and failure to respond to conservative treatment I have recommended a total knee replacement for this patient. A long discussion took place with the patient describing what a total joint replacement is and what the procedure would entail. A total knee model, similar to the implant that will be used during the operation was utilized to demonstrate and to discuss the various bearing surfaces of the implants. The hospitalization and post-operative care and rehabilitation were also discussed. The use of perioperative antibiotics and DVT prophylaxis were discussed. The risk, benefits and alternatives to a surgical intervention were discussed at length with the patient. The patient was also advised of risks related to the medical comorbidities and elevated body mass index (BMI). A lengthy discussion took place to review the most common complications including but not limited to: deep vein thrombosis, pulmonary embolus, heart attack, stroke, infection, wound breakdown, numbness, damage to nerves, tendon, muscles, arteries or other blood vessels, death and other possible complications from anesthesia. The patient was told that we will take steps to minimize these risks by using sterile technique, antibiotics and DVT prophylaxis when appropriate and follow the patient postoperatively in the office setting to monitor progress. The possibility of recurrent pain, no improvement in pain and actual worsening of pain were also discussed with the patient.  The discharge plan of care focused on the patient going home following surgery. I encouraged the patient to make the necessary arrangements to have someone stay with them when they are discharged home. Following discharge, a home care nurse will visit the patient. They will open your home care case and request home physical therapy services. Home physical therapy will commence following discharge provided it is appropriate and covered by her health insurance benefit plan.  The risks, benefits and alternative treatment options of total joint replacement were reviewed with the patient at great length. All questions were answered to the satisfaction of the patient. The patient participated in an interactive discussion of the total knee replacement implant planned for their surgery with questions answered. The patient agreed with the treatment plan, and has decided to move forward with the elective total knee replacement as planned.  FIDEL THOMAS was seen face to face and needs a commode for bedside use as the patient has no ability to acces the bathroom in their home. The patient also requires a rolling walker as this is needed for activities of daily living within their home secondary to the diagnosis of osteoarthritis.

## 2024-05-10 ENCOUNTER — RX CHANGE (OUTPATIENT)
Age: 64
End: 2024-05-10

## 2024-05-10 ENCOUNTER — OUTPATIENT (OUTPATIENT)
Dept: OUTPATIENT SERVICES | Facility: HOSPITAL | Age: 64
LOS: 1 days | End: 2024-05-10
Payer: COMMERCIAL

## 2024-05-10 ENCOUNTER — APPOINTMENT (OUTPATIENT)
Dept: CT IMAGING | Facility: IMAGING CENTER | Age: 64
End: 2024-05-10
Payer: COMMERCIAL

## 2024-05-10 DIAGNOSIS — Z98.890 OTHER SPECIFIED POSTPROCEDURAL STATES: Chronic | ICD-10-CM

## 2024-05-10 DIAGNOSIS — Z90.11 ACQUIRED ABSENCE OF RIGHT BREAST AND NIPPLE: Chronic | ICD-10-CM

## 2024-05-10 DIAGNOSIS — Z00.8 ENCOUNTER FOR OTHER GENERAL EXAMINATION: ICD-10-CM

## 2024-05-10 DIAGNOSIS — Z90.13 ACQUIRED ABSENCE OF BILATERAL BREASTS AND NIPPLES: Chronic | ICD-10-CM

## 2024-05-10 PROCEDURE — 70460 CT HEAD/BRAIN W/DYE: CPT

## 2024-05-10 PROCEDURE — 70460 CT HEAD/BRAIN W/DYE: CPT | Mod: 26

## 2024-05-10 RX ORDER — DULOXETINE HYDROCHLORIDE 30 MG/1
30 CAPSULE, DELAYED RELEASE PELLETS ORAL
Qty: 120 | Refills: 1 | Status: ACTIVE | COMMUNITY
Start: 1900-01-01 | End: 1900-01-01

## 2024-05-10 RX ORDER — DULOXETINE HYDROCHLORIDE 30 MG/1
30 CAPSULE, DELAYED RELEASE PELLETS ORAL
Qty: 120 | Refills: 1 | Status: DISCONTINUED | COMMUNITY
Start: 2019-08-20 | End: 2024-05-10

## 2024-05-12 ENCOUNTER — TRANSCRIPTION ENCOUNTER (OUTPATIENT)
Age: 64
End: 2024-05-12

## 2024-05-12 ENCOUNTER — RX RENEWAL (OUTPATIENT)
Age: 64
End: 2024-05-12

## 2024-05-20 RX ORDER — ETANERCEPT 50 MG/ML
50 SOLUTION SUBCUTANEOUS
Qty: 1 | Refills: 11 | Status: ACTIVE | COMMUNITY
Start: 2024-05-20 | End: 1900-01-01

## 2024-05-20 NOTE — PHYSICAL EXAM
[General Appearance - In No Acute Distress] : in no acute distress [General Appearance - Alert] : alert [General Appearance - Well Nourished] : well nourished [Sclera] : the sclera and conjunctiva were normal [PERRL With Normal Accommodation] : pupils were equal in size, round, and reactive to light [Extraocular Movements] : extraocular movements were intact [Outer Ear] : the ears and nose were normal in appearance [Oropharynx] : the oropharynx was normal [Neck Appearance] : the appearance of the neck was normal [] : no respiratory distress [Auscultation Breath Sounds / Voice Sounds] : lungs were clear to auscultation bilaterally [Heart Rate And Rhythm] : heart rate was normal and rhythm regular [Heart Sounds] : normal S1 and S2 [Murmurs] : no murmurs [Edema] : there was no peripheral edema [No CVA Tenderness] : no ~M costovertebral angle tenderness [Skin Color & Pigmentation] : normal skin color and pigmentation [No Focal Deficits] : no focal deficits [Oriented To Time, Place, And Person] : oriented to person, place, and time [Impaired Insight] : insight and judgment were intact [Affect] : the affect was normal [No Spinal Tenderness] : no spinal tenderness [Abnormal Walk] : normal gait [Nail Clubbing] : no clubbing  or cyanosis of the fingernails [Musculoskeletal - Swelling] : no joint swelling seen [Motor Tone] : muscle strength and tone were normal [FreeTextEntry1] : Rash on feet, elbows-- scalp worse

## 2024-05-20 NOTE — REVIEW OF SYSTEMS
[Feeling Poorly] : feeling poorly [Feeling Tired] : feeling tired [Dry Eyes] : dryness of the eyes [Arthralgias] : arthralgias [Joint Pain] : joint pain [Joint Stiffness] : joint stiffness [As Noted in HPI] : as noted in HPI [Difficulty Walking] : difficulty walking [Negative] : Heme/Lymph [Joint Swelling] : no joint swelling

## 2024-05-20 NOTE — ASSESSMENT
[FreeTextEntry1] : FIDEL THOMAS is a 63 year old woman with PsA -- months of symmetrical small joint stiffness and pain, b/l Achilles tenderness, L knee warmth on exam, psoriatic plaque on R foot and some fissuring in palms which may be psoriasis vs eczema. Marked response to steroids but ongoing activity despite MTX initiation, new spinal sx which appear inflammatory necessitating need for biologic. Biologics trialled - Humira, Stelara.   # PsA and psoriasis -- improved but still active joints and skin despite Simponi  - will d/c Simponi and get PA for Enbrel  - c/w prednisone - hasn't been able to taper below 10mg/day as yet, ok to use 15mg sparingly PRN  - c/w MTX 8 tabs. C/w FA 1 mg daily  - recent labs reviewed- repeat prior to next visit   # immunosuppressed status 2/2 steroids and above meds - pt counseled to call if febrile or unwell, counseled to c/w steroids but hold other rheum medication while on Abx  # OA related pain - b/l knees, R thumb - c/w gel injections PRN with ortho  - Tylenol prn - c/w low dose Cymbalta for now, but no issues with switching with PMD if needed as she is having some SE - insomnia, hot flashes   RTC 3 months

## 2024-05-23 ENCOUNTER — OUTPATIENT (OUTPATIENT)
Dept: OUTPATIENT SERVICES | Facility: HOSPITAL | Age: 64
LOS: 1 days | Discharge: HOME | End: 2024-05-23

## 2024-05-23 DIAGNOSIS — Z90.11 ACQUIRED ABSENCE OF RIGHT BREAST AND NIPPLE: Chronic | ICD-10-CM

## 2024-05-23 DIAGNOSIS — C50.911 MALIGNANT NEOPLASM OF UNSPECIFIED SITE OF RIGHT FEMALE BREAST: ICD-10-CM

## 2024-05-23 DIAGNOSIS — Z90.13 ACQUIRED ABSENCE OF BILATERAL BREASTS AND NIPPLES: Chronic | ICD-10-CM

## 2024-05-23 DIAGNOSIS — Z98.890 OTHER SPECIFIED POSTPROCEDURAL STATES: Chronic | ICD-10-CM

## 2024-05-23 DIAGNOSIS — D84.9 IMMUNODEFICIENCY, UNSPECIFIED: ICD-10-CM

## 2024-05-26 ENCOUNTER — RX RENEWAL (OUTPATIENT)
Age: 64
End: 2024-05-26

## 2024-05-26 RX ORDER — METHOTREXATE 2.5 MG/1
2.5 TABLET ORAL
Qty: 32 | Refills: 5 | Status: ACTIVE | COMMUNITY
Start: 2021-11-24 | End: 1900-01-01

## 2024-05-30 NOTE — PATIENT PROFILE ADULT - NSPROEXTENSIONSOFSELF_GEN_A_NUR
Pt cleansed of incontinence of urine, repositioned in bed and placed on pure wick. Denies any needs at this time   eyeglasses

## 2024-05-31 ENCOUNTER — APPOINTMENT (OUTPATIENT)
Dept: INFUSION THERAPY | Facility: HOSPITAL | Age: 64
End: 2024-05-31

## 2024-06-03 ENCOUNTER — NON-APPOINTMENT (OUTPATIENT)
Age: 64
End: 2024-06-03

## 2024-06-26 ENCOUNTER — APPOINTMENT (OUTPATIENT)
Dept: OTOLARYNGOLOGY | Facility: CLINIC | Age: 64
End: 2024-06-26
Payer: COMMERCIAL

## 2024-06-26 ENCOUNTER — APPOINTMENT (OUTPATIENT)
Dept: ORTHOPEDIC SURGERY | Facility: CLINIC | Age: 64
End: 2024-06-26
Payer: COMMERCIAL

## 2024-06-26 VITALS
HEART RATE: 80 BPM | SYSTOLIC BLOOD PRESSURE: 124 MMHG | DIASTOLIC BLOOD PRESSURE: 84 MMHG | WEIGHT: 280 LBS | HEIGHT: 67 IN | BODY MASS INDEX: 43.95 KG/M2

## 2024-06-26 VITALS — BODY MASS INDEX: 43.95 KG/M2 | WEIGHT: 280 LBS | HEIGHT: 67 IN

## 2024-06-26 DIAGNOSIS — M17.0 BILATERAL PRIMARY OSTEOARTHRITIS OF KNEE: ICD-10-CM

## 2024-06-26 DIAGNOSIS — E66.9 OBESITY, UNSPECIFIED: ICD-10-CM

## 2024-06-26 DIAGNOSIS — J32.9 CHRONIC SINUSITIS, UNSPECIFIED: ICD-10-CM

## 2024-06-26 DIAGNOSIS — L03.213 PERIORBITAL CELLULITIS: ICD-10-CM

## 2024-06-26 DIAGNOSIS — L40.50 ARTHROPATHIC PSORIASIS, UNSPECIFIED: ICD-10-CM

## 2024-06-26 PROCEDURE — 31231 NASAL ENDOSCOPY DX: CPT

## 2024-06-26 PROCEDURE — 99214 OFFICE O/P EST MOD 30 MIN: CPT | Mod: 25

## 2024-06-26 PROCEDURE — 99213 OFFICE O/P EST LOW 20 MIN: CPT

## 2024-06-27 ENCOUNTER — RX RENEWAL (OUTPATIENT)
Age: 64
End: 2024-06-27

## 2024-07-10 ENCOUNTER — APPOINTMENT (OUTPATIENT)
Dept: PAIN MANAGEMENT | Facility: CLINIC | Age: 64
End: 2024-07-10

## 2024-07-10 VITALS
BODY MASS INDEX: 43.95 KG/M2 | SYSTOLIC BLOOD PRESSURE: 138 MMHG | WEIGHT: 280 LBS | DIASTOLIC BLOOD PRESSURE: 77 MMHG | HEIGHT: 67 IN | HEART RATE: 71 BPM

## 2024-07-10 DIAGNOSIS — G44.52 NEW DAILY PERSISTENT HEADACHE (NDPH): ICD-10-CM

## 2024-07-10 PROCEDURE — 99204 OFFICE O/P NEW MOD 45 MIN: CPT

## 2024-07-10 RX ORDER — ELETRIPTAN HYDROBROMIDE 40 MG/1
40 TABLET, FILM COATED ORAL
Qty: 2 | Refills: 5 | Status: ACTIVE | COMMUNITY
Start: 2024-07-10 | End: 1900-01-01

## 2024-07-10 RX ORDER — NABUMETONE 750 MG/1
750 TABLET, FILM COATED ORAL
Qty: 45 | Refills: 2 | Status: ACTIVE | COMMUNITY
Start: 2024-07-10 | End: 1900-01-01

## 2024-07-12 ENCOUNTER — OUTPATIENT (OUTPATIENT)
Dept: OUTPATIENT SERVICES | Facility: HOSPITAL | Age: 64
LOS: 1 days | End: 2024-07-12
Payer: COMMERCIAL

## 2024-07-12 ENCOUNTER — APPOINTMENT (OUTPATIENT)
Dept: CT IMAGING | Facility: IMAGING CENTER | Age: 64
End: 2024-07-12
Payer: COMMERCIAL

## 2024-07-12 DIAGNOSIS — Z98.890 OTHER SPECIFIED POSTPROCEDURAL STATES: Chronic | ICD-10-CM

## 2024-07-12 DIAGNOSIS — Z90.11 ACQUIRED ABSENCE OF RIGHT BREAST AND NIPPLE: Chronic | ICD-10-CM

## 2024-07-12 DIAGNOSIS — R91.1 SOLITARY PULMONARY NODULE: ICD-10-CM

## 2024-07-12 DIAGNOSIS — Z90.13 ACQUIRED ABSENCE OF BILATERAL BREASTS AND NIPPLES: Chronic | ICD-10-CM

## 2024-07-12 DIAGNOSIS — C50.911 MALIGNANT NEOPLASM OF UNSPECIFIED SITE OF RIGHT FEMALE BREAST: ICD-10-CM

## 2024-07-12 PROCEDURE — 71260 CT THORAX DX C+: CPT

## 2024-07-12 PROCEDURE — 71260 CT THORAX DX C+: CPT | Mod: 26

## 2024-07-17 ENCOUNTER — APPOINTMENT (OUTPATIENT)
Dept: CV DIAGNOSITCS | Facility: HOSPITAL | Age: 64
End: 2024-07-17

## 2024-07-17 ENCOUNTER — RESULT REVIEW (OUTPATIENT)
Age: 64
End: 2024-07-17

## 2024-07-17 ENCOUNTER — OUTPATIENT (OUTPATIENT)
Dept: OUTPATIENT SERVICES | Facility: HOSPITAL | Age: 64
LOS: 1 days | End: 2024-07-17
Payer: COMMERCIAL

## 2024-07-17 ENCOUNTER — NON-APPOINTMENT (OUTPATIENT)
Age: 64
End: 2024-07-17

## 2024-07-17 DIAGNOSIS — Z98.890 OTHER SPECIFIED POSTPROCEDURAL STATES: Chronic | ICD-10-CM

## 2024-07-17 DIAGNOSIS — Z90.11 ACQUIRED ABSENCE OF RIGHT BREAST AND NIPPLE: Chronic | ICD-10-CM

## 2024-07-17 DIAGNOSIS — Z90.13 ACQUIRED ABSENCE OF BILATERAL BREASTS AND NIPPLES: Chronic | ICD-10-CM

## 2024-07-17 DIAGNOSIS — Z91.89 OTHER SPECIFIED PERSONAL RISK FACTORS, NOT ELSEWHERE CLASSIFIED: ICD-10-CM

## 2024-07-17 PROCEDURE — 93356 MYOCRD STRAIN IMG SPCKL TRCK: CPT

## 2024-07-17 PROCEDURE — 93306 TTE W/DOPPLER COMPLETE: CPT

## 2024-07-17 PROCEDURE — 76376 3D RENDER W/INTRP POSTPROCES: CPT

## 2024-07-17 PROCEDURE — 93306 TTE W/DOPPLER COMPLETE: CPT | Mod: 26

## 2024-07-17 PROCEDURE — 76376 3D RENDER W/INTRP POSTPROCES: CPT | Mod: 26

## 2024-07-19 ENCOUNTER — OUTPATIENT (OUTPATIENT)
Dept: OUTPATIENT SERVICES | Facility: HOSPITAL | Age: 64
LOS: 1 days | Discharge: HOME | End: 2024-07-19

## 2024-07-19 DIAGNOSIS — C50.911 MALIGNANT NEOPLASM OF UNSPECIFIED SITE OF RIGHT FEMALE BREAST: ICD-10-CM

## 2024-07-19 DIAGNOSIS — Z90.11 ACQUIRED ABSENCE OF RIGHT BREAST AND NIPPLE: Chronic | ICD-10-CM

## 2024-07-19 DIAGNOSIS — Z98.890 OTHER SPECIFIED POSTPROCEDURAL STATES: Chronic | ICD-10-CM

## 2024-07-19 DIAGNOSIS — Z90.13 ACQUIRED ABSENCE OF BILATERAL BREASTS AND NIPPLES: Chronic | ICD-10-CM

## 2024-07-19 DIAGNOSIS — C77.3 SECONDARY AND UNSPECIFIED MALIGNANT NEOPLASM OF AXILLA AND UPPER LIMB LYMPH NODES: ICD-10-CM

## 2024-07-26 ENCOUNTER — APPOINTMENT (OUTPATIENT)
Dept: INFUSION THERAPY | Facility: HOSPITAL | Age: 64
End: 2024-07-26

## 2024-07-31 ENCOUNTER — APPOINTMENT (OUTPATIENT)
Dept: CARDIOLOGY | Facility: CLINIC | Age: 64
End: 2024-07-31
Payer: COMMERCIAL

## 2024-07-31 ENCOUNTER — NON-APPOINTMENT (OUTPATIENT)
Age: 64
End: 2024-07-31

## 2024-07-31 VITALS
OXYGEN SATURATION: 97 % | HEART RATE: 85 BPM | BODY MASS INDEX: 43.95 KG/M2 | HEIGHT: 67 IN | DIASTOLIC BLOOD PRESSURE: 82 MMHG | SYSTOLIC BLOOD PRESSURE: 127 MMHG | WEIGHT: 280 LBS

## 2024-07-31 DIAGNOSIS — E66.9 OBESITY, UNSPECIFIED: ICD-10-CM

## 2024-07-31 DIAGNOSIS — I10 ESSENTIAL (PRIMARY) HYPERTENSION: ICD-10-CM

## 2024-07-31 DIAGNOSIS — Z92.21 PERSONAL HISTORY OF ANTINEOPLASTIC CHEMOTHERAPY: ICD-10-CM

## 2024-07-31 DIAGNOSIS — G43.909 MIGRAINE, UNSPECIFIED, NOT INTRACTABLE, W/OUT STATUS MIGRAINOSUS: ICD-10-CM

## 2024-07-31 DIAGNOSIS — Z91.89 OTHER SPECIFIED PERSONAL RISK FACTORS, NOT ELSEWHERE CLASSIFIED: ICD-10-CM

## 2024-07-31 DIAGNOSIS — L40.50 ARTHROPATHIC PSORIASIS, UNSPECIFIED: ICD-10-CM

## 2024-07-31 PROCEDURE — 99215 OFFICE O/P EST HI 40 MIN: CPT | Mod: 25

## 2024-07-31 PROCEDURE — 93000 ELECTROCARDIOGRAM COMPLETE: CPT

## 2024-08-04 PROBLEM — G43.909 MIGRAINE HEADACHE: Status: ACTIVE | Noted: 2024-08-04

## 2024-08-04 NOTE — CARDIOLOGY SUMMARY
[de-identified] : 7/31/2024 Sinus rhythm, left atrial enlargement, poor R wave progression, nonspecific @ 85 bpm

## 2024-08-04 NOTE — CARDIOLOGY SUMMARY
[de-identified] : 7/31/2024 Sinus rhythm, left atrial enlargement, poor R wave progression, nonspecific @ 85 bpm

## 2024-08-04 NOTE — HISTORY OF PRESENT ILLNESS
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  PMH: Migraines, back pain and DLD s/p epidural injections, menopausal, anxiety and depression on medication, Obesity BMI 45. CHOL 218 ,  in 2016 She has no prior History of HTN.  Labs:P   Cancer Dx: Left Poorly differentiated ductal Breast cancer ER+, MT- HER+  multifocal with multiple masses and LNs involved, Diagnosed in august 2018.   Under Corey and Dr. Cox she is planned for neoadjuvant chemotherapy  ACTHP.  She presents today for evaluation of HTN and prechemotherapy cardiac risk stratification.  Denies: SOB, LE edema, palpitation, chest pain  Echo 9/11/2018.  EF 63%  Normal left ventricular internal dimensions and wall thicknesses. Normal left ventricular systolic function. No segmental wall motion abnormalities. Normal right ventricular size and function. Global longitudinal strain equals - 21.2%, which is normal.   Oncologist: Dr. Noa Cox PCP: DR. CHRISTINA NELSON  Interval follow up 4/23/19  Echo at plain view with mass on aortic valve- reviewed by me. Not a mass- just calcification of the non cusp. EF reported as 55%   ROS: Denies Chest pain, dyspnea, palpitations, dizziness or syncope. here with    Follow-up December 2, 2020 Doing well offers no complaints.   Interval follow up 12/7/22  Here for routine follow up no complaints today. ROS: Denies Chest pain, dyspnea, palpitations, dizziness or syncope.  Interval Note December 13, 2023  Ms. Harrison is ow 63 years old and returns for a cardiovascular follow up. She carries a medical history as outlined below:  -Hx of left sided breast cancer  -Hx of psoriatic arthritis-> s/p Biologic Stelara; now is planning on being titrated with Simponi starting next week -Hx Migraine headaches  Most recently had a COVID-19 infection in [place] of 2023 Followed by a left ocular periorbital cellulitis-> hospitalized at [facility] and treated with IV and oral antibiotics X 4 days Now patient reports that infection  has resolved.  During active infection, patient had stopped taking Methotrexate. Currently taking Prednisone 10 mg QD  Blood pressure today: 143/ 78 EKG-   Most recent echocardiogram June 23, 2023  Interval Note July 31, 2024  Ms. Harrison is now 64 years old and returns for a cardiovascular follow up. She carries a medical history as outlined below:  -Hx of left sided breast cancer -> started Anastrozole 1 mg QD  in 2019-> plan for 10 years of treatment -Hx of psoriatic arthritis-> Treatment changed to :  Embrel . steroids and methotrexate -Hx Migraine headaches-> followed by Dr. Darien Diamond, neurologist-> surveillance MRI of the head scheduled next month -Hx of hypertension -Hx of obesity-> BMI 43.85  Blood pressure today:  127/ 82 EKG- Sinus rhythm, left atrial enlargement, poor R wave progression, nonspecific @ 85 bpm   Reviewed echocardiogram which was performed on July 17, 2024 LV cavity is normal in size No regional wall motion abnormalities LVEF 54% Global longitudinal strain -23.1%  Patient offers no complaints except knee and joint pain. She is "hopeful" that the new treatment on Embrel will help with her psoriatic arthritis pain. She denies any issues with shortness of breath, chest discomfort or palpitations.

## 2024-08-04 NOTE — ASSESSMENT
[FreeTextEntry1] : Ms. Harrison is now 64 years old and returns for a cardiovascular follow up. She carries a medical history as outlined below:  -Hx of left sided breast cancer -> started Anastrozole 1 mg QD in 2019-> plan for 10 years of treatment -Hx of psoriatic arthritis-> Treatment changed to : Embrel . steroids and methotrexate -Hx Migraine headaches-> followed by Dr. Darien Diamond, neurologist-> surveillance MRI of the head scheduled next month Hx of Hypertension -Hx of obesity-> BMI 43.85  #Hx of breast cancer/ at risk for cardiomyopathy   Reviewed recent echocardiogram performed on Jul 17, 2024   LVEF normal-> 54%   Normal LV global longitudinal strain -23.1%   Excellent heart function   Continuing on Anastrozole 1 mg QD  #Hypertension   Blood pressure is well controlled-> 127/ 82   Continue on Lisinopril 5 mg QD  #Recurrent Migraine Headaches   Continue on meds as per neuro    Butalbital-APAP - 40 one tab PRN QID    Eletriptan Hydrobromide 40 mg as needed    Nabumetone 750 mg BID as needed  #Psorriatic arthritis   Continuing on meds as per Rheumatology    Enbrel 50mg/ML sq once per week    Methotrexate 2.5 mg 8 tabs weekly    Prednisone 5 mg (2) tabs daily  #Morbid Obesity   BMI  43.85   - Encouraged patient to participate in  healthy walking and eating habits, focusing on a Mediterranean style of eating and aiming for the recommended 150 minutes per week of moderate physical activity.  - Encouraged the patient to find healthy outlets and coping mechanisms to help manage stress, such as physical activity/exercise, reducing workload if possible, spending time with family and friends, engaging in an enjoyable hobby, or using meditation or mindfulness techniques.  I spent 45 minutes evaluating patient's history, family medical history and blood pressure management, ordering tests and providing documentation.

## 2024-08-04 NOTE — CARDIOLOGY SUMMARY
[de-identified] : 7/31/2024 Sinus rhythm, left atrial enlargement, poor R wave progression, nonspecific @ 85 bpm

## 2024-08-04 NOTE — HISTORY OF PRESENT ILLNESS
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  PMH: Migraines, back pain and DLD s/p epidural injections, menopausal, anxiety and depression on medication, Obesity BMI 45. CHOL 218 ,  in 2016 She has no prior History of HTN.  Labs:P   Cancer Dx: Left Poorly differentiated ductal Breast cancer ER+, [place]- HER+  multifocal with multiple masses and LNs involved, Diagnosed in august 2018.   Under Corey and Dr. Cox she is planned for neoadjuvant chemotherapy  ACTHP.  She presents today for evaluation of HTN and prechemotherapy cardiac risk stratification.  Denies: SOB, LE edema, palpitation, chest pain  Echo 9/11/2018.  EF 63%  Normal left ventricular internal dimensions and wall thicknesses. Normal left ventricular systolic function. No segmental wall motion abnormalities. Normal right ventricular size and function. Global longitudinal strain equals - 21.2%, which is normal.   Oncologist: Dr. Noa Cox PCP: DR. CHRISTINA NELSON  Interval follow up 4/23/19  Echo at plain view with mass on aortic valve- reviewed by me. Not a mass- just calcification of the non cusp. EF reported as 55%   ROS: Denies Chest pain, dyspnea, palpitations, dizziness or syncope. here with    Follow-up December 2, 2020 Doing well offers no complaints.   Interval follow up 12/7/22  Here for routine follow up no complaints today. ROS: Denies Chest pain, dyspnea, palpitations, dizziness or syncope.  Interval Note December 13, 2023  Ms. Harrison is ow 63 years old and returns for a cardiovascular follow up. She carries a medical history as outlined below:  -Hx of left sided breast cancer  -Hx of psoriatic arthritis-> s/p Biologic Stelara; now is planning on being titrated with Simponi starting next week -Hx Migraine headaches  Most recently had a COVID-19 infection in [place] of 2023 Followed by a left ocular periorbital cellulitis-> hospitalized at [facility] and treated with IV and oral antibiotics X 4 days Now patient reports that infection  has resolved.  During active infection, patient had stopped taking Methotrexate. Currently taking Prednisone 10 mg QD  Blood pressure today: 143/ 78 EKG-   Most recent echocardiogram June 23, 2023  Interval Note July 31, 2024  Ms. Harrison is now 64 years old and returns for a cardiovascular follow up. She carries a medical history as outlined below:  -Hx of left sided breast cancer -> started Anastrozole 1 mg QD  in 2019-> plan for 10 years of treatment -Hx of psoriatic arthritis-> Treatment changed to :  Embrel . steroids and methotrexate -Hx Migraine headaches-> followed by Dr. Darien Diamond, neurologist-> surveillance MRI of the head scheduled next month -Hx of hypertension -Hx of obesity-> BMI 43.85  Blood pressure today:  127/ 82 EKG- Sinus rhythm, left atrial enlargement, poor R wave progression, nonspecific @ 85 bpm   Reviewed echocardiogram which was performed on July 17, 2024 LV cavity is normal in size No regional wall motion abnormalities LVEF 54% Global longitudinal strain -23.1%  Patient offers no complaints except knee and joint pain. She is "hopeful" that the new treatment on Embrel will help with her psoriatic arthritis pain. She denies any issues with shortness of breath, chest discomfort or palpitations.

## 2024-08-04 NOTE — HISTORY OF PRESENT ILLNESS
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  PMH: Migraines, back pain and DLD s/p epidural injections, menopausal, anxiety and depression on medication, Obesity BMI 45. CHOL 218 ,  in 2016 She has no prior History of HTN.  Labs:P   Cancer Dx: Left Poorly differentiated ductal Breast cancer ER+, [place]- HER+  multifocal with multiple masses and LNs involved, Diagnosed in august 2018.   Under Corey and Dr. Cox she is planned for neoadjuvant chemotherapy  ACTHP.  She presents today for evaluation of HTN and prechemotherapy cardiac risk stratification.  Denies: SOB, LE edema, palpitation, chest pain  Echo 9/11/2018.  EF 63%  Normal left ventricular internal dimensions and wall thicknesses. Normal left ventricular systolic function. No segmental wall motion abnormalities. Normal right ventricular size and function. Global longitudinal strain equals - 21.2%, which is normal.   Oncologist: Dr. Noa Cox PCP: DR. CHRISTINA NELSON  Interval follow up 4/23/19  Echo at plain view with mass on aortic valve- reviewed by me. Not a mass- just calcification of the non cusp. EF reported as 55%   ROS: Denies Chest pain, dyspnea, palpitations, dizziness or syncope. here with    Follow-up December 2, 2020 Doing well offers no complaints.   Interval follow up 12/7/22  Here for routine follow up no complaints today. ROS: Denies Chest pain, dyspnea, palpitations, dizziness or syncope.  Interval Note December 13, 2023  Ms. Harrison is ow 63 years old and returns for a cardiovascular follow up. She carries a medical history as outlined below:  -Hx of left sided breast cancer  -Hx of psoriatic arthritis-> s/p Biologic Stelara; now is planning on being titrated with Simponi starting next week -Hx Migraine headaches  Most recently had a COVID-19 infection in [place] of 2023 Followed by a left ocular periorbital cellulitis-> hospitalized at [facility] and treated with IV and oral antibiotics X 4 days Now patient reports that infection  has resolved.  During active infection, patient had stopped taking Methotrexate. Currently taking Prednisone 10 mg QD  Blood pressure today: 143/ 78 EKG-   Most recent echocardiogram June 23, 2023  Interval Note July 31, 2024  Ms. Harrison is now 64 years old and returns for a cardiovascular follow up. She carries a medical history as outlined below:  -Hx of left sided breast cancer -> started Anastrozole 1 mg QD  in 2019-> plan for 10 years of treatment -Hx of psoriatic arthritis-> Treatment changed to :  Embrel . steroids and methotrexate -Hx Migraine headaches-> followed by Dr. Darien Diamond, neurologist-> surveillance MRI of the head scheduled next month -Hx of hypertension -Hx of obesity-> BMI 43.85  Blood pressure today:  127/ 82 EKG- Sinus rhythm, left atrial enlargement, poor R wave progression, nonspecific @ 85 bpm   Reviewed echocardiogram which was performed on July 17, 2024 LV cavity is normal in size No regional wall motion abnormalities LVEF 54% Global longitudinal strain -23.1%  Patient offers no complaints except knee and joint pain. She is "hopeful" that the new treatment on Embrel will help with her psoriatic arthritis pain. She denies any issues with shortness of breath, chest discomfort or palpitations.

## 2024-08-16 ENCOUNTER — APPOINTMENT (OUTPATIENT)
Dept: RHEUMATOLOGY | Facility: CLINIC | Age: 64
End: 2024-08-16
Payer: COMMERCIAL

## 2024-08-16 VITALS
OXYGEN SATURATION: 97 % | WEIGHT: 283 LBS | RESPIRATION RATE: 16 BRPM | BODY MASS INDEX: 44.42 KG/M2 | HEIGHT: 67 IN | TEMPERATURE: 97.6 F | SYSTOLIC BLOOD PRESSURE: 138 MMHG | HEART RATE: 97 BPM | DIASTOLIC BLOOD PRESSURE: 88 MMHG

## 2024-08-16 DIAGNOSIS — M65.9 SYNOVITIS AND TENOSYNOVITIS, UNSPECIFIED: ICD-10-CM

## 2024-08-16 DIAGNOSIS — L40.9 PSORIASIS, UNSPECIFIED: ICD-10-CM

## 2024-08-16 DIAGNOSIS — M62.838 OTHER MUSCLE SPASM: ICD-10-CM

## 2024-08-16 DIAGNOSIS — R79.89 OTHER SPECIFIED ABNORMAL FINDINGS OF BLOOD CHEMISTRY: ICD-10-CM

## 2024-08-16 DIAGNOSIS — M79.641 PAIN IN RIGHT HAND: ICD-10-CM

## 2024-08-16 DIAGNOSIS — L40.50 ARTHROPATHIC PSORIASIS, UNSPECIFIED: ICD-10-CM

## 2024-08-16 DIAGNOSIS — D84.9 IMMUNODEFICIENCY, UNSPECIFIED: ICD-10-CM

## 2024-08-16 DIAGNOSIS — M79.642 PAIN IN RIGHT HAND: ICD-10-CM

## 2024-08-16 DIAGNOSIS — Z79.52 LONG TERM (CURRENT) USE OF SYSTEMIC STEROIDS: ICD-10-CM

## 2024-08-16 PROCEDURE — G2211 COMPLEX E/M VISIT ADD ON: CPT | Mod: NC

## 2024-08-16 PROCEDURE — 99214 OFFICE O/P EST MOD 30 MIN: CPT

## 2024-08-16 RX ORDER — CYCLOBENZAPRINE HYDROCHLORIDE 5 MG/1
5 TABLET, FILM COATED ORAL
Qty: 60 | Refills: 2 | Status: ACTIVE | COMMUNITY
Start: 2024-08-16 | End: 1900-01-01

## 2024-08-16 NOTE — ASSESSMENT
[FreeTextEntry1] : FIDEL THOMAS is a 64 year old woman with PsA -- months of symmetrical small joint stiffness and pain, b/l Achilles tenderness, L knee warmth on exam, psoriatic plaque on R foot and some fissuring in palms which may be psoriasis vs eczema. Marked response to steroids but ongoing activity despite MTX initiation, new spinal sx which appear inflammatory necessitating need for biologic. Biologics trialled - Poonam Perrin Simponi   # PsA and psoriasis -- rash remains active, unclear if joints are inflammatory vs OA mediated  - c/w Enbrel  - c/w prednisone - hasn't been able to taper below 10mg/day as yet, ok to use 15mg sparingly PRN  - c/w MTX 8 tabs. C/w FA 1 mg daily  - recent labs reviewed - TSH low so T3/4 and Abs added on now, repeat routine labs prior to next visit  - will get MRI R hand to eval for subclinical synovitis to determine if we should change biologic. Prefer MRI as better test than XR, but if needs XR first order given  # immunosuppressed status 2/2 steroids and above meds - pt counseled to call if febrile or unwell, counseled to c/w steroids but hold other rheum medication while on Abx  # OA related pain - b/l knees, R thumb  # cervical paraspinal spasm - c/w gel injections PRN with ortho  - Tylenol prn - trial of Flexeril as helped in the past  - c/w low dose Cymbalta for now - if still with moreso OA mediated pain at next visit will consider dose increase  - pt interested in weight loss injections - no rheumatologic contraindication, in fact may help with weight loss and thus help offload joints and decrease OA progression/pain - pt to discuss further with PMD   RTC 3 months

## 2024-08-16 NOTE — PHYSICAL EXAM
[General Appearance - Alert] : alert [General Appearance - In No Acute Distress] : in no acute distress [General Appearance - Well Nourished] : well nourished [Sclera] : the sclera and conjunctiva were normal [PERRL With Normal Accommodation] : pupils were equal in size, round, and reactive to light [Extraocular Movements] : extraocular movements were intact [Outer Ear] : the ears and nose were normal in appearance [Neck Appearance] : the appearance of the neck was normal [] : no respiratory distress [Edema] : there was no peripheral edema [No Spinal Tenderness] : no spinal tenderness [Abnormal Walk] : normal gait [Nail Clubbing] : no clubbing  or cyanosis of the fingernails [Musculoskeletal - Swelling] : no joint swelling seen [Motor Tone] : muscle strength and tone were normal [Skin Color & Pigmentation] : normal skin color and pigmentation [No Focal Deficits] : no focal deficits [Oriented To Time, Place, And Person] : oriented to person, place, and time [Impaired Insight] : insight and judgment were intact [Affect] : the affect was normal [Respiration, Rhythm And Depth] : normal respiratory rhythm and effort [FreeTextEntry1] : Rash on feet, elbows-- scalp worse

## 2024-08-16 NOTE — HISTORY OF PRESENT ILLNESS
[FreeTextEntry1] : FIDEL THOMAS is a 62 year old woman with PsA. Over the past few months she has developed pain and stiffness in --- + b/l hands -- chronic R thumb pain, remainder worsening  + b/l shoulders  + b/l heels, ankles  + L knee > R knee pain  + L sided lumbar radicular sx, chronic LBP with R sided radicular sx prior which has not worsened and ROM intact  + R 1st MTP OA, s/p local injection with benefit  Meloxicam not helping, Advil prn -- some mild GI upset  + gelling  + dry eyes, using AT q12, can't wear contacts  + R eye posterior edema, improved with local atavastin  + psoriasis on R foot, and some worsening of fissuring rashes on hands -- previously dx as eczema  Inflammatory arthritis ROS negative for dactylitis, eye inflammation, inflammatory low back pain, IBD.  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 Some insomnia and sweats with prednisone, otherwise no SE, some GI upset but mild with MTX, otherwise tolerating it well. No infectious sx, rash improved, no extra-articular manifestations.   2/11/22 -- Ongoing improvement in joints but still with activity, L knee posterior fullness and pain is most active. She is amenable to trying to taper steroids regardless as is worried about long term use. No extra-articular manifestations, no infectious sx.   3/25/22 -- Worsening of symmetrical joint pain and warmth, some b/l SI joint and L hip pain with prolonged AM stiffness and improved with exercise and not typical of her DJD related pain. Rash has not emerged but increased sensitivity over sites of prior rash. Ongoing dry eye but no inflammation. No SE with MTX, no infectious sx, above is worse since tapering to prednisone 10mg/day.    6/24/22 -- Recent L knee nondisplaced tibial plateau fracture/ ACL sprain, slowly improving. Improvement in psoriasis, ongoing small and large joint arthralgias tho less overt swelling/warmth. Some positional RUE tingling, mild C spine pain. No CP/SOB, GI/ sx, no infectious sx.   8/26/22 -- Rash is flaring again, returned to b/l feet, now on elbows, on scalp. Ongoing small symmetrical small joint arthralgias with all day stiffness. L knee fx is healed, now getting gel injections in L knee, considering R as well if L goes well. + C spine pain with RUE radiation, positional at present.   11/18/22 -- Recent worsening of arthralgias with weather change, spine is the worst. Areas of rash on R foot that was responsive to Humira has recurred on Stelara, scalp also more itchy. S/p gel injections to b/l knees with improvement. Ongoing, somewhat worse fatigue but still able to do ADLs. Remainder of extra-articular inflammatory ROS negative, no infectious sx.   1/20/23 -- Rash not improved but stabilized, ongoing arthralgias/stiffness but no synovitis, no SE with Stelara. R thumb CMC OA related pain which is making use difficult. Fatigue may be somewhat improved, no extra-articular inflammatory sx.   5/12/23 -- Ongoing issues with ?tooth infection, s/p Abx course with improvement but mild sx have now recurred. No constitutional sx. Did hold meds x 1 week but now back on, remains on prednisone 10mg/day, some arthralgias but no worsening synovitis or rash, no extra-articular inflammatory sx.   7/28/23 -- Pulm nodules, PET negative for malignancy, skipped 1 Stelara dose, prior tooth issue resolved. Some arthralgias in setting of skipping dose but no synovitis, worsening rash, no extra-articular inflammatory sx. Knee pain is most active.   12/1/23 -- L periorbital cellulitis rapidly progressive requiring admission, improved with IV and then PO Abx which she has now completed, optho exam stable since discharge tho still some focal sinus sx, never had fever. Last Stelara dose made her very fatigued and achy, would like to try alternative med. Has skipped MTX since infection. Currently achy all over, tendonitis in L foot which hasn't been responsive to local injections with podiatry. Some areas of rash, no other extra-articular inflammatory sx.   2/2/24 -- Tolerating Simponi with only a little HA, has mildly improved fatigue but joints active tho not worsening except R knee where she has known OA and is due for gel injections with ortho upcoming. Scalp psoriasis has been worse, no other active areas, notes her shampoo does help and recently wasn't using as often 2/2 travel. No infectious sx currently.   5/3/24 -- Joint remains stiff but no synovitis, worsening psoriasis in scalp, increased itchiness in back and neck. Remains on prednisone 10mg/day. Eyes are dry/itchy, taking antihistamines and using TheraTears, Flonase ?helping. No infectious sx.   8/16/24 -- Joints only mildly improved with Enbrel, rash is mildly more active in hairline, no extra-articular inflammatory sx, no infectious sx. Ongoing dry eyes, Flonase more regularly lead to increased HA so using PRN. Will be getting repeat R knee injections with ortho end of this month. Upcoming MRI brain for her ongoing HA. Asking about thyroid fxn as has been very hard to lose weight.

## 2024-08-16 NOTE — REVIEW OF SYSTEMS
[Feeling Poorly] : feeling poorly [Feeling Tired] : feeling tired [Dry Eyes] : dryness of the eyes [Arthralgias] : arthralgias [Joint Pain] : joint pain [Joint Stiffness] : joint stiffness [As Noted in HPI] : as noted in HPI [Difficulty Walking] : difficulty walking [Negative] : Heme/Lymph [Joint Swelling] : no joint swelling [de-identified] : HA

## 2024-08-19 ENCOUNTER — NON-APPOINTMENT (OUTPATIENT)
Age: 64
End: 2024-08-19

## 2024-08-19 DIAGNOSIS — E78.00 PURE HYPERCHOLESTEROLEMIA, UNSPECIFIED: ICD-10-CM

## 2024-08-19 DIAGNOSIS — E66.9 OBESITY, UNSPECIFIED: ICD-10-CM

## 2024-08-19 LAB
T3FREE SERPL-MCNC: 2.72 PG/ML
T4 SERPL-MCNC: 5.2 UG/DL
THYROGLOB AB SERPL-ACNC: <20 IU/ML
THYROPEROXIDASE AB SERPL IA-ACNC: 14.4 IU/ML

## 2024-08-21 ENCOUNTER — OUTPATIENT (OUTPATIENT)
Dept: OUTPATIENT SERVICES | Facility: HOSPITAL | Age: 64
LOS: 1 days | End: 2024-08-21
Payer: COMMERCIAL

## 2024-08-21 ENCOUNTER — APPOINTMENT (OUTPATIENT)
Dept: MRI IMAGING | Facility: IMAGING CENTER | Age: 64
End: 2024-08-21
Payer: COMMERCIAL

## 2024-08-21 DIAGNOSIS — Z98.890 OTHER SPECIFIED POSTPROCEDURAL STATES: Chronic | ICD-10-CM

## 2024-08-21 DIAGNOSIS — Z90.11 ACQUIRED ABSENCE OF RIGHT BREAST AND NIPPLE: Chronic | ICD-10-CM

## 2024-08-21 DIAGNOSIS — G43.009 MIGRAINE WITHOUT AURA, NOT INTRACTABLE, WITHOUT STATUS MIGRAINOSUS: ICD-10-CM

## 2024-08-21 DIAGNOSIS — Z90.13 ACQUIRED ABSENCE OF BILATERAL BREASTS AND NIPPLES: Chronic | ICD-10-CM

## 2024-08-21 DIAGNOSIS — Z00.8 ENCOUNTER FOR OTHER GENERAL EXAMINATION: ICD-10-CM

## 2024-08-21 PROCEDURE — 70551 MRI BRAIN STEM W/O DYE: CPT | Mod: 26

## 2024-08-21 PROCEDURE — 70551 MRI BRAIN STEM W/O DYE: CPT

## 2024-08-28 ENCOUNTER — APPOINTMENT (OUTPATIENT)
Dept: ORTHOPEDIC SURGERY | Facility: CLINIC | Age: 64
End: 2024-08-28
Payer: COMMERCIAL

## 2024-08-28 VITALS — WEIGHT: 285 LBS | BODY MASS INDEX: 44.73 KG/M2 | HEIGHT: 67 IN

## 2024-08-28 DIAGNOSIS — M17.0 BILATERAL PRIMARY OSTEOARTHRITIS OF KNEE: ICD-10-CM

## 2024-08-28 DIAGNOSIS — L40.50 ARTHROPATHIC PSORIASIS, UNSPECIFIED: ICD-10-CM

## 2024-08-28 PROCEDURE — 99214 OFFICE O/P EST MOD 30 MIN: CPT | Mod: 25

## 2024-08-28 PROCEDURE — 20610 DRAIN/INJ JOINT/BURSA W/O US: CPT | Mod: RT

## 2024-08-28 RX ORDER — HYALURONATE SODIUM 20 MG/2 ML
20 SYRINGE (ML) INTRAARTICULAR
Qty: 1 | Refills: 0 | Status: ACTIVE | COMMUNITY
Start: 2024-08-28

## 2024-08-28 NOTE — ADDENDUM
[FreeTextEntry1] : I, HARSHIL BENAVIDEZ, acted solely as a scribe for Dr. Fareed Coates on this date 08/28/2024.  All medical record entries made by the Scribe were at my, Dr. Fareed Coates, direction and personally dictated by me on 08/28/2024. I have reviewed the chart and agree that the record accurately reflects my personal performance of the history, physical exam, assessment and plan. I have also personally directed, reviewed, and agreed with the chart.

## 2024-08-28 NOTE — PHYSICAL EXAM
[de-identified] : Constitutional o Appearance : well-nourished, well developed, alert, in no acute distress  Head and Face o Head :  Inspection : atraumatic, normocephalic o Face :  Inspection : no visible rash or discoloration Respiratory o Respiratory Effort: breathing unlabored  Neurologic o Mental Status Examination :  Orientation : alert and oriented X 3 Psychiatric o Mood and Affect: mood normal, affect appropriate Cardiovascular o Observation/Palpation : - no swelling Lymphatic o Additional Nodes : No palpable lymph nodes present  Lumbosacral Spine o Inspection : no visible rash or discoloration o Palpation : no paraspinal musculature tenderness, right SI joint tenderness o Range of Motion : extension and sidebending cause no discomfort, rotation to the right causes discomfort  o Muscle Strength : paraspinal muscle strength and tone within normal limits o Muscle Tone : paraspinal muscle strength and tone within normal limits o Tests: straight leg test negative and BASIL test negative bilaterally   Right Lower Extremity o Buttock : no tenderness, swelling or deformities  o Right Hip :  Inspection/Palpation : no tenderness, swelling or deformities  Range of Motion : full and painless in all planes, no crepitance  Stability : joint stability intact  Strength : extension, flexion, adduction, abduction, internal rotation and external rotation, 5/5  o Right Knee :  Inspection/Palpation : medial and lateral compartment tenderness to palpation, medial facet tenderness and lateral facet tenderness no swelling, no warmth, valgus deformity , mild warmth    Range of Motion : 0-120 good patellofemoral glide with mild crepitance  Stability : no valgus or varus instability present on provocative testing  Strength : flexion and extension 5/5  Tests and Signs : negative Anterior Drawer, negative Lachman, negative Lien  Left Lower Extremity o Buttock : no tenderness, swelling or deformities  o Left Hip :  Inspection/Palpation : no tenderness, no swelling or deformities  Range of Motion : full and painless in all planes, no crepitance  Stability : joint stability intact  Strength : extension, flexion, adduction, abduction, internal rotation and external rotation, 5/5  o Left Knee :  Inspection/Palpation : medial and lateral compartment tenderness, no swelling, no warmth, no Baker's cyst, valgus deformity   Range of Motion : 0-120, decreased patellofemoral glide with crepitance  Stability : no valgus or varus instability present on provocative testing  Strength : flexion and extension 5/5  Tests and Signs : negative Anterior Drawer, negative Lachman, negative Lien  Gait and Station: Gait: no significant extremity swelling or lymphedema, no foot drop  o Knee injection : Indication- right knee osteoarthritis, Anatomic location- right intra-articular joint space, Spray - area was sterilized with Betadine and alcohol and anesthetized with Ethyl Chloride , needle used-20G, Medications given- 5cc's lidocaine, 0.5cc's kenalog, 0.5 cc's dexamethasone. The patient tolerated the procedure well.

## 2024-08-28 NOTE — DISCUSSION/SUMMARY
[de-identified] : I went over the pathophysiology of the patient's symptoms in great detail with the patient. We discussed the use of ice, Tylenol and anti-inflammatories to relieve pain. I informed the patient they are due for a repeat gel injection any time after 09/27/2024. Viscosupplementation was discussed as a solution to the patient's symptoms, and we are requesting Monovisc for the right knee. The patient elected to receive a cortisone injection into her right knee today, and tolerated it well. I instructed the patient on ROM exercises, and told them to take it easy. The use of ice and rest was reviewed with the patient. The patient may resume activities tomorrow. I stressed the importance of weight loss and its benefits to the patients joints and overall health.   All of their questions were answered. They understand and consent to the plan.   FU after 9/27/2024 for the right knee gel.   The patient is an 62 year old female with bone on bone arthritis of their right knee. Based upon the patient's continued symptoms and failure to respond to conservative treatment I have recommended a total knee replacement for this patient. A long discussion took place with the patient describing what a total joint replacement is and what the procedure would entail. A total knee model, similar to the implant that will be used during the operation was utilized to demonstrate and to discuss the various bearing surfaces of the implants. The hospitalization and post-operative care and rehabilitation were also discussed. The use of perioperative antibiotics and DVT prophylaxis were discussed. The risk, benefits and alternatives to a surgical intervention were discussed at length with the patient. The patient was also advised of risks related to the medical comorbidities and elevated body mass index (BMI). A lengthy discussion took place to review the most common complications including but not limited to: deep vein thrombosis, pulmonary embolus, heart attack, stroke, infection, wound breakdown, numbness, damage to nerves, tendon, muscles, arteries or other blood vessels, death and other possible complications from anesthesia. The patient was told that we will take steps to minimize these risks by using sterile technique, antibiotics and DVT prophylaxis when appropriate and follow the patient postoperatively in the office setting to monitor progress. The possibility of recurrent pain, no improvement in pain and actual worsening of pain were also discussed with the patient.  The discharge plan of care focused on the patient going home following surgery. I encouraged the patient to make the necessary arrangements to have someone stay with them when they are discharged home. Following discharge, a home care nurse will visit the patient. They will open your home care case and request home physical therapy services. Home physical therapy will commence following discharge provided it is appropriate and covered by her health insurance benefit plan.  The risks, benefits and alternative treatment options of total joint replacement were reviewed with the patient at great length. All questions were answered to the satisfaction of the patient. The patient participated in an interactive discussion of the total knee replacement implant planned for their surgery with questions answered. The patient agreed with the treatment plan, and has decided to move forward with the elective total knee replacement as planned.  FIDEL THOMAS was seen face to face and needs a commode for bedside use as the patient has no ability to acces the bathroom in their home. The patient also requires a rolling walker as this is needed for activities of daily living within their home secondary to the diagnosis of osteoarthritis.

## 2024-08-28 NOTE — HISTORY OF PRESENT ILLNESS
[de-identified] : 62 year old female presents for a bilateral knee follow-up. She had a left knee Monovisc injection on 5/8/2024. She had a right knee Monovisc injection on 3/27/2024. She notes the right knee is worse than the left. She denies any swelling or buckling. She had a right knee cortisone injection on 3/13/2024 and reports significant improvement. She denies any swelling or buckling. She is s/p L lateral tibial plateau fracture on 5/14/22. She understands that she will need a knee replacement at some point and needs to lose weight before that. She presents with her . She has a history of psoriatic arthritis is being and is being managed by a Rheumatologist. She notes swelling and warm of her right knee. She notes her right knee has woken her up from sleep. She is interested in a right knee cortisone injection today.   Radiology Results 2/21/2024 o Right Knee : Standing AP, lateral and tunnel views of the knee were obtained and revealed bone on bone in the medial compartment with severe patellofemoral arthritis  o Left Knee : Standing AP, lateral and tunnel views of the knee were obtained and revealed bone on bone in the medial compartment with severe patellofemoral arthritis   Radiology Results from 12/14/2022: o Cervical Spine : AP, lateral, and flexion/extension views were obtained and revealed DDD at C5/C6 and C6/C7 with diffuse facet arthropathy and no evident instability. o Lumbosacral Spine : AP, lateral, and flexion/extension views were obtained and revealed grade 1 spondylolisthesis of L4 on L5 with diffuse facet arthropathy and minimal instability reduced in extension.  o Pelvis : AP pelvis was obtained and revealed mild to moderate degenerative arthritis of both hips and SI joints.  Radiology Results from 8/17/2022: o Left Knee : Standing AP, lateral, and obliques views were obtained and revealed full healing of the lateral tibial plateau fracture. Bone on bone in the lateral compartment with severe patellofemoral arthritis.   Left Knee MRI obtained at [facility] on 6/2/2022 revealed: 1. Nondepressed Schatzker type III fracture of the lateral tibial plateau. 2. Moderate to severe osteoarthritis. 3. Severe degenerative tearing of the medial meniscus posterior horn. 4. Degeneration and fraying of the lateral meniscus. 5. Anterior cruciate ligament rupture that is suspected to be chronic.  Radiology Results from 6/1/2022: o Right Knee : Standing AP, lateral, tunnel, and skyline views of the knee were obtained and reveal severe tricompartmental osteoarthritis.

## 2024-09-05 ENCOUNTER — RX RENEWAL (OUTPATIENT)
Age: 64
End: 2024-09-05

## 2024-09-13 ENCOUNTER — OUTPATIENT (OUTPATIENT)
Dept: OUTPATIENT SERVICES | Facility: HOSPITAL | Age: 64
LOS: 1 days | Discharge: HOME | End: 2024-09-13

## 2024-09-13 DIAGNOSIS — Z98.890 OTHER SPECIFIED POSTPROCEDURAL STATES: Chronic | ICD-10-CM

## 2024-09-13 DIAGNOSIS — C50.911 MALIGNANT NEOPLASM OF UNSPECIFIED SITE OF RIGHT FEMALE BREAST: ICD-10-CM

## 2024-09-13 DIAGNOSIS — C77.3 SECONDARY AND UNSPECIFIED MALIGNANT NEOPLASM OF AXILLA AND UPPER LIMB LYMPH NODES: ICD-10-CM

## 2024-09-13 DIAGNOSIS — Z90.13 ACQUIRED ABSENCE OF BILATERAL BREASTS AND NIPPLES: Chronic | ICD-10-CM

## 2024-09-13 DIAGNOSIS — Z90.11 ACQUIRED ABSENCE OF RIGHT BREAST AND NIPPLE: Chronic | ICD-10-CM

## 2024-09-17 NOTE — ASU PREOP CHECKLIST - SITE MARKED BY SURGEON
Interval History:  Patient with no acute problems.  Transferred out of ICU last evening.  Having smoked urine in urine bag.  Currently on 4 liter/minute supplemental oxygen via nasal cannula.  Awaiting LTAC placement.  Patient's  is present at bedside.  Tolerating diet.  Reporting normal bowel movement.  No abdominal pain present.      Review of Systems   Respiratory:  Negative for shortness of breath.    Cardiovascular:  Negative for chest pain.     Objective:     Vital Signs (Most Recent):  Temp: 98.9 °F (37.2 °C) (09/17/24 0458)  Pulse: 84 (09/17/24 0458)  Resp: 18 (09/17/24 0520)  BP: (!) 169/71 (09/17/24 0458)  SpO2: (!) 94 % (09/17/24 0458) Vital Signs (24h Range):  Temp:  [98 °F (36.7 °C)-98.9 °F (37.2 °C)] 98.9 °F (37.2 °C)  Pulse:  [71-86] 84  Resp:  [18-22] 18  SpO2:  [93 %-97 %] 94 %  BP: (129-172)/(61-73) 169/71     Weight: 124.2 kg (273 lb 13 oz)  Body mass index is 45.56 kg/m².    Intake/Output Summary (Last 24 hours) at 9/17/2024 0713  Last data filed at 9/17/2024 0513  Gross per 24 hour   Intake 1440 ml   Output 1902 ml   Net -462 ml         Physical Exam  Constitutional:       General: She is not in acute distress.     Appearance: She is not ill-appearing.      Comments: Looks uncomfortable   Eyes:      General:         Right eye: No discharge.         Left eye: No discharge.   Neck:      Vascular: No JVD.   Cardiovascular:      Rate and Rhythm: Normal rate and regular rhythm.   Pulmonary:      Effort: Pulmonary effort is normal.      Breath sounds: Normal breath sounds.   Abdominal:      General: Abdomen is flat. Bowel sounds are normal. There is distension (less so today).      Palpations: Abdomen is soft.      Tenderness: There is no abdominal tenderness (lower abdomen).   Musculoskeletal:      Right lower leg: Edema present.      Left lower leg: Edema present.   Skin:     General: Skin is warm and moist.      Findings: No rash.   Neurological:      Mental Status: She is alert and oriented  to person, place, and time.   Psychiatric:         Attention and Perception: Attention normal.         Mood and Affect: Mood and affect normal.         Speech: Speech normal.             Significant Labs:  Lab Results   Component Value Date    WBC 5.21 09/15/2024    HGB 7.4 (L) 09/15/2024    HCT 25.5 (L) 09/15/2024     (H) 09/15/2024     09/15/2024       CMP  Sodium   Date Value Ref Range Status   09/15/2024 145 136 - 145 mmol/L Final     Potassium   Date Value Ref Range Status   09/15/2024 3.9 3.5 - 5.1 mmol/L Final     Chloride   Date Value Ref Range Status   09/15/2024 100 95 - 110 mmol/L Final     CO2   Date Value Ref Range Status   09/15/2024 43 (HH) 23 - 29 mmol/L Final     Comment:     Critical result CO2 43 mmol/L called to and read back by Yi Penn  RN/si at 15-Sep-2024 07:56 [facility].  Result Rechecked       Glucose   Date Value Ref Range Status   09/15/2024 34 (LL) 70 - 110 mg/dL Final     Comment:     Critical result GLU 34 mg/dL called to and read back by Yi Penn  RN/si at 15-Sep-2024 07:56 by [facility].  Result Rechecked       BUN   Date Value Ref Range Status   09/15/2024 5 (L) 8 - 23 mg/dL Final     Creatinine   Date Value Ref Range Status   09/15/2024 0.4 (L) 0.5 - 1.4 mg/dL Final     Calcium   Date Value Ref Range Status   09/15/2024 7.8 (L) 8.7 - 10.5 mg/dL Final     Total Protein   Date Value Ref Range Status   09/12/2024 5.2 (L) 6.0 - 8.4 g/dL Final     Albumin   Date Value Ref Range Status   09/12/2024 2.7 (L) 3.5 - 5.2 g/dL Final     Total Bilirubin   Date Value Ref Range Status   09/12/2024 0.4 0.1 - 1.0 mg/dL Final     Comment:     For infants and newborns, interpretation of results should be based  on gestational age, weight and in agreement with clinical  observations.    Premature Infant recommended reference ranges:  Up to 24 hours.............<8.0 mg/dL  Up to 48 hours............<12.0 mg/dL  3-5 days..................<15.0 mg/dL  6-29  days.................<15.0 mg/dL       Alkaline Phosphatase   Date Value Ref Range Status   09/12/2024 134 55 - 135 U/L Final     AST   Date Value Ref Range Status   09/12/2024 29 10 - 40 U/L Final     ALT   Date Value Ref Range Status   09/12/2024 45 (H) 10 - 44 U/L Final     Anion Gap   Date Value Ref Range Status   09/15/2024 2 (L) 8 - 16 mmol/L Final     eGFR   Date Value Ref Range Status   09/15/2024 >60.0 >60 mL/min/1.73 m^2 Final     Microbiology Results (last 7 days)       ** No results found for the last 168 hours. **          Significant Imaging:   ECHO (8/30/24):    Left Ventricle: The left ventricle is normal in size. Mildly increased wall thickness. There is mild concentric hypertrophy. Normal wall motion. There is normal systolic function with a visually estimated ejection fraction of 60 - 65%. There is normal diastolic function.    Right Ventricle: Normal right ventricular cavity size. Wall thickness is normal. Systolic function is normal.    IVC/SVC: Normal venous pressure at 3 mmHg.    Colonoscopy:   66 F with colonic ileus and ischemic changes of                          the suspected proximal ascending colon and cecum.                          Unable to intubate the cecum due to body habitus,                          poor visualization. Air was suctioned out and                          procedure aborted. Significant improvement in                          distension of abdomen post endoscopy. Will need                          serial abdominal exams and daily KUB. If running                          fever, rising wbc I would have concern for                          gangrenous right colon but as of now the mucosa                          appears to be viable on limited examination.                          Clears okay today.                          Recommend that surgery continue to follow along                          Optimize all electrolytes (K/Mg/Phos)                          - Preparation  of the colon was poor.                          - Dilated in the transverse colon, at the hepatic                          flexure and in the ascending colon.                          - Mucosal ulceration.                          - No specimens collected.     CXR:   Interval intubation with endotracheal tube tip appearing to project at the level of the clavicular heads. Enteric tube courses below the diaphragm, extending beyond the field of view. No interval detrimental change in lung aeration or evidence of new or enlarging pneumothorax relative to examination referenced above.     X-ray left tibia/fibula:  No acute radiographic abnormalities.     CT Head: Normal CT appearance of the brain and calvarium.     CT Chest without contrast:  1. Alveolar infiltrates in the left upper and lower lobes compatible with pneumonia.  Scattered small foci of atelectasis or infiltrates on the right.  Small bilateral pleural effusions.  2. Stable cardiomegaly.  Small to moderate-sized pericardial effusion is increased compared to June 2024.  3. Support devices in place as above.  Additional stable findings as noted.    CXR: No significant interval change.     US Left leg:  Complex mass in the left pretibial subcutaneous fat, presumably reflecting hematoma given clinical history.     CT abdomen and pelvis without contrast:  Gaseous distension of the ascending colon.  The cecum measures 11.4 cm.  There is no small bowel obstruction  Prior cholecystectomy  Tiny pleural effusions and bibasilar atelectasis  Small pericardial effusion  Dependent anasarca    CT abdomen and pelvis without contrast:  1.  Dilated large bowel loops mild colonic ileus with a large volume of retained fecal load within the ascending colon.  2.  Normal size liver with a small volume of surrounding perihepatic fluid.  3.  Pericardial effusion/thickening appears equal in capacity as compared to previous.  4.  Diffuse inflammatory stranding of the subcutaneous  tissues along the lateral chest wall secondary to anasarca with fairly extensive inflammatory thickening and stranding involving the right flank region similar to that from previous.    CT abdomen and pelvis without contrast:  Gaseous distention of the portions of the colon with some liquid stool.  Small-bowel obstruction is not demonstrated.  Left abdomen and pelvic subcutaneous anasarca.  Prior cholecystectomy.  Dense consolidated infiltrate of the right lower lobe consistent with pneumonia with small right pleural effusion and mild atelectasis and trace left pleural effusion.    KUB:  Nonspecific, nonobstructive bowel gas pattern with scattered stool and gas in the large and small bowel.    yes

## 2024-09-25 ENCOUNTER — LABORATORY RESULT (OUTPATIENT)
Age: 64
End: 2024-09-25

## 2024-09-27 ENCOUNTER — APPOINTMENT (OUTPATIENT)
Dept: INFUSION THERAPY | Facility: HOSPITAL | Age: 64
End: 2024-09-27

## 2024-10-04 ENCOUNTER — APPOINTMENT (OUTPATIENT)
Dept: HEMATOLOGY ONCOLOGY | Facility: CLINIC | Age: 64
End: 2024-10-04
Payer: COMMERCIAL

## 2024-10-04 VITALS
DIASTOLIC BLOOD PRESSURE: 83 MMHG | RESPIRATION RATE: 16 BRPM | BODY MASS INDEX: 44.89 KG/M2 | SYSTOLIC BLOOD PRESSURE: 131 MMHG | TEMPERATURE: 97 F | WEIGHT: 286.6 LBS | OXYGEN SATURATION: 97 % | HEART RATE: 98 BPM

## 2024-10-04 DIAGNOSIS — C50.911 MALIGNANT NEOPLASM OF UNSPECIFIED SITE OF RIGHT FEMALE BREAST: ICD-10-CM

## 2024-10-04 DIAGNOSIS — K04.7 PERIAPICAL ABSCESS W/OUT SINUS: ICD-10-CM

## 2024-10-04 DIAGNOSIS — Z79.811 LONG TERM (CURRENT) USE OF AROMATASE INHIBITORS: ICD-10-CM

## 2024-10-04 DIAGNOSIS — E78.00 PURE HYPERCHOLESTEROLEMIA, UNSPECIFIED: ICD-10-CM

## 2024-10-04 DIAGNOSIS — U07.1 COVID-19: ICD-10-CM

## 2024-10-04 DIAGNOSIS — C77.3 MALIGNANT NEOPLASM OF UNSPECIFIED SITE OF RIGHT FEMALE BREAST: ICD-10-CM

## 2024-10-04 PROCEDURE — 99214 OFFICE O/P EST MOD 30 MIN: CPT

## 2024-10-04 PROCEDURE — G2211 COMPLEX E/M VISIT ADD ON: CPT | Mod: NC

## 2024-10-04 RX ORDER — ROSUVASTATIN CALCIUM 5 MG/1
5 TABLET, FILM COATED ORAL
Qty: 30 | Refills: 5 | Status: ACTIVE | COMMUNITY
Start: 2024-10-04 | End: 1900-01-01

## 2024-10-05 NOTE — PHYSICAL EXAM
[Fully active, able to carry on all pre-disease performance without restriction] : Status 0 - Fully active, able to carry on all pre-disease performance without restriction [de-identified] : Pain on palp of lower medial anterior rib; also noted to have reduced range of motion in the right arm [Normal] : full range of motion and no deformities appreciated [de-identified] : infusaport in place in left chest [de-identified] : bilateral reconstructed breast with well healed scars and no discrete palpable masses, nipple reconstruction b/l well healed, no palpable axillary nodes.

## 2024-10-05 NOTE — HISTORY OF PRESENT ILLNESS
[Disease: _____________________] : Disease: [unfilled] [T: ___] : T[unfilled] [N: ___] : N[unfilled] [M: ___] : M[unfilled] [AJCC Stage: ____] : AJCC Stage: [unfilled] [90: Able to carry normal activity; minor signs or symptoms of disease.] : 90: Able to carry normal activity; minor signs or symptoms of disease.  [ECOG Performance Status: 1 - Restricted in physically strenuous activity but ambulatory and able to carry out work of a light or sedentary nature] : Performance Status: 1 - Restricted in physically strenuous activity but ambulatory and able to carry out work of a light or sedentary nature, e.g., light house work, office work [de-identified] : The pt was seen by me (Dr. Ledbetter) for an initial visit 4/5/24 in order to transfer her care to me from that of Dr Jones who left the [facility].  She was initially seen by Dr. Cox and transferred care from Dr. Cox to Dr. Jones in 2021. Her history below is as per review of the AEHR notes and confirmation with the pt.   She noted summer 2018 she felt a mass near her nipple in her R breast. She went to her PMD who ordered a mammogram and sonogram, which revealed a R abnormal hypoechoic mass and abnormal appearing axillary LN.  Image guided biopsy of mass and axilla revealed invasive poorly differentiated ductal carcinoma, +LVI, 1.2cm in specimen, Laura 8/9, DCIS, microcalcifications present, ER >95%, MD negative, HER2+ 3+ IHC, Lymph node biopsy with metastatic carcinoma.  She then saw Dr. Obdulia Camacho (breast surgeon breast surgeon) for evaluation.   8/20/2018 - MRI Breast  showed: Biopsy proven mammary carcinoma in the R retroareolar region 5:00 manifested as multiple irregular masses. The tumor is extensive in nature and spanning an area of over 3 cm with non-mass enhancement extending posteriorly suggesting extensive intraductal component as well. Two other highly suspicious masses noted in the RUOQ at 11:00 measuring >3 cm and at 10:00 measuring 2.6 cm consistent with multicentric disease. Biopsy proven metastatic disease to an axillary LN with additional suspicious LNs (additional with replaced fatty hilum and abnormally enlarged cortices, some have irregular borders, some posterior to pectoralis muscle, also abnormal appearing node in soft tissue lateral to the R chest wall).  Dr. Camacho referred patient for neoadjuvant chemotherapy and the option for mastectomy with possible axillary lymph node dissection pending response to therapy.  She then saw Dr Cox in consultation. She underwent PETCT that found a peripancreatic lymph node that was enlarged and SUV avid, b/l laryngeal FDG avidity (likely due to speaking during exam), and SUV avid breast mass/axilla on R with breast skin noted to be thickened. She underwent EGD/EUS and biopsy of this peripancreatic LN (Dr. Giovanni Felton) which was consistent with benign/reactive lymphatic tissue. She started neoadjuvant chemotherapy with dose-dense AC on 9/26/2018 with dose-dense AC-->THP (weekly taxol 80mg/m2) requiring intermittent holds of taxol due to neuropathy  Post treatment MRI 2/4/19 BIRADS 2, masses and adenopathy resolved. Some residual R breast skin thickening noted.  3/22/19 R mastectomy and ALND: 0/6 LN removed involved, no residual invasive carcinoma or DCIS identified. [id number] ([facility]); Reconstruction with Dr. Antonio with completion of revision in 2021  4/3/2019 Herceptin and Perjeta continued post-operatively every 3 weeks for total of 17 cycles, adjuvant radiation started 5/2019 and completed 6/2019 c/b grade 3 dermatitis now resolved.  LH/FSH/estradiol levels May/June 2019 consistent with menopause and started aromatase inhibitor in 7/2019 without incdident. She started extended adjuvant therapy with neratinib 12/2019 -2/2020 - self discontinued due to toxicity of diarrhea. She continues on anastrazole  Risk assessment: She notes a history of smoking in the distant past. Mother passed away in 70s from metastatic melanoma, and a maternal aunt had breast cancer; no other significant close family history of cancers. She used OCPs in the past for years particularly for endometriosis treatment.  [de-identified] : ER+ME-, HER2+ [de-identified] : 10/4/24 Patient returns today to rule out progression or recurrence of breast cancer and to assess treatment toxicity. She has baseline arthralgias from her psoriatic arthritis with no change from pre AI baseline; has been on anastrozole since  7/2019 with 7-10 years of therapy planned. Having HAs that start at the top of her head and radiates down. Saw a neurologist, having migraines now on nabumetone. Having vasomotor symptoms for years. Intermittently sweating profusely with humid weather. Tried gabapentin 100mg TID but was somnolent even while driving.  Most Recent Imaging ================= CT chest 7/12/24 -  Interval resolution of left apical groundglass pulmonary nodule (likely was inflammatory etiology) compared with 2/14/24 No new, enlarging or suspicious pulmonary nodule.  DEXA 3/23 WNL.

## 2024-10-05 NOTE — PHYSICAL EXAM
[Fully active, able to carry on all pre-disease performance without restriction] : Status 0 - Fully active, able to carry on all pre-disease performance without restriction [de-identified] : Pain on palp of lower medial anterior rib; also noted to have reduced range of motion in the right arm [Normal] : full range of motion and no deformities appreciated [de-identified] : infusaport in place in left chest [de-identified] : bilateral reconstructed breast with well healed scars and no discrete palpable masses, nipple reconstruction b/l well healed, no palpable axillary nodes.

## 2024-10-05 NOTE — ASSESSMENT
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 Also had a trial of neratinib 12/2019-2/2020 but stopped due to severe diarrhea.  - Continue anastrozole for total of 7-10 years ([id number] vs. 2029); given no untoward side effects, would opt to extend to 10 years - Last DEXA in 6/2023 normal; to repeat in 6/2025 - Left apical GGO seen on CT chest 2/14/24 has now resolved. Likely was an inflammatory nodule. - Discussed removal of infusaport which has not happened as of yet since she was supposed to have it removed during the height of the COVID pandemic and it was delayed; will continue to have flushed until it is removed. - Patient had the opportunity to have all their questions answered to their satisfaction - RTC every 6 months or sooner if needed;  Port flushes continue q 6 to 8 weeks.  Patient seen and examined with Dr. Ledbetter.   Tanner Lombardo M.D. Hematology/Oncology Fellow PGY-6 [facility]

## 2024-10-05 NOTE — REVIEW OF SYSTEMS
[Diarrhea: Grade 0] : Diarrhea: Grade 0 [Negative] : Allergic/Immunologic [Hot Flashes] : hot flashes [FreeTextEntry9] : as above [FreeTextEntry2] : Migraines

## 2024-10-05 NOTE — HISTORY OF PRESENT ILLNESS
[Disease: _____________________] : Disease: [unfilled] [T: ___] : T[unfilled] [N: ___] : N[unfilled] [M: ___] : M[unfilled] [AJCC Stage: ____] : AJCC Stage: [unfilled] [90: Able to carry normal activity; minor signs or symptoms of disease.] : 90: Able to carry normal activity; minor signs or symptoms of disease.  [ECOG Performance Status: 1 - Restricted in physically strenuous activity but ambulatory and able to carry out work of a light or sedentary nature] : Performance Status: 1 - Restricted in physically strenuous activity but ambulatory and able to carry out work of a light or sedentary nature, e.g., light house work, office work [de-identified] : The pt was seen by me (Dr. Ledbetter) for an initial visit 4/5/24 in order to transfer her care to me from that of Dr Jones who left the [facility].  She was initially seen by Dr. Cox and transferred care from Dr. Cox to Dr. Jones in 2021. Her history below is as per review of the AEHR notes and confirmation with the pt.   She noted summer 2018 she felt a mass near her nipple in her R breast. She went to her PMD who ordered a mammogram and sonogram, which revealed a R abnormal hypoechoic mass and abnormal appearing axillary LN.  Image guided biopsy of mass and axilla revealed invasive poorly differentiated ductal carcinoma, +LVI, 1.2cm in specimen, Laura 8/9, DCIS, microcalcifications present, ER >95%, MI negative, HER2+ 3+ IHC, Lymph node biopsy with metastatic carcinoma.  She then saw Dr. Obdulia Camacho (breast surgeon breast surgeon) for evaluation.   8/20/2018 - MRI Breast  showed: Biopsy proven mammary carcinoma in the R retroareolar region 5:00 manifested as multiple irregular masses. The tumor is extensive in nature and spanning an area of over 3 cm with non-mass enhancement extending posteriorly suggesting extensive intraductal component as well. Two other highly suspicious masses noted in the RUOQ at 11:00 measuring >3 cm and at 10:00 measuring 2.6 cm consistent with multicentric disease. Biopsy proven metastatic disease to an axillary LN with additional suspicious LNs (additional with replaced fatty hilum and abnormally enlarged cortices, some have irregular borders, some posterior to pectoralis muscle, also abnormal appearing node in soft tissue lateral to the R chest wall).  Dr. Camacho referred patient for neoadjuvant chemotherapy and the option for mastectomy with possible axillary lymph node dissection pending response to therapy.  She then saw Dr Cox in consultation. She underwent PETCT that found a peripancreatic lymph node that was enlarged and SUV avid, b/l laryngeal FDG avidity (likely due to speaking during exam), and SUV avid breast mass/axilla on R with breast skin noted to be thickened. She underwent EGD/EUS and biopsy of this peripancreatic LN (Dr. Giovanni Felton) which was consistent with benign/reactive lymphatic tissue. She started neoadjuvant chemotherapy with dose-dense AC on 9/26/2018 with dose-dense AC-->THP (weekly taxol 80mg/m2) requiring intermittent holds of taxol due to neuropathy  Post treatment MRI 2/4/19 BIRADS 2, masses and adenopathy resolved. Some residual R breast skin thickening noted.  3/22/19 R mastectomy and ALND: 0/6 LN removed involved, no residual invasive carcinoma or DCIS identified. [id number] ([facility]); Reconstruction with Dr. Antonio with completion of revision in 2021  4/3/2019 Herceptin and Perjeta continued post-operatively every 3 weeks for total of 17 cycles, adjuvant radiation started 5/2019 and completed 6/2019 c/b grade 3 dermatitis now resolved.  LH/FSH/estradiol levels May/June 2019 consistent with menopause and started aromatase inhibitor in 7/2019 without incdident. She started extended adjuvant therapy with neratinib 12/2019 -2/2020 - self discontinued due to toxicity of diarrhea. She continues on anastrazole  Risk assessment: She notes a history of smoking in the distant past. Mother passed away in 70s from metastatic melanoma, and a maternal aunt had breast cancer; no other significant close family history of cancers. She used OCPs in the past for years particularly for endometriosis treatment.  [de-identified] : ER+KY-, HER2+ [de-identified] : 10/4/24 Patient returns today to rule out progression or recurrence of breast cancer and to assess treatment toxicity. She has baseline arthralgias from her psoriatic arthritis with no change from pre AI baseline; has been on anastrozole since  7/2019 with 7-10 years of therapy planned. Having HAs that start at the top of her head and radiates down. Saw a neurologist, having migraines now on nabumetone. Having vasomotor symptoms for years. Intermittently sweating profusely with humid weather. Tried gabapentin 100mg TID but was somnolent even while driving.  Most Recent Imaging ================= CT chest 7/12/24 -  Interval resolution of left apical groundglass pulmonary nodule (likely was inflammatory etiology) compared with 2/14/24 No new, enlarging or suspicious pulmonary nodule.  DEXA 3/23 WNL.

## 2024-10-05 NOTE — PHYSICAL EXAM
[Fully active, able to carry on all pre-disease performance without restriction] : Status 0 - Fully active, able to carry on all pre-disease performance without restriction [de-identified] : Pain on palp of lower medial anterior rib; also noted to have reduced range of motion in the right arm [Normal] : full range of motion and no deformities appreciated [de-identified] : infusaport in place in left chest [de-identified] : bilateral reconstructed breast with well healed scars and no discrete palpable masses, nipple reconstruction b/l well healed, no palpable axillary nodes.

## 2024-10-05 NOTE — ASSESSMENT
[FreeTextEntry1] : 65 yo woman diagnosed in 2018 with inflammatory right breast cancer ([id number], ER+TN-Her2+) s/p neoadjuvant chemotherapy with ddAC-THP with no residual disease found at the time of surgery; continued for 52 weeks of trastuzumab/pertuzumab, s/p radiation to the right chest wall, started on endocrine therapy 7/2019. Also had a trial of neratinib 12/2019-2/2020 but stopped due to severe diarrhea.  - Continue anastrozole for total of 7-10 years (5410-2859 vs. 2029); given no untoward side effects, would opt to extend to 10 years - Last DEXA in 6/2023 normal; to repeat in 6/2025 - Left apical GGO seen on CT chest 2/14/24 has now resolved. Likely was an inflammatory nodule. - Discussed removal of infusaport which has not happened as of yet since she was supposed to have it removed during the height of the COVID pandemic and it was delayed; will continue to have flushed until it is removed. - Patient had the opportunity to have all their questions answered to their satisfaction - RTC every 6 months or sooner if needed;  Port flushes continue q 6 to 8 weeks.  Patient seen and examined with Dr. Ledbetter.   Tanner Lombardo M.D. Hematology/Oncology Fellow PGY-6 [facility]

## 2024-10-05 NOTE — HISTORY OF PRESENT ILLNESS
[Disease: _____________________] : Disease: [unfilled] [T: ___] : T[unfilled] [N: ___] : N[unfilled] [M: ___] : M[unfilled] [AJCC Stage: ____] : AJCC Stage: [unfilled] [ECOG Performance Status: 1 - Restricted in physically strenuous activity but ambulatory and able to carry out work of a light or sedentary nature] : Performance Status: 1 - Restricted in physically strenuous activity but ambulatory and able to carry out work of a light or sedentary nature, e.g., light house work, office work [90: Able to carry normal activity; minor signs or symptoms of disease.] : 90: Able to carry normal activity; minor signs or symptoms of disease.  [de-identified] : The pt was seen by me (Dr. Ledbetter) for an initial visit 4/5/24 in order to transfer her care to me from that of Dr Jones who left the [facility].  She was initially seen by Dr. Cox and transferred care from Dr. Cox to Dr. Jones in 2021. Her history below is as per review of the AEHR notes and confirmation with the pt.   She noted summer 2018 she felt a mass near her nipple in her R breast. She went to her PMD who ordered a mammogram and sonogram, which revealed a R abnormal hypoechoic mass and abnormal appearing axillary LN.  Image guided biopsy of mass and axilla revealed invasive poorly differentiated ductal carcinoma, +LVI, 1.2cm in specimen, Laura 8/9, DCIS, microcalcifications present, ER >95%, SD negative, HER2+ 3+ IHC, Lymph node biopsy with metastatic carcinoma.  She then saw Dr. Obdulia Camacho (breast surgeon breast surgeon) for evaluation.   8/20/2018 - MRI Breast  showed: Biopsy proven mammary carcinoma in the R retroareolar region 5:00 manifested as multiple irregular masses. The tumor is extensive in nature and spanning an area of over 3 cm with non-mass enhancement extending posteriorly suggesting extensive intraductal component as well. Two other highly suspicious masses noted in the RUOQ at 11:00 measuring >3 cm and at 10:00 measuring 2.6 cm consistent with multicentric disease. Biopsy proven metastatic disease to an axillary LN with additional suspicious LNs (additional with replaced fatty hilum and abnormally enlarged cortices, some have irregular borders, some posterior to pectoralis muscle, also abnormal appearing node in soft tissue lateral to the R chest wall).  Dr. Camacho referred patient for neoadjuvant chemotherapy and the option for mastectomy with possible axillary lymph node dissection pending response to therapy.  She then saw Dr Cox in consultation. She underwent PETCT that found a peripancreatic lymph node that was enlarged and SUV avid, b/l laryngeal FDG avidity (likely due to speaking during exam), and SUV avid breast mass/axilla on R with breast skin noted to be thickened. She underwent EGD/EUS and biopsy of this peripancreatic LN (Dr. Giovanni Felton) which was consistent with benign/reactive lymphatic tissue. She started neoadjuvant chemotherapy with dose-dense AC on 9/26/2018 with dose-dense AC-->THP (weekly taxol 80mg/m2) requiring intermittent holds of taxol due to neuropathy  Post treatment MRI 2/4/19 BIRADS 2, masses and adenopathy resolved. Some residual R breast skin thickening noted.  3/22/19 R mastectomy and ALND: 0/6 LN removed involved, no residual invasive carcinoma or DCIS identified. [id number] ([facility]); Reconstruction with Dr. Antonio with completion of revision in 2021  4/3/2019 Herceptin and Perjeta continued post-operatively every 3 weeks for total of 17 cycles, adjuvant radiation started 5/2019 and completed 6/2019 c/b grade 3 dermatitis now resolved.  LH/FSH/estradiol levels May/June 2019 consistent with menopause and started aromatase inhibitor in 7/2019 without incdident. She started extended adjuvant therapy with neratinib 12/2019 -2/2020 - self discontinued due to toxicity of diarrhea. She continues on anastrazole  Risk assessment: She notes a history of smoking in the distant past. Mother passed away in 70s from metastatic melanoma, and a maternal aunt had breast cancer; no other significant close family history of cancers. She used OCPs in the past for years particularly for endometriosis treatment.  [de-identified] : ER+KY-, HER2+ [de-identified] : 10/4/24 Patient returns today to rule out progression or recurrence of breast cancer and to assess treatment toxicity. She has baseline arthralgias from her psoriatic arthritis with no change from pre AI baseline; has been on anastrozole since  7/2019 with 7-10 years of therapy planned. Having HAs that start at the top of her head and radiates down. Saw a neurologist, having migraines now on nabumetone. Having vasomotor symptoms for years. Intermittently sweating profusely with humid weather. Tried gabapentin 100mg TID but was somnolent even while driving.  Most Recent Imaging ================= CT chest 7/12/24 -  Interval resolution of left apical groundglass pulmonary nodule (likely was inflammatory etiology) compared with 2/14/24 No new, enlarging or suspicious pulmonary nodule.  DEXA 3/23 WNL.

## 2024-10-05 NOTE — ASSESSMENT
[FreeTextEntry1] : 65 yo woman diagnosed in 2018 with inflammatory right breast cancer ([id number], ER+HI-Her2+) s/p neoadjuvant chemotherapy with ddAC-THP with no residual disease found at the time of surgery; continued for 52 weeks of trastuzumab/pertuzumab, s/p radiation to the right chest wall, started on endocrine therapy 7/2019. Also had a trial of neratinib 12/2019-2/2020 but stopped due to severe diarrhea.  - Continue anastrozole for total of 7-10 years (1061-2417 vs. 2029); given no untoward side effects, would opt to extend to 10 years - Last DEXA in 6/2023 normal; to repeat in 6/2025 - Left apical GGO seen on CT chest 2/14/24 has now resolved. Likely was an inflammatory nodule. - Discussed removal of infusaport which has not happened as of yet since she was supposed to have it removed during the height of the COVID pandemic and it was delayed; will continue to have flushed until it is removed. - Patient had the opportunity to have all their questions answered to their satisfaction - RTC every 6 months or sooner if needed;  Port flushes continue q 6 to 8 weeks.  Patient seen and examined with Dr. Ledbetter.   Tanner Lombardo M.D. Hematology/Oncology Fellow PGY-6 [facility]

## 2024-10-11 ENCOUNTER — APPOINTMENT (OUTPATIENT)
Dept: PAIN MANAGEMENT | Facility: CLINIC | Age: 64
End: 2024-10-11

## 2024-10-17 ENCOUNTER — APPOINTMENT (OUTPATIENT)
Dept: PAIN MANAGEMENT | Facility: CLINIC | Age: 64
End: 2024-10-17
Payer: COMMERCIAL

## 2024-10-17 VITALS
DIASTOLIC BLOOD PRESSURE: 87 MMHG | WEIGHT: 280 LBS | BODY MASS INDEX: 43.95 KG/M2 | HEIGHT: 67 IN | HEART RATE: 94 BPM | SYSTOLIC BLOOD PRESSURE: 147 MMHG

## 2024-10-17 PROCEDURE — 99213 OFFICE O/P EST LOW 20 MIN: CPT

## 2024-10-17 RX ORDER — RIZATRIPTAN BENZOATE 10 MG/1
10 TABLET ORAL
Qty: 18 | Refills: 3 | Status: ACTIVE | COMMUNITY
Start: 2024-10-17 | End: 1900-01-01

## 2024-10-28 ENCOUNTER — RX RENEWAL (OUTPATIENT)
Age: 64
End: 2024-10-28

## 2024-10-30 ENCOUNTER — RX CHANGE (OUTPATIENT)
Age: 64
End: 2024-10-30

## 2024-10-30 RX ORDER — ROSUVASTATIN CALCIUM 5 MG/1
5 TABLET, FILM COATED ORAL
Qty: 90 | Refills: 2 | Status: ACTIVE | COMMUNITY
Start: 1900-01-01 | End: 1900-01-01

## 2024-11-06 ENCOUNTER — APPOINTMENT (OUTPATIENT)
Dept: ORTHOPEDIC SURGERY | Facility: CLINIC | Age: 64
End: 2024-11-06
Payer: COMMERCIAL

## 2024-11-06 VITALS — HEIGHT: 67 IN | BODY MASS INDEX: 43.95 KG/M2 | WEIGHT: 280 LBS

## 2024-11-06 DIAGNOSIS — M47.812 SPONDYLOSIS W/OUT MYELOPATHY OR RADICULOPATHY, CERVICAL REGION: ICD-10-CM

## 2024-11-06 DIAGNOSIS — M19.019 PRIMARY OSTEOARTHRITIS, UNSPECIFIED SHOULDER: ICD-10-CM

## 2024-11-06 DIAGNOSIS — M62.838 OTHER MUSCLE SPASM: ICD-10-CM

## 2024-11-06 PROCEDURE — 73030 X-RAY EXAM OF SHOULDER: CPT | Mod: LT

## 2024-11-06 PROCEDURE — 99214 OFFICE O/P EST MOD 30 MIN: CPT | Mod: 25

## 2024-11-06 PROCEDURE — 72040 X-RAY EXAM NECK SPINE 2-3 VW: CPT

## 2024-11-06 PROCEDURE — 20610 DRAIN/INJ JOINT/BURSA W/O US: CPT | Mod: RT

## 2024-11-11 ENCOUNTER — OUTPATIENT (OUTPATIENT)
Dept: OUTPATIENT SERVICES | Facility: HOSPITAL | Age: 64
LOS: 1 days | Discharge: HOME | End: 2024-11-11

## 2024-11-11 DIAGNOSIS — Z98.890 OTHER SPECIFIED POSTPROCEDURAL STATES: Chronic | ICD-10-CM

## 2024-11-11 DIAGNOSIS — C77.3 SECONDARY AND UNSPECIFIED MALIGNANT NEOPLASM OF AXILLA AND UPPER LIMB LYMPH NODES: ICD-10-CM

## 2024-11-11 DIAGNOSIS — Z90.13 ACQUIRED ABSENCE OF BILATERAL BREASTS AND NIPPLES: Chronic | ICD-10-CM

## 2024-11-11 DIAGNOSIS — Z90.11 ACQUIRED ABSENCE OF RIGHT BREAST AND NIPPLE: Chronic | ICD-10-CM

## 2024-11-11 DIAGNOSIS — C50.911 MALIGNANT NEOPLASM OF UNSPECIFIED SITE OF RIGHT FEMALE BREAST: ICD-10-CM

## 2024-11-13 ENCOUNTER — APPOINTMENT (OUTPATIENT)
Dept: ORTHOPEDIC SURGERY | Facility: CLINIC | Age: 64
End: 2024-11-13
Payer: COMMERCIAL

## 2024-11-13 VITALS — HEIGHT: 67 IN | WEIGHT: 280 LBS | BODY MASS INDEX: 43.95 KG/M2

## 2024-11-13 PROCEDURE — 20610 DRAIN/INJ JOINT/BURSA W/O US: CPT | Mod: 50

## 2024-11-15 ENCOUNTER — APPOINTMENT (OUTPATIENT)
Dept: GERIATRICS | Facility: CLINIC | Age: 64
End: 2024-11-15
Payer: COMMERCIAL

## 2024-11-15 ENCOUNTER — APPOINTMENT (OUTPATIENT)
Dept: INFUSION THERAPY | Facility: HOSPITAL | Age: 64
End: 2024-11-15

## 2024-11-15 VITALS
WEIGHT: 288.59 LBS | BODY MASS INDEX: 45.2 KG/M2 | OXYGEN SATURATION: 98 % | DIASTOLIC BLOOD PRESSURE: 73 MMHG | HEART RATE: 84 BPM | RESPIRATION RATE: 16 BRPM | TEMPERATURE: 97.6 F | SYSTOLIC BLOOD PRESSURE: 140 MMHG

## 2024-11-15 DIAGNOSIS — M19.90 UNSPECIFIED OSTEOARTHRITIS, UNSPECIFIED SITE: ICD-10-CM

## 2024-11-15 PROCEDURE — 99213 OFFICE O/P EST LOW 20 MIN: CPT

## 2024-11-20 ENCOUNTER — APPOINTMENT (OUTPATIENT)
Dept: ORTHOPEDIC SURGERY | Facility: CLINIC | Age: 64
End: 2024-11-20
Payer: COMMERCIAL

## 2024-11-20 DIAGNOSIS — M17.0 BILATERAL PRIMARY OSTEOARTHRITIS OF KNEE: ICD-10-CM

## 2024-11-20 DIAGNOSIS — E66.9 OBESITY, UNSPECIFIED: ICD-10-CM

## 2024-11-20 PROCEDURE — 20610 DRAIN/INJ JOINT/BURSA W/O US: CPT | Mod: 50

## 2024-11-22 ENCOUNTER — APPOINTMENT (OUTPATIENT)
Dept: RHEUMATOLOGY | Facility: CLINIC | Age: 64
End: 2024-11-22

## 2024-11-22 VITALS
TEMPERATURE: 97.6 F | DIASTOLIC BLOOD PRESSURE: 88 MMHG | WEIGHT: 286 LBS | HEIGHT: 67 IN | HEART RATE: 92 BPM | OXYGEN SATURATION: 96 % | SYSTOLIC BLOOD PRESSURE: 142 MMHG | BODY MASS INDEX: 44.89 KG/M2 | RESPIRATION RATE: 18 BRPM

## 2024-11-22 DIAGNOSIS — M25.50 PAIN IN UNSPECIFIED JOINT: ICD-10-CM

## 2024-11-22 DIAGNOSIS — D84.9 IMMUNODEFICIENCY, UNSPECIFIED: ICD-10-CM

## 2024-11-22 DIAGNOSIS — L40.9 PSORIASIS, UNSPECIFIED: ICD-10-CM

## 2024-11-22 DIAGNOSIS — L40.50 ARTHROPATHIC PSORIASIS, UNSPECIFIED: ICD-10-CM

## 2024-11-22 PROCEDURE — 99214 OFFICE O/P EST MOD 30 MIN: CPT

## 2024-11-22 PROCEDURE — G2211 COMPLEX E/M VISIT ADD ON: CPT | Mod: NC

## 2024-12-11 ENCOUNTER — APPOINTMENT (OUTPATIENT)
Dept: ORTHOPEDIC SURGERY | Facility: CLINIC | Age: 64
End: 2024-12-11
Payer: COMMERCIAL

## 2024-12-11 DIAGNOSIS — M17.0 BILATERAL PRIMARY OSTEOARTHRITIS OF KNEE: ICD-10-CM

## 2024-12-11 PROCEDURE — 20610 DRAIN/INJ JOINT/BURSA W/O US: CPT | Mod: LT

## 2024-12-20 ENCOUNTER — APPOINTMENT (OUTPATIENT)
Dept: INFUSION THERAPY | Facility: HOSPITAL | Age: 64
End: 2024-12-20

## 2024-12-30 NOTE — H&P PST ADULT - PAIN SCALE PREFERRED, PROFILE
Last seen: 2/20/2024  Follow up appointment: 2/25/2025  Last filled:     Disp Refills Start End     triamcinolone (ARISTOCORT) 0.1 % cream 60 g 0 2/20/2024 5/20/2024    Sig: Apply to affected areas arm twice daily for 2 weeks          Okay to refill?   Please advise.   
numerical 0-10

## 2025-01-22 ENCOUNTER — LABORATORY RESULT (OUTPATIENT)
Age: 65
End: 2025-01-22

## 2025-01-22 ENCOUNTER — APPOINTMENT (OUTPATIENT)
Dept: FAMILY MEDICINE | Facility: CLINIC | Age: 65
End: 2025-01-22
Payer: COMMERCIAL

## 2025-01-22 ENCOUNTER — APPOINTMENT (OUTPATIENT)
Dept: ORTHOPEDIC SURGERY | Facility: CLINIC | Age: 65
End: 2025-01-22
Payer: COMMERCIAL

## 2025-01-22 VITALS
BODY MASS INDEX: 44.57 KG/M2 | OXYGEN SATURATION: 97 % | WEIGHT: 284 LBS | DIASTOLIC BLOOD PRESSURE: 83 MMHG | RESPIRATION RATE: 18 BRPM | HEIGHT: 67 IN | SYSTOLIC BLOOD PRESSURE: 157 MMHG | HEART RATE: 100 BPM | TEMPERATURE: 97.9 F

## 2025-01-22 VITALS — HEIGHT: 67 IN | BODY MASS INDEX: 44.89 KG/M2 | WEIGHT: 286 LBS

## 2025-01-22 DIAGNOSIS — E66.9 OBESITY, UNSPECIFIED: ICD-10-CM

## 2025-01-22 DIAGNOSIS — N30.01 ACUTE CYSTITIS WITH HEMATURIA: ICD-10-CM

## 2025-01-22 DIAGNOSIS — M17.0 BILATERAL PRIMARY OSTEOARTHRITIS OF KNEE: ICD-10-CM

## 2025-01-22 DIAGNOSIS — L40.50 ARTHROPATHIC PSORIASIS, UNSPECIFIED: ICD-10-CM

## 2025-01-22 PROCEDURE — 73564 X-RAY EXAM KNEE 4 OR MORE: CPT | Mod: 50

## 2025-01-22 PROCEDURE — 99214 OFFICE O/P EST MOD 30 MIN: CPT

## 2025-01-22 PROCEDURE — 99213 OFFICE O/P EST LOW 20 MIN: CPT

## 2025-01-22 RX ORDER — NITROFURANTOIN (MONOHYDRATE/MACROCRYSTALS) 25; 75 MG/1; MG/1
100 CAPSULE ORAL
Qty: 14 | Refills: 0 | Status: ACTIVE | COMMUNITY
Start: 2025-01-22 | End: 1900-01-01

## 2025-01-24 ENCOUNTER — APPOINTMENT (OUTPATIENT)
Dept: PAIN MANAGEMENT | Facility: CLINIC | Age: 65
End: 2025-01-24
Payer: COMMERCIAL

## 2025-01-24 VITALS
HEIGHT: 67 IN | BODY MASS INDEX: 44.89 KG/M2 | SYSTOLIC BLOOD PRESSURE: 134 MMHG | HEART RATE: 89 BPM | WEIGHT: 286 LBS | DIASTOLIC BLOOD PRESSURE: 81 MMHG

## 2025-01-24 LAB
APPEARANCE: ABNORMAL
BILIRUBIN URINE: NEGATIVE
BLOOD URINE: ABNORMAL
COLOR: NORMAL
GLUCOSE QUALITATIVE U: NEGATIVE MG/DL
KETONES URINE: NEGATIVE MG/DL
LEUKOCYTE ESTERASE URINE: ABNORMAL
NITRITE URINE: NEGATIVE
PH URINE: 7.5
PROTEIN URINE: 30 MG/DL
SPECIFIC GRAVITY URINE: 1.02
UROBILINOGEN URINE: 0.2 MG/DL

## 2025-01-24 PROCEDURE — 99213 OFFICE O/P EST LOW 20 MIN: CPT

## 2025-01-29 ENCOUNTER — APPOINTMENT (OUTPATIENT)
Dept: MRI IMAGING | Facility: IMAGING CENTER | Age: 65
End: 2025-01-29
Payer: COMMERCIAL

## 2025-01-29 ENCOUNTER — OUTPATIENT (OUTPATIENT)
Dept: OUTPATIENT SERVICES | Facility: HOSPITAL | Age: 65
LOS: 1 days | Discharge: HOME | End: 2025-01-29

## 2025-01-29 ENCOUNTER — OUTPATIENT (OUTPATIENT)
Dept: OUTPATIENT SERVICES | Facility: HOSPITAL | Age: 65
LOS: 1 days | End: 2025-01-29
Payer: COMMERCIAL

## 2025-01-29 DIAGNOSIS — C50.911 MALIGNANT NEOPLASM OF UNSPECIFIED SITE OF RIGHT FEMALE BREAST: ICD-10-CM

## 2025-01-29 DIAGNOSIS — Z90.11 ACQUIRED ABSENCE OF RIGHT BREAST AND NIPPLE: Chronic | ICD-10-CM

## 2025-01-29 DIAGNOSIS — Z90.13 ACQUIRED ABSENCE OF BILATERAL BREASTS AND NIPPLES: Chronic | ICD-10-CM

## 2025-01-29 DIAGNOSIS — Z98.890 OTHER SPECIFIED POSTPROCEDURAL STATES: Chronic | ICD-10-CM

## 2025-01-29 DIAGNOSIS — Z00.8 ENCOUNTER FOR OTHER GENERAL EXAMINATION: ICD-10-CM

## 2025-01-29 DIAGNOSIS — C77.3 SECONDARY AND UNSPECIFIED MALIGNANT NEOPLASM OF AXILLA AND UPPER LIMB LYMPH NODES: ICD-10-CM

## 2025-01-29 PROCEDURE — 73721 MRI JNT OF LWR EXTRE W/O DYE: CPT

## 2025-01-29 PROCEDURE — 73721 MRI JNT OF LWR EXTRE W/O DYE: CPT | Mod: 26,RT

## 2025-01-31 ENCOUNTER — APPOINTMENT (OUTPATIENT)
Dept: INFUSION THERAPY | Facility: HOSPITAL | Age: 65
End: 2025-01-31

## 2025-02-05 ENCOUNTER — APPOINTMENT (OUTPATIENT)
Dept: CARDIOLOGY | Facility: CLINIC | Age: 65
End: 2025-02-05
Payer: COMMERCIAL

## 2025-02-05 VITALS
HEIGHT: 67 IN | BODY MASS INDEX: 44.89 KG/M2 | OXYGEN SATURATION: 95 % | SYSTOLIC BLOOD PRESSURE: 132 MMHG | WEIGHT: 286 LBS | DIASTOLIC BLOOD PRESSURE: 83 MMHG | HEART RATE: 92 BPM

## 2025-02-05 DIAGNOSIS — E78.00 PURE HYPERCHOLESTEROLEMIA, UNSPECIFIED: ICD-10-CM

## 2025-02-05 DIAGNOSIS — I10 ESSENTIAL (PRIMARY) HYPERTENSION: ICD-10-CM

## 2025-02-05 DIAGNOSIS — Z91.89 OTHER SPECIFIED PERSONAL RISK FACTORS, NOT ELSEWHERE CLASSIFIED: ICD-10-CM

## 2025-02-05 PROCEDURE — 99215 OFFICE O/P EST HI 40 MIN: CPT | Mod: 25

## 2025-02-05 PROCEDURE — 93000 ELECTROCARDIOGRAM COMPLETE: CPT

## 2025-02-07 LAB
CHOLEST SERPL-MCNC: 169 MG/DL
HDLC SERPL-MCNC: 64 MG/DL
LDLC SERPL CALC-MCNC: 87 MG/DL
NONHDLC SERPL-MCNC: 104 MG/DL
TRIGL SERPL-MCNC: 95 MG/DL

## 2025-02-11 ENCOUNTER — NON-APPOINTMENT (OUTPATIENT)
Age: 65
End: 2025-02-11

## 2025-02-11 DIAGNOSIS — R30.0 DYSURIA: ICD-10-CM

## 2025-02-11 RX ORDER — CEPHALEXIN 500 MG/1
500 CAPSULE ORAL
Qty: 21 | Refills: 0 | Status: COMPLETED | COMMUNITY
Start: 2025-02-11 | End: 2025-02-18

## 2025-02-13 ENCOUNTER — RESULT REVIEW (OUTPATIENT)
Age: 65
End: 2025-02-13

## 2025-02-13 LAB — BACTERIA UR CULT: NORMAL

## 2025-02-15 ENCOUNTER — NON-APPOINTMENT (OUTPATIENT)
Age: 65
End: 2025-02-15

## 2025-02-21 ENCOUNTER — APPOINTMENT (OUTPATIENT)
Dept: RHEUMATOLOGY | Facility: CLINIC | Age: 65
End: 2025-02-21
Payer: COMMERCIAL

## 2025-02-21 VITALS
TEMPERATURE: 97.4 F | BODY MASS INDEX: 44.79 KG/M2 | WEIGHT: 286 LBS | DIASTOLIC BLOOD PRESSURE: 82 MMHG | RESPIRATION RATE: 22 BRPM | OXYGEN SATURATION: 97 % | SYSTOLIC BLOOD PRESSURE: 136 MMHG | HEART RATE: 110 BPM

## 2025-02-21 DIAGNOSIS — L40.9 PSORIASIS, UNSPECIFIED: ICD-10-CM

## 2025-02-21 DIAGNOSIS — Z79.52 LONG TERM (CURRENT) USE OF SYSTEMIC STEROIDS: ICD-10-CM

## 2025-02-21 DIAGNOSIS — M17.0 BILATERAL PRIMARY OSTEOARTHRITIS OF KNEE: ICD-10-CM

## 2025-02-21 DIAGNOSIS — D84.9 IMMUNODEFICIENCY, UNSPECIFIED: ICD-10-CM

## 2025-02-21 DIAGNOSIS — L40.50 ARTHROPATHIC PSORIASIS, UNSPECIFIED: ICD-10-CM

## 2025-02-21 PROCEDURE — G2211 COMPLEX E/M VISIT ADD ON: CPT | Mod: NC

## 2025-02-21 PROCEDURE — 99214 OFFICE O/P EST MOD 30 MIN: CPT

## 2025-03-14 ENCOUNTER — APPOINTMENT (OUTPATIENT)
Dept: INFUSION THERAPY | Facility: HOSPITAL | Age: 65
End: 2025-03-14

## 2025-03-17 ENCOUNTER — APPOINTMENT (OUTPATIENT)
Dept: ORTHOPEDIC SURGERY | Facility: CLINIC | Age: 65
End: 2025-03-17
Payer: COMMERCIAL

## 2025-03-17 VITALS — WEIGHT: 288 LBS | HEIGHT: 67 IN | BODY MASS INDEX: 45.2 KG/M2

## 2025-03-17 DIAGNOSIS — M70.71 OTHER BURSITIS OF HIP, RIGHT HIP: ICD-10-CM

## 2025-03-17 PROCEDURE — 73502 X-RAY EXAM HIP UNI 2-3 VIEWS: CPT

## 2025-03-17 PROCEDURE — 99204 OFFICE O/P NEW MOD 45 MIN: CPT

## 2025-03-19 ENCOUNTER — OUTPATIENT (OUTPATIENT)
Dept: OUTPATIENT SERVICES | Facility: HOSPITAL | Age: 65
LOS: 1 days | End: 2025-03-19
Payer: COMMERCIAL

## 2025-03-19 ENCOUNTER — APPOINTMENT (OUTPATIENT)
Dept: MRI IMAGING | Facility: IMAGING CENTER | Age: 65
End: 2025-03-19
Payer: COMMERCIAL

## 2025-03-19 DIAGNOSIS — Z90.11 ACQUIRED ABSENCE OF RIGHT BREAST AND NIPPLE: Chronic | ICD-10-CM

## 2025-03-19 DIAGNOSIS — Z00.8 ENCOUNTER FOR OTHER GENERAL EXAMINATION: ICD-10-CM

## 2025-03-19 DIAGNOSIS — Z98.890 OTHER SPECIFIED POSTPROCEDURAL STATES: Chronic | ICD-10-CM

## 2025-03-19 DIAGNOSIS — Z90.13 ACQUIRED ABSENCE OF BILATERAL BREASTS AND NIPPLES: Chronic | ICD-10-CM

## 2025-03-19 PROCEDURE — 73721 MRI JNT OF LWR EXTRE W/O DYE: CPT | Mod: 26,RT

## 2025-03-19 PROCEDURE — 73721 MRI JNT OF LWR EXTRE W/O DYE: CPT

## 2025-03-24 DIAGNOSIS — M67.959 UNSPECIFIED DISORDER OF SYNOVIUM AND TENDON, UNSPECIFIED THIGH: ICD-10-CM

## 2025-03-26 ENCOUNTER — APPOINTMENT (OUTPATIENT)
Dept: ORTHOPEDIC SURGERY | Facility: CLINIC | Age: 65
End: 2025-03-26
Payer: COMMERCIAL

## 2025-03-26 DIAGNOSIS — M54.31 SCIATICA, RIGHT SIDE: ICD-10-CM

## 2025-03-26 DIAGNOSIS — M48.07 SPINAL STENOSIS, LUMBOSACRAL REGION: ICD-10-CM

## 2025-03-26 PROCEDURE — 99203 OFFICE O/P NEW LOW 30 MIN: CPT | Mod: 25

## 2025-03-26 PROCEDURE — 20611 DRAIN/INJ JOINT/BURSA W/US: CPT | Mod: RT

## 2025-03-28 ENCOUNTER — APPOINTMENT (OUTPATIENT)
Dept: ORTHOPEDIC SURGERY | Facility: CLINIC | Age: 65
End: 2025-03-28

## 2025-04-03 ENCOUNTER — OUTPATIENT (OUTPATIENT)
Dept: OUTPATIENT SERVICES | Facility: HOSPITAL | Age: 65
LOS: 1 days | Discharge: HOME | End: 2025-04-03

## 2025-04-03 DIAGNOSIS — C50.911 MALIGNANT NEOPLASM OF UNSPECIFIED SITE OF RIGHT FEMALE BREAST: ICD-10-CM

## 2025-04-03 DIAGNOSIS — Z90.11 ACQUIRED ABSENCE OF RIGHT BREAST AND NIPPLE: Chronic | ICD-10-CM

## 2025-04-03 DIAGNOSIS — Z90.13 ACQUIRED ABSENCE OF BILATERAL BREASTS AND NIPPLES: Chronic | ICD-10-CM

## 2025-04-03 DIAGNOSIS — C77.3 SECONDARY AND UNSPECIFIED MALIGNANT NEOPLASM OF AXILLA AND UPPER LIMB LYMPH NODES: ICD-10-CM

## 2025-04-03 DIAGNOSIS — Z98.890 OTHER SPECIFIED POSTPROCEDURAL STATES: Chronic | ICD-10-CM

## 2025-04-04 ENCOUNTER — APPOINTMENT (OUTPATIENT)
Dept: CT IMAGING | Facility: IMAGING CENTER | Age: 65
End: 2025-04-04
Payer: COMMERCIAL

## 2025-04-04 ENCOUNTER — APPOINTMENT (OUTPATIENT)
Dept: HEMATOLOGY ONCOLOGY | Facility: CLINIC | Age: 65
End: 2025-04-04
Payer: COMMERCIAL

## 2025-04-04 ENCOUNTER — OUTPATIENT (OUTPATIENT)
Dept: OUTPATIENT SERVICES | Facility: HOSPITAL | Age: 65
LOS: 1 days | End: 2025-04-04
Payer: COMMERCIAL

## 2025-04-04 VITALS
DIASTOLIC BLOOD PRESSURE: 82 MMHG | HEART RATE: 89 BPM | WEIGHT: 285.92 LBS | RESPIRATION RATE: 18 BRPM | SYSTOLIC BLOOD PRESSURE: 131 MMHG | TEMPERATURE: 97.5 F | BODY MASS INDEX: 44.78 KG/M2 | OXYGEN SATURATION: 96 %

## 2025-04-04 DIAGNOSIS — Z90.11 ACQUIRED ABSENCE OF RIGHT BREAST AND NIPPLE: Chronic | ICD-10-CM

## 2025-04-04 DIAGNOSIS — Z79.811 LONG TERM (CURRENT) USE OF AROMATASE INHIBITORS: ICD-10-CM

## 2025-04-04 DIAGNOSIS — C77.3 MALIGNANT NEOPLASM OF UNSPECIFIED SITE OF RIGHT FEMALE BREAST: ICD-10-CM

## 2025-04-04 DIAGNOSIS — N30.01 ACUTE CYSTITIS WITH HEMATURIA: ICD-10-CM

## 2025-04-04 DIAGNOSIS — C50.911 MALIGNANT NEOPLASM OF UNSPECIFIED SITE OF RIGHT FEMALE BREAST: ICD-10-CM

## 2025-04-04 DIAGNOSIS — Z90.13 ACQUIRED ABSENCE OF BILATERAL BREASTS AND NIPPLES: Chronic | ICD-10-CM

## 2025-04-04 PROCEDURE — 74176 CT ABD & PELVIS W/O CONTRAST: CPT | Mod: 26

## 2025-04-04 PROCEDURE — 99214 OFFICE O/P EST MOD 30 MIN: CPT

## 2025-04-04 PROCEDURE — 74176 CT ABD & PELVIS W/O CONTRAST: CPT

## 2025-04-08 ENCOUNTER — NON-APPOINTMENT (OUTPATIENT)
Age: 65
End: 2025-04-08

## 2025-04-08 ENCOUNTER — RESULT REVIEW (OUTPATIENT)
Age: 65
End: 2025-04-08

## 2025-04-09 ENCOUNTER — APPOINTMENT (OUTPATIENT)
Dept: ORTHOPEDIC SURGERY | Facility: CLINIC | Age: 65
End: 2025-04-09

## 2025-04-09 DIAGNOSIS — M48.07 SPINAL STENOSIS, LUMBOSACRAL REGION: ICD-10-CM

## 2025-04-09 PROCEDURE — 20611 DRAIN/INJ JOINT/BURSA W/US: CPT | Mod: RT

## 2025-04-23 ENCOUNTER — APPOINTMENT (OUTPATIENT)
Dept: ORTHOPEDIC SURGERY | Facility: CLINIC | Age: 65
End: 2025-04-23
Payer: COMMERCIAL

## 2025-04-23 DIAGNOSIS — M70.62 TROCHANTERIC BURSITIS, RIGHT HIP: ICD-10-CM

## 2025-04-23 DIAGNOSIS — M17.0 BILATERAL PRIMARY OSTEOARTHRITIS OF KNEE: ICD-10-CM

## 2025-04-23 DIAGNOSIS — E66.9 OBESITY, UNSPECIFIED: ICD-10-CM

## 2025-04-23 DIAGNOSIS — M70.61 TROCHANTERIC BURSITIS, RIGHT HIP: ICD-10-CM

## 2025-04-23 PROCEDURE — 20610 DRAIN/INJ JOINT/BURSA W/O US: CPT | Mod: RT

## 2025-04-23 PROCEDURE — 99214 OFFICE O/P EST MOD 30 MIN: CPT | Mod: 25

## 2025-04-25 ENCOUNTER — RESULT REVIEW (OUTPATIENT)
Age: 65
End: 2025-04-25

## 2025-04-25 ENCOUNTER — APPOINTMENT (OUTPATIENT)
Dept: CV DIAGNOSITCS | Facility: HOSPITAL | Age: 65
End: 2025-04-25

## 2025-04-25 ENCOUNTER — APPOINTMENT (OUTPATIENT)
Dept: INFUSION THERAPY | Facility: HOSPITAL | Age: 65
End: 2025-04-25

## 2025-04-25 ENCOUNTER — OUTPATIENT (OUTPATIENT)
Dept: OUTPATIENT SERVICES | Facility: HOSPITAL | Age: 65
LOS: 1 days | End: 2025-04-25
Payer: COMMERCIAL

## 2025-04-25 DIAGNOSIS — Z98.890 OTHER SPECIFIED POSTPROCEDURAL STATES: Chronic | ICD-10-CM

## 2025-04-25 DIAGNOSIS — Z92.21 PERSONAL HISTORY OF ANTINEOPLASTIC CHEMOTHERAPY: ICD-10-CM

## 2025-04-25 DIAGNOSIS — Z90.13 ACQUIRED ABSENCE OF BILATERAL BREASTS AND NIPPLES: Chronic | ICD-10-CM

## 2025-04-25 DIAGNOSIS — Z90.11 ACQUIRED ABSENCE OF RIGHT BREAST AND NIPPLE: Chronic | ICD-10-CM

## 2025-04-25 PROCEDURE — 93306 TTE W/DOPPLER COMPLETE: CPT

## 2025-04-25 PROCEDURE — 93356 MYOCRD STRAIN IMG SPCKL TRCK: CPT

## 2025-04-25 PROCEDURE — 93306 TTE W/DOPPLER COMPLETE: CPT | Mod: 26

## 2025-04-30 ENCOUNTER — APPOINTMENT (OUTPATIENT)
Dept: ORTHOPEDIC SURGERY | Facility: CLINIC | Age: 65
End: 2025-04-30

## 2025-05-02 ENCOUNTER — APPOINTMENT (OUTPATIENT)
Dept: FAMILY MEDICINE | Facility: CLINIC | Age: 65
End: 2025-05-02

## 2025-05-02 VITALS
BODY MASS INDEX: 44.89 KG/M2 | HEIGHT: 67 IN | OXYGEN SATURATION: 96 % | SYSTOLIC BLOOD PRESSURE: 132 MMHG | HEART RATE: 86 BPM | DIASTOLIC BLOOD PRESSURE: 79 MMHG | WEIGHT: 286 LBS | RESPIRATION RATE: 18 BRPM | TEMPERATURE: 97.7 F

## 2025-05-02 DIAGNOSIS — M43.17 SPONDYLOLISTHESIS, LUMBOSACRAL REGION: ICD-10-CM

## 2025-05-02 DIAGNOSIS — N30.01 ACUTE CYSTITIS WITH HEMATURIA: ICD-10-CM

## 2025-05-02 DIAGNOSIS — M54.31 SCIATICA, RIGHT SIDE: ICD-10-CM

## 2025-05-02 DIAGNOSIS — M48.07 SPINAL STENOSIS, LUMBOSACRAL REGION: ICD-10-CM

## 2025-05-02 DIAGNOSIS — Z87.09 PERSONAL HISTORY OF OTHER DISEASES OF THE RESPIRATORY SYSTEM: ICD-10-CM

## 2025-05-02 DIAGNOSIS — M19.019 PRIMARY OSTEOARTHRITIS, UNSPECIFIED SHOULDER: ICD-10-CM

## 2025-05-02 DIAGNOSIS — G89.18 OTHER ACUTE POSTPROCEDURAL PAIN: ICD-10-CM

## 2025-05-02 DIAGNOSIS — L40.50 ARTHROPATHIC PSORIASIS, UNSPECIFIED: ICD-10-CM

## 2025-05-02 DIAGNOSIS — M17.0 BILATERAL PRIMARY OSTEOARTHRITIS OF KNEE: ICD-10-CM

## 2025-05-02 PROCEDURE — G2211 COMPLEX E/M VISIT ADD ON: CPT | Mod: NC

## 2025-05-02 PROCEDURE — 99214 OFFICE O/P EST MOD 30 MIN: CPT

## 2025-05-07 PROBLEM — Z87.09 HISTORY OF ACUTE SINUSITIS: Status: RESOLVED | Noted: 2022-12-09 | Resolved: 2025-05-07

## 2025-05-07 PROBLEM — G89.18 POST-OPERATIVE PAIN: Status: RESOLVED | Noted: 2020-11-17 | Resolved: 2025-05-07

## 2025-05-07 PROBLEM — N30.01 ACUTE CYSTITIS WITH HEMATURIA: Status: RESOLVED | Noted: 2025-01-22 | Resolved: 2025-05-07

## 2025-05-20 ENCOUNTER — RX RENEWAL (OUTPATIENT)
Age: 65
End: 2025-05-20

## 2025-05-28 ENCOUNTER — APPOINTMENT (OUTPATIENT)
Dept: RHEUMATOLOGY | Facility: CLINIC | Age: 65
End: 2025-05-28

## 2025-05-28 VITALS
HEART RATE: 102 BPM | TEMPERATURE: 97.2 F | DIASTOLIC BLOOD PRESSURE: 88 MMHG | BODY MASS INDEX: 37.98 KG/M2 | OXYGEN SATURATION: 97 % | HEIGHT: 67 IN | RESPIRATION RATE: 18 BRPM | SYSTOLIC BLOOD PRESSURE: 138 MMHG | WEIGHT: 242 LBS

## 2025-05-28 DIAGNOSIS — M67.959 UNSPECIFIED DISORDER OF SYNOVIUM AND TENDON, UNSPECIFIED THIGH: ICD-10-CM

## 2025-05-28 DIAGNOSIS — L40.9 PSORIASIS, UNSPECIFIED: ICD-10-CM

## 2025-05-28 DIAGNOSIS — L40.50 ARTHROPATHIC PSORIASIS, UNSPECIFIED: ICD-10-CM

## 2025-05-28 DIAGNOSIS — D84.9 IMMUNODEFICIENCY, UNSPECIFIED: ICD-10-CM

## 2025-05-28 DIAGNOSIS — M25.50 PAIN IN UNSPECIFIED JOINT: ICD-10-CM

## 2025-05-28 PROCEDURE — 99214 OFFICE O/P EST MOD 30 MIN: CPT

## 2025-05-28 PROCEDURE — G2211 COMPLEX E/M VISIT ADD ON: CPT | Mod: NC

## 2025-05-30 ENCOUNTER — OUTPATIENT (OUTPATIENT)
Dept: OUTPATIENT SERVICES | Facility: HOSPITAL | Age: 65
LOS: 1 days | End: 2025-05-30
Payer: COMMERCIAL

## 2025-05-30 ENCOUNTER — APPOINTMENT (OUTPATIENT)
Dept: MRI IMAGING | Facility: IMAGING CENTER | Age: 65
End: 2025-05-30
Payer: COMMERCIAL

## 2025-05-30 DIAGNOSIS — Z98.890 OTHER SPECIFIED POSTPROCEDURAL STATES: Chronic | ICD-10-CM

## 2025-05-30 DIAGNOSIS — Z00.8 ENCOUNTER FOR OTHER GENERAL EXAMINATION: ICD-10-CM

## 2025-05-30 DIAGNOSIS — Z90.13 ACQUIRED ABSENCE OF BILATERAL BREASTS AND NIPPLES: Chronic | ICD-10-CM

## 2025-05-30 DIAGNOSIS — Z90.11 ACQUIRED ABSENCE OF RIGHT BREAST AND NIPPLE: Chronic | ICD-10-CM

## 2025-05-30 DIAGNOSIS — M43.16 SPONDYLOLISTHESIS, LUMBAR REGION: ICD-10-CM

## 2025-05-30 PROCEDURE — 72148 MRI LUMBAR SPINE W/O DYE: CPT

## 2025-05-30 PROCEDURE — A9585: CPT

## 2025-05-30 PROCEDURE — 72148 MRI LUMBAR SPINE W/O DYE: CPT | Mod: 26

## 2025-06-06 ENCOUNTER — APPOINTMENT (OUTPATIENT)
Dept: INFUSION THERAPY | Facility: HOSPITAL | Age: 65
End: 2025-06-06

## 2025-06-08 ENCOUNTER — NON-APPOINTMENT (OUTPATIENT)
Age: 65
End: 2025-06-08

## 2025-06-10 ENCOUNTER — APPOINTMENT (OUTPATIENT)
Dept: ORTHOPEDIC SURGERY | Facility: CLINIC | Age: 65
End: 2025-06-10
Payer: COMMERCIAL

## 2025-06-10 VITALS — BODY MASS INDEX: 37.98 KG/M2 | HEIGHT: 67 IN | WEIGHT: 242 LBS

## 2025-06-10 PROCEDURE — 99214 OFFICE O/P EST MOD 30 MIN: CPT

## 2025-06-10 PROCEDURE — 73610 X-RAY EXAM OF ANKLE: CPT | Mod: LT

## 2025-06-10 PROCEDURE — 73620 X-RAY EXAM OF FOOT: CPT | Mod: LT

## 2025-06-11 ENCOUNTER — APPOINTMENT (OUTPATIENT)
Dept: ORTHOPEDIC SURGERY | Facility: CLINIC | Age: 65
End: 2025-06-11

## 2025-06-12 ENCOUNTER — RESULT REVIEW (OUTPATIENT)
Age: 65
End: 2025-06-12

## 2025-06-12 ENCOUNTER — OUTPATIENT (OUTPATIENT)
Dept: OUTPATIENT SERVICES | Facility: HOSPITAL | Age: 65
LOS: 1 days | End: 2025-06-12
Payer: COMMERCIAL

## 2025-06-12 ENCOUNTER — APPOINTMENT (OUTPATIENT)
Dept: CT IMAGING | Facility: IMAGING CENTER | Age: 65
End: 2025-06-12
Payer: COMMERCIAL

## 2025-06-12 DIAGNOSIS — Z90.11 ACQUIRED ABSENCE OF RIGHT BREAST AND NIPPLE: Chronic | ICD-10-CM

## 2025-06-12 DIAGNOSIS — S92.902A UNSPECIFIED FRACTURE OF LEFT FOOT, INITIAL ENCOUNTER FOR CLOSED FRACTURE: ICD-10-CM

## 2025-06-12 DIAGNOSIS — Z98.890 OTHER SPECIFIED POSTPROCEDURAL STATES: Chronic | ICD-10-CM

## 2025-06-12 DIAGNOSIS — Z90.13 ACQUIRED ABSENCE OF BILATERAL BREASTS AND NIPPLES: Chronic | ICD-10-CM

## 2025-06-12 PROCEDURE — 73700 CT LOWER EXTREMITY W/O DYE: CPT | Mod: 26,LT

## 2025-06-12 PROCEDURE — 76376 3D RENDER W/INTRP POSTPROCES: CPT

## 2025-06-12 PROCEDURE — 76376 3D RENDER W/INTRP POSTPROCES: CPT | Mod: 26

## 2025-06-12 PROCEDURE — 73700 CT LOWER EXTREMITY W/O DYE: CPT

## 2025-06-23 ENCOUNTER — NON-APPOINTMENT (OUTPATIENT)
Age: 65
End: 2025-06-23

## 2025-07-02 ENCOUNTER — OUTPATIENT (OUTPATIENT)
Dept: OUTPATIENT SERVICES | Facility: HOSPITAL | Age: 65
LOS: 1 days | End: 2025-07-02
Payer: COMMERCIAL

## 2025-07-02 ENCOUNTER — APPOINTMENT (OUTPATIENT)
Dept: RADIOLOGY | Facility: IMAGING CENTER | Age: 65
End: 2025-07-02
Payer: COMMERCIAL

## 2025-07-02 DIAGNOSIS — Z98.890 OTHER SPECIFIED POSTPROCEDURAL STATES: Chronic | ICD-10-CM

## 2025-07-02 DIAGNOSIS — Z90.11 ACQUIRED ABSENCE OF RIGHT BREAST AND NIPPLE: Chronic | ICD-10-CM

## 2025-07-02 DIAGNOSIS — Z79.811 LONG TERM (CURRENT) USE OF AROMATASE INHIBITORS: ICD-10-CM

## 2025-07-02 DIAGNOSIS — Z90.13 ACQUIRED ABSENCE OF BILATERAL BREASTS AND NIPPLES: Chronic | ICD-10-CM

## 2025-07-02 PROCEDURE — 77080 DXA BONE DENSITY AXIAL: CPT

## 2025-07-02 PROCEDURE — 77080 DXA BONE DENSITY AXIAL: CPT | Mod: 26

## 2025-07-08 ENCOUNTER — APPOINTMENT (OUTPATIENT)
Dept: RHEUMATOLOGY | Facility: CLINIC | Age: 65
End: 2025-07-08
Payer: COMMERCIAL

## 2025-07-08 PROCEDURE — 99214 OFFICE O/P EST MOD 30 MIN: CPT | Mod: 95

## 2025-07-08 PROCEDURE — G2211 COMPLEX E/M VISIT ADD ON: CPT | Mod: NC,95

## 2025-07-09 NOTE — PATIENT PROFILE ADULT - LONGEST PERIOD TOBACCO-FREE
Remote CAD last ST 2021. Of note was resting tachycardia today. She claims she drinks enough during the day. She takes her meds at night including hydrochlorothiazide. She isnt fazed by waking to urinate at night.   2 years ago

## 2025-07-10 RX ORDER — GUSELKUMAB 100 MG/ML
100 INJECTION SUBCUTANEOUS
Qty: 1 | Refills: 5 | Status: ACTIVE | COMMUNITY
Start: 2025-07-08

## 2025-07-10 RX ORDER — GUSELKUMAB 100 MG/ML
100 INJECTION SUBCUTANEOUS
Qty: 2 | Refills: 0 | Status: ACTIVE | COMMUNITY
Start: 2025-07-08

## 2025-07-12 ENCOUNTER — NON-APPOINTMENT (OUTPATIENT)
Age: 65
End: 2025-07-12

## 2025-07-14 ENCOUNTER — NON-APPOINTMENT (OUTPATIENT)
Age: 65
End: 2025-07-14

## 2025-07-17 ENCOUNTER — TRANSCRIPTION ENCOUNTER (OUTPATIENT)
Age: 65
End: 2025-07-17

## 2025-07-18 ENCOUNTER — TRANSCRIPTION ENCOUNTER (OUTPATIENT)
Age: 65
End: 2025-07-18

## 2025-07-19 ENCOUNTER — TRANSCRIPTION ENCOUNTER (OUTPATIENT)
Age: 65
End: 2025-07-19

## 2025-07-24 PROBLEM — M06.9 RHEUMATOID ARTHRITIS, UNSPECIFIED: Chronic | Status: ACTIVE | Noted: 2025-07-18
